# Patient Record
Sex: FEMALE | Race: BLACK OR AFRICAN AMERICAN | NOT HISPANIC OR LATINO | URBAN - METROPOLITAN AREA
[De-identification: names, ages, dates, MRNs, and addresses within clinical notes are randomized per-mention and may not be internally consistent; named-entity substitution may affect disease eponyms.]

---

## 2021-04-15 ENCOUNTER — INPATIENT (INPATIENT)
Facility: HOSPITAL | Age: 76
LOS: 17 days | Discharge: NURSING, REHAB OR LONG-TERM CARE | End: 2021-05-03
Attending: INTERNAL MEDICINE | Admitting: INTERNAL MEDICINE
Payer: MEDICARE

## 2021-04-15 VITALS
DIASTOLIC BLOOD PRESSURE: 79 MMHG | SYSTOLIC BLOOD PRESSURE: 145 MMHG | RESPIRATION RATE: 16 BRPM | TEMPERATURE: 98 F | WEIGHT: 188.94 LBS | OXYGEN SATURATION: 100 % | HEART RATE: 68 BPM | HEIGHT: 64 IN

## 2021-04-15 DIAGNOSIS — K92.2 GASTROINTESTINAL HEMORRHAGE, UNSPECIFIED: Chronic | ICD-10-CM

## 2021-04-15 LAB
ALBUMIN SERPL ELPH-MCNC: 3.6 G/DL — SIGNIFICANT CHANGE UP (ref 3.3–5)
ALP SERPL-CCNC: 87 U/L — SIGNIFICANT CHANGE UP (ref 40–120)
ALT FLD-CCNC: 18 U/L — SIGNIFICANT CHANGE UP (ref 12–78)
ANION GAP SERPL CALC-SCNC: 7 MMOL/L — SIGNIFICANT CHANGE UP (ref 5–17)
APPEARANCE UR: CLEAR — SIGNIFICANT CHANGE UP
AST SERPL-CCNC: 24 U/L — SIGNIFICANT CHANGE UP (ref 15–37)
BACTERIA # UR AUTO: ABNORMAL
BASOPHILS # BLD AUTO: 0.03 K/UL — SIGNIFICANT CHANGE UP (ref 0–0.2)
BASOPHILS NFR BLD AUTO: 0.5 % — SIGNIFICANT CHANGE UP (ref 0–2)
BILIRUB SERPL-MCNC: 0.4 MG/DL — SIGNIFICANT CHANGE UP (ref 0.2–1.2)
BILIRUB UR-MCNC: NEGATIVE — SIGNIFICANT CHANGE UP
BUN SERPL-MCNC: 67 MG/DL — HIGH (ref 7–23)
CALCIUM SERPL-MCNC: 8.4 MG/DL — LOW (ref 8.5–10.1)
CHLORIDE SERPL-SCNC: 118 MMOL/L — HIGH (ref 96–108)
CO2 SERPL-SCNC: 17 MMOL/L — LOW (ref 22–31)
COLOR SPEC: YELLOW — SIGNIFICANT CHANGE UP
CREAT SERPL-MCNC: 3.43 MG/DL — HIGH (ref 0.5–1.3)
DIFF PNL FLD: NEGATIVE — SIGNIFICANT CHANGE UP
EOSINOPHIL # BLD AUTO: 0.19 K/UL — SIGNIFICANT CHANGE UP (ref 0–0.5)
EOSINOPHIL NFR BLD AUTO: 3.1 % — SIGNIFICANT CHANGE UP (ref 0–6)
EPI CELLS # UR: SIGNIFICANT CHANGE UP
GLUCOSE BLDC GLUCOMTR-MCNC: 244 MG/DL — HIGH (ref 70–99)
GLUCOSE BLDC GLUCOMTR-MCNC: 248 MG/DL — HIGH (ref 70–99)
GLUCOSE SERPL-MCNC: 147 MG/DL — HIGH (ref 70–99)
GLUCOSE UR QL: NEGATIVE MG/DL — SIGNIFICANT CHANGE UP
HCT VFR BLD CALC: 27.5 % — LOW (ref 34.5–45)
HGB BLD-MCNC: 8.9 G/DL — LOW (ref 11.5–15.5)
IMM GRANULOCYTES NFR BLD AUTO: 1.1 % — SIGNIFICANT CHANGE UP (ref 0–1.5)
KETONES UR-MCNC: NEGATIVE — SIGNIFICANT CHANGE UP
LEUKOCYTE ESTERASE UR-ACNC: ABNORMAL
LYMPHOCYTES # BLD AUTO: 1.51 K/UL — SIGNIFICANT CHANGE UP (ref 1–3.3)
LYMPHOCYTES # BLD AUTO: 24.8 % — SIGNIFICANT CHANGE UP (ref 13–44)
MCHC RBC-ENTMCNC: 27.6 PG — SIGNIFICANT CHANGE UP (ref 27–34)
MCHC RBC-ENTMCNC: 32.4 GM/DL — SIGNIFICANT CHANGE UP (ref 32–36)
MCV RBC AUTO: 85.4 FL — SIGNIFICANT CHANGE UP (ref 80–100)
MONOCYTES # BLD AUTO: 0.32 K/UL — SIGNIFICANT CHANGE UP (ref 0–0.9)
MONOCYTES NFR BLD AUTO: 5.3 % — SIGNIFICANT CHANGE UP (ref 2–14)
NEUTROPHILS # BLD AUTO: 3.97 K/UL — SIGNIFICANT CHANGE UP (ref 1.8–7.4)
NEUTROPHILS NFR BLD AUTO: 65.2 % — SIGNIFICANT CHANGE UP (ref 43–77)
NITRITE UR-MCNC: NEGATIVE — SIGNIFICANT CHANGE UP
NRBC # BLD: 0 /100 WBCS — SIGNIFICANT CHANGE UP (ref 0–0)
PH UR: 5 — SIGNIFICANT CHANGE UP (ref 5–8)
PLATELET # BLD AUTO: 189 K/UL — SIGNIFICANT CHANGE UP (ref 150–400)
POTASSIUM SERPL-MCNC: 5.7 MMOL/L — HIGH (ref 3.5–5.3)
POTASSIUM SERPL-SCNC: 5.7 MMOL/L — HIGH (ref 3.5–5.3)
PROT SERPL-MCNC: 7.2 GM/DL — SIGNIFICANT CHANGE UP (ref 6–8.3)
PROT UR-MCNC: 100 MG/DL
RAPID RVP RESULT: SIGNIFICANT CHANGE UP
RBC # BLD: 3.22 M/UL — LOW (ref 3.8–5.2)
RBC # FLD: 14.5 % — SIGNIFICANT CHANGE UP (ref 10.3–14.5)
RBC CASTS # UR COMP ASSIST: SIGNIFICANT CHANGE UP /HPF (ref 0–4)
SARS-COV-2 RNA SPEC QL NAA+PROBE: SIGNIFICANT CHANGE UP
SODIUM SERPL-SCNC: 142 MMOL/L — SIGNIFICANT CHANGE UP (ref 135–145)
SP GR SPEC: 1.01 — SIGNIFICANT CHANGE UP (ref 1.01–1.02)
UROBILINOGEN FLD QL: NEGATIVE MG/DL — SIGNIFICANT CHANGE UP
WBC # BLD: 6.09 K/UL — SIGNIFICANT CHANGE UP (ref 3.8–10.5)
WBC # FLD AUTO: 6.09 K/UL — SIGNIFICANT CHANGE UP (ref 3.8–10.5)
WBC UR QL: SIGNIFICANT CHANGE UP

## 2021-04-15 PROCEDURE — 99285 EMERGENCY DEPT VISIT HI MDM: CPT | Mod: CS

## 2021-04-15 PROCEDURE — 72100 X-RAY EXAM L-S SPINE 2/3 VWS: CPT | Mod: 26

## 2021-04-15 PROCEDURE — 72131 CT LUMBAR SPINE W/O DYE: CPT | Mod: 26,MA

## 2021-04-15 PROCEDURE — 93970 EXTREMITY STUDY: CPT | Mod: 26

## 2021-04-15 PROCEDURE — 99223 1ST HOSP IP/OBS HIGH 75: CPT

## 2021-04-15 RX ORDER — SIMVASTATIN 20 MG/1
40 TABLET, FILM COATED ORAL AT BEDTIME
Refills: 0 | Status: DISCONTINUED | OUTPATIENT
Start: 2021-04-15 | End: 2021-05-03

## 2021-04-15 RX ORDER — SODIUM POLYSTYRENE SULFONATE 4.1 MEQ/G
30 POWDER, FOR SUSPENSION ORAL ONCE
Refills: 0 | Status: COMPLETED | OUTPATIENT
Start: 2021-04-15 | End: 2021-04-15

## 2021-04-15 RX ORDER — LABETALOL HCL 100 MG
200 TABLET ORAL THREE TIMES A DAY
Refills: 0 | Status: DISCONTINUED | OUTPATIENT
Start: 2021-04-15 | End: 2021-04-22

## 2021-04-15 RX ORDER — DEXTROSE 50 % IN WATER 50 %
15 SYRINGE (ML) INTRAVENOUS ONCE
Refills: 0 | Status: DISCONTINUED | OUTPATIENT
Start: 2021-04-15 | End: 2021-05-03

## 2021-04-15 RX ORDER — SODIUM CHLORIDE 9 MG/ML
1000 INJECTION, SOLUTION INTRAVENOUS
Refills: 0 | Status: DISCONTINUED | OUTPATIENT
Start: 2021-04-15 | End: 2021-05-03

## 2021-04-15 RX ORDER — DEXTROSE 50 % IN WATER 50 %
25 SYRINGE (ML) INTRAVENOUS ONCE
Refills: 0 | Status: DISCONTINUED | OUTPATIENT
Start: 2021-04-15 | End: 2021-05-03

## 2021-04-15 RX ORDER — GABAPENTIN 400 MG/1
300 CAPSULE ORAL AT BEDTIME
Refills: 0 | Status: DISCONTINUED | OUTPATIENT
Start: 2021-04-15 | End: 2021-05-03

## 2021-04-15 RX ORDER — DEXTROSE 50 % IN WATER 50 %
12.5 SYRINGE (ML) INTRAVENOUS ONCE
Refills: 0 | Status: DISCONTINUED | OUTPATIENT
Start: 2021-04-15 | End: 2021-05-03

## 2021-04-15 RX ORDER — AMLODIPINE BESYLATE 2.5 MG/1
10 TABLET ORAL DAILY
Refills: 0 | Status: DISCONTINUED | OUTPATIENT
Start: 2021-04-15 | End: 2021-05-03

## 2021-04-15 RX ORDER — METHOCARBAMOL 500 MG/1
1000 TABLET, FILM COATED ORAL ONCE
Refills: 0 | Status: COMPLETED | OUTPATIENT
Start: 2021-04-15 | End: 2021-04-15

## 2021-04-15 RX ORDER — LIDOCAINE 4 G/100G
1 CREAM TOPICAL DAILY
Refills: 0 | Status: DISCONTINUED | OUTPATIENT
Start: 2021-04-15 | End: 2021-05-03

## 2021-04-15 RX ORDER — HEPARIN SODIUM 5000 [USP'U]/ML
5000 INJECTION INTRAVENOUS; SUBCUTANEOUS EVERY 12 HOURS
Refills: 0 | Status: COMPLETED | OUTPATIENT
Start: 2021-04-15 | End: 2021-04-29

## 2021-04-15 RX ORDER — ONDANSETRON 8 MG/1
4 TABLET, FILM COATED ORAL ONCE
Refills: 0 | Status: COMPLETED | OUTPATIENT
Start: 2021-04-15 | End: 2021-04-15

## 2021-04-15 RX ORDER — POLYETHYLENE GLYCOL 3350 17 G/17G
17 POWDER, FOR SOLUTION ORAL DAILY
Refills: 0 | Status: DISCONTINUED | OUTPATIENT
Start: 2021-04-15 | End: 2021-05-03

## 2021-04-15 RX ORDER — INSULIN LISPRO 100/ML
VIAL (ML) SUBCUTANEOUS
Refills: 0 | Status: DISCONTINUED | OUTPATIENT
Start: 2021-04-15 | End: 2021-05-03

## 2021-04-15 RX ORDER — FUROSEMIDE 40 MG
40 TABLET ORAL DAILY
Refills: 0 | Status: DISCONTINUED | OUTPATIENT
Start: 2021-04-15 | End: 2021-04-20

## 2021-04-15 RX ORDER — TRAMADOL HYDROCHLORIDE 50 MG/1
50 TABLET ORAL ONCE
Refills: 0 | Status: DISCONTINUED | OUTPATIENT
Start: 2021-04-15 | End: 2021-04-15

## 2021-04-15 RX ORDER — GLUCAGON INJECTION, SOLUTION 0.5 MG/.1ML
1 INJECTION, SOLUTION SUBCUTANEOUS ONCE
Refills: 0 | Status: DISCONTINUED | OUTPATIENT
Start: 2021-04-15 | End: 2021-05-03

## 2021-04-15 RX ORDER — INSULIN GLARGINE 100 [IU]/ML
12 INJECTION, SOLUTION SUBCUTANEOUS AT BEDTIME
Refills: 0 | Status: DISCONTINUED | OUTPATIENT
Start: 2021-04-15 | End: 2021-04-25

## 2021-04-15 RX ORDER — SENNA PLUS 8.6 MG/1
2 TABLET ORAL AT BEDTIME
Refills: 0 | Status: DISCONTINUED | OUTPATIENT
Start: 2021-04-15 | End: 2021-05-03

## 2021-04-15 RX ORDER — OXYCODONE AND ACETAMINOPHEN 5; 325 MG/1; MG/1
1 TABLET ORAL EVERY 4 HOURS
Refills: 0 | Status: DISCONTINUED | OUTPATIENT
Start: 2021-04-15 | End: 2021-04-19

## 2021-04-15 RX ORDER — MORPHINE SULFATE 50 MG/1
2 CAPSULE, EXTENDED RELEASE ORAL ONCE
Refills: 0 | Status: DISCONTINUED | OUTPATIENT
Start: 2021-04-15 | End: 2021-04-15

## 2021-04-15 RX ADMIN — LIDOCAINE 1 PATCH: 4 CREAM TOPICAL at 19:34

## 2021-04-15 RX ADMIN — GABAPENTIN 300 MILLIGRAM(S): 400 CAPSULE ORAL at 21:03

## 2021-04-15 RX ADMIN — Medication 40 MILLIGRAM(S): at 20:14

## 2021-04-15 RX ADMIN — Medication 125 MILLIGRAM(S): at 18:07

## 2021-04-15 RX ADMIN — Medication 200 MILLIGRAM(S): at 20:14

## 2021-04-15 RX ADMIN — TRAMADOL HYDROCHLORIDE 50 MILLIGRAM(S): 50 TABLET ORAL at 15:21

## 2021-04-15 RX ADMIN — METHOCARBAMOL 1000 MILLIGRAM(S): 500 TABLET, FILM COATED ORAL at 15:21

## 2021-04-15 RX ADMIN — SIMVASTATIN 40 MILLIGRAM(S): 20 TABLET, FILM COATED ORAL at 21:03

## 2021-04-15 RX ADMIN — SODIUM POLYSTYRENE SULFONATE 30 GRAM(S): 4.1 POWDER, FOR SUSPENSION ORAL at 18:31

## 2021-04-15 RX ADMIN — INSULIN GLARGINE 12 UNIT(S): 100 INJECTION, SOLUTION SUBCUTANEOUS at 21:04

## 2021-04-15 NOTE — ED PROVIDER NOTE - CONSTITUTIONAL, MLM
aAwake, alert, oriented to person, place, time/situation, appear in pain especially with movement normal...

## 2021-04-15 NOTE — ED ADULT NURSE NOTE - OBJECTIVE STATEMENT
A&Ox4, brought in by EMS on stretcher. c/o left groin pain radiating down to left toes, sharp pain 10/10, started today around 1am, pain woke pt up from sleep. constant, worse with ambulating. A&Ox4, brought in by EMS on stretcher. c/o left groin pain radiating down to left toes, sharp pain 10/10, started today around 1am, pain woke pt up from sleep. constant, worse with ambulating. states that both legs were swollen all week. PMH b/l DVT 2008. Alix [place] and SOB

## 2021-04-15 NOTE — H&P ADULT - ASSESSMENT
75 year old female presents today c/o left sided back pain radiating into the left side since waking up this morning. Patient states that she was attempting to get out of bed to use the bathroom when she experienced sever pain described as an ache felt inside associated with numbness of the left leg. Patient  reports difficulty ambulating due to the pain worsening when she bears weight. Patient denies recent trauma but does report having physical therapy in 2007 due to problems in her lower back (-) urinary or bowel incontinence, fevers or chills, flu like symptoms, chest pain or sob. Called her PMD this morning and told to go to ER for an evaluation.     Ortho:  75 year old female presents today c/o left sided back pain radiating into the left side since waking up this morning. Patient states that she was attempting to get out of bed to use the bathroom when she experienced sever pain described as an ache felt inside associated with numbness of the left leg. Patient  reports difficulty ambulating due to the pain worsening when she bears weight. Patient denies recent trauma but does report having physical therapy in 2007 due to problems in her lower back (-) urinary or bowel incontinence, fevers or chills, flu like symptoms, chest pain or sob. Called her PMD this morning and told to go to ER for an evaluation.     Ortho:  Acute left leg siatica, moderate to severe spinal stenosis. PT eval, lidocaine patch, currently refusing medrol dose pack, may   reconsider in AM. Asked Ortho to eval. PRN percocet, DVT prophylaxis.     CV: Continue BP meds from home, Clonidine patch every Monday, is on in ER, Norvasc 10 mg/day, Labatelol 200 mg tid, Lasix   40 mg/day and ZXocur 40 mg/day.    DM: Uses NPH 70/30 bid about 15 units/day. Will change to Lantus 15 units at night.     75 year old female presents today c/o left sided back pain radiating into the left side since waking up this morning. Patient states that she was attempting to get out of bed to use the bathroom when she experienced sever pain described as an ache felt inside associated with numbness of the left leg. Patient  reports difficulty ambulating due to the pain worsening when she bears weight. Patient denies recent trauma but does report having physical therapy in 2007 due to problems in her lower back (-) urinary or bowel incontinence, fevers or chills, flu like symptoms, chest pain or sob. Called her PMD this morning and told to go to ER for an evaluation.     Ortho:  Acute left leg siatica, moderate to severe spinal stenosis. PT eval, lidocaine patch, currently refusing medrol dose pack, may   reconsider in AM. Asked Ortho to eval. PRN percocet, DVT prophylaxis.     CV: Continue BP meds from home, Clonidine patch every Monday, is on in ER, Norvasc 10 mg/day, Labatelol 200 mg tid, Lasix   40 mg/day and Zocur 40 mg/day.    DM: Uses NPH 70/30 bid about 15 units total per day. Will change to Lantus 15 units at night.        Renal: CKD IV stable, at baseline with creatinine 3.43. High K in ER, was given k-exolate by ER, repeat in AM. Renal diabetic diet.    75 year old female presents today c/o left sided back pain radiating into the left side since waking up this morning. Patient states that she was attempting to get out of bed to use the bathroom when she experienced sever pain described as an ache felt inside associated with numbness of the left leg. Patient  reports difficulty ambulating due to the pain worsening when she bears weight. Patient denies recent trauma but does report having physical therapy in 2007 due to problems in her lower back (-) urinary or bowel incontinence, fevers or chills, flu like symptoms, chest pain or sob. Called her PMD this morning and told to go to ER for an evaluation.     Ortho:  Acute left leg siatica, moderate to severe spinal stenosis. PT eval, lidocaine patch, currently refusing medrol dose pack, may   reconsider in AM. Asked Ortho to eval. PRN percocet, DVT prophylaxis. Start Neurontin 300 mg at night. Max dose per day. Confirmed dose with pharmacy  for CKD IV.     CV: Continue BP meds from home, Clonidine patch weekly every Monday .3 mg, is on in ER from home, Norvasc 10 mg/day, Labatelol 200 mg tid, Lasix   40 mg/day and Zocur 40 mg/day.    DM: Uses NPH 70/30 bid about 15 units total per day. Will change to Lantus 15 units at night.        Renal: CKD IV stable, at baseline with creatinine 3.43. High K in ER, was given k-exolate by ER, repeat in AM. Renal diabetic diet.    HGB likely anemia of chronic disease with HGB 8.9.   75 year old female presents today c/o left sided back pain radiating into the left side since waking up this morning. Patient states that she was attempting to get out of bed to use the bathroom when she experienced sever pain described as an ache felt inside associated with numbness of the left leg. Patient  reports difficulty ambulating due to the pain worsening when she bears weight. Patient denies recent trauma but does report having physical therapy in 2007 due to problems in her lower back (-) urinary or bowel incontinence, fevers or chills, flu like symptoms, chest pain or sob. Called her PMD this morning and told to go to ER for an evaluation.     Ortho:  Acute left leg siatica, moderate to severe spinal stenosis. PT eval, lidocaine patch, currently refusing medrol dose pack, may   reconsider in AM. Asked Ortho to eval. PRN percocet, DVT prophylaxis. Start Neurontin 300 mg at night. Max dose per day. Confirmed dose with pharmacy  for CKD IV.     CV: Continue BP meds from home, Clonidine patch weekly every Monday .3 mg, is on in ER from home, Norvasc 10 mg/day, Labatelol 200 mg tid, Lasix   40 mg/day and Zocur 40 mg/day.    DM: Uses NPH 70/30 bid about 15 units total per day. Will change to Lantus 12 units at night.        Renal: CKD IV stable, at baseline with creatinine 3.43. High K in ER, was given k-exolate by ER, repeat in AM. Renal diabetic diet.    HGB likely anemia of chronic disease with HGB 8.9.

## 2021-04-15 NOTE — CONSULT NOTE ADULT - SUBJECTIVE AND OBJECTIVE BOX
Patient is a 75y Female who presents c/o back pain that radiates down the left leg that began 1 day ago without any inciting factor. Patient states that she hasn't experienced pain this severe before. Denies trauma or fall. Denies pain/injury elsewhere. Admits to radiation of pain down the left extremity. Denies bowel/bladder incontinence. Denies fevers/chills. No other complaints at this time. Patient is a community ambulator with a walker. Patient has a history of a pacemaker and stage 4 chronic kidney disease.       Allergies    Eliquis (Other)          PAST MEDICAL & SURGICAL HISTORY:  HTN (hypertension)    Pacemaker    DVT (deep venous thrombosis)    Diverticulitis    Chronic renal disease    Intestinal bleeding                              8.9    6.09  )-----------( 189      ( 15 Apr 2021 15:04 )             27.5       15 Apr 2021 15:04    142    |  118    |  67     ----------------------------<  147    5.7     |  17     |  3.43     Ca    8.4        15 Apr 2021 15:04    TPro  7.2    /  Alb  3.6    /  TBili  0.4    /  DBili  x      /  AST  24     /  ALT  18     /  AlkPhos  87     15 Apr 2021 15:04          Urinalysis Basic - ( 15 Apr 2021 20:01 )    Color: Yellow / Appearance: Clear / S.015 / pH: x  Gluc: x / Ketone: Negative  / Bili: Negative / Urobili: Negative mg/dL   Blood: x / Protein: 100 mg/dL / Nitrite: Negative   Leuk Esterase: Trace / RBC: 0-2 /HPF / WBC 3-5   Sq Epi: x / Non Sq Epi: Few / Bacteria: Few        Vital Signs Last 24 Hrs  T(C): 36.4 (04-15-21 @ 18:41), Max: 36.6 (04-15-21 @ 13:33)  T(F): 97.5 (04-15-21 @ 18:41), Max: 97.9 (04-15-21 @ 13:33)  HR: 60 (04-15-21 @ 20:10) (60 - 68)  BP: 170/70 (04-15-21 @ 20:10) (145/79 - 171/75)  BP(mean): --  RR: 17 (04-15-21 @ 20:10) (16 - 21)  SpO2: 98% (04-15-21 @ 20:10) (98% - 100%)    Physical Exam:  Gen: NAD  Spine:  Skin intact  Lumbar scoliosis on exam   No midline TTP C/T/L/S spine  No bony step offs  Paraspinal muscle ttp/hypertonicity in the lumbar area  Pain with Straight leg raise of the left side  Negative clonus  Negative babinski  Negative cobian    Motor:                   C5                C6              C7               C8           T1   R            5/5                5/5            5/5             5/5          5/5  L             5/5               5/5             5/5             5/5          5/5                L2             L3             L4               L5            S1  R         5/5           5/5          5/5             5/5           5/5  L          3/5          4/5           5/5             5/5           5/5    Sensory:            C5         C6         C7      C8       T1        (0=absent, 1=impaired, 2=normal, NT=not testable)  R         2            2           2        2         2  L          2            2           2        2         2               L2          L3         L4      L5       S1         (0=absent, 1=impaired, 2=normal, NT=not testable)  R         2            2            2        2        2  L          2            2           2        2         2    Imaging: CT of the lumbar spine demonstrates a scoliotic curve present in the thoracolumbar junction. The patient also has severe degenerative spine disease throughout with severe facet arthritis. There is an L4-5 anterograde spondylolisthesis.     A/P: 75y Female with left sided sciatic like pain likely 2/2 severe degenerative disc disease    Would recommend MRI of lumbar spine if the patients PPM was compatible, however not urgent  Pain control, recommend tramadol/gabapentin   If cleared by medical team would recommend low dose steroids/medrol dose pack   WBAT with assistive devices as needed  PT/OT  Orthopaedically stable for discharge  No acute surgical orthopedic indication at this time  Follow up w/ Dr. Siegel 1-2 weeks after discharge. Call office to schedule appointment.  All patient's questions answered. Patient understands and agrees w/ above plan.  Will discuss w/ attending and advise if plan changes.

## 2021-04-15 NOTE — H&P ADULT - NSICDXPASTMEDICALHX_GEN_ALL_CORE_FT
PAST MEDICAL HISTORY:  Chronic renal disease     Diverticulitis     DVT (deep venous thrombosis)     HTN (hypertension)     Pacemaker

## 2021-04-15 NOTE — ED PROVIDER NOTE - CLINICAL SUMMARY MEDICAL DECISION MAKING FREE TEXT BOX
pt presented today with acute onstead of back pain radiating into her left lower leg w paresthesia and difficulty ambulating, labs/ct/pain control, dispo pending results

## 2021-04-15 NOTE — ED PROVIDER NOTE - PMH
Diverticulitis    DVT (deep venous thrombosis)    HTN (hypertension)    Pacemaker     Chronic renal disease    Diverticulitis    DVT (deep venous thrombosis)    HTN (hypertension)    Pacemaker

## 2021-04-15 NOTE — H&P ADULT - NSHPPHYSICALEXAM_GEN_ALL_CORE
PHYSICAL EXAMINATION:  Vital Signs Last 24 Hrs  T(C): 36.3 (15 Apr 2021 15:34), Max: 36.6 (15 Apr 2021 13:33)  T(F): 97.4 (15 Apr 2021 15:34), Max: 97.9 (15 Apr 2021 13:33)  HR: 60 (15 Apr 2021 15:34) (60 - 68)  BP: 156/66 (15 Apr 2021 15:34) (145/79 - 156/66)  BP(mean): --  RR: 21 (15 Apr 2021 15:34) (16 - 21)  SpO2: 98% (15 Apr 2021 15:34) (98% - 100%)  CAPILLARY BLOOD GLUCOSE          GENERAL: NAD, well-groomed, well-developed  HEAD:  atraumatic, normocephalic  EYES: sclera anicteric  ENMT: mucous membranes moist  NECK: supple, No JVD  CHEST/LUNG: clear to auscultation bilaterally; no rales, rhonchi, or wheezing b/l  HEART: normal S1, S2  ABDOMEN: BS+, soft, ND, NT   EXTREMITIES:  pulses palpable; no clubbing, cyanosis, or edema b/l LEs  NEURO: awake, alert, interactive; moves all extremities  SKIN: no rashes or lesions PHYSICAL EXAMINATION:  Vital Signs Last 24 Hrs  T(C): 36.3 (15 Apr 2021 15:34), Max: 36.6 (15 Apr 2021 13:33)  T(F): 97.4 (15 Apr 2021 15:34), Max: 97.9 (15 Apr 2021 13:33)  HR: 60 (15 Apr 2021 15:34) (60 - 68)  BP: 156/66 (15 Apr 2021 15:34) (145/79 - 156/66)  BP(mean): --  RR: 21 (15 Apr 2021 15:34) (16 - 21)  SpO2: 98% (15 Apr 2021 15:34) (98% - 100%)  CAPILLARY BLOOD GLUCOSE          GENERAL: NAD, well-groomed, seen in ER, left leg pain  HEAD:  atraumatic, normocephalic  EYES: sclera anicteric  ENMT: mucous membranes moist  NECK: supple, No JVD  CHEST/LUNG: clear to auscultation bilaterally; no rales, rhonchi, or wheezing b/l  HEART: normal S1, S2  ABDOMEN: BS+, soft, ND, NT   EXTREMITIES:  pulses palpable; no clubbing, cyanosis, or edema b/l LEs  NEURO: awake, alert, interactive; moves all extremities  LT intact both LE, left leg 3/5 MP, right leg 4/5 MP limited by pain  SKIN: no rashes or lesions

## 2021-04-15 NOTE — H&P ADULT - NSHPLABSRESULTS_GEN_ALL_CORE
LABS:                        8.9    6.09  )-----------( 189      ( 15 Apr 2021 15:04 )             27.5     04-15    142  |  118<H>  |  67<H>  ----------------------------<  147<H>  5.7<H>   |  17<L>  |  3.43<H>    Ca    8.4<L>      15 Apr 2021 15:04    TPro  7.2  /  Alb  3.6  /  TBili  0.4  /  DBili  x   /  AST  24  /  ALT  18  /  AlkPhos  87  04-15            RADIOLOGY & ADDITIONAL TESTS:

## 2021-04-15 NOTE — H&P ADULT - HISTORY OF PRESENT ILLNESS
75 year old female presents today c/o left sided back pain radiating into the left side since waking up this morning. Patient states that she was attempting to get out of bed to use the bathroom when she experienced sever pain described as an ache felt inside associated with numbness of the left leg. Patient  reports difficulty ambulating due to the pain worsening when she bears weight. Patient denies recent trauma but does report having physical therapy in 2007 due to problems in her lower back (-) urinary or bowel incontinence, fevers or chills, flu like symptoms, chest pain or sob. Called her PMD this morning and told to go to ER for an evaluation.

## 2021-04-15 NOTE — ED ADULT NURSE NOTE - ED STAT RN HANDOFF DETAILS
report given to Paula FOX. pt resting in stretcher offers no c/o at this time. pt drinking kayexalate

## 2021-04-15 NOTE — ED PROVIDER NOTE - CARE PLAN
Principal Discharge DX:	Lumbar radiculopathy, acute   Principal Discharge DX:	Lumbar radiculopathy, acute  Secondary Diagnosis:	Unable to ambulate

## 2021-04-15 NOTE — GOALS OF CARE CONVERSATION - ADVANCED CARE PLANNING - CONVERSATION DETAILS
Notified by RN of pt stating she has DNR/MOLST but doesn't have paperwork with her.  d/w pt at length; she tells me "when god tells me it's time, let me go."  She reports her son, who is also POA, has MOLST and will be bringing from [place] tomorrow.    I d/w her in lay terms what DNR and DNI encompass and she confirms this to be her wishes.  She declines to complete a new form, requests we honor her wishes and wait for original copy tomorrow.  pt is aaox4. will place order, day team to follow.

## 2021-04-15 NOTE — ED PROVIDER NOTE - PROGRESS NOTE DETAILS
pt continues to have pain, pt tried to ambulate but has pain, refused more medication except for solumedrol, pt's son did call and updated, he does confirm pt's chronic kidney problems

## 2021-04-15 NOTE — PATIENT PROFILE ADULT - BILL PAYMENT
Daily Note     Today's date: 3/10/2021  Patient name: Quoc Cisneros  : 1940  MRN: [id number]  Referring provider: Avi Andres DO  Dx:   Encounter Diagnosis     ICD-10-CM    1  Decreased motor strength  R53 1    2  Ambulatory dysfunction  R26 2                   Subjective: Pt reports some mild discomfort after IE  Today states he feels "pretty good "      Objective: See treatment diary below      Assessment: Reviewed/performed HR  Performed new exercises w/o complaint  Added PROM to R hip, motions improved with repetitions  Tolerated treatment well  Will monitor  Patient would benefit from continued PT      Plan: Continue per plan of care        Precautions: h/o prostate CA, PVCs      Manuals  3/10           R hip PROM  10'                                                  Neuro Re-Ed             NBOS foam  3x30"  No UE           Tandem walking  x2laps           Walking with head turns             Wobble board                                                    Ther Ex             Roll outs w/ pball- fwd  5"x10           Seated march  x15           HR  HEP x20           Step up/down  6"x10  ea                                                               Ther Activity                                       Gait Training                                       Modalities
no

## 2021-04-16 LAB
A1C WITH ESTIMATED AVERAGE GLUCOSE RESULT: 5.8 % — HIGH (ref 4–5.6)
ANION GAP SERPL CALC-SCNC: 9 MMOL/L — SIGNIFICANT CHANGE UP (ref 5–17)
BUN SERPL-MCNC: 73 MG/DL — HIGH (ref 7–23)
CALCIUM SERPL-MCNC: 8.7 MG/DL — SIGNIFICANT CHANGE UP (ref 8.5–10.1)
CHLORIDE SERPL-SCNC: 115 MMOL/L — HIGH (ref 96–108)
CO2 SERPL-SCNC: 16 MMOL/L — LOW (ref 22–31)
COVID-19 SPIKE DOMAIN AB INTERP: NEGATIVE — SIGNIFICANT CHANGE UP
COVID-19 SPIKE DOMAIN ANTIBODY RESULT: 0.4 U/ML — SIGNIFICANT CHANGE UP
CREAT SERPL-MCNC: 3.36 MG/DL — HIGH (ref 0.5–1.3)
ESTIMATED AVERAGE GLUCOSE: 120 MG/DL — HIGH (ref 68–114)
FERRITIN SERPL-MCNC: 230 NG/ML — HIGH (ref 15–150)
GLUCOSE BLDC GLUCOMTR-MCNC: 118 MG/DL — HIGH (ref 70–99)
GLUCOSE BLDC GLUCOMTR-MCNC: 196 MG/DL — HIGH (ref 70–99)
GLUCOSE BLDC GLUCOMTR-MCNC: 216 MG/DL — HIGH (ref 70–99)
GLUCOSE BLDC GLUCOMTR-MCNC: 267 MG/DL — HIGH (ref 70–99)
GLUCOSE SERPL-MCNC: 214 MG/DL — HIGH (ref 70–99)
HCT VFR BLD CALC: 29.8 % — LOW (ref 34.5–45)
HGB BLD-MCNC: 9.4 G/DL — LOW (ref 11.5–15.5)
IRON SATN MFR SERPL: 14 % — SIGNIFICANT CHANGE UP (ref 14–50)
IRON SATN MFR SERPL: 41 UG/DL — SIGNIFICANT CHANGE UP (ref 30–160)
MCHC RBC-ENTMCNC: 27.6 PG — SIGNIFICANT CHANGE UP (ref 27–34)
MCHC RBC-ENTMCNC: 31.5 GM/DL — LOW (ref 32–36)
MCV RBC AUTO: 87.6 FL — SIGNIFICANT CHANGE UP (ref 80–100)
NRBC # BLD: 0 /100 WBCS — SIGNIFICANT CHANGE UP (ref 0–0)
OB PNL STL: NEGATIVE — SIGNIFICANT CHANGE UP
PLATELET # BLD AUTO: 214 K/UL — SIGNIFICANT CHANGE UP (ref 150–400)
POTASSIUM SERPL-MCNC: 5.6 MMOL/L — HIGH (ref 3.5–5.3)
POTASSIUM SERPL-SCNC: 5.6 MMOL/L — HIGH (ref 3.5–5.3)
RBC # BLD: 3.4 M/UL — LOW (ref 3.8–5.2)
RBC # FLD: 14.6 % — HIGH (ref 10.3–14.5)
SARS-COV-2 IGG+IGM SERPL QL IA: 0.4 U/ML — SIGNIFICANT CHANGE UP
SARS-COV-2 IGG+IGM SERPL QL IA: NEGATIVE — SIGNIFICANT CHANGE UP
SODIUM SERPL-SCNC: 140 MMOL/L — SIGNIFICANT CHANGE UP (ref 135–145)
TIBC SERPL-MCNC: 284 UG/DL — SIGNIFICANT CHANGE UP (ref 220–430)
UIBC SERPL-MCNC: 244 UG/DL — SIGNIFICANT CHANGE UP (ref 110–370)
WBC # BLD: 7.72 K/UL — SIGNIFICANT CHANGE UP (ref 3.8–10.5)
WBC # FLD AUTO: 7.72 K/UL — SIGNIFICANT CHANGE UP (ref 3.8–10.5)

## 2021-04-16 PROCEDURE — 72170 X-RAY EXAM OF PELVIS: CPT | Mod: 26

## 2021-04-16 PROCEDURE — 99233 SBSQ HOSP IP/OBS HIGH 50: CPT

## 2021-04-16 RX ORDER — SODIUM POLYSTYRENE SULFONATE 4.1 MEQ/G
30 POWDER, FOR SUSPENSION ORAL ONCE
Refills: 0 | Status: COMPLETED | OUTPATIENT
Start: 2021-04-16 | End: 2021-04-16

## 2021-04-16 RX ADMIN — HEPARIN SODIUM 5000 UNIT(S): 5000 INJECTION INTRAVENOUS; SUBCUTANEOUS at 06:00

## 2021-04-16 RX ADMIN — SODIUM POLYSTYRENE SULFONATE 30 GRAM(S): 4.1 POWDER, FOR SUSPENSION ORAL at 17:47

## 2021-04-16 RX ADMIN — Medication 6: at 11:16

## 2021-04-16 RX ADMIN — SENNA PLUS 2 TABLET(S): 8.6 TABLET ORAL at 21:58

## 2021-04-16 RX ADMIN — Medication 4: at 08:05

## 2021-04-16 RX ADMIN — LIDOCAINE 1 PATCH: 4 CREAM TOPICAL at 07:08

## 2021-04-16 RX ADMIN — LIDOCAINE 1 PATCH: 4 CREAM TOPICAL at 11:18

## 2021-04-16 RX ADMIN — INSULIN GLARGINE 12 UNIT(S): 100 INJECTION, SOLUTION SUBCUTANEOUS at 21:58

## 2021-04-16 RX ADMIN — Medication 200 MILLIGRAM(S): at 15:39

## 2021-04-16 RX ADMIN — LIDOCAINE 1 PATCH: 4 CREAM TOPICAL at 23:00

## 2021-04-16 RX ADMIN — POLYETHYLENE GLYCOL 3350 17 GRAM(S): 17 POWDER, FOR SOLUTION ORAL at 11:16

## 2021-04-16 RX ADMIN — GABAPENTIN 300 MILLIGRAM(S): 400 CAPSULE ORAL at 21:59

## 2021-04-16 RX ADMIN — Medication 40 MILLIGRAM(S): at 06:00

## 2021-04-16 RX ADMIN — LIDOCAINE 1 PATCH: 4 CREAM TOPICAL at 19:36

## 2021-04-16 RX ADMIN — SIMVASTATIN 40 MILLIGRAM(S): 20 TABLET, FILM COATED ORAL at 21:58

## 2021-04-16 RX ADMIN — Medication 200 MILLIGRAM(S): at 05:59

## 2021-04-16 RX ADMIN — Medication 50 MILLIGRAM(S): at 17:47

## 2021-04-16 RX ADMIN — AMLODIPINE BESYLATE 10 MILLIGRAM(S): 2.5 TABLET ORAL at 05:59

## 2021-04-16 RX ADMIN — Medication 200 MILLIGRAM(S): at 21:58

## 2021-04-16 NOTE — PHYSICAL THERAPY INITIAL EVALUATION ADULT - PERTINENT HX OF CURRENT PROBLEM, REHAB EVAL
Pt is a 75 year old female with a PMH of stage 4 chronic renal disease, diverticulitis, DVT, HTN, and pacemaker. Pt arrived to [facility] ED 4/15 with c/o left sided back pain and radiculopathy into LLE. Pt denies trauma/fall, or injury elsewhere. CT of the lumbar spine shows scoliosis at thoracolumbar curve, severe degenerative spine disease throughout with severe facet arthritis & L4-5 anterograde spondylolisthesis. Ortho consult deemed no acute surgical orthopedic indication at this time.

## 2021-04-16 NOTE — PROGRESS NOTE ADULT - SUBJECTIVE AND OBJECTIVE BOX
Patient is a 75y old  Female who presents with a chief complaint of Acute left leg siatica. (15 Apr 2021 20:30)    INTERVAL HPI/OVERNIGHT EVENTS:  Pt was seen and examined, no acute events.    MEDICATIONS  (STANDING):  amLODIPine   Tablet 10 milliGRAM(s) Oral daily  dextrose 40% Gel 15 Gram(s) Oral once  dextrose 5%. 1000 milliLiter(s) (50 mL/Hr) IV Continuous <Continuous>  dextrose 5%. 1000 milliLiter(s) (100 mL/Hr) IV Continuous <Continuous>  dextrose 50% Injectable 25 Gram(s) IV Push once  dextrose 50% Injectable 12.5 Gram(s) IV Push once  dextrose 50% Injectable 25 Gram(s) IV Push once  furosemide    Tablet 40 milliGRAM(s) Oral daily  gabapentin 300 milliGRAM(s) Oral at bedtime  glucagon  Injectable 1 milliGRAM(s) IntraMuscular once  heparin   Injectable 5000 Unit(s) SubCutaneous every 12 hours  insulin glargine Injectable (LANTUS) 12 Unit(s) SubCutaneous at bedtime  insulin lispro (ADMELOG) corrective regimen sliding scale   SubCutaneous three times a day before meals  labetalol 200 milliGRAM(s) Oral three times a day  lidocaine   Patch 1 Patch Transdermal daily  polyethylene glycol 3350 17 Gram(s) Oral daily  predniSONE   Tablet 50 milliGRAM(s) Oral daily  senna 2 Tablet(s) Oral at bedtime  simvastatin 40 milliGRAM(s) Oral at bedtime    MEDICATIONS  (PRN):  oxycodone    5 mG/acetaminophen 325 mG 1 Tablet(s) Oral every 4 hours PRN Mild Pain (1 - 3)      Allergies  Eliquis (Other)        Vital Signs Last 24 Hrs  T(C): 36.6 (2021 11:56), Max: 36.6 (2021 11:56)  T(F): 97.9 (2021 11:56), Max: 97.9 (2021 11:56)  HR: 63 (2021 14:55) (60 - 72)  BP: 169/73 (2021 14:55) (154/78 - 173/78)  BP(mean): --  RR: 18 (2021 11:56) (17 - 19)  SpO2: 98% (2021 14:55) (95% - 100%)      PHYSICAL EXAM:  GENERAL: NAD  HEAD:  Atraumatic  EYES: PERRLA  NERVOUS SYSTEM:  Awake, alert  CHEST/LUNG: Clear  HEART: RRR  ABDOMEN: Soft, non tender  EXTREMITIES:  no edema      LABS:                        9.4    7.72  )-----------( 214      ( 2021 08:51 )             29.8     04-16    140  |  115<H>  |  73<H>  ----------------------------<  214<H>  5.6<H>   |  16<L>  |  3.36<H>    Ca    8.7      2021 08:50    TPro  7.2  /  Alb  3.6  /  TBili  0.4  /  DBili  x   /  AST  24  /  ALT  18  /  AlkPhos  87  04-15      Urinalysis Basic - ( 15 Apr 2021 20:01 )    Color: Yellow / Appearance: Clear / S.015 / pH: x  Gluc: x / Ketone: Negative  / Bili: Negative / Urobili: Negative mg/dL   Blood: x / Protein: 100 mg/dL / Nitrite: Negative   Leuk Esterase: Trace / RBC: 0-2 /HPF / WBC 3-5   Sq Epi: x / Non Sq Epi: Few / Bacteria: Few      CAPILLARY BLOOD GLUCOSE      POCT Blood Glucose.: 118 mg/dL (2021 15:26)  POCT Blood Glucose.: 267 mg/dL (2021 11:11)  POCT Blood Glucose.: 216 mg/dL (2021 07:42)  POCT Blood Glucose.: 244 mg/dL (15 Apr 2021 22:26)  POCT Blood Glucose.: 248 mg/dL (15 Apr 2021 20:50)      RADIOLOGY & ADDITIONAL TESTS:    Imaging Personally Reviewed:  [ ] YES  [ ] NO    Consultant(s) Notes Reviewed:  [ ] YES  [ ] NO    Care Discussed with Consultants/Other Providers [ ] YES  [ ] NO

## 2021-04-16 NOTE — PHYSICAL THERAPY INITIAL EVALUATION ADULT - BED MOBILITY TRAINING, PT EVAL
Pt will perform bed mobility independently with safe and efficient technique without pain in 3 weeks.

## 2021-04-16 NOTE — PROGRESS NOTE ADULT - ASSESSMENT
75 year old female with PMH of CKD, HTN, diverticulitis, DVT? presents today c/o left sided back pain radiating into the left side since waking up this morning. Patient states that she was attempting to get out of bed to use the bathroom when she experienced sever pain described as an ache felt inside associated with numbness of the left leg. Patient  reports difficulty ambulating due to the pain worsening when she bears weight. Patient denies recent trauma but does report having physical therapy in 2007 due to problems in her lower back (-) urinary or bowel incontinence, fevers or chills, flu like symptoms, chest pain or sob. Called her PMD this morning and told to go to ER for an evaluation.     Left sided sciatica:  - Acute onset  - Pain management  - Start prednisone 50 mg daily X 5 days.  - Seen by ortho, no intervention   - PT mayteal: SERGEI    HTN:  - Continue BP meds from home, Clonidine patch weekly every Monday .3 mg, is on in ER from home, Norvasc 10 mg/day, Labatelol 200 mg tid, Lasix   40 mg/day     HLD:  - Continue Statin    DM:   - Uses NPH 70/30 bid about 15 units total per day at home, prescribed by her endocrine doctor  - Now on Lantus 12 units at night with pre meal SS  - Hba 1c 5.8  - Expect hyperglycemia with steroid       CKD IV stable:  - Cr at baseline with creatinine 3.43.   - No NSAID  - On lasix    Hyperkalemia:  - S/P Kayexalate in ER  - Will dose again today- Low K diet    Anemia of chronic disease:  - Hb stable  - VIOLET outpt per renal    DVT ppx  -

## 2021-04-16 NOTE — PHYSICAL THERAPY INITIAL EVALUATION ADULT - DISCHARGE DISPOSITION, PT EVAL
Sub-Acute Rehab, pending further functional assessment, to further improve strength, balance and falls prevention. Patient demonstrates higher level of functional assistance on this encounter, compared to status pre-admission.

## 2021-04-16 NOTE — PHYSICAL THERAPY INITIAL EVALUATION ADULT - RANGE OF MOTION EXAMINATION, REHAB EVAL
LLE ROM limited secondary to onset of pain with movement/bilateral upper extremity ROM was WFL (within functional limits)/Right LE ROM was WFL (within functional limits)

## 2021-04-16 NOTE — PHYSICAL THERAPY INITIAL EVALUATION ADULT - GENERAL OBSERVATIONS, REHAB EVAL
Pt encountered semi-fowlers in bed, A&Ox4 in no acute distress, vitals stable with complaints of 5/10 pain at the L groin and down the left leg. +IV lock intact, +primafit,

## 2021-04-16 NOTE — PHYSICAL THERAPY INITIAL EVALUATION ADULT - ADDITIONAL COMMENTS
Pt lives alone in a co-op apartment that has 3 stair steps to enter, but patient uses ramp to enter building. Once inside, patient is on the main level. Prior to onset of pain upon waking up on 4/15 patient was independent with functional mobility in the household with a straight cane and in the community with a rolling walker. Pt reports having physical therapy in 2007 due to problems in her lower back.

## 2021-04-16 NOTE — PHYSICAL THERAPY INITIAL EVALUATION ADULT - BALANCE TRAINING, PT EVAL
Pt will improve static/dynamic sitting balance to normal in order for improved performance with transfers and to prevent falls in 3 weeks.

## 2021-04-17 LAB
ALBUMIN SERPL ELPH-MCNC: 3.7 G/DL — SIGNIFICANT CHANGE UP (ref 3.3–5)
ALP SERPL-CCNC: 97 U/L — SIGNIFICANT CHANGE UP (ref 40–120)
ALT FLD-CCNC: 19 U/L — SIGNIFICANT CHANGE UP (ref 12–78)
ANION GAP SERPL CALC-SCNC: 8 MMOL/L — SIGNIFICANT CHANGE UP (ref 5–17)
ANION GAP SERPL CALC-SCNC: 9 MMOL/L — SIGNIFICANT CHANGE UP (ref 5–17)
AST SERPL-CCNC: 27 U/L — SIGNIFICANT CHANGE UP (ref 15–37)
BILIRUB SERPL-MCNC: 0.3 MG/DL — SIGNIFICANT CHANGE UP (ref 0.2–1.2)
BUN SERPL-MCNC: 90 MG/DL — HIGH (ref 7–23)
BUN SERPL-MCNC: 94 MG/DL — HIGH (ref 7–23)
CALCIUM SERPL-MCNC: 8.2 MG/DL — LOW (ref 8.5–10.1)
CALCIUM SERPL-MCNC: 8.4 MG/DL — LOW (ref 8.5–10.1)
CHLORIDE SERPL-SCNC: 115 MMOL/L — HIGH (ref 96–108)
CHLORIDE SERPL-SCNC: 116 MMOL/L — HIGH (ref 96–108)
CO2 SERPL-SCNC: 17 MMOL/L — LOW (ref 22–31)
CO2 SERPL-SCNC: 18 MMOL/L — LOW (ref 22–31)
CREAT SERPL-MCNC: 3.57 MG/DL — HIGH (ref 0.5–1.3)
CREAT SERPL-MCNC: 3.7 MG/DL — HIGH (ref 0.5–1.3)
GLUCOSE BLDC GLUCOMTR-MCNC: 209 MG/DL — HIGH (ref 70–99)
GLUCOSE BLDC GLUCOMTR-MCNC: 215 MG/DL — HIGH (ref 70–99)
GLUCOSE BLDC GLUCOMTR-MCNC: 225 MG/DL — HIGH (ref 70–99)
GLUCOSE BLDC GLUCOMTR-MCNC: 259 MG/DL — HIGH (ref 70–99)
GLUCOSE SERPL-MCNC: 197 MG/DL — HIGH (ref 70–99)
GLUCOSE SERPL-MCNC: 215 MG/DL — HIGH (ref 70–99)
HCT VFR BLD CALC: 29.1 % — LOW (ref 34.5–45)
HGB BLD-MCNC: 9.2 G/DL — LOW (ref 11.5–15.5)
MCHC RBC-ENTMCNC: 27.5 PG — SIGNIFICANT CHANGE UP (ref 27–34)
MCHC RBC-ENTMCNC: 31.6 GM/DL — LOW (ref 32–36)
MCV RBC AUTO: 87.1 FL — SIGNIFICANT CHANGE UP (ref 80–100)
NRBC # BLD: 0 /100 WBCS — SIGNIFICANT CHANGE UP (ref 0–0)
PLATELET # BLD AUTO: 205 K/UL — SIGNIFICANT CHANGE UP (ref 150–400)
POTASSIUM SERPL-MCNC: 5.4 MMOL/L — HIGH (ref 3.5–5.3)
POTASSIUM SERPL-MCNC: 5.6 MMOL/L — HIGH (ref 3.5–5.3)
POTASSIUM SERPL-SCNC: 5.4 MMOL/L — HIGH (ref 3.5–5.3)
POTASSIUM SERPL-SCNC: 5.6 MMOL/L — HIGH (ref 3.5–5.3)
PROT SERPL-MCNC: 7.7 GM/DL — SIGNIFICANT CHANGE UP (ref 6–8.3)
RBC # BLD: 3.34 M/UL — LOW (ref 3.8–5.2)
RBC # FLD: 14.6 % — HIGH (ref 10.3–14.5)
SODIUM SERPL-SCNC: 141 MMOL/L — SIGNIFICANT CHANGE UP (ref 135–145)
SODIUM SERPL-SCNC: 142 MMOL/L — SIGNIFICANT CHANGE UP (ref 135–145)
WBC # BLD: 10.29 K/UL — SIGNIFICANT CHANGE UP (ref 3.8–10.5)
WBC # FLD AUTO: 10.29 K/UL — SIGNIFICANT CHANGE UP (ref 3.8–10.5)

## 2021-04-17 PROCEDURE — 99233 SBSQ HOSP IP/OBS HIGH 50: CPT

## 2021-04-17 RX ORDER — SODIUM POLYSTYRENE SULFONATE 4.1 MEQ/G
30 POWDER, FOR SUSPENSION ORAL ONCE
Refills: 0 | Status: COMPLETED | OUTPATIENT
Start: 2021-04-17 | End: 2021-04-17

## 2021-04-17 RX ORDER — SODIUM ZIRCONIUM CYCLOSILICATE 10 G/10G
10 POWDER, FOR SUSPENSION ORAL DAILY
Refills: 0 | Status: DISCONTINUED | OUTPATIENT
Start: 2021-04-17 | End: 2021-04-22

## 2021-04-17 RX ORDER — HYDRALAZINE HCL 50 MG
25 TABLET ORAL THREE TIMES A DAY
Refills: 0 | Status: DISCONTINUED | OUTPATIENT
Start: 2021-04-17 | End: 2021-04-19

## 2021-04-17 RX ADMIN — HEPARIN SODIUM 5000 UNIT(S): 5000 INJECTION INTRAVENOUS; SUBCUTANEOUS at 06:46

## 2021-04-17 RX ADMIN — LIDOCAINE 1 PATCH: 4 CREAM TOPICAL at 19:23

## 2021-04-17 RX ADMIN — Medication 4: at 16:24

## 2021-04-17 RX ADMIN — HEPARIN SODIUM 5000 UNIT(S): 5000 INJECTION INTRAVENOUS; SUBCUTANEOUS at 17:45

## 2021-04-17 RX ADMIN — Medication 4: at 08:06

## 2021-04-17 RX ADMIN — LIDOCAINE 1 PATCH: 4 CREAM TOPICAL at 11:32

## 2021-04-17 RX ADMIN — INSULIN GLARGINE 12 UNIT(S): 100 INJECTION, SOLUTION SUBCUTANEOUS at 21:21

## 2021-04-17 RX ADMIN — AMLODIPINE BESYLATE 10 MILLIGRAM(S): 2.5 TABLET ORAL at 06:46

## 2021-04-17 RX ADMIN — Medication 25 MILLIGRAM(S): at 16:24

## 2021-04-17 RX ADMIN — Medication 25 MILLIGRAM(S): at 21:20

## 2021-04-17 RX ADMIN — Medication 200 MILLIGRAM(S): at 13:25

## 2021-04-17 RX ADMIN — Medication 4: at 11:32

## 2021-04-17 RX ADMIN — SIMVASTATIN 40 MILLIGRAM(S): 20 TABLET, FILM COATED ORAL at 21:20

## 2021-04-17 RX ADMIN — LIDOCAINE 1 PATCH: 4 CREAM TOPICAL at 23:01

## 2021-04-17 RX ADMIN — Medication 40 MILLIGRAM(S): at 06:46

## 2021-04-17 RX ADMIN — SODIUM POLYSTYRENE SULFONATE 30 GRAM(S): 4.1 POWDER, FOR SUSPENSION ORAL at 17:44

## 2021-04-17 RX ADMIN — Medication 50 MILLIGRAM(S): at 08:05

## 2021-04-17 RX ADMIN — Medication 200 MILLIGRAM(S): at 21:20

## 2021-04-17 RX ADMIN — GABAPENTIN 300 MILLIGRAM(S): 400 CAPSULE ORAL at 21:21

## 2021-04-17 RX ADMIN — SODIUM POLYSTYRENE SULFONATE 30 GRAM(S): 4.1 POWDER, FOR SUSPENSION ORAL at 09:32

## 2021-04-17 NOTE — PROGRESS NOTE ADULT - SUBJECTIVE AND OBJECTIVE BOX
Patient is a 75y old  Female who presents with a chief complaint of Acute left leg sciatica. (2021 16:59)    INTERVAL HPI/OVERNIGHT EVENTS:  Pt was seen and examined, no acute events.    MEDICATIONS  (STANDING):  amLODIPine   Tablet 10 milliGRAM(s) Oral daily  dextrose 40% Gel 15 Gram(s) Oral once  dextrose 5%. 1000 milliLiter(s) (50 mL/Hr) IV Continuous <Continuous>  dextrose 5%. 1000 milliLiter(s) (100 mL/Hr) IV Continuous <Continuous>  dextrose 50% Injectable 25 Gram(s) IV Push once  dextrose 50% Injectable 12.5 Gram(s) IV Push once  dextrose 50% Injectable 25 Gram(s) IV Push once  furosemide    Tablet 40 milliGRAM(s) Oral daily  gabapentin 300 milliGRAM(s) Oral at bedtime  glucagon  Injectable 1 milliGRAM(s) IntraMuscular once  heparin   Injectable 5000 Unit(s) SubCutaneous every 12 hours  hydrALAZINE 25 milliGRAM(s) Oral three times a day  insulin glargine Injectable (LANTUS) 12 Unit(s) SubCutaneous at bedtime  insulin lispro (ADMELOG) corrective regimen sliding scale   SubCutaneous three times a day before meals  labetalol 200 milliGRAM(s) Oral three times a day  lidocaine   Patch 1 Patch Transdermal daily  polyethylene glycol 3350 17 Gram(s) Oral daily  predniSONE   Tablet 50 milliGRAM(s) Oral daily  senna 2 Tablet(s) Oral at bedtime  simvastatin 40 milliGRAM(s) Oral at bedtime    MEDICATIONS  (PRN):  oxycodone    5 mG/acetaminophen 325 mG 1 Tablet(s) Oral every 4 hours PRN Mild Pain (1 - 3)      Allergies  Eliquis (Other)      Vital Signs Last 24 Hrs  T(C): 37.1 (2021 11:44), Max: 37.1 (2021 11:44)  T(F): 98.7 (2021 11:44), Max: 98.7 (2021 11:44)  HR: 63 (2021 11:44) (58 - 63)  BP: 184/76 (2021 11:44) (160/75 - 188/80)  BP(mean): --  RR: 18 (2021 11:44) (18 - 18)  SpO2: 97% (2021 11:44) (95% - 97%)      PHYSICAL EXAM:  GENERAL: NAD  HEAD:  Atraumatic  EYES: PERRLA  NERVOUS SYSTEM:  Awake, alert  CHEST/LUNG: Clear  HEART: RRR  ABDOMEN: Soft, non tender  EXTREMITIES:  no edema      LABS:                        9.2    10.29 )-----------( 205      ( 2021 07:36 )             29.1     04-17    142  |  116<H>  |  90<H>  ----------------------------<  215<H>  5.6<H>   |  18<L>  |  3.70<H>    Ca    8.2<L>      2021 07:36    TPro  7.7  /  Alb  3.7  /  TBili  0.3  /  DBili  x   /  AST  27  /  ALT  19  /  AlkPhos  97  -      Urinalysis Basic - ( 15 Apr 2021 20:01 )    Color: Yellow / Appearance: Clear / S.015 / pH: x  Gluc: x / Ketone: Negative  / Bili: Negative / Urobili: Negative mg/dL   Blood: x / Protein: 100 mg/dL / Nitrite: Negative   Leuk Esterase: Trace / RBC: 0-2 /HPF / WBC 3-5   Sq Epi: x / Non Sq Epi: Few / Bacteria: Few      CAPILLARY BLOOD GLUCOSE      POCT Blood Glucose.: 215 mg/dL (2021 11:06)  POCT Blood Glucose.: 225 mg/dL (2021 07:45)  POCT Blood Glucose.: 196 mg/dL (2021 20:53)      RADIOLOGY & ADDITIONAL TESTS:    Imaging Personally Reviewed:  [ ] YES  [ ] NO    Consultant(s) Notes Reviewed:  [ ] YES  [ ] NO    Care Discussed with Consultants/Other Providers [ ] YES  [ ] NO

## 2021-04-17 NOTE — PROGRESS NOTE ADULT - ASSESSMENT
75 year old female with PMH of CKD, HTN, diverticulitis, DVT? presents today c/o left sided back pain radiating into the left side since waking up this morning. Patient states that she was attempting to get out of bed to use the bathroom when she experienced sever pain described as an ache felt inside associated with numbness of the left leg. Patient  reports difficulty ambulating due to the pain worsening when she bears weight. Patient denies recent trauma but does report having physical therapy in 2007 due to problems in her lower back (-) urinary or bowel incontinence, fevers or chills, flu like symptoms, chest pain or sob. Called her PMD this morning and told to go to ER for an evaluation.     Left sided sciatica:  - Acute onset  - Pain management  - Started prednisone 50 mg daily X 5 days.  - Seen by ortho, no intervention   - PT eval: SERGEI pt reluctant but willing to discuss with son    HTN:  - Uncontrolled  - Not on ACEi/ARB for CKD 4  - On clonidine patch, Labetalol, amlodipine, HR borderline  - Will add hydralzine    HLD:  - Continue Statin    DM:   - Uses NPH 70/30 bid about 15 units total per day at home, prescribed by her endocrine doctor  - Now on Lantus 12 units at night with pre meal SS  - Hba 1c 5.8  - Expect hyperglycemia with steroid       CKD IV stable:  - Cr around baseline  - No NSAID  - On lasix    Hyperkalemia:  - S/P Kayexalate X 3  - Will add lokelma  - Low K diet    Anemia of chronic disease:  - Hb stable  - VIOLET outpt per renal    DVT ppx

## 2021-04-17 NOTE — CHART NOTE - NSCHARTNOTEFT_GEN_A_CORE
Hospitalist Medicine NP STKITTS, CLAUDETTE    Notified by RN patient with critical lab result Potasium 5.6    Kayaxelate 30 gm stat  BMP @ 16:00    Interventions taken:     DENISSE Wang

## 2021-04-18 LAB
ANION GAP SERPL CALC-SCNC: 11 MMOL/L — SIGNIFICANT CHANGE UP (ref 5–17)
BUN SERPL-MCNC: 98 MG/DL — HIGH (ref 7–23)
CALCIUM SERPL-MCNC: 7.8 MG/DL — LOW (ref 8.5–10.1)
CHLORIDE SERPL-SCNC: 116 MMOL/L — HIGH (ref 96–108)
CO2 SERPL-SCNC: 17 MMOL/L — LOW (ref 22–31)
CREAT SERPL-MCNC: 3.51 MG/DL — HIGH (ref 0.5–1.3)
GLUCOSE BLDC GLUCOMTR-MCNC: 162 MG/DL — HIGH (ref 70–99)
GLUCOSE BLDC GLUCOMTR-MCNC: 242 MG/DL — HIGH (ref 70–99)
GLUCOSE BLDC GLUCOMTR-MCNC: 327 MG/DL — HIGH (ref 70–99)
GLUCOSE BLDC GLUCOMTR-MCNC: 332 MG/DL — HIGH (ref 70–99)
GLUCOSE SERPL-MCNC: 151 MG/DL — HIGH (ref 70–99)
HCT VFR BLD CALC: 27.8 % — LOW (ref 34.5–45)
HGB BLD-MCNC: 9.1 G/DL — LOW (ref 11.5–15.5)
MCHC RBC-ENTMCNC: 28 PG — SIGNIFICANT CHANGE UP (ref 27–34)
MCHC RBC-ENTMCNC: 32.7 GM/DL — SIGNIFICANT CHANGE UP (ref 32–36)
MCV RBC AUTO: 85.5 FL — SIGNIFICANT CHANGE UP (ref 80–100)
NRBC # BLD: 0 /100 WBCS — SIGNIFICANT CHANGE UP (ref 0–0)
PLATELET # BLD AUTO: 205 K/UL — SIGNIFICANT CHANGE UP (ref 150–400)
POTASSIUM SERPL-MCNC: 4.7 MMOL/L — SIGNIFICANT CHANGE UP (ref 3.5–5.3)
POTASSIUM SERPL-SCNC: 4.7 MMOL/L — SIGNIFICANT CHANGE UP (ref 3.5–5.3)
RBC # BLD: 3.25 M/UL — LOW (ref 3.8–5.2)
RBC # FLD: 14.5 % — SIGNIFICANT CHANGE UP (ref 10.3–14.5)
SODIUM SERPL-SCNC: 144 MMOL/L — SIGNIFICANT CHANGE UP (ref 135–145)
WBC # BLD: 12.11 K/UL — HIGH (ref 3.8–10.5)
WBC # FLD AUTO: 12.11 K/UL — HIGH (ref 3.8–10.5)

## 2021-04-18 PROCEDURE — 76775 US EXAM ABDO BACK WALL LIM: CPT | Mod: 26

## 2021-04-18 PROCEDURE — 99233 SBSQ HOSP IP/OBS HIGH 50: CPT

## 2021-04-18 RX ADMIN — LIDOCAINE 1 PATCH: 4 CREAM TOPICAL at 11:10

## 2021-04-18 RX ADMIN — Medication 8: at 16:14

## 2021-04-18 RX ADMIN — Medication 40 MILLIGRAM(S): at 05:38

## 2021-04-18 RX ADMIN — SODIUM ZIRCONIUM CYCLOSILICATE 10 GRAM(S): 10 POWDER, FOR SUSPENSION ORAL at 11:10

## 2021-04-18 RX ADMIN — SENNA PLUS 2 TABLET(S): 8.6 TABLET ORAL at 21:18

## 2021-04-18 RX ADMIN — Medication 25 MILLIGRAM(S): at 21:17

## 2021-04-18 RX ADMIN — HEPARIN SODIUM 5000 UNIT(S): 5000 INJECTION INTRAVENOUS; SUBCUTANEOUS at 17:00

## 2021-04-18 RX ADMIN — Medication 200 MILLIGRAM(S): at 13:28

## 2021-04-18 RX ADMIN — LIDOCAINE 1 PATCH: 4 CREAM TOPICAL at 23:21

## 2021-04-18 RX ADMIN — INSULIN GLARGINE 12 UNIT(S): 100 INJECTION, SOLUTION SUBCUTANEOUS at 21:17

## 2021-04-18 RX ADMIN — HEPARIN SODIUM 5000 UNIT(S): 5000 INJECTION INTRAVENOUS; SUBCUTANEOUS at 05:39

## 2021-04-18 RX ADMIN — Medication 200 MILLIGRAM(S): at 05:39

## 2021-04-18 RX ADMIN — SIMVASTATIN 40 MILLIGRAM(S): 20 TABLET, FILM COATED ORAL at 21:17

## 2021-04-18 RX ADMIN — Medication 50 MILLIGRAM(S): at 05:39

## 2021-04-18 RX ADMIN — Medication 2: at 07:39

## 2021-04-18 RX ADMIN — GABAPENTIN 300 MILLIGRAM(S): 400 CAPSULE ORAL at 21:17

## 2021-04-18 RX ADMIN — Medication 200 MILLIGRAM(S): at 21:17

## 2021-04-18 RX ADMIN — Medication 8: at 11:11

## 2021-04-18 RX ADMIN — Medication 25 MILLIGRAM(S): at 05:39

## 2021-04-18 RX ADMIN — Medication 25 MILLIGRAM(S): at 13:28

## 2021-04-18 RX ADMIN — LIDOCAINE 1 PATCH: 4 CREAM TOPICAL at 19:36

## 2021-04-18 RX ADMIN — AMLODIPINE BESYLATE 10 MILLIGRAM(S): 2.5 TABLET ORAL at 05:38

## 2021-04-18 NOTE — PROGRESS NOTE ADULT - SUBJECTIVE AND OBJECTIVE BOX
Patient is a 75y old  Female who presents with a chief complaint of Acute left leg sciatica. (17 Apr 2021 15:47)    INTERVAL HPI/OVERNIGHT EVENTS:  Pt was seen and examined, no acute events.    MEDICATIONS  (STANDING):  amLODIPine   Tablet 10 milliGRAM(s) Oral daily  dextrose 40% Gel 15 Gram(s) Oral once  dextrose 5%. 1000 milliLiter(s) (50 mL/Hr) IV Continuous <Continuous>  dextrose 5%. 1000 milliLiter(s) (100 mL/Hr) IV Continuous <Continuous>  dextrose 50% Injectable 25 Gram(s) IV Push once  dextrose 50% Injectable 12.5 Gram(s) IV Push once  dextrose 50% Injectable 25 Gram(s) IV Push once  furosemide    Tablet 40 milliGRAM(s) Oral daily  gabapentin 300 milliGRAM(s) Oral at bedtime  glucagon  Injectable 1 milliGRAM(s) IntraMuscular once  heparin   Injectable 5000 Unit(s) SubCutaneous every 12 hours  hydrALAZINE 25 milliGRAM(s) Oral three times a day  insulin glargine Injectable (LANTUS) 12 Unit(s) SubCutaneous at bedtime  insulin lispro (ADMELOG) corrective regimen sliding scale   SubCutaneous three times a day before meals  labetalol 200 milliGRAM(s) Oral three times a day  lidocaine   Patch 1 Patch Transdermal daily  polyethylene glycol 3350 17 Gram(s) Oral daily  predniSONE   Tablet 50 milliGRAM(s) Oral daily  senna 2 Tablet(s) Oral at bedtime  simvastatin 40 milliGRAM(s) Oral at bedtime  sodium zirconium cyclosilicate 10 Gram(s) Oral daily    MEDICATIONS  (PRN):  oxycodone    5 mG/acetaminophen 325 mG 1 Tablet(s) Oral every 4 hours PRN Mild Pain (1 - 3)      Allergchidi Robles (Other)    Vital Signs Last 24 Hrs  T(C): 36.6 (18 Apr 2021 11:55), Max: 36.9 (17 Apr 2021 17:17)  T(F): 97.9 (18 Apr 2021 11:55), Max: 98.4 (17 Apr 2021 17:17)  HR: 91 (18 Apr 2021 11:55) (60 - 91)  BP: 162/71 (18 Apr 2021 11:55) (159/75 - 179/73)  BP(mean): --  RR: 18 (18 Apr 2021 11:55) (18 - 18)  SpO2: 97% (18 Apr 2021 11:55) (97% - 98%)      PHYSICAL EXAM:  GENERAL: NAD  HEAD:  Atraumatic  EYES: PERRLA  NERVOUS SYSTEM:  Awake, alert  CHEST/LUNG: Clear  HEART: RRR  ABDOMEN: Soft, non tender  EXTREMITIES:  no edema      LABS:                        9.1    12.11 )-----------( 205      ( 18 Apr 2021 07:08 )             27.8     04-18    144  |  116<H>  |  98<H>  ----------------------------<  151<H>  4.7   |  17<L>  |  3.51<H>    Ca    7.8<L>      18 Apr 2021 12:32    TPro  7.7  /  Alb  3.7  /  TBili  0.3  /  DBili  x   /  AST  27  /  ALT  19  /  AlkPhos  97  04-17        CAPILLARY BLOOD GLUCOSE      POCT Blood Glucose.: 327 mg/dL (18 Apr 2021 15:42)  POCT Blood Glucose.: 332 mg/dL (18 Apr 2021 11:01)  POCT Blood Glucose.: 162 mg/dL (18 Apr 2021 07:34)  POCT Blood Glucose.: 259 mg/dL (17 Apr 2021 20:50)      RADIOLOGY & ADDITIONAL TESTS:    Imaging Personally Reviewed:  [ ] YES  [ ] NO    Consultant(s) Notes Reviewed:  [ ] YES  [ ] NO    Care Discussed with Consultants/Other Providers [ ] YES  [ ] NO

## 2021-04-18 NOTE — PROGRESS NOTE ADULT - ASSESSMENT
75 year old female with PMH of CKD, HTN, diverticulitis, DVT? presents today c/o left sided back pain radiating into the left side since waking up this morning. Patient states that she was attempting to get out of bed to use the bathroom when she experienced sever pain described as an ache felt inside associated with numbness of the left leg. Patient  reports difficulty ambulating due to the pain worsening when she bears weight. Patient denies recent trauma but does report having physical therapy in 2007 due to problems in her lower back (-) urinary or bowel incontinence, fevers or chills, flu like symptoms, chest pain or sob. Called her PMD this morning and told to go to ER for an evaluation.     Left sided sciatica:  - Acute onset  - Pain management  - Started prednisone 50 mg daily X 5 days.  - Seen by ortho, no intervention   - PT eval: SERGEI pt reluctant but willing to discuss with son    HTN:  - Not on ACEi/ARB for CKD 4  - On clonidine patch, Labetalol, amlodipine, HR borderline  - Added hydralzine, BP improved    HLD:  - Continue Statin    DM:   - Uses NPH 70/30 bid about 15 units total per day at home, prescribed by her endocrine doctor  - Now on Lantus 12 units at night with pre meal SS  - Hba 1c 5.8  - Expect hyperglycemia with steroid       CKD IV stable:  - Cr around baseline  - No NSAID  - On lasix    Hyperkalemia:  - S/P Kayexalate X 3  - Added lokelma, on veltassa at home  - Low K diet    Anemia of chronic disease:  - Hb stable  - VIOLET outpt per renal    Renal lesion in CT:  - Check renal US    DVT ppx    Discussed with son.

## 2021-04-19 LAB
ALBUMIN SERPL ELPH-MCNC: 3.2 G/DL — LOW (ref 3.3–5)
ALP SERPL-CCNC: 71 U/L — SIGNIFICANT CHANGE UP (ref 40–120)
ALT FLD-CCNC: 21 U/L — SIGNIFICANT CHANGE UP (ref 12–78)
ANION GAP SERPL CALC-SCNC: 11 MMOL/L — SIGNIFICANT CHANGE UP (ref 5–17)
AST SERPL-CCNC: 20 U/L — SIGNIFICANT CHANGE UP (ref 15–37)
BILIRUB SERPL-MCNC: 0.4 MG/DL — SIGNIFICANT CHANGE UP (ref 0.2–1.2)
BUN SERPL-MCNC: 108 MG/DL — HIGH (ref 7–23)
CALCIUM SERPL-MCNC: 8 MG/DL — LOW (ref 8.5–10.1)
CHLORIDE SERPL-SCNC: 115 MMOL/L — HIGH (ref 96–108)
CO2 SERPL-SCNC: 16 MMOL/L — LOW (ref 22–31)
CREAT SERPL-MCNC: 3.69 MG/DL — HIGH (ref 0.5–1.3)
FLUAV AG NPH QL: SIGNIFICANT CHANGE UP
FLUBV AG NPH QL: SIGNIFICANT CHANGE UP
GLUCOSE BLDC GLUCOMTR-MCNC: 172 MG/DL — HIGH (ref 70–99)
GLUCOSE BLDC GLUCOMTR-MCNC: 226 MG/DL — HIGH (ref 70–99)
GLUCOSE BLDC GLUCOMTR-MCNC: 344 MG/DL — HIGH (ref 70–99)
GLUCOSE BLDC GLUCOMTR-MCNC: 371 MG/DL — HIGH (ref 70–99)
GLUCOSE SERPL-MCNC: 143 MG/DL — HIGH (ref 70–99)
HCT VFR BLD CALC: 25.4 % — LOW (ref 34.5–45)
HGB BLD-MCNC: 8.4 G/DL — LOW (ref 11.5–15.5)
MCHC RBC-ENTMCNC: 27.7 PG — SIGNIFICANT CHANGE UP (ref 27–34)
MCHC RBC-ENTMCNC: 33.1 GM/DL — SIGNIFICANT CHANGE UP (ref 32–36)
MCV RBC AUTO: 83.8 FL — SIGNIFICANT CHANGE UP (ref 80–100)
NRBC # BLD: 0 /100 WBCS — SIGNIFICANT CHANGE UP (ref 0–0)
PLATELET # BLD AUTO: 191 K/UL — SIGNIFICANT CHANGE UP (ref 150–400)
POTASSIUM SERPL-MCNC: 4.5 MMOL/L — SIGNIFICANT CHANGE UP (ref 3.5–5.3)
POTASSIUM SERPL-SCNC: 4.5 MMOL/L — SIGNIFICANT CHANGE UP (ref 3.5–5.3)
PROT SERPL-MCNC: 6.4 GM/DL — SIGNIFICANT CHANGE UP (ref 6–8.3)
RBC # BLD: 3.03 M/UL — LOW (ref 3.8–5.2)
RBC # FLD: 14.7 % — HIGH (ref 10.3–14.5)
SARS-COV-2 RNA SPEC QL NAA+PROBE: SIGNIFICANT CHANGE UP
SODIUM SERPL-SCNC: 142 MMOL/L — SIGNIFICANT CHANGE UP (ref 135–145)
WBC # BLD: 11.3 K/UL — HIGH (ref 3.8–10.5)
WBC # FLD AUTO: 11.3 K/UL — HIGH (ref 3.8–10.5)

## 2021-04-19 PROCEDURE — 99233 SBSQ HOSP IP/OBS HIGH 50: CPT

## 2021-04-19 PROCEDURE — 73700 CT LOWER EXTREMITY W/O DYE: CPT | Mod: 26,LT

## 2021-04-19 RX ORDER — HYDRALAZINE HCL 50 MG
50 TABLET ORAL THREE TIMES A DAY
Refills: 0 | Status: DISCONTINUED | OUTPATIENT
Start: 2021-04-19 | End: 2021-04-22

## 2021-04-19 RX ORDER — FERROUS SULFATE 325(65) MG
325 TABLET ORAL DAILY
Refills: 0 | Status: DISCONTINUED | OUTPATIENT
Start: 2021-04-19 | End: 2021-04-22

## 2021-04-19 RX ADMIN — Medication 1 PATCH: at 08:13

## 2021-04-19 RX ADMIN — GABAPENTIN 300 MILLIGRAM(S): 400 CAPSULE ORAL at 21:27

## 2021-04-19 RX ADMIN — POLYETHYLENE GLYCOL 3350 17 GRAM(S): 17 POWDER, FOR SOLUTION ORAL at 11:57

## 2021-04-19 RX ADMIN — Medication 325 MILLIGRAM(S): at 11:56

## 2021-04-19 RX ADMIN — Medication 200 MILLIGRAM(S): at 21:28

## 2021-04-19 RX ADMIN — AMLODIPINE BESYLATE 10 MILLIGRAM(S): 2.5 TABLET ORAL at 05:26

## 2021-04-19 RX ADMIN — Medication 50 MILLIGRAM(S): at 21:27

## 2021-04-19 RX ADMIN — Medication 200 MILLIGRAM(S): at 14:01

## 2021-04-19 RX ADMIN — Medication 200 MILLIGRAM(S): at 05:26

## 2021-04-19 RX ADMIN — Medication 10: at 16:20

## 2021-04-19 RX ADMIN — INSULIN GLARGINE 12 UNIT(S): 100 INJECTION, SOLUTION SUBCUTANEOUS at 21:27

## 2021-04-19 RX ADMIN — SODIUM ZIRCONIUM CYCLOSILICATE 10 GRAM(S): 10 POWDER, FOR SUSPENSION ORAL at 11:56

## 2021-04-19 RX ADMIN — Medication 8: at 11:54

## 2021-04-19 RX ADMIN — Medication 50 MILLIGRAM(S): at 05:26

## 2021-04-19 RX ADMIN — Medication 40 MILLIGRAM(S): at 05:26

## 2021-04-19 RX ADMIN — SIMVASTATIN 40 MILLIGRAM(S): 20 TABLET, FILM COATED ORAL at 21:27

## 2021-04-19 RX ADMIN — LIDOCAINE 1 PATCH: 4 CREAM TOPICAL at 11:56

## 2021-04-19 RX ADMIN — Medication 25 MILLIGRAM(S): at 05:26

## 2021-04-19 RX ADMIN — LIDOCAINE 1 PATCH: 4 CREAM TOPICAL at 19:30

## 2021-04-19 RX ADMIN — HEPARIN SODIUM 5000 UNIT(S): 5000 INJECTION INTRAVENOUS; SUBCUTANEOUS at 18:24

## 2021-04-19 RX ADMIN — Medication 50 MILLIGRAM(S): at 14:01

## 2021-04-19 RX ADMIN — HEPARIN SODIUM 5000 UNIT(S): 5000 INJECTION INTRAVENOUS; SUBCUTANEOUS at 05:26

## 2021-04-19 RX ADMIN — OXYCODONE AND ACETAMINOPHEN 1 TABLET(S): 5; 325 TABLET ORAL at 10:09

## 2021-04-19 RX ADMIN — Medication 2: at 07:59

## 2021-04-19 RX ADMIN — Medication 1 PATCH: at 19:30

## 2021-04-19 NOTE — CONSULT NOTE ADULT - SUBJECTIVE AND OBJECTIVE BOX
HPI:  75 year old female presents today c/o left sided back pain radiating into the left side since waking up this morning. Patient states that she was attempting to get out of bed to use the bathroom when she experienced sever pain described as an ache felt inside associated with numbness of the left leg. Patient  reports difficulty ambulating due to the pain worsening when she bears weight. Patient denies recent trauma but does report having physical therapy in 2007 due to problems in her lower back (-) urinary or bowel incontinence, fevers or chills, flu like symptoms, chest pain or sob. Called her PMD this morning and told to go to ER for an evaluation.  (15 Apr 2021 18:39)  Miss her podiatrist appt and concerned about feet. FAmily lost leg from diabetes    PAST MEDICAL & SURGICAL HISTORY:  HTN (hypertension)    Pacemaker    DVT (deep venous thrombosis)    Diverticulitis    Chronic renal disease    Intestinal bleeding        ALLERY AND IMMUNOLOGIC: No hives or eczema      MEDICATIONS  (STANDING):  amLODIPine   Tablet 10 milliGRAM(s) Oral daily  cloNIDine Patch 0.3 mG/24Hr(s) 1 patch Transdermal <User Schedule>  dextrose 40% Gel 15 Gram(s) Oral once  dextrose 5%. 1000 milliLiter(s) (50 mL/Hr) IV Continuous <Continuous>  dextrose 5%. 1000 milliLiter(s) (100 mL/Hr) IV Continuous <Continuous>  dextrose 50% Injectable 25 Gram(s) IV Push once  dextrose 50% Injectable 12.5 Gram(s) IV Push once  dextrose 50% Injectable 25 Gram(s) IV Push once  ferrous    sulfate 325 milliGRAM(s) Oral daily  furosemide    Tablet 40 milliGRAM(s) Oral daily  gabapentin 300 milliGRAM(s) Oral at bedtime  glucagon  Injectable 1 milliGRAM(s) IntraMuscular once  heparin   Injectable 5000 Unit(s) SubCutaneous every 12 hours  hydrALAZINE 50 milliGRAM(s) Oral three times a day  insulin glargine Injectable (LANTUS) 12 Unit(s) SubCutaneous at bedtime  insulin lispro (ADMELOG) corrective regimen sliding scale   SubCutaneous three times a day before meals  labetalol 200 milliGRAM(s) Oral three times a day  lidocaine   Patch 1 Patch Transdermal daily  polyethylene glycol 3350 17 Gram(s) Oral daily  predniSONE   Tablet 50 milliGRAM(s) Oral daily  senna 2 Tablet(s) Oral at bedtime  simvastatin 40 milliGRAM(s) Oral at bedtime  sodium zirconium cyclosilicate 10 Gram(s) Oral daily    MEDICATIONS  (PRN):  oxycodone    5 mG/acetaminophen 325 mG 1 Tablet(s) Oral every 4 hours PRN Mild Pain (1 - 3)      Allergies    Eliquis (Other)    Intolerances        SOCIAL HISTORY:Smoking history  Alcohol history  Drug history    FAMILY HISTORY:      Vital Signs Last 24 Hrs  T(C): 36.6 (19 Apr 2021 11:39), Max: 36.6 (19 Apr 2021 11:39)  T(F): 97.9 (19 Apr 2021 11:39), Max: 97.9 (19 Apr 2021 11:39)  HR: 64 (19 Apr 2021 11:39) (60 - 65)  BP: 161/76 (19 Apr 2021 11:39) (143/78 - 174/72)  BP(mean): --  RR: 18 (19 Apr 2021 11:39) (18 - 18)  SpO2: 95% (19 Apr 2021 11:39) (95% - 99%)    PHYSICAL EXAM:    GENERAL: NAD, well-groomed, well-developed  NERVOUS SYSTEM:  Alert & Oriented    Good concentration;   Motor Strength 3/5 B/L lower extremities;   DTRs 1+ intact and symmetric   Sensorium  absent  EXTREMITIES:  1+ Peripheral Pulses,   No clubbing, cyanosis, or edema   Vascularity Skin dry  appearance hair absent     LYMPH: No lymphadenopathy noted  Derm Nails incurvated thick with subungual debris   3mm thickness    SKIN: No rashes or lesions  ORTHOPEDIC: foot deformities: rectus  ULCER: none  LABS:                        8.4    11.30 )-----------( 191      ( 19 Apr 2021 08:12 )             25.4     04-19    142  |  115<H>  |  108<H>  ----------------------------<  143<H>  4.5   |  16<L>  |  3.69<H>    Ca    8.0<L>      19 Apr 2021 08:12    TPro  6.4  /  Alb  3.2<L>  /  TBili  0.4  /  DBili  x   /  AST  20  /  ALT  21  /  AlkPhos  71  04-19              RADIOLOGY & ADDITIONAL STUDIES:

## 2021-04-19 NOTE — PROGRESS NOTE ADULT - SUBJECTIVE AND OBJECTIVE BOX
Patient is a 75y old  Female who presents with a chief complaint of Acute left leg sciatica. (18 Apr 2021 16:03)    INTERVAL HPI/OVERNIGHT EVENTS:  Pt was seen and examined, no acute events.    MEDICATIONS  (STANDING):  amLODIPine   Tablet 10 milliGRAM(s) Oral daily  cloNIDine Patch 0.3 mG/24Hr(s) 1 patch Transdermal <User Schedule>  dextrose 40% Gel 15 Gram(s) Oral once  dextrose 5%. 1000 milliLiter(s) (50 mL/Hr) IV Continuous <Continuous>  dextrose 5%. 1000 milliLiter(s) (100 mL/Hr) IV Continuous <Continuous>  dextrose 50% Injectable 25 Gram(s) IV Push once  dextrose 50% Injectable 12.5 Gram(s) IV Push once  dextrose 50% Injectable 25 Gram(s) IV Push once  ferrous    sulfate 325 milliGRAM(s) Oral daily  furosemide    Tablet 40 milliGRAM(s) Oral daily  gabapentin 300 milliGRAM(s) Oral at bedtime  glucagon  Injectable 1 milliGRAM(s) IntraMuscular once  heparin   Injectable 5000 Unit(s) SubCutaneous every 12 hours  hydrALAZINE 50 milliGRAM(s) Oral three times a day  insulin glargine Injectable (LANTUS) 12 Unit(s) SubCutaneous at bedtime  insulin lispro (ADMELOG) corrective regimen sliding scale   SubCutaneous three times a day before meals  labetalol 200 milliGRAM(s) Oral three times a day  lidocaine   Patch 1 Patch Transdermal daily  polyethylene glycol 3350 17 Gram(s) Oral daily  predniSONE   Tablet 50 milliGRAM(s) Oral daily  senna 2 Tablet(s) Oral at bedtime  simvastatin 40 milliGRAM(s) Oral at bedtime  sodium zirconium cyclosilicate 10 Gram(s) Oral daily    MEDICATIONS  (PRN):  oxycodone    5 mG/acetaminophen 325 mG 1 Tablet(s) Oral every 4 hours PRN Mild Pain (1 - 3)      Allergies  Eliquis (Other)      Vital Signs Last 24 Hrs  T(C): 36.6 (19 Apr 2021 11:39), Max: 36.8 (18 Apr 2021 17:12)  T(F): 97.9 (19 Apr 2021 11:39), Max: 98.3 (18 Apr 2021 17:12)  HR: 64 (19 Apr 2021 11:39) (60 - 75)  BP: 161/76 (19 Apr 2021 11:39) (143/78 - 174/72)  BP(mean): --  RR: 18 (19 Apr 2021 11:39) (18 - 19)  SpO2: 95% (19 Apr 2021 11:39) (95% - 99%)      PHYSICAL EXAM:  GENERAL: NAD  HEAD:  Atraumatic  EYES: PERRLA  NERVOUS SYSTEM:  Awake, alert  CHEST/LUNG: Clear  HEART: RRR  ABDOMEN: Soft, non tender  EXTREMITIES: no edema       LABS:                        8.4    11.30 )-----------( 191      ( 19 Apr 2021 08:12 )             25.4     04-19    142  |  115<H>  |  108<H>  ----------------------------<  143<H>  4.5   |  16<L>  |  3.69<H>    Ca    8.0<L>      19 Apr 2021 08:12    TPro  6.4  /  Alb  3.2<L>  /  TBili  0.4  /  DBili  x   /  AST  20  /  ALT  21  /  AlkPhos  71  04-19        CAPILLARY BLOOD GLUCOSE      POCT Blood Glucose.: 344 mg/dL (19 Apr 2021 10:56)  POCT Blood Glucose.: 172 mg/dL (19 Apr 2021 07:38)  POCT Blood Glucose.: 242 mg/dL (18 Apr 2021 20:31)  POCT Blood Glucose.: 327 mg/dL (18 Apr 2021 15:42)      RADIOLOGY & ADDITIONAL TESTS:    Imaging Personally Reviewed:  [ ] YES  [ ] NO    Consultant(s) Notes Reviewed:  [ ] YES  [ ] NO    Care Discussed with Consultants/Other Providers [ ] YES  [ ] NO

## 2021-04-19 NOTE — PROGRESS NOTE ADULT - ASSESSMENT
75 year old female with PMH of CKD, HTN, diverticulitis, DVT? presents today c/o left sided back pain radiating into the left side since waking up this morning. Patient states that she was attempting to get out of bed to use the bathroom when she experienced sever pain described as an ache felt inside associated with numbness of the left leg. Patient  reports difficulty ambulating due to the pain worsening when she bears weight. Patient denies recent trauma but does report having physical therapy in 2007 due to problems in her lower back (-) urinary or bowel incontinence, fevers or chills, flu like symptoms, chest pain or sob. Called her PMD this morning and told to go to ER for an evaluation.     Left sided sciatica:  - Acute onset  - Pain management  - Started prednisone 50 mg daily X 5 days.  - Seen by ortho, no intervention   - Check CT hip   - DC to rehab    HTN:  - Not on ACEi/ARB for CKD 4  - On clonidine patch, Labetalol, amlodipine, HR borderline  - Added hydralzine, BP improved    HLD:  - Continue Statin    DM:   - Uses NPH 70/30 bid about 15 units total per day at home, prescribed by her endocrine doctor  - Now on Lantus 12 units at night with pre meal SS  - Hb A1c 5.8  - Expect hyperglycemia with steroid       CKD IV stable:  - Cr around baseline  - No NSAID  - On lasix    Hyperkalemia:  - S/P Kayexalate X 3  - Added lokelma, on veltassa at home  - Low K diet    Anemia of chronic disease:  - Hb stable  - VIOLET outpt per renal    Renal cysts:  - seen in sono  - Follow up with PCP    DVT ppx    Discussed with son.

## 2021-04-20 LAB
ALBUMIN SERPL ELPH-MCNC: 3.4 G/DL — SIGNIFICANT CHANGE UP (ref 3.3–5)
ALP SERPL-CCNC: 72 U/L — SIGNIFICANT CHANGE UP (ref 40–120)
ALT FLD-CCNC: 22 U/L — SIGNIFICANT CHANGE UP (ref 12–78)
ANION GAP SERPL CALC-SCNC: 13 MMOL/L — SIGNIFICANT CHANGE UP (ref 5–17)
ANION GAP SERPL CALC-SCNC: 14 MMOL/L — SIGNIFICANT CHANGE UP (ref 5–17)
AST SERPL-CCNC: 19 U/L — SIGNIFICANT CHANGE UP (ref 15–37)
BASE EXCESS BLDA CALC-SCNC: -13.7 MMOL/L — LOW (ref -2–2)
BILIRUB SERPL-MCNC: 0.3 MG/DL — SIGNIFICANT CHANGE UP (ref 0.2–1.2)
BLOOD GAS COMMENTS: SIGNIFICANT CHANGE UP
BLOOD GAS SOURCE: SIGNIFICANT CHANGE UP
BUN SERPL-MCNC: 131 MG/DL — HIGH (ref 7–23)
BUN SERPL-MCNC: 131 MG/DL — HIGH (ref 7–23)
CALCIUM SERPL-MCNC: 7.6 MG/DL — LOW (ref 8.5–10.1)
CALCIUM SERPL-MCNC: 7.6 MG/DL — LOW (ref 8.5–10.1)
CHLORIDE SERPL-SCNC: 105 MMOL/L — SIGNIFICANT CHANGE UP (ref 96–108)
CHLORIDE SERPL-SCNC: 113 MMOL/L — HIGH (ref 96–108)
CO2 SERPL-SCNC: 13 MMOL/L — LOW (ref 22–31)
CO2 SERPL-SCNC: 15 MMOL/L — LOW (ref 22–31)
CREAT SERPL-MCNC: 4.77 MG/DL — HIGH (ref 0.5–1.3)
CREAT SERPL-MCNC: 4.96 MG/DL — HIGH (ref 0.5–1.3)
GLUCOSE BLDC GLUCOMTR-MCNC: 149 MG/DL — HIGH (ref 70–99)
GLUCOSE BLDC GLUCOMTR-MCNC: 294 MG/DL — HIGH (ref 70–99)
GLUCOSE BLDC GLUCOMTR-MCNC: 306 MG/DL — HIGH (ref 70–99)
GLUCOSE BLDC GLUCOMTR-MCNC: 313 MG/DL — HIGH (ref 70–99)
GLUCOSE BLDC GLUCOMTR-MCNC: 343 MG/DL — HIGH (ref 70–99)
GLUCOSE BLDC GLUCOMTR-MCNC: 459 MG/DL — CRITICAL HIGH (ref 70–99)
GLUCOSE BLDC GLUCOMTR-MCNC: 465 MG/DL — CRITICAL HIGH (ref 70–99)
GLUCOSE BLDC GLUCOMTR-MCNC: 492 MG/DL — CRITICAL HIGH (ref 70–99)
GLUCOSE BLDC GLUCOMTR-MCNC: 563 MG/DL — CRITICAL HIGH (ref 70–99)
GLUCOSE BLDC GLUCOMTR-MCNC: 572 MG/DL — CRITICAL HIGH (ref 70–99)
GLUCOSE BLDC GLUCOMTR-MCNC: 578 MG/DL — CRITICAL HIGH (ref 70–99)
GLUCOSE BLDC GLUCOMTR-MCNC: 588 MG/DL — CRITICAL HIGH (ref 70–99)
GLUCOSE BLDC GLUCOMTR-MCNC: >600 MG/DL — CRITICAL HIGH (ref 70–99)
GLUCOSE SERPL-MCNC: 131 MG/DL — HIGH (ref 70–99)
GLUCOSE SERPL-MCNC: 567 MG/DL — CRITICAL HIGH (ref 70–99)
HCO3 BLDA-SCNC: 12 MMOL/L — LOW (ref 21–29)
HCT VFR BLD CALC: 26 % — LOW (ref 34.5–45)
HCT VFR BLD CALC: 26.2 % — LOW (ref 34.5–45)
HGB BLD-MCNC: 8.6 G/DL — LOW (ref 11.5–15.5)
HGB BLD-MCNC: 8.6 G/DL — LOW (ref 11.5–15.5)
HOROWITZ INDEX BLDA+IHG-RTO: 28 — SIGNIFICANT CHANGE UP
MCHC RBC-ENTMCNC: 27.6 PG — SIGNIFICANT CHANGE UP (ref 27–34)
MCHC RBC-ENTMCNC: 28.1 PG — SIGNIFICANT CHANGE UP (ref 27–34)
MCHC RBC-ENTMCNC: 32.8 GM/DL — SIGNIFICANT CHANGE UP (ref 32–36)
MCHC RBC-ENTMCNC: 33.1 GM/DL — SIGNIFICANT CHANGE UP (ref 32–36)
MCV RBC AUTO: 84 FL — SIGNIFICANT CHANGE UP (ref 80–100)
MCV RBC AUTO: 85 FL — SIGNIFICANT CHANGE UP (ref 80–100)
NRBC # BLD: 0 /100 WBCS — SIGNIFICANT CHANGE UP (ref 0–0)
NRBC # BLD: 0 /100 WBCS — SIGNIFICANT CHANGE UP (ref 0–0)
PCO2 BLDA: 31 MMHG — LOW (ref 32–46)
PH BLD: 7.23 — LOW (ref 7.35–7.45)
PLATELET # BLD AUTO: 168 K/UL — SIGNIFICANT CHANGE UP (ref 150–400)
PLATELET # BLD AUTO: 186 K/UL — SIGNIFICANT CHANGE UP (ref 150–400)
PO2 BLDA: 110 MMHG — HIGH (ref 74–108)
POTASSIUM SERPL-MCNC: 4.7 MMOL/L — SIGNIFICANT CHANGE UP (ref 3.5–5.3)
POTASSIUM SERPL-MCNC: 5.2 MMOL/L — SIGNIFICANT CHANGE UP (ref 3.5–5.3)
POTASSIUM SERPL-SCNC: 4.7 MMOL/L — SIGNIFICANT CHANGE UP (ref 3.5–5.3)
POTASSIUM SERPL-SCNC: 5.2 MMOL/L — SIGNIFICANT CHANGE UP (ref 3.5–5.3)
PROT SERPL-MCNC: 6.8 GM/DL — SIGNIFICANT CHANGE UP (ref 6–8.3)
RBC # BLD: 3.06 M/UL — LOW (ref 3.8–5.2)
RBC # BLD: 3.12 M/UL — LOW (ref 3.8–5.2)
RBC # FLD: 14.9 % — HIGH (ref 10.3–14.5)
RBC # FLD: 14.9 % — HIGH (ref 10.3–14.5)
SAO2 % BLDA: 97 % — HIGH (ref 92–96)
SODIUM SERPL-SCNC: 132 MMOL/L — LOW (ref 135–145)
SODIUM SERPL-SCNC: 141 MMOL/L — SIGNIFICANT CHANGE UP (ref 135–145)
TROPONIN I SERPL-MCNC: 0.02 NG/ML — SIGNIFICANT CHANGE UP (ref 0.01–0.04)
TROPONIN I SERPL-MCNC: 0.03 NG/ML — SIGNIFICANT CHANGE UP (ref 0.01–0.04)
WBC # BLD: 11.17 K/UL — HIGH (ref 3.8–10.5)
WBC # BLD: 9.12 K/UL — SIGNIFICANT CHANGE UP (ref 3.8–10.5)
WBC # FLD AUTO: 11.17 K/UL — HIGH (ref 3.8–10.5)
WBC # FLD AUTO: 9.12 K/UL — SIGNIFICANT CHANGE UP (ref 3.8–10.5)

## 2021-04-20 PROCEDURE — 99233 SBSQ HOSP IP/OBS HIGH 50: CPT

## 2021-04-20 PROCEDURE — 71045 X-RAY EXAM CHEST 1 VIEW: CPT | Mod: 26

## 2021-04-20 PROCEDURE — 93010 ELECTROCARDIOGRAM REPORT: CPT

## 2021-04-20 RX ORDER — INSULIN LISPRO 100/ML
8 VIAL (ML) SUBCUTANEOUS ONCE
Refills: 0 | Status: COMPLETED | OUTPATIENT
Start: 2021-04-20 | End: 2021-04-20

## 2021-04-20 RX ORDER — SODIUM CHLORIDE 9 MG/ML
1000 INJECTION, SOLUTION INTRAVENOUS
Refills: 0 | Status: DISCONTINUED | OUTPATIENT
Start: 2021-04-20 | End: 2021-04-22

## 2021-04-20 RX ORDER — INSULIN HUMAN 100 [IU]/ML
5 INJECTION, SOLUTION SUBCUTANEOUS ONCE
Refills: 0 | Status: COMPLETED | OUTPATIENT
Start: 2021-04-20 | End: 2021-04-20

## 2021-04-20 RX ORDER — INSULIN LISPRO 100/ML
5 VIAL (ML) SUBCUTANEOUS
Refills: 0 | Status: DISCONTINUED | OUTPATIENT
Start: 2021-04-20 | End: 2021-05-03

## 2021-04-20 RX ORDER — PANTOPRAZOLE SODIUM 20 MG/1
40 TABLET, DELAYED RELEASE ORAL
Refills: 0 | Status: DISCONTINUED | OUTPATIENT
Start: 2021-04-20 | End: 2021-05-03

## 2021-04-20 RX ADMIN — INSULIN HUMAN 5 UNIT(S): 100 INJECTION, SOLUTION SUBCUTANEOUS at 19:10

## 2021-04-20 RX ADMIN — SIMVASTATIN 40 MILLIGRAM(S): 20 TABLET, FILM COATED ORAL at 22:30

## 2021-04-20 RX ADMIN — SODIUM CHLORIDE 75 MILLILITER(S): 9 INJECTION, SOLUTION INTRAVENOUS at 17:39

## 2021-04-20 RX ADMIN — Medication 50 MILLIGRAM(S): at 05:32

## 2021-04-20 RX ADMIN — Medication 1 PATCH: at 20:53

## 2021-04-20 RX ADMIN — Medication 50 MILLIGRAM(S): at 22:31

## 2021-04-20 RX ADMIN — INSULIN GLARGINE 12 UNIT(S): 100 INJECTION, SOLUTION SUBCUTANEOUS at 22:39

## 2021-04-20 RX ADMIN — SODIUM ZIRCONIUM CYCLOSILICATE 10 GRAM(S): 10 POWDER, FOR SUSPENSION ORAL at 11:04

## 2021-04-20 RX ADMIN — SENNA PLUS 2 TABLET(S): 8.6 TABLET ORAL at 22:36

## 2021-04-20 RX ADMIN — HEPARIN SODIUM 5000 UNIT(S): 5000 INJECTION INTRAVENOUS; SUBCUTANEOUS at 17:40

## 2021-04-20 RX ADMIN — LIDOCAINE 1 PATCH: 4 CREAM TOPICAL at 22:43

## 2021-04-20 RX ADMIN — Medication 12: at 15:50

## 2021-04-20 RX ADMIN — Medication 200 MILLIGRAM(S): at 13:38

## 2021-04-20 RX ADMIN — LIDOCAINE 1 PATCH: 4 CREAM TOPICAL at 20:52

## 2021-04-20 RX ADMIN — LIDOCAINE 1 PATCH: 4 CREAM TOPICAL at 00:54

## 2021-04-20 RX ADMIN — Medication 40 MILLIGRAM(S): at 05:32

## 2021-04-20 RX ADMIN — Medication 325 MILLIGRAM(S): at 11:03

## 2021-04-20 RX ADMIN — Medication 1 PATCH: at 07:55

## 2021-04-20 RX ADMIN — INSULIN HUMAN 5 UNIT(S): 100 INJECTION, SOLUTION SUBCUTANEOUS at 16:32

## 2021-04-20 RX ADMIN — Medication 50 MILLIGRAM(S): at 13:38

## 2021-04-20 RX ADMIN — POLYETHYLENE GLYCOL 3350 17 GRAM(S): 17 POWDER, FOR SOLUTION ORAL at 11:04

## 2021-04-20 RX ADMIN — LIDOCAINE 1 PATCH: 4 CREAM TOPICAL at 11:03

## 2021-04-20 RX ADMIN — HEPARIN SODIUM 5000 UNIT(S): 5000 INJECTION INTRAVENOUS; SUBCUTANEOUS at 05:32

## 2021-04-20 RX ADMIN — Medication 30 MILLILITER(S): at 15:39

## 2021-04-20 RX ADMIN — Medication 8: at 11:04

## 2021-04-20 RX ADMIN — AMLODIPINE BESYLATE 10 MILLIGRAM(S): 2.5 TABLET ORAL at 05:32

## 2021-04-20 RX ADMIN — GABAPENTIN 300 MILLIGRAM(S): 400 CAPSULE ORAL at 22:31

## 2021-04-20 RX ADMIN — Medication 200 MILLIGRAM(S): at 22:30

## 2021-04-20 RX ADMIN — Medication 8 UNIT(S): at 22:02

## 2021-04-20 NOTE — CHART NOTE - NSCHARTNOTEFT_GEN_A_CORE
Called by the medicine team to discuss Left hip arthritis with patient.    Discussed the cause of her groin pain is likely dur to her severe arthritis of the left hip.     Advised patient of possible treatment options, including nonoperative and operative management at this time.    Patient states that she is not interested in operative management at this time. Recommend pain control and PT.     No orthopedic surgical intervention at this time. Patient can FU with orthopedic surgeon as an outpatient if they are interested in pursuing operative management or need further nonoperative recommendations.

## 2021-04-20 NOTE — PROGRESS NOTE ADULT - ASSESSMENT
75 year old female with PMH of CKD, HTN, diverticulitis, DVT? presents today c/o left sided back pain radiating into the left side since waking up this morning. Patient states that she was attempting to get out of bed to use the bathroom when she experienced sever pain described as an ache felt inside associated with numbness of the left leg. Patient  reports difficulty ambulating due to the pain worsening when she bears weight. Patient denies recent trauma but does report having physical therapy in 2007 due to problems in her lower back (-) urinary or bowel incontinence, fevers or chills, flu like symptoms, chest pain or sob. Called her PMD who told her to go to ER for an evaluation.     Left sided sciatica:  - Acute onset  - Pain management  - Started prednisone 50 mg daily X 5 days.  - Seen by ortho, no intervention   - CT L spine: Multilevel degenerative changes, most prominent at L4/L5 with moderate to severe bilateral foraminal stenosis and high-grade spinal canal stenosis, CT hip: no fracture, unable to do MR.  - Her left groin pain is likely secondary to severe OA  - DC to rehab when stable.    HTN:  - Not on ACEi/ARB for CKD 4  - On clonidine patch, Labetalol, amlodipine, HR borderline  - Added hydralazine,  BP improved    HLD:  - Continue Statin    DM:   - Uses NPH 70/30 bid about 15 units total per day at home, prescribed by her endocrine doctor  - Now on Lantus 12 units at night with pre meal SS  - Hb A1c 5.8  - Expect hyperglycemia with steroid       ROSALES on CKD IV:  - Cr worse today, cr 4.77  - No NSAID  - Hold Lasix  - Bladder scan with ~ 50 mL.  - Renal follow up    Hyperkalemia:  - S/P Kayexalate X 3  - Added lokelma, on veltassa at home  - Low K diet    Anemia of chronic disease:  - Hb stable  - Low % saturation , although high ferritin that could be from chronic inflammatory state   - Started PO iron   - VIOLET outpt per renal    Renal cysts:  - seen in sono  - Follow up with PCP    DVT ppx    Discussed with son. 75 year old female with PMH of CKD, HTN, diverticulitis, DVT? presents today c/o left sided back pain radiating into the left side since waking up this morning. Patient states that she was attempting to get out of bed to use the bathroom when she experienced sever pain described as an ache felt inside associated with numbness of the left leg. Patient  reports difficulty ambulating due to the pain worsening when she bears weight. Patient denies recent trauma but does report having physical therapy in 2007 due to problems in her lower back (-) urinary or bowel incontinence, fevers or chills, flu like symptoms, chest pain or sob. Called her PMD who told her to go to ER for an evaluation.     Left sided sciatica:  - Acute onset  - Pain management  - Started prednisone 50 mg daily X 5 days.  - Seen by ortho, no intervention   - CT L spine: Multilevel degenerative changes, most prominent at L4/L5 with moderate to severe bilateral foraminal stenosis and high-grade spinal canal stenosis, CT hip: no fracture, unable to do MR.  - Her left groin pain is likely secondary to severe OA  - DC to rehab when stable.    HTN:  - Not on ACEi/ARB for CKD 4  - On clonidine patch, Labetalol, amlodipine, HR borderline  - Added hydralazine,  BP improved    HLD:  - Continue Statin    DM:   - Uses NPH 70/30 bid about 15 units total per day at home, prescribed by her endocrine doctor  - Now on Lantus 12 units at night with pre meal SS  - Hb A1c 5.8  - Expect hyperglycemia with steroid       ROSALES on CKD IV:  - Cr worse today, cr 4.77  - No NSAID  - Hold Lasix  - Bladder scan with ~ 50 mL.  - Renal follow up    Hyperkalemia:  - S/P Kayexalate X 3  - Added lokelma, on veltassa at home  - Low K diet    Anemia of chronic disease:  - Hb stable  - Low % saturation , although high ferritin that could be from chronic inflammatory state   - Started PO iron   - VIOLET outpt per renal    Renal cysts:  - seen in sono  - Follow up with PCP    DVT ppx    As per pt she is DNR and had documents at home. son to bring paperwork

## 2021-04-20 NOTE — PROGRESS NOTE ADULT - SUBJECTIVE AND OBJECTIVE BOX
Patient is a 75y old  Female who presents with a chief complaint of Acute left leg siatica. (20 Apr 2021 10:58)    INTERVAL HPI/OVERNIGHT EVENTS:  Pt was seen and examined, no acute events.    MEDICATIONS  (STANDING):  amLODIPine   Tablet 10 milliGRAM(s) Oral daily  cloNIDine Patch 0.3 mG/24Hr(s) 1 patch Transdermal <User Schedule>  dextrose 40% Gel 15 Gram(s) Oral once  dextrose 5%. 1000 milliLiter(s) (50 mL/Hr) IV Continuous <Continuous>  dextrose 5%. 1000 milliLiter(s) (100 mL/Hr) IV Continuous <Continuous>  dextrose 50% Injectable 25 Gram(s) IV Push once  dextrose 50% Injectable 12.5 Gram(s) IV Push once  dextrose 50% Injectable 25 Gram(s) IV Push once  ferrous    sulfate 325 milliGRAM(s) Oral daily  gabapentin 300 milliGRAM(s) Oral at bedtime  glucagon  Injectable 1 milliGRAM(s) IntraMuscular once  heparin   Injectable 5000 Unit(s) SubCutaneous every 12 hours  hydrALAZINE 50 milliGRAM(s) Oral three times a day  insulin glargine Injectable (LANTUS) 12 Unit(s) SubCutaneous at bedtime  insulin lispro (ADMELOG) corrective regimen sliding scale   SubCutaneous three times a day before meals  labetalol 200 milliGRAM(s) Oral three times a day  lidocaine   Patch 1 Patch Transdermal daily  polyethylene glycol 3350 17 Gram(s) Oral daily  predniSONE   Tablet 50 milliGRAM(s) Oral daily  senna 2 Tablet(s) Oral at bedtime  simvastatin 40 milliGRAM(s) Oral at bedtime  sodium zirconium cyclosilicate 10 Gram(s) Oral daily    MEDICATIONS  (PRN):  oxycodone    5 mG/acetaminophen 325 mG 1 Tablet(s) Oral every 4 hours PRN Mild Pain (1 - 3)      Allergies  Eliquis (Other)      Vital Signs Last 24 Hrs  T(C): 36.7 (20 Apr 2021 13:24), Max: 36.7 (19 Apr 2021 17:34)  T(F): 98 (20 Apr 2021 13:24), Max: 98.1 (19 Apr 2021 17:34)  HR: 71 (20 Apr 2021 13:24) (60 - 71)  BP: 156/61 (20 Apr 2021 13:24) (145/73 - 156/61)  BP(mean): --  RR: 18 (20 Apr 2021 13:24) (17 - 18)  SpO2: 94% (20 Apr 2021 13:24) (94% - 97%)      PHYSICAL EXAM:  GENERAL: NAD  HEAD:  Atraumatic  EYES: PERRLA  NERVOUS SYSTEM:  Awake, alert  CHEST/LUNG: Clear  HEART: RRR  ABDOMEN: Soft, non tender  EXTREMITIES:  no edema      LABS:                        8.6    11.17 )-----------( 186      ( 20 Apr 2021 08:20 )             26.2     04-20    141  |  113<H>  |  131<H>  ----------------------------<  131<H>  4.7   |  15<L>  |  4.77<H>    Ca    7.6<L>      20 Apr 2021 08:20    TPro  6.8  /  Alb  3.4  /  TBili  0.3  /  DBili  x   /  AST  19  /  ALT  22  /  AlkPhos  72  04-20        CAPILLARY BLOOD GLUCOSE      POCT Blood Glucose.: 343 mg/dL (20 Apr 2021 10:42)  POCT Blood Glucose.: 149 mg/dL (20 Apr 2021 08:02)  POCT Blood Glucose.: 226 mg/dL (19 Apr 2021 21:23)  POCT Blood Glucose.: 371 mg/dL (19 Apr 2021 16:14)      RADIOLOGY & ADDITIONAL TESTS:    Imaging Personally Reviewed:  [ ] YES  [ ] NO    Consultant(s) Notes Reviewed:  [ ] YES  [ ] NO    Care Discussed with Consultants/Other Providers [ ] YES  [ ] NO

## 2021-04-20 NOTE — CHART NOTE - NSCHARTNOTEFT_GEN_A_CORE
Reviewed her EKG, I was concerned about Wellens syndrome, however discussed with on call cath team at [facility], EKG and chart reviewed and didn't think any acte EKG finding present. Advised serial trop and EKG. Echo, CXR  and cardio follow up inpt.  ABG with metabolic acidosis, will start bicarb drip ( will do half strength as pt is very hyperglycemic and will avoid D5)  Pt anuric now, refusing HD start.  no emergent indication for HD yet, discussed with renal. Reviewed her EKG, I was concerned about Wellens syndrome, however discussed with on call cath team at [facility], EKG and chart reviewed and didn't think any acte EKG finding present. Advised serial trop and EKG. Echo, CXR  and cardio follow up inpt.  ABG with metabolic acidosis, will start bicarb drip ( will do half strength as pt is very hyperglycemic and will avoid D5)  Pt anuric now, refusing HD start.  no emergent indication for HD yet, discussed with renal.  Transfer to telemetry.

## 2021-04-20 NOTE — CONSULT NOTE ADULT - SUBJECTIVE AND OBJECTIVE BOX
Patient chart reviewed, full consult to follow.     Thank you for the courtesy of this consultation. [facility] NEPHROLOGY [facility], [facility]  NEPHROLOGY AND HYPERTENSION  [address]  [address]  [place] [address]  [phone]    MD PRAVEEN LYNNE MD ANDREY GONCHARUK, MD MADHU KORRAPATI, MD YELENA ROSENBERG, MD BINNY KOSHY, MD CHRISTOPHER CAPUTO, MD EDWARD BOVER, MD      Information from chart:  "Patient is a 75y old  Female who presents with a chief complaint of Acute left leg sciatica. (2021 15:09)    HPI:  75 year old female presents today c/o left sided back pain radiating into the left side since waking up this morning. Patient states that she was attempting to get out of bed to use the bathroom when she experienced sever pain described as an ache felt inside associated with numbness of the left leg. Patient  reports difficulty ambulating due to the pain worsening when she bears weight. Patient denies recent trauma but does report having physical therapy in  due to problems in her lower back (-) urinary or bowel incontinence, fevers or chills, flu like symptoms, chest pain or sob. Called her PMD this morning and told to go to ER for an evaluation.  (15 Apr 2021 18:39)   "    Noted increasing Cr trend since admission  Increasing SOB with chest pain today  Hyperglycemia and metabolic acidosis  On lasix 40 mg daily; prednisone for back pain ;   Cr 3.5 ; CKD 4 followed by Dr. Castillo in [place] [phone].  Patient resistant to starting hemodialysis    Trend Bun/Cr  21 @ 16:13  BUN/CR -  131<H> / 4.96<H>  21 @ 08:20  BUN/CR -  131<H> / 4.77<H>  21 @ 08:12  BUN/CR -  108<H> / 3.69<H>  21 @ 12:32  BUN/CR -  98<H> / 3.51<H>  21 @ 16:25  BUN/CR -  94<H> / 3.57<H>  21 @ 07:36  BUN/CR -  90<H> / 3.70<H>  21 @ 08:50  BUN/CR -  73<H> / 3.36<H>  04-15-21 @ 15:04  BUN/CR -  67<H> / 3.43<H>    PAST MEDICAL & SURGICAL HISTORY:  HTN (hypertension)    Pacemaker    DVT (deep venous thrombosis)    Diverticulitis    Chronic renal disease    Intestinal bleeding      FAMILY HISTORY:    Allergies    Eliquis (Other)    Intolerances      Home Medications:  aspirin:  (15 Apr 2021 14:14)    MEDICATIONS  (STANDING):  amLODIPine   Tablet 10 milliGRAM(s) Oral daily  cloNIDine Patch 0.3 mG/24Hr(s) 1 patch Transdermal <User Schedule>  dextrose 40% Gel 15 Gram(s) Oral once  dextrose 5%. 1000 milliLiter(s) (50 mL/Hr) IV Continuous <Continuous>  dextrose 5%. 1000 milliLiter(s) (100 mL/Hr) IV Continuous <Continuous>  dextrose 50% Injectable 25 Gram(s) IV Push once  dextrose 50% Injectable 12.5 Gram(s) IV Push once  dextrose 50% Injectable 25 Gram(s) IV Push once  ferrous    sulfate 325 milliGRAM(s) Oral daily  gabapentin 300 milliGRAM(s) Oral at bedtime  glucagon  Injectable 1 milliGRAM(s) IntraMuscular once  heparin   Injectable 5000 Unit(s) SubCutaneous every 12 hours  hydrALAZINE 50 milliGRAM(s) Oral three times a day  insulin glargine Injectable (LANTUS) 12 Unit(s) SubCutaneous at bedtime  insulin lispro (ADMELOG) corrective regimen sliding scale   SubCutaneous three times a day before meals  insulin lispro Injectable (ADMELOG) 5 Unit(s) SubCutaneous three times a day before meals  labetalol 200 milliGRAM(s) Oral three times a day  lidocaine   Patch 1 Patch Transdermal daily  pantoprazole    Tablet 40 milliGRAM(s) Oral before breakfast  polyethylene glycol 3350 17 Gram(s) Oral daily  predniSONE   Tablet 50 milliGRAM(s) Oral daily  senna 2 Tablet(s) Oral at bedtime  simvastatin 40 milliGRAM(s) Oral at bedtime  sodium chloride 0.45% 1000 milliLiter(s) (75 mL/Hr) IV Continuous <Continuous>  sodium zirconium cyclosilicate 10 Gram(s) Oral daily    MEDICATIONS  (PRN):  aluminum hydroxide/magnesium hydroxide/simethicone Suspension 30 milliLiter(s) Oral every 6 hours PRN Dyspepsia  oxycodone    5 mG/acetaminophen 325 mG 1 Tablet(s) Oral every 4 hours PRN Mild Pain (1 - 3)    Vital Signs Last 24 Hrs  T(C): 36.7 (2021 13:24), Max: 36.7 (2021 17:34)  T(F): 98 (2021 13:24), Max: 98.1 (2021 17:34)  HR: 67 (2021 15:53) (60 - 71)  BP: 170/76 (2021 15:53) (145/73 - 170/76)  BP(mean): --  RR: 18 (2021 15:53) (17 - 18)  SpO2: 96% (2021 15:53) (94% - 97%)    Daily     Daily Weight in k.4 (2021 05:23)    21 @ 07:01  -  21 @ 16:55  --------------------------------------------------------  IN: 720 mL / OUT: 0 mL / NET: 720 mL      CAPILLARY BLOOD GLUCOSE      POCT Blood Glucose.: 563 mg/dL (2021 16:35)  POCT Blood Glucose.: 588 mg/dL (2021 16:34)  POCT Blood Glucose.: 572 mg/dL (2021 15:59)  POCT Blood Glucose.: >600 mg/dL (2021 15:44)  POCT Blood Glucose.: 578 mg/dL (2021 15:41)  POCT Blood Glucose.: 343 mg/dL (2021 10:42)  POCT Blood Glucose.: 149 mg/dL (2021 08:02)  POCT Blood Glucose.: 226 mg/dL (2021 21:23)    PHYSICAL EXAM:      T(C): 36.7 (21 @ 13:24), Max: 36.7 (21 @ 17:34)  HR: 67 (21 @ 15:53) (60 - 71)  BP: 170/76 (21 @ 15:53) (145/73 - 170/76)  RR: 18 (21 @ 15:53) (17 - 18)  SpO2: 96% (21 @ 15:53) (94% - 97%)  Wt(kg): --  Lungs clear  Heart S1S2  Abd soft NT ND  Extremities:   tr edema                  132<L>  |  105  |  131<H>  ----------------------------<  567<HH>  5.2   |  13<L>  |  4.96<H>    Ca    7.6<L>      2021 16:13    TPro  6.8  /  Alb  3.4  /  TBili  0.3  /  DBili  x   /  AST  19  /  ALT  22  /  AlkPhos  72                            8.6    9.12  )-----------( 168      ( 2021 16:14 )             26.0     Creatinine Trend: 4.96<--, 4.77<--, 3.69<--, 3.51<--, 3.57<--, 3.70<--    ABG - ( 2021 16:17 )  pH, Arterial: x     pH, Blood: 7.23  /  pCO2: 31    /  pO2: 110   / HCO3: 12    / Base Excess: -13.7 /  SaO2: 97          Urinalysis (04.15.21 @ 20:01)    pH Urine: 5.0    Glucose Qualitative, Urine: Negative mg/dL    Blood, Urine: Negative    Color: Yellow    Urine Appearance: Clear    Bilirubin: Negative    Ketone - Urine: Negative    Specific Gravity: 1.015    Protein, Urine: 100 mg/dL    Urobilinogen: Negative mg/dL    Nitrite: Negative    Leukocyte Esterase Concentration: Trace              Assessment   ROSALES CKD3-4; pre renal azotemia; catabolism with steroids;   Metabolic acidosis; hyperglycemia   R/o occult paraprotein related disease    Plan  D/C Lasix  Serial EKG; CE; transfer center evaluated;   IVF with bicarbonate support  Telemetry monitoring, CE   Paraprotein screen, acetone   Will follow closely for HD indications. Patient has expressed resistance to this option, will discuss with outpatient nephrologist       Erickson Ray MD        Patient chart reviewed, full consult to follow.     Thank you for the courtesy of this consultation.

## 2021-04-21 LAB
ACETONE SERPL-MCNC: NEGATIVE — SIGNIFICANT CHANGE UP
ALBUMIN SERPL ELPH-MCNC: 3.1 G/DL — LOW (ref 3.3–5)
ALP SERPL-CCNC: 63 U/L — SIGNIFICANT CHANGE UP (ref 40–120)
ALT FLD-CCNC: 19 U/L — SIGNIFICANT CHANGE UP (ref 12–78)
ANION GAP SERPL CALC-SCNC: 12 MMOL/L — SIGNIFICANT CHANGE UP (ref 5–17)
APPEARANCE UR: CLEAR — SIGNIFICANT CHANGE UP
AST SERPL-CCNC: 15 U/L — SIGNIFICANT CHANGE UP (ref 15–37)
BACTERIA # UR AUTO: ABNORMAL
BILIRUB SERPL-MCNC: 0.4 MG/DL — SIGNIFICANT CHANGE UP (ref 0.2–1.2)
BILIRUB UR-MCNC: NEGATIVE — SIGNIFICANT CHANGE UP
BUN SERPL-MCNC: 134 MG/DL — HIGH (ref 7–23)
CALCIUM SERPL-MCNC: 7.5 MG/DL — LOW (ref 8.5–10.1)
CHLORIDE SERPL-SCNC: 110 MMOL/L — HIGH (ref 96–108)
CO2 SERPL-SCNC: 17 MMOL/L — LOW (ref 22–31)
COLOR SPEC: YELLOW — SIGNIFICANT CHANGE UP
CREAT SERPL-MCNC: 5.26 MG/DL — HIGH (ref 0.5–1.3)
DIFF PNL FLD: NEGATIVE — SIGNIFICANT CHANGE UP
EPI CELLS # UR: SIGNIFICANT CHANGE UP
GLUCOSE BLDC GLUCOMTR-MCNC: 120 MG/DL — HIGH (ref 70–99)
GLUCOSE BLDC GLUCOMTR-MCNC: 279 MG/DL — HIGH (ref 70–99)
GLUCOSE BLDC GLUCOMTR-MCNC: 285 MG/DL — HIGH (ref 70–99)
GLUCOSE BLDC GLUCOMTR-MCNC: 372 MG/DL — HIGH (ref 70–99)
GLUCOSE SERPL-MCNC: 116 MG/DL — HIGH (ref 70–99)
GLUCOSE UR QL: NEGATIVE MG/DL — SIGNIFICANT CHANGE UP
HCT VFR BLD CALC: 25.1 % — LOW (ref 34.5–45)
HGB BLD-MCNC: 8.3 G/DL — LOW (ref 11.5–15.5)
KETONES UR-MCNC: NEGATIVE — SIGNIFICANT CHANGE UP
LEUKOCYTE ESTERASE UR-ACNC: ABNORMAL
MCHC RBC-ENTMCNC: 27.1 PG — SIGNIFICANT CHANGE UP (ref 27–34)
MCHC RBC-ENTMCNC: 33.1 GM/DL — SIGNIFICANT CHANGE UP (ref 32–36)
MCV RBC AUTO: 82 FL — SIGNIFICANT CHANGE UP (ref 80–100)
NITRITE UR-MCNC: NEGATIVE — SIGNIFICANT CHANGE UP
NRBC # BLD: 0 /100 WBCS — SIGNIFICANT CHANGE UP (ref 0–0)
NT-PROBNP SERPL-SCNC: 2132 PG/ML — HIGH (ref 0–450)
PH UR: 5 — SIGNIFICANT CHANGE UP (ref 5–8)
PLATELET # BLD AUTO: 182 K/UL — SIGNIFICANT CHANGE UP (ref 150–400)
POTASSIUM SERPL-MCNC: 4.6 MMOL/L — SIGNIFICANT CHANGE UP (ref 3.5–5.3)
POTASSIUM SERPL-SCNC: 4.6 MMOL/L — SIGNIFICANT CHANGE UP (ref 3.5–5.3)
PROT SERPL-MCNC: 6.3 GM/DL — SIGNIFICANT CHANGE UP (ref 6–8.3)
PROT UR-MCNC: 15 MG/DL
RBC # BLD: 3.06 M/UL — LOW (ref 3.8–5.2)
RBC # FLD: 14.7 % — HIGH (ref 10.3–14.5)
SODIUM SERPL-SCNC: 139 MMOL/L — SIGNIFICANT CHANGE UP (ref 135–145)
SP GR SPEC: 1.02 — SIGNIFICANT CHANGE UP (ref 1.01–1.02)
TROPONIN I SERPL-MCNC: 0.03 NG/ML — SIGNIFICANT CHANGE UP (ref 0.01–0.04)
UROBILINOGEN FLD QL: NEGATIVE MG/DL — SIGNIFICANT CHANGE UP
WBC # BLD: 10.76 K/UL — HIGH (ref 3.8–10.5)
WBC # FLD AUTO: 10.76 K/UL — HIGH (ref 3.8–10.5)
WBC UR QL: ABNORMAL

## 2021-04-21 PROCEDURE — 99233 SBSQ HOSP IP/OBS HIGH 50: CPT

## 2021-04-21 RX ADMIN — LIDOCAINE 1 PATCH: 4 CREAM TOPICAL at 18:32

## 2021-04-21 RX ADMIN — Medication 200 MILLIGRAM(S): at 06:30

## 2021-04-21 RX ADMIN — HEPARIN SODIUM 5000 UNIT(S): 5000 INJECTION INTRAVENOUS; SUBCUTANEOUS at 06:31

## 2021-04-21 RX ADMIN — POLYETHYLENE GLYCOL 3350 17 GRAM(S): 17 POWDER, FOR SOLUTION ORAL at 11:51

## 2021-04-21 RX ADMIN — INSULIN GLARGINE 12 UNIT(S): 100 INJECTION, SOLUTION SUBCUTANEOUS at 21:59

## 2021-04-21 RX ADMIN — Medication 200 MILLIGRAM(S): at 22:13

## 2021-04-21 RX ADMIN — SODIUM CHLORIDE 75 MILLILITER(S): 9 INJECTION, SOLUTION INTRAVENOUS at 08:38

## 2021-04-21 RX ADMIN — SENNA PLUS 2 TABLET(S): 8.6 TABLET ORAL at 22:13

## 2021-04-21 RX ADMIN — PANTOPRAZOLE SODIUM 40 MILLIGRAM(S): 20 TABLET, DELAYED RELEASE ORAL at 06:30

## 2021-04-21 RX ADMIN — Medication 50 MILLIGRAM(S): at 15:15

## 2021-04-21 RX ADMIN — Medication 325 MILLIGRAM(S): at 11:51

## 2021-04-21 RX ADMIN — SODIUM ZIRCONIUM CYCLOSILICATE 10 GRAM(S): 10 POWDER, FOR SUSPENSION ORAL at 12:23

## 2021-04-21 RX ADMIN — GABAPENTIN 300 MILLIGRAM(S): 400 CAPSULE ORAL at 22:13

## 2021-04-21 RX ADMIN — AMLODIPINE BESYLATE 10 MILLIGRAM(S): 2.5 TABLET ORAL at 11:51

## 2021-04-21 RX ADMIN — LIDOCAINE 1 PATCH: 4 CREAM TOPICAL at 12:23

## 2021-04-21 RX ADMIN — Medication 5 UNIT(S): at 16:56

## 2021-04-21 RX ADMIN — Medication 50 MILLIGRAM(S): at 06:29

## 2021-04-21 RX ADMIN — Medication 1 PATCH: at 08:19

## 2021-04-21 RX ADMIN — Medication 50 MILLIGRAM(S): at 06:28

## 2021-04-21 RX ADMIN — Medication 5 UNIT(S): at 08:39

## 2021-04-21 RX ADMIN — Medication 200 MILLIGRAM(S): at 15:15

## 2021-04-21 RX ADMIN — Medication 10: at 16:56

## 2021-04-21 RX ADMIN — Medication 5 UNIT(S): at 11:53

## 2021-04-21 RX ADMIN — SIMVASTATIN 40 MILLIGRAM(S): 20 TABLET, FILM COATED ORAL at 22:13

## 2021-04-21 RX ADMIN — Medication 1 PATCH: at 18:31

## 2021-04-21 RX ADMIN — Medication 50 MILLIGRAM(S): at 22:12

## 2021-04-21 RX ADMIN — HEPARIN SODIUM 5000 UNIT(S): 5000 INJECTION INTRAVENOUS; SUBCUTANEOUS at 17:36

## 2021-04-21 RX ADMIN — Medication 6: at 11:54

## 2021-04-21 NOTE — PROGRESS NOTE ADULT - SUBJECTIVE AND OBJECTIVE BOX
Patient is a 75y old  Female who presents with a chief complaint of Acute left leg siatica. (20 Apr 2021 10:58)    INTERVAL HPI/OVERNIGHT EVENTS:  Pt was seen and examined, no acute events.    MEDICATIONS  (STANDING):  amLODIPine   Tablet 10 milliGRAM(s) Oral daily  cloNIDine Patch 0.3 mG/24Hr(s) 1 patch Transdermal <User Schedule>  dextrose 40% Gel 15 Gram(s) Oral once  dextrose 5%. 1000 milliLiter(s) (50 mL/Hr) IV Continuous <Continuous>  dextrose 5%. 1000 milliLiter(s) (100 mL/Hr) IV Continuous <Continuous>  dextrose 50% Injectable 25 Gram(s) IV Push once  dextrose 50% Injectable 12.5 Gram(s) IV Push once  dextrose 50% Injectable 25 Gram(s) IV Push once  ferrous    sulfate 325 milliGRAM(s) Oral daily  gabapentin 300 milliGRAM(s) Oral at bedtime  glucagon  Injectable 1 milliGRAM(s) IntraMuscular once  heparin   Injectable 5000 Unit(s) SubCutaneous every 12 hours  hydrALAZINE 50 milliGRAM(s) Oral three times a day  insulin glargine Injectable (LANTUS) 12 Unit(s) SubCutaneous at bedtime  insulin lispro (ADMELOG) corrective regimen sliding scale   SubCutaneous three times a day before meals  labetalol 200 milliGRAM(s) Oral three times a day  lidocaine   Patch 1 Patch Transdermal daily  polyethylene glycol 3350 17 Gram(s) Oral daily  predniSONE   Tablet 50 milliGRAM(s) Oral daily  senna 2 Tablet(s) Oral at bedtime  simvastatin 40 milliGRAM(s) Oral at bedtime  sodium zirconium cyclosilicate 10 Gram(s) Oral daily    MEDICATIONS  (PRN):  oxycodone    5 mG/acetaminophen 325 mG 1 Tablet(s) Oral every 4 hours PRN Mild Pain (1 - 3)      Allergies  Eliquis (Other)      Vital Signs Last 24 Hrs  T(C): 36.7 (21 Apr 2021 17:11), Max: 36.7 (21 Apr 2021 17:11)  T(F): 98 (21 Apr 2021 17:11), Max: 98 (21 Apr 2021 17:11)  HR: 60 (21 Apr 2021 17:11) (60 - 64)  BP: 144/73 (21 Apr 2021 17:11) (135/65 - 144/73)  BP(mean): --  RR: 18 (21 Apr 2021 17:11) (18 - 20)  SpO2: 100% (21 Apr 2021 17:11) (98% - 100%)      PHYSICAL EXAM:  GENERAL: NAD  HEAD:  Atraumatic  EYES: PERRLA  NERVOUS SYSTEM:  Awake, alert  CHEST/LUNG: Clear  HEART: RRR  ABDOMEN: Soft, non tender  EXTREMITIES:  no edema      LABS:                                   8.3    10.76 )-----------( 182      ( 21 Apr 2021 07:30 )             25.1     04-21    139  |  110<H>  |  134<H>  ----------------------------<  116<H>  4.6   |  17<L>  |  5.26<H>    Ca    7.5<L>      21 Apr 2021 07:30    TPro  6.3  /  Alb  3.1<L>  /  TBili  0.4  /  DBili  x   /  AST  15  /  ALT  19  /  AlkPhos  63  04-21      RADIOLOGY & ADDITIONAL TESTS:    Imaging Personally Reviewed:  [ ] YES  [ ] NO    Consultant(s) Notes Reviewed:  [ ] YES  [ ] NO    Care Discussed with Consultants/Other Providers [ ] YES  [ ] NO Patient is a 75y old  Female who presents with a chief complaint of Acute left leg siatica. (20 Apr 2021 10:58)    INTERVAL HPI/OVERNIGHT EVENTS:  Pt was seen and examined, no acute events.  no complaints.     Denies fever, chills, N/V, dizziness, HA, cough, CP, palpitations, SOB, abdominal pain, dysuria, diarrhea, constipation.     MEDICATIONS  (STANDING):  amLODIPine   Tablet 10 milliGRAM(s) Oral daily  cloNIDine Patch 0.3 mG/24Hr(s) 1 patch Transdermal <User Schedule>  dextrose 40% Gel 15 Gram(s) Oral once  dextrose 5%. 1000 milliLiter(s) (50 mL/Hr) IV Continuous <Continuous>  dextrose 5%. 1000 milliLiter(s) (100 mL/Hr) IV Continuous <Continuous>  dextrose 50% Injectable 25 Gram(s) IV Push once  dextrose 50% Injectable 12.5 Gram(s) IV Push once  dextrose 50% Injectable 25 Gram(s) IV Push once  ferrous    sulfate 325 milliGRAM(s) Oral daily  gabapentin 300 milliGRAM(s) Oral at bedtime  glucagon  Injectable 1 milliGRAM(s) IntraMuscular once  heparin   Injectable 5000 Unit(s) SubCutaneous every 12 hours  hydrALAZINE 50 milliGRAM(s) Oral three times a day  insulin glargine Injectable (LANTUS) 12 Unit(s) SubCutaneous at bedtime  insulin lispro (ADMELOG) corrective regimen sliding scale   SubCutaneous three times a day before meals  labetalol 200 milliGRAM(s) Oral three times a day  lidocaine   Patch 1 Patch Transdermal daily  polyethylene glycol 3350 17 Gram(s) Oral daily  predniSONE   Tablet 50 milliGRAM(s) Oral daily  senna 2 Tablet(s) Oral at bedtime  simvastatin 40 milliGRAM(s) Oral at bedtime  sodium zirconium cyclosilicate 10 Gram(s) Oral daily    MEDICATIONS  (PRN):  oxycodone    5 mG/acetaminophen 325 mG 1 Tablet(s) Oral every 4 hours PRN Mild Pain (1 - 3)      Allergies  Eliquis (Other)      Vital Signs Last 24 Hrs  T(C): 36.7 (21 Apr 2021 17:11), Max: 36.7 (21 Apr 2021 17:11)  T(F): 98 (21 Apr 2021 17:11), Max: 98 (21 Apr 2021 17:11)  HR: 60 (21 Apr 2021 17:11) (60 - 64)  BP: 144/73 (21 Apr 2021 17:11) (135/65 - 144/73)  BP(mean): --  RR: 18 (21 Apr 2021 17:11) (18 - 20)  SpO2: 100% (21 Apr 2021 17:11) (98% - 100%)      PHYSICAL EXAM:  GENERAL: NAD, no increased WOB  HEAD:  Atraumatic  EYES: PERRLA  NERVOUS SYSTEM:  AOx3, no focal neuro deficits   CHEST/LUNG: CTAB  HEART: RRR, +S1/S2  ABDOMEN: Soft, non tender, ND, +BS   EXTREMITIES:  no edema, calf tenderness b/l       LABS:                                   8.3    10.76 )-----------( 182      ( 21 Apr 2021 07:30 )             25.1     04-21    139  |  110<H>  |  134<H>  ----------------------------<  116<H>  4.6   |  17<L>  |  5.26<H>    Ca    7.5<L>      21 Apr 2021 07:30    TPro  6.3  /  Alb  3.1<L>  /  TBili  0.4  /  DBili  x   /  AST  15  /  ALT  19  /  AlkPhos  63  04-21      RADIOLOGY & ADDITIONAL TESTS:    Imaging Personally Reviewed:  [ ] YES  [ ] NO    Consultant(s) Notes Reviewed:  [x ] YES  [ ] NO    Care Discussed with Consultants/Other Providers [x ] YES  [ ] NO  Care discussed in detail with patient and son  All questions and concerns addressed

## 2021-04-21 NOTE — PROGRESS NOTE ADULT - ASSESSMENT
75 year old female with PMH of CKD, HTN, diverticulitis, DVT? presents today c/o left sided back pain radiating into the left side since waking up this morning. Patient states that she was attempting to get out of bed to use the bathroom when she experienced sever pain described as an ache felt inside associated with numbness of the left leg. Patient  reports difficulty ambulating due to the pain worsening when she bears weight. Patient denies recent trauma but does report having physical therapy in 2007 due to problems in her lower back (-) urinary or bowel incontinence, fevers or chills, flu like symptoms, chest pain or sob. Called her PMD who told her to go to ER for an evaluation.     Left sided sciatica:  - Acute onset  - Pain management  - Started prednisone 50 mg daily X 5 days.  - Seen by ortho, no intervention   - CT L spine: Multilevel degenerative changes, most prominent at L4/L5 with moderate to severe bilateral foraminal stenosis and high-grade spinal canal stenosis, CT hip: no fracture, unable to do MR.  - Her left groin pain is likely secondary to severe OA  - DC to rehab when stable.    HTN:  - Not on ACEi/ARB for CKD 4  - On clonidine patch, Labetalol, amlodipine, HR borderline  - Added hydralazine,  BP improved    HLD:  - Continue Statin    DM:   - Uses NPH 70/30 bid about 15 units total per day at home, prescribed by her endocrine doctor  - Now on Lantus 12 units at night with pre meal SS  - Hb A1c 5.8  - Expect hyperglycemia with steroid       ROSALES on CKD IV:  - Cr worse today, cr 4.77  - No NSAID  - Hold Lasix  - Bladder scan with ~ 50 mL.  - Renal follow up    Hyperkalemia:  - S/P Kayexalate X 3  - Added lokelma, on veltassa at home  - Low K diet    Anemia of chronic disease:  - Hb stable  - Low % saturation , although high ferritin that could be from chronic inflammatory state   - Started PO iron   - VIOLET outpt per renal    Renal cysts:  - seen in sono  - Follow up with PCP    DVT ppx    As per pt she is DNR and had documents at home. son to bring paperwork 75 year old female with PMH of CKD, HTN, diverticulitis, DVT? presents today c/o left sided back pain radiating into the left side since waking up this morning. Patient states that she was attempting to get out of bed to use the bathroom when she experienced sever pain described as an ache felt inside associated with numbness of the left leg. Patient  reports difficulty ambulating due to the pain worsening when she bears weight. Patient denies recent trauma but does report having physical therapy in 2007 due to problems in her lower back (-) urinary or bowel incontinence, fevers or chills, flu like symptoms, chest pain or sob. Called her PMD who told her to go to ER for an evaluation.     Left sided sciatica:  - Acute onset  - Pain management  - completed prednisone 50 mg daily X 5 days.  - Seen by ortho, no intervention   - CT L spine: Multilevel degenerative changes, most prominent at L4/L5 with moderate to severe bilateral foraminal stenosis and high-grade spinal canal stenosis, CT hip: no fracture, unable to do MR.  - Her left groin pain is likely secondary to severe OA    HTN:  - Not on ACEi/ARB for CKD 4  - On clonidine patch, Labetalol, amlodipine, hydralazine     HLD:  - Continue Statin    DM:   - Uses NPH 70/30 bid about 15 units total per day at home, prescribed by her endocrine doctor  - Now on Lantus 12 units at night with pre meal SS  - Hb A1c 5.8       ROSALES on CKD IV:  - Cr worse today, cr 4.77  - No NSAID  - Hold Lasix  - Bladder scan with ~ 50 mL.  - Renal follow up  patient now agreeable to initiating HD on this admission if warranted,   discussed with renal     Hyperkalemia:  - S/P Kayexalate X 3  - Added lokelma, on veltassa at home  - Low K diet    Anemia of chronic disease:  - Hb stable  - Low % saturation , although high ferritin that could be from chronic inflammatory state   - Started PO iron   - VIOLET outpt per renal    Renal cysts:  - seen in sono  - Follow up with PCP    DVT ppx--heparin SQ  fall precautions     As per pt she is DNR and had documents at home. son to bring paperwork

## 2021-04-21 NOTE — DIETITIAN INITIAL EVALUATION ADULT. - OTHER INFO
Pt lives alone; hs a son in [place] & another in [place]; has been independent c ADLs. Pt takes NPH 70/30 insulin x 2/day (15 units total daily). Pt hospitalized for left leg sciatica but per discussion c Nephrologist (Dr. Ray) was just advised that HD to be initiated tomorrow due to increasing SOB & chest pain; increasing Cr trend since admission, lack of urination, hyperglycemia c metabolic acidosis (per MD note 4/20).  Pt very upset during visit; discussed not thinking too far into future but dealing c one day at a time.  Unsure at this time whether HD to be permanent or temporary.  Advised pt will come back again once HD initiated & she is feeling better so we can discuss more in depth Renal diet recommendations.  Denies any N/V/C/D or chew/swallowing difficulty; wt has been stable & appetite is good.

## 2021-04-21 NOTE — DIETITIAN INITIAL EVALUATION ADULT. - PERTINENT MEDS FT
Heparin, 1/2 NS @ 75 ml/hr, Admelog, Lantus, Maalox, Protonix, Chinyere press patch, Iron Sulfate, Hydralazine, Lokelma, Neurontin, Normodyne, Lidoderm, percocet, Senna, Zocor, Miralax

## 2021-04-21 NOTE — DIETITIAN INITIAL EVALUATION ADULT. - OTHER CALCULATIONS
Ht (cm):    162.6   Wt (kg):    93.7 (4/21)   BMI:   35.5      IBW: 54.5 kg   %IBW:  172%   UBW:  stable   %UBW: 100%

## 2021-04-21 NOTE — PROGRESS NOTE ADULT - SUBJECTIVE AND OBJECTIVE BOX
[facility] NEPHROLOGY SERVICES, [facility]  NEPHROLOGY AND HYPERTENSION  [address]  [address]  [place] [address]  [phone]    MD PRAVEEN LYNNE MD ANDREY GONCHARUK, MD MADHU KORRAPATI, MD YELENA ROSENBERG, MD BINNY KOSHY, MD CHRISTOPHER CAPUTO, MD EDWARD BOVER, MD          Patient events noted  Anxious about initiating HD  Tachypnea with exertion and anxiety   When resting, comfortable     MEDICATIONS  (STANDING):  amLODIPine   Tablet 10 milliGRAM(s) Oral daily  cloNIDine Patch 0.3 mG/24Hr(s) 1 patch Transdermal <User Schedule>  dextrose 40% Gel 15 Gram(s) Oral once  dextrose 5%. 1000 milliLiter(s) (100 mL/Hr) IV Continuous <Continuous>  dextrose 5%. 1000 milliLiter(s) (50 mL/Hr) IV Continuous <Continuous>  dextrose 50% Injectable 25 Gram(s) IV Push once  dextrose 50% Injectable 12.5 Gram(s) IV Push once  dextrose 50% Injectable 25 Gram(s) IV Push once  ferrous    sulfate 325 milliGRAM(s) Oral daily  gabapentin 300 milliGRAM(s) Oral at bedtime  glucagon  Injectable 1 milliGRAM(s) IntraMuscular once  heparin   Injectable 5000 Unit(s) SubCutaneous every 12 hours  hydrALAZINE 50 milliGRAM(s) Oral three times a day  insulin glargine Injectable (LANTUS) 12 Unit(s) SubCutaneous at bedtime  insulin lispro (ADMELOG) corrective regimen sliding scale   SubCutaneous three times a day before meals  insulin lispro Injectable (ADMELOG) 5 Unit(s) SubCutaneous three times a day before meals  labetalol 200 milliGRAM(s) Oral three times a day  lidocaine   Patch 1 Patch Transdermal daily  pantoprazole    Tablet 40 milliGRAM(s) Oral before breakfast  polyethylene glycol 3350 17 Gram(s) Oral daily  senna 2 Tablet(s) Oral at bedtime  simvastatin 40 milliGRAM(s) Oral at bedtime  sodium chloride 0.45% 1000 milliLiter(s) (75 mL/Hr) IV Continuous <Continuous>  sodium zirconium cyclosilicate 10 Gram(s) Oral daily    MEDICATIONS  (PRN):  aluminum hydroxide/magnesium hydroxide/simethicone Suspension 30 milliLiter(s) Oral every 6 hours PRN Dyspepsia  oxycodone    5 mG/acetaminophen 325 mG 1 Tablet(s) Oral every 4 hours PRN Mild Pain (1 - 3)      04-20-21 @ 07:01  -  04-21-21 @ 07:00  --------------------------------------------------------  IN: 720 mL / OUT: 0 mL / NET: 720 mL    04-21-21 @ 07:01  -  04-21-21 @ 15:12  --------------------------------------------------------  IN: 1000 mL / OUT: 0 mL / NET: 1000 mL      PHYSICAL EXAM:      T(C): 36.4 (04-21-21 @ 11:00), Max: 36.7 (04-20-21 @ 17:20)  HR: 60 (04-21-21 @ 11:00) (60 - 68)  BP: 135/65 (04-21-21 @ 11:00) (135/65 - 170/76)  RR: 20 (04-21-21 @ 11:00) (18 - 20)  SpO2: 99% (04-21-21 @ 11:00) (96% - 100%)  Wt(kg): --  Lungs clear  Heart S1S2  Abd soft NT ND  Extremities:   tr edema                                    8.3    10.76 )-----------( 182      ( 21 Apr 2021 07:30 )             25.1     04-21    139  |  110<H>  |  134<H>  ----------------------------<  116<H>  4.6   |  17<L>  |  5.26<H>    Ca    7.5<L>      21 Apr 2021 07:30    TPro  6.3  /  Alb  3.1<L>  /  TBili  0.4  /  DBili  x   /  AST  15  /  ALT  19  /  AlkPhos  63  04-21    ABG - ( 20 Apr 2021 16:17 )  pH, Arterial: x     pH, Blood: 7.23  /  pCO2: 31    /  pO2: 110   / HCO3: 12    / Base Excess: -13.7 /  SaO2: 97          Trend Bun/Cr  04-21-21 @ 07:30  BUN/CR -  134<H> / 5.26<H>  04-20-21 @ 16:13  BUN/CR -  131<H> / 4.96<H>  04-20-21 @ 08:20  BUN/CR -  131<H> / 4.77<H>  04-19-21 @ 08:12  BUN/CR -  108<H> / 3.69<H>  04-18-21 @ 12:32  BUN/CR -  98<H> / 3.51<H>  04-17-21 @ 16:25  BUN/CR -  94<H> / 3.57<H>  04-17-21 @ 07:36  BUN/CR -  90<H> / 3.70<H>  04-16-21 @ 08:50  BUN/CR -  73<H> / 3.36<H>  04-15-21 @ 15:04  BUN/CR -  67<H> / 3.43<H>        LIVER FUNCTIONS - ( 21 Apr 2021 07:30 )  Alb: 3.1 g/dL / Pro: 6.3 gm/dL / ALK PHOS: 63 U/L / ALT: 19 U/L / AST: 15 U/L / GGT: x           Creatinine Trend: 5.26<--, 4.96<--, 4.77<--, 3.69<--, 3.51<--, 3.57<--      Assessment   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  Discussed with patient and son in detail, goals, indications, benefits and risks of the hemodialysis procedure.   They expressed agreement for tomorrow.         Erickson Ray MD

## 2021-04-21 NOTE — DIETITIAN INITIAL EVALUATION ADULT. - PERTINENT LABORATORY DATA
04-21 Na139 mmol/L Glu 116 mg/dL<H> K+ 4.6 mmol/L Cr  5.26 mg/dL<H>  mg/dL<H> 04-21 Alb 3.1 g/dL<L>  04-16-21  A1C 5.8%; average 120; 04-20 POCT: 149, 343, 578, >600, 572, 588, 563, 459, 465, 306, 313

## 2021-04-22 LAB
ALBUMIN SERPL ELPH-MCNC: 3 G/DL — LOW (ref 3.3–5)
ALP SERPL-CCNC: 59 U/L — SIGNIFICANT CHANGE UP (ref 40–120)
ALT FLD-CCNC: 18 U/L — SIGNIFICANT CHANGE UP (ref 12–78)
ANION GAP SERPL CALC-SCNC: 13 MMOL/L — SIGNIFICANT CHANGE UP (ref 5–17)
APTT BLD: 33.6 SEC — SIGNIFICANT CHANGE UP (ref 27.5–35.5)
AST SERPL-CCNC: 14 U/L — LOW (ref 15–37)
BASOPHILS # BLD AUTO: 0 K/UL — SIGNIFICANT CHANGE UP (ref 0–0.2)
BASOPHILS NFR BLD AUTO: 0 % — SIGNIFICANT CHANGE UP (ref 0–2)
BILIRUB SERPL-MCNC: 0.5 MG/DL — SIGNIFICANT CHANGE UP (ref 0.2–1.2)
BUN SERPL-MCNC: 142 MG/DL — HIGH (ref 7–23)
CALCIUM SERPL-MCNC: 7.3 MG/DL — LOW (ref 8.5–10.1)
CHLORIDE SERPL-SCNC: 107 MMOL/L — SIGNIFICANT CHANGE UP (ref 96–108)
CO2 SERPL-SCNC: 17 MMOL/L — LOW (ref 22–31)
CREAT SERPL-MCNC: 6.21 MG/DL — HIGH (ref 0.5–1.3)
EOSINOPHIL # BLD AUTO: 0.1 K/UL — SIGNIFICANT CHANGE UP (ref 0–0.5)
EOSINOPHIL NFR BLD AUTO: 1 % — SIGNIFICANT CHANGE UP (ref 0–6)
ERYTHROCYTE [SEDIMENTATION RATE] IN BLOOD: 11 MM/HR — SIGNIFICANT CHANGE UP (ref 0–20)
GLUCOSE BLDC GLUCOMTR-MCNC: 192 MG/DL — HIGH (ref 70–99)
GLUCOSE BLDC GLUCOMTR-MCNC: 233 MG/DL — HIGH (ref 70–99)
GLUCOSE BLDC GLUCOMTR-MCNC: 248 MG/DL — HIGH (ref 70–99)
GLUCOSE BLDC GLUCOMTR-MCNC: 301 MG/DL — HIGH (ref 70–99)
GLUCOSE SERPL-MCNC: 210 MG/DL — HIGH (ref 70–99)
HCT VFR BLD CALC: 23.2 % — LOW (ref 34.5–45)
HCT VFR BLD CALC: 24.1 % — LOW (ref 34.5–45)
HGB BLD-MCNC: 7.7 G/DL — LOW (ref 11.5–15.5)
HGB BLD-MCNC: 7.8 G/DL — LOW (ref 11.5–15.5)
INR BLD: 1.03 RATIO — SIGNIFICANT CHANGE UP (ref 0.88–1.16)
LYMPHOCYTES # BLD AUTO: 1.39 K/UL — SIGNIFICANT CHANGE UP (ref 1–3.3)
LYMPHOCYTES # BLD AUTO: 14 % — SIGNIFICANT CHANGE UP (ref 13–44)
MAGNESIUM SERPL-MCNC: 2 MG/DL — SIGNIFICANT CHANGE UP (ref 1.6–2.6)
MANUAL SMEAR VERIFICATION: YES — SIGNIFICANT CHANGE UP
MCHC RBC-ENTMCNC: 27.4 PG — SIGNIFICANT CHANGE UP (ref 27–34)
MCHC RBC-ENTMCNC: 27.8 PG — SIGNIFICANT CHANGE UP (ref 27–34)
MCHC RBC-ENTMCNC: 32.4 GM/DL — SIGNIFICANT CHANGE UP (ref 32–36)
MCHC RBC-ENTMCNC: 33.2 GM/DL — SIGNIFICANT CHANGE UP (ref 32–36)
MCV RBC AUTO: 83.8 FL — SIGNIFICANT CHANGE UP (ref 80–100)
MCV RBC AUTO: 84.6 FL — SIGNIFICANT CHANGE UP (ref 80–100)
MONOCYTES # BLD AUTO: 0.7 K/UL — SIGNIFICANT CHANGE UP (ref 0–0.9)
MONOCYTES NFR BLD AUTO: 7 % — SIGNIFICANT CHANGE UP (ref 2–14)
NEUTROPHILS # BLD AUTO: 7.65 K/UL — HIGH (ref 1.8–7.4)
NEUTROPHILS NFR BLD AUTO: 74 % — SIGNIFICANT CHANGE UP (ref 43–77)
NEUTS BAND # BLD: 3 % — SIGNIFICANT CHANGE UP (ref 0–8)
NRBC # BLD: 1 /100 WBCS — HIGH (ref 0–0)
NRBC # BLD: 1 /100 — HIGH (ref 0–0)
NRBC # BLD: SIGNIFICANT CHANGE UP /100 WBCS (ref 0–0)
PHOSPHATE SERPL-MCNC: 7.4 MG/DL — HIGH (ref 2.5–4.5)
PLAT MORPH BLD: NORMAL — SIGNIFICANT CHANGE UP
PLATELET # BLD AUTO: 165 K/UL — SIGNIFICANT CHANGE UP (ref 150–400)
PLATELET # BLD AUTO: 177 K/UL — SIGNIFICANT CHANGE UP (ref 150–400)
POTASSIUM SERPL-MCNC: 4.9 MMOL/L — SIGNIFICANT CHANGE UP (ref 3.5–5.3)
POTASSIUM SERPL-SCNC: 4.9 MMOL/L — SIGNIFICANT CHANGE UP (ref 3.5–5.3)
PROT ?TM UR-MCNC: 13 MG/DL — HIGH (ref 0–12)
PROT SERPL-MCNC: 5.9 G/DL — LOW (ref 6–8.3)
PROT SERPL-MCNC: 5.9 G/DL — LOW (ref 6–8.3)
PROT SERPL-MCNC: 6 GM/DL — SIGNIFICANT CHANGE UP (ref 6–8.3)
PROTHROM AB SERPL-ACNC: 11.9 SEC — SIGNIFICANT CHANGE UP (ref 10.6–13.6)
RBC # BLD: 2.77 M/UL — LOW (ref 3.8–5.2)
RBC # BLD: 2.85 M/UL — LOW (ref 3.8–5.2)
RBC # FLD: 14.6 % — HIGH (ref 10.3–14.5)
RBC # FLD: 15 % — HIGH (ref 10.3–14.5)
RBC BLD AUTO: SIGNIFICANT CHANGE UP
SODIUM SERPL-SCNC: 137 MMOL/L — SIGNIFICANT CHANGE UP (ref 135–145)
VARIANT LYMPHS # BLD: 1 % — SIGNIFICANT CHANGE UP (ref 0–6)
WBC # BLD: 5.79 K/UL — SIGNIFICANT CHANGE UP (ref 3.8–10.5)
WBC # BLD: 9.94 K/UL — SIGNIFICANT CHANGE UP (ref 3.8–10.5)
WBC # FLD AUTO: 5.79 K/UL — SIGNIFICANT CHANGE UP (ref 3.8–10.5)
WBC # FLD AUTO: 9.94 K/UL — SIGNIFICANT CHANGE UP (ref 3.8–10.5)

## 2021-04-22 PROCEDURE — 77001 FLUOROGUIDE FOR VEIN DEVICE: CPT | Mod: 26

## 2021-04-22 PROCEDURE — 71045 X-RAY EXAM CHEST 1 VIEW: CPT | Mod: 26

## 2021-04-22 PROCEDURE — 70450 CT HEAD/BRAIN W/O DYE: CPT | Mod: 26

## 2021-04-22 PROCEDURE — 99221 1ST HOSP IP/OBS SF/LOW 40: CPT

## 2021-04-22 PROCEDURE — 99233 SBSQ HOSP IP/OBS HIGH 50: CPT

## 2021-04-22 PROCEDURE — 36556 INSERT NON-TUNNEL CV CATH: CPT | Mod: RT

## 2021-04-22 PROCEDURE — 76937 US GUIDE VASCULAR ACCESS: CPT | Mod: 26

## 2021-04-22 RX ORDER — ONDANSETRON 8 MG/1
4 TABLET, FILM COATED ORAL ONCE
Refills: 0 | Status: COMPLETED | OUTPATIENT
Start: 2021-04-22 | End: 2021-04-22

## 2021-04-22 RX ORDER — HYDRALAZINE HCL 50 MG
25 TABLET ORAL THREE TIMES A DAY
Refills: 0 | Status: DISCONTINUED | OUTPATIENT
Start: 2021-04-22 | End: 2021-05-03

## 2021-04-22 RX ORDER — LABETALOL HCL 100 MG
100 TABLET ORAL THREE TIMES A DAY
Refills: 0 | Status: DISCONTINUED | OUTPATIENT
Start: 2021-04-22 | End: 2021-05-03

## 2021-04-22 RX ORDER — ALPRAZOLAM 0.25 MG
0.5 TABLET ORAL EVERY 12 HOURS
Refills: 0 | Status: DISCONTINUED | OUTPATIENT
Start: 2021-04-22 | End: 2021-04-22

## 2021-04-22 RX ADMIN — SENNA PLUS 2 TABLET(S): 8.6 TABLET ORAL at 22:14

## 2021-04-22 RX ADMIN — Medication 1 PATCH: at 07:04

## 2021-04-22 RX ADMIN — PANTOPRAZOLE SODIUM 40 MILLIGRAM(S): 20 TABLET, DELAYED RELEASE ORAL at 05:50

## 2021-04-22 RX ADMIN — LIDOCAINE 1 PATCH: 4 CREAM TOPICAL at 01:28

## 2021-04-22 RX ADMIN — INSULIN GLARGINE 12 UNIT(S): 100 INJECTION, SOLUTION SUBCUTANEOUS at 22:21

## 2021-04-22 RX ADMIN — Medication 8: at 11:23

## 2021-04-22 RX ADMIN — GABAPENTIN 300 MILLIGRAM(S): 400 CAPSULE ORAL at 22:16

## 2021-04-22 RX ADMIN — Medication 5 UNIT(S): at 17:18

## 2021-04-22 RX ADMIN — Medication 325 MILLIGRAM(S): at 13:58

## 2021-04-22 RX ADMIN — Medication 25 MILLIGRAM(S): at 22:20

## 2021-04-22 RX ADMIN — SODIUM CHLORIDE 75 MILLILITER(S): 9 INJECTION, SOLUTION INTRAVENOUS at 02:52

## 2021-04-22 RX ADMIN — HEPARIN SODIUM 5000 UNIT(S): 5000 INJECTION INTRAVENOUS; SUBCUTANEOUS at 17:17

## 2021-04-22 RX ADMIN — SODIUM ZIRCONIUM CYCLOSILICATE 10 GRAM(S): 10 POWDER, FOR SUSPENSION ORAL at 13:58

## 2021-04-22 RX ADMIN — Medication 4: at 08:18

## 2021-04-22 RX ADMIN — Medication 4: at 17:18

## 2021-04-22 RX ADMIN — Medication 50 MILLIGRAM(S): at 05:48

## 2021-04-22 RX ADMIN — Medication 5 UNIT(S): at 08:18

## 2021-04-22 RX ADMIN — Medication 200 MILLIGRAM(S): at 05:48

## 2021-04-22 RX ADMIN — LIDOCAINE 1 PATCH: 4 CREAM TOPICAL at 23:00

## 2021-04-22 RX ADMIN — Medication 100 MILLIGRAM(S): at 22:20

## 2021-04-22 RX ADMIN — Medication 1 PATCH: at 23:00

## 2021-04-22 RX ADMIN — HEPARIN SODIUM 5000 UNIT(S): 5000 INJECTION INTRAVENOUS; SUBCUTANEOUS at 05:48

## 2021-04-22 RX ADMIN — AMLODIPINE BESYLATE 10 MILLIGRAM(S): 2.5 TABLET ORAL at 05:49

## 2021-04-22 RX ADMIN — SIMVASTATIN 40 MILLIGRAM(S): 20 TABLET, FILM COATED ORAL at 22:14

## 2021-04-22 RX ADMIN — Medication 5 UNIT(S): at 11:22

## 2021-04-22 RX ADMIN — LIDOCAINE 1 PATCH: 4 CREAM TOPICAL at 13:58

## 2021-04-22 RX ADMIN — Medication 0.5 MILLIGRAM(S): at 10:54

## 2021-04-22 NOTE — PROGRESS NOTE ADULT - SUBJECTIVE AND OBJECTIVE BOX
Patient is a 75y old  Female who presents with a chief complaint of Acute left leg siatica. (20 Apr 2021 10:58)    INTERVAL HPI/OVERNIGHT EVENTS:  Pt was seen and examined, no acute events.  no complaints.     Denies fever, chills, N/V, dizziness, HA, cough, CP, palpitations, SOB, abdominal pain, dysuria, diarrhea, constipation.     MEDICATIONS  (STANDING):  amLODIPine   Tablet 10 milliGRAM(s) Oral daily  cloNIDine Patch 0.3 mG/24Hr(s) 1 patch Transdermal <User Schedule>  dextrose 40% Gel 15 Gram(s) Oral once  dextrose 5%. 1000 milliLiter(s) (50 mL/Hr) IV Continuous <Continuous>  dextrose 5%. 1000 milliLiter(s) (100 mL/Hr) IV Continuous <Continuous>  dextrose 50% Injectable 25 Gram(s) IV Push once  dextrose 50% Injectable 12.5 Gram(s) IV Push once  dextrose 50% Injectable 25 Gram(s) IV Push once  ferrous    sulfate 325 milliGRAM(s) Oral daily  gabapentin 300 milliGRAM(s) Oral at bedtime  glucagon  Injectable 1 milliGRAM(s) IntraMuscular once  heparin   Injectable 5000 Unit(s) SubCutaneous every 12 hours  hydrALAZINE 50 milliGRAM(s) Oral three times a day  insulin glargine Injectable (LANTUS) 12 Unit(s) SubCutaneous at bedtime  insulin lispro (ADMELOG) corrective regimen sliding scale   SubCutaneous three times a day before meals  labetalol 200 milliGRAM(s) Oral three times a day  lidocaine   Patch 1 Patch Transdermal daily  polyethylene glycol 3350 17 Gram(s) Oral daily  predniSONE   Tablet 50 milliGRAM(s) Oral daily  senna 2 Tablet(s) Oral at bedtime  simvastatin 40 milliGRAM(s) Oral at bedtime  sodium zirconium cyclosilicate 10 Gram(s) Oral daily    MEDICATIONS  (PRN):  oxycodone    5 mG/acetaminophen 325 mG 1 Tablet(s) Oral every 4 hours PRN Mild Pain (1 - 3)      Allergies  Eliquis (Other)      Vital Signs Last 24 Hrs  T(C): 36.5 (22 Apr 2021 11:00), Max: 36.9 (22 Apr 2021 00:27)  T(F): 97.7 (22 Apr 2021 11:00), Max: 98.5 (22 Apr 2021 00:27)  HR: 60 (22 Apr 2021 11:00) (60 - 64)  BP: 115/70 (22 Apr 2021 11:00) (115/70 - 152/63)  BP(mean): --  RR: 18 (22 Apr 2021 11:00) (18 - 18)  SpO2: 99% (22 Apr 2021 11:00) (98% - 99%)    PHYSICAL EXAM:  GENERAL: NAD, no increased WOB  HEAD:  Atraumatic  EYES: PERRLA  NERVOUS SYSTEM:  AOx3, no focal neuro deficits   CHEST/LUNG: CTAB  HEART: RRR, +S1/S2  ABDOMEN: Soft, non tender, ND, +BS   EXTREMITIES:  no edema, calf tenderness b/l       LABS:                                   7.8    9.94  )-----------( 177      ( 22 Apr 2021 07:39 )             24.1   04-22    137  |  107  |  142<H>  ----------------------------<  210<H>  4.9   |  17<L>  |  6.21<H>    Ca    7.3<L>      22 Apr 2021 07:39  Phos  7.4     04-22  Mg     2.0     04-22    TPro  5.9<L>  /  Alb  x   /  TBili  x   /  DBili  x   /  AST  x   /  ALT  x   /  AlkPhos  x   04-22      RADIOLOGY & ADDITIONAL TESTS:    Imaging Personally Reviewed:  [ ] YES  [ ] NO    Consultant(s) Notes Reviewed:  [x ] YES  [ ] NO    Care Discussed with Consultants/Other Providers [x ] YES  [ ] NO  Care discussed in detail with patient and son  All questions and concerns addressed

## 2021-04-22 NOTE — CONSULT NOTE ADULT - CONSULT REASON
Sciatica
temporary dialysis catheter placement
ROSALES CKD
Dm foot evaluation   Patient experiencing painful toes

## 2021-04-22 NOTE — CONSULT NOTE ADULT - SUBJECTIVE AND OBJECTIVE BOX
IR consulted for temporary dialysis catheter placement.        HPI:  75 year old female presents today c/o left sided back pain radiating into the left side since waking up this morning. Patient states that she was attempting to get out of bed to use the bathroom when she experienced sever pain described as an ache felt inside associated with numbness of the left leg. Patient  reports difficulty ambulating due to the pain worsening when she bears weight. Patient denies recent trauma but does report having physical therapy in  due to problems in her lower back (-) urinary or bowel incontinence, fevers or chills, flu like symptoms, chest pain or sob. Called her PMD this morning and told to go to ER for an evaluation.  (15 Apr 2021 18:39)          PAST MEDICAL & SURGICAL HISTORY:  HTN (hypertension)    Pacemaker    DVT (deep venous thrombosis)    Diverticulitis    Chronic renal disease    Intestinal bleeding        Allergies    Eliquis (Other)    Intolerances        MEDICATIONS  (STANDING):  amLODIPine   Tablet 10 milliGRAM(s) Oral daily  cloNIDine Patch 0.3 mG/24Hr(s) 1 patch Transdermal <User Schedule>  dextrose 40% Gel 15 Gram(s) Oral once  dextrose 5%. 1000 milliLiter(s) (50 mL/Hr) IV Continuous <Continuous>  dextrose 5%. 1000 milliLiter(s) (100 mL/Hr) IV Continuous <Continuous>  dextrose 50% Injectable 25 Gram(s) IV Push once  dextrose 50% Injectable 12.5 Gram(s) IV Push once  dextrose 50% Injectable 25 Gram(s) IV Push once  ferrous    sulfate 325 milliGRAM(s) Oral daily  gabapentin 300 milliGRAM(s) Oral at bedtime  glucagon  Injectable 1 milliGRAM(s) IntraMuscular once  heparin   Injectable 5000 Unit(s) SubCutaneous every 12 hours  hydrALAZINE 50 milliGRAM(s) Oral three times a day  insulin glargine Injectable (LANTUS) 12 Unit(s) SubCutaneous at bedtime  insulin lispro (ADMELOG) corrective regimen sliding scale   SubCutaneous three times a day before meals  insulin lispro Injectable (ADMELOG) 5 Unit(s) SubCutaneous three times a day before meals  labetalol 200 milliGRAM(s) Oral three times a day  lidocaine   Patch 1 Patch Transdermal daily  pantoprazole    Tablet 40 milliGRAM(s) Oral before breakfast  polyethylene glycol 3350 17 Gram(s) Oral daily  senna 2 Tablet(s) Oral at bedtime  simvastatin 40 milliGRAM(s) Oral at bedtime  sodium chloride 0.45% 1000 milliLiter(s) (75 mL/Hr) IV Continuous <Continuous>  sodium zirconium cyclosilicate 10 Gram(s) Oral daily    MEDICATIONS  (PRN):  ALPRAZolam 0.5 milliGRAM(s) Oral every 12 hours PRN anxiety  aluminum hydroxide/magnesium hydroxide/simethicone Suspension 30 milliLiter(s) Oral every 6 hours PRN Dyspepsia  oxycodone    5 mG/acetaminophen 325 mG 1 Tablet(s) Oral every 4 hours PRN Mild Pain (1 - 3)        SOCIAL HISTORY:    FAMILY HISTORY:        PHYSICAL EXAM:    Vital Signs Last 24 Hrs  T(C): 36.9 (2021 04:53), Max: 36.9 (2021 00:27)  T(F): 98.5 (2021 04:53), Max: 98.5 (2021 00:27)  HR: 64 (2021 04:53) (60 - 64)  BP: 152/63 (2021 04:53) (135/65 - 152/63)  BP(mean): --  RR: 18 (2021 04:53) (18 - 20)  SpO2: 98% (2021 04:53) (98% - 100%)    General:  Appears stated age, anxious  Lungs:  CTAB  Cardiovascular:  good S1, S2,   Abdomen:  Soft, non-tender, non-distended,   Extremities:  mild pitting edema b/l LE  Neuro/Psych:  A &O x 3    LABS:                        7.8    9.94  )-----------( 177      ( 2021 07:39 )             24.1         137  |  107  |  142<H>  ----------------------------<  210<H>  4.9   |  17<L>  |  6.21<H>    Ca    7.3<L>      2021 07:39  Phos  7.4       Mg     2.0         TPro  6.0  /  Alb  3.0<L>  /  TBili  0.5  /  DBili  x   /  AST  14<L>  /  ALT  18  /  AlkPhos  59      PT/INR - ( 2021 07:40 )   PT: 11.9 sec;   INR: 1.03 ratio         PTT - ( 2021 07:40 )  PTT:33.6 sec  Urinalysis Basic - ( 2021 19:55 )    Color: Yellow / Appearance: Clear / S.020 / pH: x  Gluc: x / Ketone: Negative  / Bili: Negative / Urobili: Negative mg/dL   Blood: x / Protein: 15 mg/dL / Nitrite: Negative   Leuk Esterase: Small / RBC: x / WBC 6-10   Sq Epi: x / Non Sq Epi: Occasional / Bacteria: Few        RADIOLOGY & ADDITIONAL STUDIES:  < from: CT Hip No Cont, Left (21 @ 16:07) >  IMPRESSION: No acute fracture or dislocation is demonstrated.  Moderate to advanced left hip osteoarthritis. Other degenerative changes, as above.    < end of copied text >  < from: CT Lumbar Spine No Cont (04.15.21 @ 16:38) >  No acute fractures identified.    Multilevel degenerative changes, most prominent at L4/L5 with moderate to severe bilateral foraminal stenosis and high-grade spinal canal stenosis. MRI of the lumbar spine is a more sensitive evaluation for degenerative disc disease and may be obtained on a nonemergent basis.    Multiple hypoattenuating and hyperattenuating lesions in the bilateral kidneys, incompletely evaluated on this study. Further imaging may be obtained as clinically indicated.    < end of copied text >

## 2021-04-22 NOTE — CONSULT NOTE ADULT - ASSESSMENT
Onychomychosis 1-10 application of betadine solution   Feet stable
IR consulted for temporary dialysis catheter placement.      76 yo female with HTN, DM, ROSALES and CKD.  Pt  and her private nephrologist agree with initiation of HD  Pt c/o Sciatic nerve pain with spinal stenosis,    Renal US - Sonographic evaluation demonstrates presence of bilateral renal cysts without suspicious mass lesion noted. Dedicated renal mass protocol CT or MRI evaluation can be performed for further assessment of the renal parenchyma as clinically desired.    CXR shows mild congestive changes    Plan  -will place catheter today  -meds, labs and vitals reviewed  -Consent to be obtained from patient

## 2021-04-22 NOTE — CHART NOTE - NSCHARTNOTEFT_GEN_A_CORE
75 y female with ROSALES/CKD.  IR consulted for a temporary dialysis catheter placement by nephrology    Pt refusing temporary dialysis catheter at this time.  She wants her private nephrologist (Dr. Mike Castillo [phone]) to be made aware of her hospital plan.  This discussed with nephrology    -full consult to follow        -

## 2021-04-22 NOTE — PROGRESS NOTE ADULT - ASSESSMENT
75 year old female with PMH of CKD, HTN, diverticulitis, DVT? presents today c/o left sided back pain radiating into the left side since waking up this morning. Patient states that she was attempting to get out of bed to use the bathroom when she experienced sever pain described as an ache felt inside associated with numbness of the left leg. Patient  reports difficulty ambulating due to the pain worsening when she bears weight. Patient denies recent trauma but does report having physical therapy in 2007 due to problems in her lower back (-) urinary or bowel incontinence, fevers or chills, flu like symptoms, chest pain or sob. Called her PMD who told her to go to ER for an evaluation.     Left sided sciatica:  - Acute onset  - Pain management  - completed prednisone 50 mg daily X 5 days.  - Seen by ortho, no intervention   - CT L spine: Multilevel degenerative changes, most prominent at L4/L5 with moderate to severe bilateral foraminal stenosis and high-grade spinal canal stenosis, CT hip: no fracture, unable to do MR.  - Her left groin pain is likely secondary to severe OA    HTN:  - Not on ACEi/ARB for CKD 4  - On clonidine patch, Labetalol, amlodipine, hydralazine     HLD:  - Continue Statin    DM:   - Uses NPH 70/30 bid about 15 units total per day at home, prescribed by her endocrine doctor  - Now on Lantus 12 units at night with pre meal SS  - Hb A1c 5.8       ROSALES on CKD IV:  - Cr worse today, cr 4.77  - No NSAID  - Hold Lasix  - Bladder scan with ~ 50 mL.  - Renal follow up  patient now agreeable to initiating HD on this admission if warranted,   discussed with renal     Hyperkalemia:  - S/P Kayexalate X 3  - Added lokelma, on veltassa at home  - Low K diet    Anemia of chronic disease:  - Hb stable  - Low % saturation , although high ferritin that could be from chronic inflammatory state   - Started PO iron   - VIOLET outpt per renal    Renal cysts:  - seen in sono  - Follow up with PCP    DVT ppx--heparin SQ  fall precautions     As per pt she is DNR and had documents at home. son to bring paperwork 75 year old female with PMH of CKD, HTN, diverticulitis, DVT? presents today c/o left sided back pain radiating into the left side since waking up this morning. Patient states that she was attempting to get out of bed to use the bathroom when she experienced sever pain described as an ache felt inside associated with numbness of the left leg. Patient  reports difficulty ambulating due to the pain worsening when she bears weight. Patient denies recent trauma but does report having physical therapy in 2007 due to problems in her lower back (-) urinary or bowel incontinence, fevers or chills, flu like symptoms, chest pain or sob. Called her PMD who told her to go to ER for an evaluation.     Left sided sciatica:  - Acute onset  - Pain management  - completed prednisone 50 mg daily X 5 days.  - Seen by ortho, no intervention   - CT L spine: Multilevel degenerative changes, most prominent at L4/L5 with moderate to severe bilateral foraminal stenosis and high-grade spinal canal stenosis, CT hip: no fracture, unable to do MR.  - Her left groin pain is likely secondary to severe OA    HTN:  - Not on ACEi/ARB for CKD 4  - On clonidine patch, Labetalol, amlodipine, hydralazine     HLD:  - Continue Statin    DM:   - Uses NPH 70/30 bid about 15 units total per day at home, prescribed by her endocrine doctor  - Now on Lantus 12 units at night with pre meal SS  - Hb A1c 5.8       ROSALES on CKD IV:  - Cr worse today, cr 4.77  - No NSAID  - Hold Lasix  - Bladder scan with ~ 50 mL.  - Renal follow up  4/22 temp dialysis cath placed     Hyperkalemia:  - S/P Kayexalate X 3  - Added lokelma, on veltassa at home  - Low K diet    Anemia of chronic disease:  - Hb stable  - Low % saturation , although high ferritin that could be from chronic inflammatory state   - Started PO iron   - VIOLET outpt per renal    Renal cysts:  - seen in sono  - Follow up with PCP    DVT ppx--heparin SQ  fall precautions     As per pt she is DNR and had documents at home. son to bring paperwork

## 2021-04-22 NOTE — PROGRESS NOTE ADULT - SUBJECTIVE AND OBJECTIVE BOX
NEPHROLOGY PROGRESS NOTE    CHIEF COMPLAINT:  ROSALES/CKD    HPI:  Renal function continues to acutely worsen.  Little urine output.  RIJ sharona catheter placed.    ROS:  she feels lightheaded    EXAM:  T(F): 97.7 (21 @ 11:00)  HR: 60 (21 @ 11:00)  BP: 115/70 (21 @ 11:00)  RR: 18 (21 @ 11:00)  SpO2: 99% (21 @ 11:00)    Conversant, in no apparent distress  Normal respiratory effort, lungs clear bilaterally  Heart RRR with no murmur, 1+ peripheral edema  RIJ catheter placed with some oozing         LABS                             7.8    9.94  )-----------( 177      ( 2021 07:39 )             24.1              137  |  107  |  142<H>  ----------------------------<  210<H>  4.9   |  17<L>  |  6.21<H>    Ca    7.3<L>      2021 07:39  Phos  7.4       Mg     2.0         TPro  5.9<L>  /  Alb  x   /  TBili  x   /  DBili  x   /  AST  x   /  ALT  x   /  AlkPhos  x       Urinalysis Basic - ( 2021 19:55 )  Color: Yellow / Appearance: Clear / S.020 / pH: x  Gluc: x / Ketone: Negative  / Bili: Negative / Urobili: Negative mg/dL   Blood: x / Protein: 15 mg/dL / Nitrite: Negative   Leuk Esterase: Small / RBC: x / WBC 6-10   Sq Epi: x / Non Sq Epi: Occasional / Bacteria: Few        Assessment   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

## 2021-04-23 LAB
ANA TITR SER: NEGATIVE — SIGNIFICANT CHANGE UP
ANION GAP SERPL CALC-SCNC: 10 MMOL/L — SIGNIFICANT CHANGE UP (ref 5–17)
BUN SERPL-MCNC: 104 MG/DL — HIGH (ref 7–23)
CALCIUM SERPL-MCNC: 7.2 MG/DL — LOW (ref 8.5–10.1)
CHLORIDE SERPL-SCNC: 105 MMOL/L — SIGNIFICANT CHANGE UP (ref 96–108)
CO2 SERPL-SCNC: 26 MMOL/L — SIGNIFICANT CHANGE UP (ref 22–31)
CREAT SERPL-MCNC: 5.21 MG/DL — HIGH (ref 0.5–1.3)
GLUCOSE BLDC GLUCOMTR-MCNC: 116 MG/DL — HIGH (ref 70–99)
GLUCOSE BLDC GLUCOMTR-MCNC: 129 MG/DL — HIGH (ref 70–99)
GLUCOSE BLDC GLUCOMTR-MCNC: 140 MG/DL — HIGH (ref 70–99)
GLUCOSE BLDC GLUCOMTR-MCNC: 160 MG/DL — HIGH (ref 70–99)
GLUCOSE SERPL-MCNC: 165 MG/DL — HIGH (ref 70–99)
HAV IGM SER-ACNC: SIGNIFICANT CHANGE UP
HBV CORE IGM SER-ACNC: SIGNIFICANT CHANGE UP
HBV SURFACE AB SER-ACNC: SIGNIFICANT CHANGE UP
HBV SURFACE AG SER-ACNC: SIGNIFICANT CHANGE UP
HCV AB S/CO SERPL IA: 0.08 S/CO — SIGNIFICANT CHANGE UP (ref 0–0.99)
HCV AB SERPL-IMP: SIGNIFICANT CHANGE UP
IGA FLD-MCNC: 152 MG/DL — SIGNIFICANT CHANGE UP (ref 84–499)
IGG FLD-MCNC: 885 MG/DL — SIGNIFICANT CHANGE UP (ref 610–1660)
IGM SERPL-MCNC: 77 MG/DL — SIGNIFICANT CHANGE UP (ref 35–242)
KAPPA LC SER QL IFE: 6.54 MG/DL — HIGH (ref 0.33–1.94)
KAPPA/LAMBDA FREE LIGHT CHAIN RATIO, SERUM: 2.1 RATIO — HIGH (ref 0.26–1.65)
LAMBDA LC SER QL IFE: 3.12 MG/DL — HIGH (ref 0.57–2.63)
PHOSPHATE SERPL-MCNC: 6.2 MG/DL — HIGH (ref 2.5–4.5)
POTASSIUM SERPL-MCNC: 4.2 MMOL/L — SIGNIFICANT CHANGE UP (ref 3.5–5.3)
POTASSIUM SERPL-SCNC: 4.2 MMOL/L — SIGNIFICANT CHANGE UP (ref 3.5–5.3)
SODIUM SERPL-SCNC: 141 MMOL/L — SIGNIFICANT CHANGE UP (ref 135–145)

## 2021-04-23 PROCEDURE — 99232 SBSQ HOSP IP/OBS MODERATE 35: CPT

## 2021-04-23 PROCEDURE — 99233 SBSQ HOSP IP/OBS HIGH 50: CPT

## 2021-04-23 RX ORDER — CHLORHEXIDINE GLUCONATE 213 G/1000ML
1 SOLUTION TOPICAL
Refills: 0 | Status: DISCONTINUED | OUTPATIENT
Start: 2021-04-23 | End: 2021-05-03

## 2021-04-23 RX ADMIN — LIDOCAINE 1 PATCH: 4 CREAM TOPICAL at 23:15

## 2021-04-23 RX ADMIN — Medication 1 PATCH: at 07:56

## 2021-04-23 RX ADMIN — Medication 5 UNIT(S): at 11:59

## 2021-04-23 RX ADMIN — Medication 25 MILLIGRAM(S): at 21:37

## 2021-04-23 RX ADMIN — LIDOCAINE 1 PATCH: 4 CREAM TOPICAL at 01:00

## 2021-04-23 RX ADMIN — AMLODIPINE BESYLATE 10 MILLIGRAM(S): 2.5 TABLET ORAL at 11:26

## 2021-04-23 RX ADMIN — SIMVASTATIN 40 MILLIGRAM(S): 20 TABLET, FILM COATED ORAL at 21:37

## 2021-04-23 RX ADMIN — INSULIN GLARGINE 12 UNIT(S): 100 INJECTION, SOLUTION SUBCUTANEOUS at 21:38

## 2021-04-23 RX ADMIN — Medication 1 PATCH: at 18:53

## 2021-04-23 RX ADMIN — PANTOPRAZOLE SODIUM 40 MILLIGRAM(S): 20 TABLET, DELAYED RELEASE ORAL at 05:32

## 2021-04-23 RX ADMIN — Medication 100 MILLIGRAM(S): at 21:37

## 2021-04-23 RX ADMIN — Medication 5 UNIT(S): at 08:19

## 2021-04-23 RX ADMIN — HEPARIN SODIUM 5000 UNIT(S): 5000 INJECTION INTRAVENOUS; SUBCUTANEOUS at 05:32

## 2021-04-23 RX ADMIN — Medication 100 MILLIGRAM(S): at 13:05

## 2021-04-23 RX ADMIN — LIDOCAINE 1 PATCH: 4 CREAM TOPICAL at 18:53

## 2021-04-23 RX ADMIN — LIDOCAINE 1 PATCH: 4 CREAM TOPICAL at 11:26

## 2021-04-23 RX ADMIN — Medication 5 UNIT(S): at 17:37

## 2021-04-23 RX ADMIN — GABAPENTIN 300 MILLIGRAM(S): 400 CAPSULE ORAL at 21:37

## 2021-04-23 RX ADMIN — HEPARIN SODIUM 5000 UNIT(S): 5000 INJECTION INTRAVENOUS; SUBCUTANEOUS at 17:37

## 2021-04-23 RX ADMIN — POLYETHYLENE GLYCOL 3350 17 GRAM(S): 17 POWDER, FOR SOLUTION ORAL at 11:26

## 2021-04-23 NOTE — PROGRESS NOTE ADULT - SUBJECTIVE AND OBJECTIVE BOX
Patient s/p temporary dialysis catheter placement.       PHYSICAL EXAM:    Vital Signs Last 24 Hrs  T(C): 36.8 (2021 05:06), Max: 36.8 (2021 05:06)  T(F): 98.3 (2021 05:06), Max: 98.3 (2021 05:06)  HR: 60 (2021 05:06) (60 - 61)  BP: 149/67 (2021 05:06) (131/61 - 164/70)  BP(mean): --  RR: 18 (2021 05:06) (18 - 18)  SpO2: 99% (2021 05:06) (97% - 100%)    General:  A & O x3, NAD  Neck/Chest:  see A/P   Lungs:  CTAB  Cardiovascular:  good S1, S2,   Abdomen:  Soft, non-tender, non-distended, normoactive bowel sounds, no HSM  Extremities:  no calf swelling/tenderness b/l        LABS:                        7.7    5.79  )-----------( 165      ( 2021 20:21 )             23.2     04-22    137  |  107  |  142<H>  ----------------------------<  210<H>  4.9   |  17<L>  |  6.21<H>    Ca    7.3<L>      2021 07:39  Phos  7.4     04-22  Mg     2.0     04-22    TPro  5.9<L>  /  Alb  x   /  TBili  x   /  DBili  x   /  AST  x   /  ALT  x   /  AlkPhos  x   04-22    PT/INR - ( 2021 07:40 )   PT: 11.9 sec;   INR: 1.03 ratio         PTT - ( 2021 07:40 )  PTT:33.6 sec  Urinalysis Basic - ( 2021 19:55 )    Color: Yellow / Appearance: Clear / S.020 / pH: x  Gluc: x / Ketone: Negative  / Bili: Negative / Urobili: Negative mg/dL   Blood: x / Protein: 15 mg/dL / Nitrite: Negative   Leuk Esterase: Small / RBC: x / WBC 6-10   Sq Epi: x / Non Sq Epi: Occasional / Bacteria: Few        RADIOLOGY & ADDITIONAL STUDIES:

## 2021-04-23 NOTE — PROGRESS NOTE ADULT - ASSESSMENT
75 year old female with PMH of CKD, HTN, diverticulitis, DVT? presents today c/o left sided back pain radiating into the left side since waking up this morning. Patient states that she was attempting to get out of bed to use the bathroom when she experienced sever pain described as an ache felt inside associated with numbness of the left leg. Patient  reports difficulty ambulating due to the pain worsening when she bears weight. Patient denies recent trauma but does report having physical therapy in 2007 due to problems in her lower back (-) urinary or bowel incontinence, fevers or chills, flu like symptoms, chest pain or sob. Called her PMD who told her to go to ER for an evaluation.     Left sided sciatica:  - Acute onset  - Pain management  - completed prednisone 50 mg daily X 5 days.  - Seen by ortho, no intervention   - CT L spine: Multilevel degenerative changes, most prominent at L4/L5 with moderate to severe bilateral foraminal stenosis and high-grade spinal canal stenosis, CT hip: no fracture, unable to do MR.  - Her left groin pain is likely secondary to severe OA    HTN:  - Not on ACEi/ARB for CKD 4  - On clonidine patch, Labetalol, amlodipine, hydralazine     HLD:  - Continue Statin    DM:   - Uses NPH 70/30 bid about 15 units total per day at home, prescribed by her endocrine doctor  - Now on Lantus 12 units at night with pre meal SS  - Hb A1c 5.8       ROSALES on CKD IV:  - Cr worse today, cr 4.77  - No NSAID  - Hold Lasix  - Bladder scan with ~ 50 mL.  - Renal follow up  4/22 temp dialysis cath placed     Hyperkalemia:  - S/P Kayexalate X 3  - Added lokelma, on veltassa at home  - Low K diet    Anemia of chronic disease:  - Hb stable  - Low % saturation , although high ferritin that could be from chronic inflammatory state   - Started PO iron   - VIOLET outpt per renal    Renal cysts:  - seen in sono  - Follow up with PCP    DVT ppx--heparin SQ  fall precautions     As per pt she is DNR and had documents at home. son to bring paperwork 75 year old female with PMH of CKD, HTN, diverticulitis, DVT? presents today c/o left sided back pain radiating into the left side since waking up this morning. Patient states that she was attempting to get out of bed to use the bathroom when she experienced sever pain described as an ache felt inside associated with numbness of the left leg. Patient  reports difficulty ambulating due to the pain worsening when she bears weight. Patient denies recent trauma but does report having physical therapy in 2007 due to problems in her lower back (-) urinary or bowel incontinence, fevers or chills, flu like symptoms, chest pain or sob. Called her PMD who told her to go to ER for an evaluation.     Assessment and Plan:     Left sided sciatica:  - Acute onset  - Pain management  - completed prednisone 50 mg daily X 5 days.  - Seen by ortho, no intervention   - CT L spine: Multilevel degenerative changes, most prominent at L4/L5 with moderate to severe bilateral foraminal stenosis and high-grade spinal canal stenosis, CT hip: no fracture, unable to do MR.  - Her left groin pain is likely secondary to severe OA    HTN:  - Not on ACEi/ARB for CKD 4  - On clonidine patch, Labetalol, amlodipine, hydralazine     HLD:  - Continue Statin    DM:   - Uses NPH 70/30 bid about 15 units total per day at home, prescribed by her endocrine doctor  - Now on Lantus 12 units at night with pre meal SS  - Hb A1c 5.8       ROSALES on CKD IV; starting dialysis on this admission   - Cr worse today, cr 4.77  - No NSAID  - Hold Lasix  - Bladder scan with ~ 50 mL.  - Renal following   4/22 temp dialysis cath placed   per renal, s/p HD yesterday and again today     Hyperkalemia:  - S/P Kayexalate X 3  - Added lokelma, on veltassa at home  - Low K diet    Anemia of chronic disease:  - Hb stable  - Low % saturation , although high ferritin that could be from chronic inflammatory state   - Started PO iron   - VIOLET outpt per renal    Renal cysts:  - seen in sono  - Follow up with PCP    DVT ppx--heparin SQ  fall precautions     As per pt she is DNR and had documents at home. son to bring paperwork    Dispo: [facility]

## 2021-04-23 NOTE — PROGRESS NOTE ADULT - ASSESSMENT
Patient s/p temporary dialysis catheter placement.    No acute events overnight  Pt c/o some site soreness.  Pt had dialysis yesterday, per notes, catheter worked well.  Pt has mild swelling around insertion site.    VSS,  no new bmp today.      Plan   - Insertion site with bloody dressing.  IR will change today IF pt does not go to dialysis today  -Will convert to tunneled when cleared by nephrology

## 2021-04-23 NOTE — PROGRESS NOTE ADULT - SUBJECTIVE AND OBJECTIVE BOX
[facility] NEPHROLOGY SERVICES, [facility]  NEPHROLOGY AND HYPERTENSION  [address]  [address]  [place] [address]  [phone]    MD PRAVEEN LYNNE MD ANDREY GONCHARUK, MD MADHU KORRAPATI, MD YELENA ROSENBERG, MD BINNY KOSHY, MD CHRISTOPHER CAPUTO, MD EDWARD BOVER, MD          Patient events noted  No distress     MEDICATIONS  (STANDING):  amLODIPine   Tablet 10 milliGRAM(s) Oral daily  chlorhexidine 2% Cloths 1 Application(s) Topical <User Schedule>  cloNIDine Patch 0.3 mG/24Hr(s) 1 patch Transdermal <User Schedule>  dextrose 40% Gel 15 Gram(s) Oral once  dextrose 5%. 1000 milliLiter(s) (50 mL/Hr) IV Continuous <Continuous>  dextrose 5%. 1000 milliLiter(s) (100 mL/Hr) IV Continuous <Continuous>  dextrose 50% Injectable 25 Gram(s) IV Push once  dextrose 50% Injectable 12.5 Gram(s) IV Push once  dextrose 50% Injectable 25 Gram(s) IV Push once  gabapentin 300 milliGRAM(s) Oral at bedtime  glucagon  Injectable 1 milliGRAM(s) IntraMuscular once  heparin   Injectable 5000 Unit(s) SubCutaneous every 12 hours  hydrALAZINE 25 milliGRAM(s) Oral three times a day  insulin glargine Injectable (LANTUS) 12 Unit(s) SubCutaneous at bedtime  insulin lispro (ADMELOG) corrective regimen sliding scale   SubCutaneous three times a day before meals  insulin lispro Injectable (ADMELOG) 5 Unit(s) SubCutaneous three times a day before meals  labetalol 100 milliGRAM(s) Oral three times a day  lidocaine   Patch 1 Patch Transdermal daily  pantoprazole    Tablet 40 milliGRAM(s) Oral before breakfast  polyethylene glycol 3350 17 Gram(s) Oral daily  senna 2 Tablet(s) Oral at bedtime  simvastatin 40 milliGRAM(s) Oral at bedtime    MEDICATIONS  (PRN):  ALPRAZolam 0.5 milliGRAM(s) Oral every 12 hours PRN anxiety  aluminum hydroxide/magnesium hydroxide/simethicone Suspension 30 milliLiter(s) Oral every 6 hours PRN Dyspepsia      04-22-21 @ 07:01  -  04-23-21 @ 07:00  --------------------------------------------------------  IN: 300 mL / OUT: 0 mL / NET: 300 mL    04-23-21 @ 07:01  -  04-23-21 @ 22:35  --------------------------------------------------------  IN: 0 mL / OUT: 350 mL / NET: -350 mL      PHYSICAL EXAM:      T(C): 36.6 (04-23-21 @ 17:15), Max: 37 (04-23-21 @ 10:50)  HR: 65 (04-23-21 @ 21:35) (60 - 65)  BP: 169/76 (04-23-21 @ 21:35) (145/68 - 169/76)  RR: 20 (04-23-21 @ 21:35) (18 - 20)  SpO2: 94% (04-23-21 @ 21:35) (94% - 99%)  Wt(kg): --  Lungs clear  Heart S1S2  Abd soft NT ND  Extremities:   tr edema                                    7.7    5.79  )-----------( 165      ( 22 Apr 2021 20:21 )             23.2     04-23    141  |  105  |  104<H>  ----------------------------<  165<H>  4.2   |  26  |  5.21<H>    Ca    7.2<L>      23 Apr 2021 14:36  Phos  6.2     04-23  Mg     2.0     04-22    TPro  5.9<L>  /  Alb  x   /  TBili  x   /  DBili  x   /  AST  x   /  ALT  x   /  AlkPhos  x   04-22      LIVER FUNCTIONS - ( 22 Apr 2021 10:41 )  Alb: x     / Pro: 5.9 g/dL / ALK PHOS: x     / ALT: x     / AST: x     / GGT: x           Creatinine Trend: 5.21<--, 6.21<--, 5.26<--, 4.96<--, 4.77<--, 3.69<--    Assessment   ROSALES CKD3-4; pre renal azotemia; catabolism with steroids;   Probable underlying renovascular disease; HTN, diabetic nephrosclerosis   Metabolic acidosis; hyperglycemia   R/o occult paraprotein related disease  Sciatic nerve pain with spinal stenosis, will r/o occult vasculitic process related to ROSALES      Plan  HD for today and tomorrow  Will follow course;           Erickson Ray MD

## 2021-04-23 NOTE — PROGRESS NOTE ADULT - SUBJECTIVE AND OBJECTIVE BOX
Patient is a 75y old  Female who presents with a chief complaint of Acute left leg siatica. (20 Apr 2021 10:58)    INTERVAL HPI/OVERNIGHT EVENTS:  Pt was seen and examined, no acute events.  no complaints.     Denies fever, chills, N/V, dizziness, HA, cough, CP, palpitations, SOB, abdominal pain, dysuria, diarrhea, constipation.     MEDICATIONS  (STANDING):  amLODIPine   Tablet 10 milliGRAM(s) Oral daily  cloNIDine Patch 0.3 mG/24Hr(s) 1 patch Transdermal <User Schedule>  dextrose 40% Gel 15 Gram(s) Oral once  dextrose 5%. 1000 milliLiter(s) (50 mL/Hr) IV Continuous <Continuous>  dextrose 5%. 1000 milliLiter(s) (100 mL/Hr) IV Continuous <Continuous>  dextrose 50% Injectable 25 Gram(s) IV Push once  dextrose 50% Injectable 12.5 Gram(s) IV Push once  dextrose 50% Injectable 25 Gram(s) IV Push once  ferrous    sulfate 325 milliGRAM(s) Oral daily  gabapentin 300 milliGRAM(s) Oral at bedtime  glucagon  Injectable 1 milliGRAM(s) IntraMuscular once  heparin   Injectable 5000 Unit(s) SubCutaneous every 12 hours  hydrALAZINE 50 milliGRAM(s) Oral three times a day  insulin glargine Injectable (LANTUS) 12 Unit(s) SubCutaneous at bedtime  insulin lispro (ADMELOG) corrective regimen sliding scale   SubCutaneous three times a day before meals  labetalol 200 milliGRAM(s) Oral three times a day  lidocaine   Patch 1 Patch Transdermal daily  polyethylene glycol 3350 17 Gram(s) Oral daily  predniSONE   Tablet 50 milliGRAM(s) Oral daily  senna 2 Tablet(s) Oral at bedtime  simvastatin 40 milliGRAM(s) Oral at bedtime  sodium zirconium cyclosilicate 10 Gram(s) Oral daily    MEDICATIONS  (PRN):  oxycodone    5 mG/acetaminophen 325 mG 1 Tablet(s) Oral every 4 hours PRN Mild Pain (1 - 3)      Allergies  Eliquis (Other)      Vital Signs Last 24 Hrs  T(C): 36.5 (23 Apr 2021 17:12), Max: 37 (23 Apr 2021 10:50)  T(F): 97.7 (23 Apr 2021 17:12), Max: 98.6 (23 Apr 2021 10:50)  HR: 60 (23 Apr 2021 17:12) (60 - 62)  BP: 159/59 (23 Apr 2021 17:12) (131/61 - 164/70)  BP(mean): --  RR: 18 (23 Apr 2021 17:12) (18 - 20)  SpO2: 97% (23 Apr 2021 17:12) (97% - 100%)    PHYSICAL EXAM:  GENERAL: NAD, no increased WOB  HEAD:  Atraumatic  EYES: PERRLA  NERVOUS SYSTEM:  AOx3, no focal neuro deficits   CHEST/LUNG: CTAB  HEART: RRR, +S1/S2  ABDOMEN: Soft, non tender, ND, +BS   EXTREMITIES:  no edema, calf tenderness b/l       LABS:                        7.7    5.79  )-----------( 165      ( 22 Apr 2021 20:21 )             23.2   04-23    141  |  105  |  104<H>  ----------------------------<  165<H>  4.2   |  26  |  5.21<H>    Ca    7.2<L>      23 Apr 2021 14:36  Phos  6.2     04-23  Mg     2.0     04-22    TPro  5.9<L>  /  Alb  x   /  TBili  x   /  DBili  x   /  AST  x   /  ALT  x   /  AlkPhos  x   04-22      RADIOLOGY & ADDITIONAL TESTS:    Imaging Personally Reviewed:  [ ] YES  [ ] NO    Consultant(s) Notes Reviewed:  [x ] YES  [ ] NO    Care Discussed with Consultants/Other Providers [x ] YES  [ ] NO  Care discussed in detail with patient and son  All questions and concerns addressed Patient is a 75y old  Female who presents with a chief complaint of Acute left leg siatica. (20 Apr 2021 10:58)    INTERVAL HPI/OVERNIGHT EVENTS:  Pt was seen and examined, no acute events.  no complaints.     Denies fever, chills, N/V, dizziness, HA, cough, CP, palpitations, SOB, abdominal pain, dysuria, diarrhea, constipation.     MEDICATIONS  (STANDING):  amLODIPine   Tablet 10 milliGRAM(s) Oral daily  cloNIDine Patch 0.3 mG/24Hr(s) 1 patch Transdermal <User Schedule>  dextrose 40% Gel 15 Gram(s) Oral once  dextrose 5%. 1000 milliLiter(s) (50 mL/Hr) IV Continuous <Continuous>  dextrose 5%. 1000 milliLiter(s) (100 mL/Hr) IV Continuous <Continuous>  dextrose 50% Injectable 25 Gram(s) IV Push once  dextrose 50% Injectable 12.5 Gram(s) IV Push once  dextrose 50% Injectable 25 Gram(s) IV Push once  ferrous    sulfate 325 milliGRAM(s) Oral daily  gabapentin 300 milliGRAM(s) Oral at bedtime  glucagon  Injectable 1 milliGRAM(s) IntraMuscular once  heparin   Injectable 5000 Unit(s) SubCutaneous every 12 hours  hydrALAZINE 50 milliGRAM(s) Oral three times a day  insulin glargine Injectable (LANTUS) 12 Unit(s) SubCutaneous at bedtime  insulin lispro (ADMELOG) corrective regimen sliding scale   SubCutaneous three times a day before meals  labetalol 200 milliGRAM(s) Oral three times a day  lidocaine   Patch 1 Patch Transdermal daily  polyethylene glycol 3350 17 Gram(s) Oral daily  predniSONE   Tablet 50 milliGRAM(s) Oral daily  senna 2 Tablet(s) Oral at bedtime  simvastatin 40 milliGRAM(s) Oral at bedtime  sodium zirconium cyclosilicate 10 Gram(s) Oral daily    MEDICATIONS  (PRN):  oxycodone    5 mG/acetaminophen 325 mG 1 Tablet(s) Oral every 4 hours PRN Mild Pain (1 - 3)      Allergies  Eliquis (Other)      Vital Signs Last 24 Hrs  T(C): 36.5 (23 Apr 2021 17:12), Max: 37 (23 Apr 2021 10:50)  T(F): 97.7 (23 Apr 2021 17:12), Max: 98.6 (23 Apr 2021 10:50)  HR: 60 (23 Apr 2021 17:12) (60 - 62)  BP: 159/59 (23 Apr 2021 17:12) (131/61 - 164/70)  BP(mean): --  RR: 18 (23 Apr 2021 17:12) (18 - 20)  SpO2: 97% (23 Apr 2021 17:12) (97% - 100%)    PHYSICAL EXAM:  GENERAL: NAD, no increased WOB  HEAD:  Atraumatic  EYES: PERRLA  NERVOUS SYSTEM:  AOx3, no focal neuro deficits   CHEST/LUNG: CTAB  HEART: RRR, +S1/S2  ABDOMEN: Soft, non tender, ND, +BS   EXTREMITIES:  no edema, calf tenderness b/l         LABS:                        7.7    5.79  )-----------( 165      ( 22 Apr 2021 20:21 )             23.2   04-23    141  |  105  |  104<H>  ----------------------------<  165<H>  4.2   |  26  |  5.21<H>    Ca    7.2<L>      23 Apr 2021 14:36  Phos  6.2     04-23  Mg     2.0     04-22    TPro  5.9<L>  /  Alb  x   /  TBili  x   /  DBili  x   /  AST  x   /  ALT  x   /  AlkPhos  x   04-22      RADIOLOGY & ADDITIONAL TESTS:  < from: CT Head No Cont (04.22.21 @ 16:44) >  IMPRESSION:  Moderate to severe chronic microvascular changes without evidence of an acute transcortical infarction or hemorrhage.    < end of copied text >      Imaging Personally Reviewed:  [ ] YES  [ ] NO    Consultant(s) Notes Reviewed:  [x ] YES  [ ] NO    Care Discussed with Consultants/Other Providers [x ] YES  [ ] NO  Care discussed in detail with patient and son  All questions and concerns addressed

## 2021-04-24 LAB
AUTO DIFF PNL BLD: ABNORMAL
C-ANCA SER-ACNC: NEGATIVE — SIGNIFICANT CHANGE UP
GLUCOSE BLDC GLUCOMTR-MCNC: 135 MG/DL — HIGH (ref 70–99)
GLUCOSE BLDC GLUCOMTR-MCNC: 151 MG/DL — HIGH (ref 70–99)
GLUCOSE BLDC GLUCOMTR-MCNC: 206 MG/DL — HIGH (ref 70–99)
GLUCOSE BLDC GLUCOMTR-MCNC: 88 MG/DL — SIGNIFICANT CHANGE UP (ref 70–99)
P-ANCA SER-ACNC: NEGATIVE — SIGNIFICANT CHANGE UP

## 2021-04-24 PROCEDURE — 99233 SBSQ HOSP IP/OBS HIGH 50: CPT

## 2021-04-24 RX ORDER — ERYTHROPOIETIN 10000 [IU]/ML
10000 INJECTION, SOLUTION INTRAVENOUS; SUBCUTANEOUS
Refills: 0 | Status: DISCONTINUED | OUTPATIENT
Start: 2021-04-24 | End: 2021-05-03

## 2021-04-24 RX ORDER — ACETAMINOPHEN 500 MG
1000 TABLET ORAL ONCE
Refills: 0 | Status: COMPLETED | OUTPATIENT
Start: 2021-04-24 | End: 2021-04-24

## 2021-04-24 RX ADMIN — POLYETHYLENE GLYCOL 3350 17 GRAM(S): 17 POWDER, FOR SOLUTION ORAL at 15:46

## 2021-04-24 RX ADMIN — Medication 5 UNIT(S): at 16:35

## 2021-04-24 RX ADMIN — Medication 1 PATCH: at 07:50

## 2021-04-24 RX ADMIN — SENNA PLUS 2 TABLET(S): 8.6 TABLET ORAL at 22:00

## 2021-04-24 RX ADMIN — HEPARIN SODIUM 5000 UNIT(S): 5000 INJECTION INTRAVENOUS; SUBCUTANEOUS at 05:02

## 2021-04-24 RX ADMIN — LIDOCAINE 1 PATCH: 4 CREAM TOPICAL at 15:41

## 2021-04-24 RX ADMIN — Medication 4: at 16:34

## 2021-04-24 RX ADMIN — Medication 25 MILLIGRAM(S): at 05:03

## 2021-04-24 RX ADMIN — Medication 100 MILLIGRAM(S): at 15:46

## 2021-04-24 RX ADMIN — Medication 100 MILLIGRAM(S): at 22:00

## 2021-04-24 RX ADMIN — Medication 400 MILLIGRAM(S): at 22:01

## 2021-04-24 RX ADMIN — Medication 25 MILLIGRAM(S): at 22:00

## 2021-04-24 RX ADMIN — AMLODIPINE BESYLATE 10 MILLIGRAM(S): 2.5 TABLET ORAL at 05:03

## 2021-04-24 RX ADMIN — INSULIN GLARGINE 12 UNIT(S): 100 INJECTION, SOLUTION SUBCUTANEOUS at 22:02

## 2021-04-24 RX ADMIN — GABAPENTIN 300 MILLIGRAM(S): 400 CAPSULE ORAL at 22:00

## 2021-04-24 RX ADMIN — Medication 25 MILLIGRAM(S): at 15:44

## 2021-04-24 RX ADMIN — PANTOPRAZOLE SODIUM 40 MILLIGRAM(S): 20 TABLET, DELAYED RELEASE ORAL at 06:09

## 2021-04-24 RX ADMIN — HEPARIN SODIUM 5000 UNIT(S): 5000 INJECTION INTRAVENOUS; SUBCUTANEOUS at 17:35

## 2021-04-24 RX ADMIN — Medication 1 PATCH: at 22:09

## 2021-04-24 RX ADMIN — CHLORHEXIDINE GLUCONATE 1 APPLICATION(S): 213 SOLUTION TOPICAL at 05:04

## 2021-04-24 RX ADMIN — Medication 1000 MILLIGRAM(S): at 22:31

## 2021-04-24 RX ADMIN — Medication 100 MILLIGRAM(S): at 05:04

## 2021-04-24 RX ADMIN — LIDOCAINE 1 PATCH: 4 CREAM TOPICAL at 19:30

## 2021-04-24 RX ADMIN — SIMVASTATIN 40 MILLIGRAM(S): 20 TABLET, FILM COATED ORAL at 22:00

## 2021-04-24 RX ADMIN — ERYTHROPOIETIN 10000 UNIT(S): 10000 INJECTION, SOLUTION INTRAVENOUS; SUBCUTANEOUS at 22:00

## 2021-04-24 NOTE — PROGRESS NOTE ADULT - ASSESSMENT
75 year old female with PMH of CKD, HTN, diverticulitis, DVT? presents today c/o left sided back pain radiating into the left side since waking up this morning. Patient states that she was attempting to get out of bed to use the bathroom when she experienced sever pain described as an ache felt inside associated with numbness of the left leg. Patient  reports difficulty ambulating due to the pain worsening when she bears weight. Patient denies recent trauma but does report having physical therapy in 2007 due to problems in her lower back (-) urinary or bowel incontinence, fevers or chills, flu like symptoms, chest pain or sob. Called her PMD who told her to go to ER for an evaluation.     Assessment and Plan:     Left sided sciatica:  - Acute onset  - CT L spine: Multilevel degenerative changes, most prominent at L4/L5 with moderate to severe bilateral foraminal stenosis and high-grade spinal canal stenosis, CT hip: no fracture, unable to do MR.  - Seen by ortho (Christal), no intervention planned  - Her left groin pain is likely secondary to severe OA  - Pain management - Gabapentin, Lidoderm  - completed prednisone 50 mg daily X 5 days.    HTN:  - Not on ACEi/ARB due to CKD 4  - On clonidine patch, Labetalol, amlodipine, hydralazine     HLD:  - Continue Statin    DM:   - Uses NPH 70/30 bid about 15 units total per day at home, prescribed by her endocrine doctor  - Now on Lantus 12 units at night with pre meal SS  - Hb A1c 5.8       ROSALES on CKD IV; started dialysis on this admission   - No NSAID  - Hold Lasix  - Bladder scan with ~ 50 mL.  - Renal following   4/22 temp dialysis cath placed   per renal, s/p HD yesterday and again today     Hyperkalemia:  - S/P Kayexalate X 3  - Added lokelma, on veltassa at home  - Low K diet    Anemia of chronic disease:  - Hb stable  - Low % saturation , although high ferritin that could be from chronic inflammatory state   - Started PO iron   - VIOLET outpt per renal    Renal cysts:  - seen in sono  - Follow up with PCP    DVT ppx--heparin SQ  fall precautions     As per pt she is DNR and had documents at home. son to bring paperwork    Dispo: [facility]  75 year old female with PMH of CKD, HTN, diverticulitis, DVT? presents today c/o left sided back pain radiating into the left side since waking up this morning. Patient states that she was attempting to get out of bed to use the bathroom when she experienced sever pain described as an ache felt inside associated with numbness of the left leg. Patient  reports difficulty ambulating due to the pain worsening when she bears weight. Patient denies recent trauma but does report having physical therapy in 2007 due to problems in her lower back (-) urinary or bowel incontinence, fevers or chills, flu like symptoms, chest pain or sob. Called her PMD who told her to go to ER for an evaluation.     Assessment and Plan:     Left sided sciatica:  - Acute onset  - CT L spine: Multilevel degenerative changes, most prominent at L4/L5 with moderate to severe bilateral foraminal stenosis and high-grade spinal canal stenosis, CT hip: no fracture, unable to do MR.  - Seen by ortho (Christal), no intervention planned  - Her left groin pain is likely secondary to severe OA  - Pain management - Gabapentin, Lidoderm  - completed prednisone 50 mg daily X 5 days.  - Needs rehab    HTN:  - Not on ACEi/ARB due to CKD 4  - On clonidine patch, Labetalol, amlodipine, hydralazine     HLD:  - Continue Statin    DM:   - At home, uses NPH 70/30 bid about 15 units total per day at home, prescribed by her endocrine doctor  - Now on Lantus 12 units at night with pre meal SS  - Hb A1c 5.8       ROSALES on CKD IV; started dialysis on this admission   - No NSAID  - Hold Lasix  - Renal following (Cory/Dipak)  - Temp dialysis cath placed 4/22; IR will convert to tunneled cath when OK per Nephro  Had HD 4/22, 4/23, 4/24; none planned for 4/25; restart Monday 4/26    Hyperkalemia:  - S/P Kayexalate X 3  - Added lokelma, on veltassa at home  - Low K diet    Anemia of chronic disease:  - Hb stable  - Low % saturation , although high ferritin that could be from chronic inflammatory state   - Started PO iron   - Epogen started by nephro    Renal cysts:  - seen in sono  - Follow up with PCP    DVT ppx--heparin SQ  fall precautions     As per pt she is DNR and had documents at home. son to bring paperwork    Dispo: [facility] when medically stable.

## 2021-04-24 NOTE — PROGRESS NOTE ADULT - SUBJECTIVE AND OBJECTIVE BOX
Patient is a 75y old  Female who presents with a chief complaint of Acute left leg siatica. (24 Apr 2021 14:36)      INTERVAL HPI/OVERNIGHT EVENTS:    Seen during HD; only complaint is that she's very hungry. Lunch being held over concern for the positionality of her HD cath today.    REVIEW OF SYSTEMS:   Remaining ROS negative    MEDICATIONS  (STANDING):  amLODIPine   Tablet 10 milliGRAM(s) Oral daily  chlorhexidine 2% Cloths 1 Application(s) Topical <User Schedule>  cloNIDine Patch 0.3 mG/24Hr(s) 1 patch Transdermal <User Schedule>  dextrose 40% Gel 15 Gram(s) Oral once  dextrose 5%. 1000 milliLiter(s) (50 mL/Hr) IV Continuous <Continuous>  dextrose 5%. 1000 milliLiter(s) (100 mL/Hr) IV Continuous <Continuous>  dextrose 50% Injectable 25 Gram(s) IV Push once  dextrose 50% Injectable 12.5 Gram(s) IV Push once  dextrose 50% Injectable 25 Gram(s) IV Push once  epoetin torrie-epbx (RETACRIT) Injectable 44185 Unit(s) SubCutaneous every 7 days  gabapentin 300 milliGRAM(s) Oral at bedtime  glucagon  Injectable 1 milliGRAM(s) IntraMuscular once  heparin   Injectable 5000 Unit(s) SubCutaneous every 12 hours  hydrALAZINE 25 milliGRAM(s) Oral three times a day  insulin glargine Injectable (LANTUS) 12 Unit(s) SubCutaneous at bedtime  insulin lispro (ADMELOG) corrective regimen sliding scale   SubCutaneous three times a day before meals  insulin lispro Injectable (ADMELOG) 5 Unit(s) SubCutaneous three times a day before meals  labetalol 100 milliGRAM(s) Oral three times a day  lidocaine   Patch 1 Patch Transdermal daily  pantoprazole    Tablet 40 milliGRAM(s) Oral before breakfast  polyethylene glycol 3350 17 Gram(s) Oral daily  senna 2 Tablet(s) Oral at bedtime  simvastatin 40 milliGRAM(s) Oral at bedtime    MEDICATIONS  (PRN):  ALPRAZolam 0.5 milliGRAM(s) Oral every 12 hours PRN anxiety  aluminum hydroxide/magnesium hydroxide/simethicone Suspension 30 milliLiter(s) Oral every 6 hours PRN Dyspepsia      Allergies    Eliquis (Other)    Intolerances        Vital Signs Last 24 Hrs  T(C): 36.7 (24 Apr 2021 17:08), Max: 36.8 (24 Apr 2021 00:07)  T(F): 98.1 (24 Apr 2021 17:08), Max: 98.3 (24 Apr 2021 10:04)  HR: 60 (24 Apr 2021 17:08) (60 - 65)  BP: 158/68 (24 Apr 2021 17:08) (136/63 - 169/76)  BP(mean): --  RR: 18 (24 Apr 2021 17:08) (18 - 20)  SpO2: 96% (24 Apr 2021 17:08) (94% - 100%)    PHYSICAL EXAM:  GENERAL: NAD  HEAD:  Atraumatic, Normocephalic  EYES: EOMI, PERRLA, conjunctiva and sclera clear  ENT: O/P Clear  NECK: Supple, No JVD  NERVOUS SYSTEM:  No focal deficits  CHEST/LUNG: Clear to percussion bilaterally; No rales, rhonchi, wheezing  HEART: Regular rate and rhythm; No murmurs, rubs, or gallops  ABDOMEN: Soft, Nontender, Nondistended; Bowel sounds present  EXTREMITIES:  2+ Peripheral Pulses, No clubbing, cyanosis, or edema  SKIN: No rashes or lesions    LABS:                        7.7    5.79  )-----------( 165      ( 22 Apr 2021 20:21 )             23.2     04-23    141  |  105  |  104<H>  ----------------------------<  165<H>  4.2   |  26  |  5.21<H>    Ca    7.2<L>      23 Apr 2021 14:36  Phos  6.2     04-23          CAPILLARY BLOOD GLUCOSE      POCT Blood Glucose.: 206 mg/dL (24 Apr 2021 16:31)  POCT Blood Glucose.: 135 mg/dL (24 Apr 2021 13:50)  POCT Blood Glucose.: 88 mg/dL (24 Apr 2021 07:59)  POCT Blood Glucose.: 160 mg/dL (23 Apr 2021 21:23)      RADIOLOGY & ADDITIONAL TESTS:    Imaging Personally Reviewed:  [ ] YES  [ ] NO    Consultant(s) Notes Reviewed:  [ ] YES  [ ] NO    Care Discussed with Consultants/Other Providers [ ] YES  [ ] NO

## 2021-04-24 NOTE — PROGRESS NOTE ADULT - SUBJECTIVE AND OBJECTIVE BOX
NEPHROLOGY PROGRESS NOTE    CHIEF COMPLAINT:  ROSALES/CKD    HPI:  Seen on dialysis.  Access worked well.  Tolerated 2 liters fluid removal.    ROS:  denies SOB    EXAM:  T(F): 98.1 (04-24-21 @ 14:12)  HR: 60 (04-24-21 @ 14:12)  BP: 161/78 (04-24-21 @ 14:12)  RR: 18 (04-24-21 @ 14:12)  SpO2: 100% (04-24-21 @ 14:12)    Conversant, in no apparent distress  Normal respiratory effort, lungs clear bilaterally  Heart RRR with no murmur, 1+ peripheral edema         LABS                             7.7    5.79  )-----------( 165      ( 22 Apr 2021 20:21 )             23.2          04-23    141  |  105  |  104<H>  ----------------------------<  165<H>  4.2   |  26  |  5.21<H>    Ca    7.2<L>      23 Apr 2021 14:36  Phos  6.2     04-23      ASSESSMENT  ROSALES CKD3-4; pre renal azotemia; catabolism with steroids  Probable underlying renovascular disease; HTN, diabetic nephrosclerosis   Metabolic acidosis, resolved  Anemia of chronic disease  Sciatic nerve pain with spinal stenosis    RECOMMEND  She has had 3 dialysis sessions in a row;  rest tomorrow and re evaluate for continuation of HD on Monday  Start epogen 10,000 units SC once weekly

## 2021-04-25 LAB
GLUCOSE BLDC GLUCOMTR-MCNC: 199 MG/DL — HIGH (ref 70–99)
GLUCOSE BLDC GLUCOMTR-MCNC: 216 MG/DL — HIGH (ref 70–99)
GLUCOSE BLDC GLUCOMTR-MCNC: 227 MG/DL — HIGH (ref 70–99)
GLUCOSE BLDC GLUCOMTR-MCNC: 95 MG/DL — SIGNIFICANT CHANGE UP (ref 70–99)

## 2021-04-25 PROCEDURE — 99233 SBSQ HOSP IP/OBS HIGH 50: CPT

## 2021-04-25 RX ORDER — COLCHICINE 0.6 MG
1.2 TABLET ORAL ONCE
Refills: 0 | Status: COMPLETED | OUTPATIENT
Start: 2021-04-25 | End: 2021-04-25

## 2021-04-25 RX ORDER — COLCHICINE 0.6 MG
0.6 TABLET ORAL ONCE
Refills: 0 | Status: COMPLETED | OUTPATIENT
Start: 2021-04-25 | End: 2021-04-25

## 2021-04-25 RX ORDER — INSULIN GLARGINE 100 [IU]/ML
10 INJECTION, SOLUTION SUBCUTANEOUS AT BEDTIME
Refills: 0 | Status: DISCONTINUED | OUTPATIENT
Start: 2021-04-25 | End: 2021-04-29

## 2021-04-25 RX ADMIN — Medication 100 MILLIGRAM(S): at 21:43

## 2021-04-25 RX ADMIN — Medication 100 MILLIGRAM(S): at 05:39

## 2021-04-25 RX ADMIN — PANTOPRAZOLE SODIUM 40 MILLIGRAM(S): 20 TABLET, DELAYED RELEASE ORAL at 05:39

## 2021-04-25 RX ADMIN — Medication 25 MILLIGRAM(S): at 21:43

## 2021-04-25 RX ADMIN — LIDOCAINE 1 PATCH: 4 CREAM TOPICAL at 19:30

## 2021-04-25 RX ADMIN — Medication 1.2 MILLIGRAM(S): at 17:47

## 2021-04-25 RX ADMIN — CHLORHEXIDINE GLUCONATE 1 APPLICATION(S): 213 SOLUTION TOPICAL at 05:39

## 2021-04-25 RX ADMIN — Medication 4: at 11:08

## 2021-04-25 RX ADMIN — POLYETHYLENE GLYCOL 3350 17 GRAM(S): 17 POWDER, FOR SOLUTION ORAL at 11:09

## 2021-04-25 RX ADMIN — SIMVASTATIN 40 MILLIGRAM(S): 20 TABLET, FILM COATED ORAL at 21:43

## 2021-04-25 RX ADMIN — HEPARIN SODIUM 5000 UNIT(S): 5000 INJECTION INTRAVENOUS; SUBCUTANEOUS at 05:39

## 2021-04-25 RX ADMIN — Medication 1 PATCH: at 07:39

## 2021-04-25 RX ADMIN — Medication 100 MILLIGRAM(S): at 14:15

## 2021-04-25 RX ADMIN — Medication 25 MILLIGRAM(S): at 05:39

## 2021-04-25 RX ADMIN — GABAPENTIN 300 MILLIGRAM(S): 400 CAPSULE ORAL at 21:43

## 2021-04-25 RX ADMIN — Medication 5 UNIT(S): at 11:08

## 2021-04-25 RX ADMIN — HEPARIN SODIUM 5000 UNIT(S): 5000 INJECTION INTRAVENOUS; SUBCUTANEOUS at 17:48

## 2021-04-25 RX ADMIN — Medication 4: at 16:56

## 2021-04-25 RX ADMIN — Medication 5 UNIT(S): at 16:56

## 2021-04-25 RX ADMIN — INSULIN GLARGINE 10 UNIT(S): 100 INJECTION, SOLUTION SUBCUTANEOUS at 21:45

## 2021-04-25 RX ADMIN — Medication 0.6 MILLIGRAM(S): at 18:48

## 2021-04-25 RX ADMIN — LIDOCAINE 1 PATCH: 4 CREAM TOPICAL at 03:58

## 2021-04-25 RX ADMIN — ERYTHROPOIETIN 10000 UNIT(S): 10000 INJECTION, SOLUTION INTRAVENOUS; SUBCUTANEOUS at 11:10

## 2021-04-25 RX ADMIN — SENNA PLUS 2 TABLET(S): 8.6 TABLET ORAL at 21:47

## 2021-04-25 RX ADMIN — Medication 25 MILLIGRAM(S): at 14:15

## 2021-04-25 RX ADMIN — Medication 1 PATCH: at 19:30

## 2021-04-25 RX ADMIN — AMLODIPINE BESYLATE 10 MILLIGRAM(S): 2.5 TABLET ORAL at 05:39

## 2021-04-25 RX ADMIN — LIDOCAINE 1 PATCH: 4 CREAM TOPICAL at 11:09

## 2021-04-25 NOTE — PROGRESS NOTE ADULT - SUBJECTIVE AND OBJECTIVE BOX
Patient is a 75y old  Female who presents with a chief complaint of Acute left leg siatica. (24 Apr 2021 19:04)      INTERVAL HPI/OVERNIGHT EVENTS:    c/o considerable pain in R big toe since this morning. No Hx of gout.    REVIEW OF SYSTEMS:   Remaining ROS negative    MEDICATIONS  (STANDING):  amLODIPine   Tablet 10 milliGRAM(s) Oral daily  chlorhexidine 2% Cloths 1 Application(s) Topical <User Schedule>  cloNIDine Patch 0.3 mG/24Hr(s) 1 patch Transdermal <User Schedule>  colchicine 1.2 milliGRAM(s) Oral once  colchicine 0.6 milliGRAM(s) Oral once  dextrose 40% Gel 15 Gram(s) Oral once  dextrose 5%. 1000 milliLiter(s) (50 mL/Hr) IV Continuous <Continuous>  dextrose 5%. 1000 milliLiter(s) (100 mL/Hr) IV Continuous <Continuous>  dextrose 50% Injectable 25 Gram(s) IV Push once  dextrose 50% Injectable 12.5 Gram(s) IV Push once  dextrose 50% Injectable 25 Gram(s) IV Push once  epoetin torrie-epbx (RETACRIT) Injectable 67056 Unit(s) SubCutaneous every 7 days  gabapentin 300 milliGRAM(s) Oral at bedtime  glucagon  Injectable 1 milliGRAM(s) IntraMuscular once  heparin   Injectable 5000 Unit(s) SubCutaneous every 12 hours  hydrALAZINE 25 milliGRAM(s) Oral three times a day  insulin glargine Injectable (LANTUS) 12 Unit(s) SubCutaneous at bedtime  insulin lispro (ADMELOG) corrective regimen sliding scale   SubCutaneous three times a day before meals  insulin lispro Injectable (ADMELOG) 5 Unit(s) SubCutaneous three times a day before meals  labetalol 100 milliGRAM(s) Oral three times a day  lidocaine   Patch 1 Patch Transdermal daily  pantoprazole    Tablet 40 milliGRAM(s) Oral before breakfast  polyethylene glycol 3350 17 Gram(s) Oral daily  senna 2 Tablet(s) Oral at bedtime  simvastatin 40 milliGRAM(s) Oral at bedtime    MEDICATIONS  (PRN):  ALPRAZolam 0.5 milliGRAM(s) Oral every 12 hours PRN anxiety  aluminum hydroxide/magnesium hydroxide/simethicone Suspension 30 milliLiter(s) Oral every 6 hours PRN Dyspepsia      Allergies    Eliquis (Other)    Intolerances        Vital Signs Last 24 Hrs  T(C): 36.5 (25 Apr 2021 11:04), Max: 36.8 (25 Apr 2021 00:30)  T(F): 97.7 (25 Apr 2021 11:04), Max: 98.3 (25 Apr 2021 00:30)  HR: 65 (25 Apr 2021 14:00) (60 - 71)  BP: 145/77 (25 Apr 2021 14:00) (124/69 - 162/66)  BP(mean): --  RR: 18 (25 Apr 2021 11:04) (18 - 18)  SpO2: 99% (25 Apr 2021 11:04) (94% - 99%)    PHYSICAL EXAM:  GENERAL: NAD  HEAD:  Atraumatic, Normocephalic  EYES: EOMI, PERRLA, conjunctiva and sclera clear  ENT: O/P Clear  NECK: Supple, No JVD  NERVOUS SYSTEM:  No focal deficits  CHEST/LUNG: Clear to percussion bilaterally; No rales, rhonchi, wheezing  HEART: Regular rate and rhythm; No murmurs, rubs, or gallops  ABDOMEN: Soft, Nontender, Nondistended; Bowel sounds present  EXTREMITIES:  2+ Peripheral Pulses, No clubbing, cyanosis, or edema R 1st toe mildly swollen; very tender, especially w/ movement  SKIN: No rashes or lesions    LABS:              CAPILLARY BLOOD GLUCOSE      POCT Blood Glucose.: 227 mg/dL (25 Apr 2021 11:04)  POCT Blood Glucose.: 95 mg/dL (25 Apr 2021 07:34)  POCT Blood Glucose.: 151 mg/dL (24 Apr 2021 21:59)  POCT Blood Glucose.: 206 mg/dL (24 Apr 2021 16:31)      RADIOLOGY & ADDITIONAL TESTS:    Imaging Personally Reviewed:  [ ] YES  [ ] NO    Consultant(s) Notes Reviewed:  [ ] YES  [ ] NO    Care Discussed with Consultants/Other Providers [ ] YES  [ ] NO

## 2021-04-25 NOTE — PROGRESS NOTE ADULT - ASSESSMENT
75 year old female with PMH of CKD, HTN, diverticulitis, DVT? presents today c/o left sided back pain radiating into the left side since waking up this morning. Patient states that she was attempting to get out of bed to use the bathroom when she experienced sever pain described as an ache felt inside associated with numbness of the left leg. Patient  reports difficulty ambulating due to the pain worsening when she bears weight. Patient denies recent trauma but does report having physical therapy in 2007 due to problems in her lower back (-) urinary or bowel incontinence, fevers or chills, flu like symptoms, chest pain or sob. Called her PMD who told her to go to ER for an evaluation.     Assessment and Plan:     Left sided sciatica:  - Acute onset  - CT L spine: Multilevel degenerative changes, most prominent at L4/L5 with moderate to severe bilateral foraminal stenosis and high-grade spinal canal stenosis, CT hip: no fracture, unable to do MR.  - Seen by ortho (Christal), no intervention planned  - Her left groin pain is likely secondary to severe OA  - Pain management - Gabapentin, Lidoderm  - completed prednisone 50 mg daily X 5 days.  - Needs rehab    HTN:  - Not on ACEi/ARB due to CKD 4  - On clonidine patch, Labetalol, amlodipine, hydralazine     HLD:  - Continue Statin    DM:   - At home, uses NPH 70/30 bid about 15 units total per day at home, prescribed by her endocrine doctor  - Now on Lantus 10 units at night with pre meal Admelog 5 units and SS  - Hb A1c 5.8       ROSALES on CKD IV; started dialysis on this admission   - No NSAID  - Hold Lasix  - Renal following (Cory/Dipak)  - Temp dialysis cath placed 4/22; IR will convert to tunneled cath when OK per Nephro  Had HD 4/22, 4/23, 4/24; none planned for 4/25; restart Monday 4/26    Hyperkalemia:  - was, on Veltassa at home  - S/P Kayexalate X 3  - Added Lokelma initially; now stopped after HD started  - Low K diet    Anemia of chronic disease:  - Hb stable  - high ferritin, but could be from chronic inflammatory state   - Epogen started by nephro    Renal cysts:  - seen in sono  - Follow up with PCP    Probable gout (or pseudogout)   - check uric acid   - Colchicine 1.2mg x 1, then 0.6mg x 1    DVT ppx--heparin SQ  fall precautions     As per pt she is DNR and had documents at home. son to bring paperwork    Dispo: [facility] when medically stable.

## 2021-04-26 LAB
GBM IGG SER-ACNC: 11 — SIGNIFICANT CHANGE UP (ref 0–20)
GLUCOSE BLDC GLUCOMTR-MCNC: 115 MG/DL — HIGH (ref 70–99)
GLUCOSE BLDC GLUCOMTR-MCNC: 153 MG/DL — HIGH (ref 70–99)
GLUCOSE BLDC GLUCOMTR-MCNC: 222 MG/DL — HIGH (ref 70–99)
GLUCOSE BLDC GLUCOMTR-MCNC: 87 MG/DL — SIGNIFICANT CHANGE UP (ref 70–99)

## 2021-04-26 PROCEDURE — 99233 SBSQ HOSP IP/OBS HIGH 50: CPT

## 2021-04-26 RX ORDER — FUROSEMIDE 40 MG
80 TABLET ORAL ONCE
Refills: 0 | Status: COMPLETED | OUTPATIENT
Start: 2021-04-26 | End: 2021-04-26

## 2021-04-26 RX ADMIN — Medication 25 MILLIGRAM(S): at 21:27

## 2021-04-26 RX ADMIN — Medication 2: at 17:20

## 2021-04-26 RX ADMIN — Medication 1 PATCH: at 09:14

## 2021-04-26 RX ADMIN — Medication 5 UNIT(S): at 08:36

## 2021-04-26 RX ADMIN — Medication 1 PATCH: at 08:38

## 2021-04-26 RX ADMIN — Medication 80 MILLIGRAM(S): at 17:20

## 2021-04-26 RX ADMIN — POLYETHYLENE GLYCOL 3350 17 GRAM(S): 17 POWDER, FOR SOLUTION ORAL at 11:17

## 2021-04-26 RX ADMIN — HEPARIN SODIUM 5000 UNIT(S): 5000 INJECTION INTRAVENOUS; SUBCUTANEOUS at 17:26

## 2021-04-26 RX ADMIN — LIDOCAINE 1 PATCH: 4 CREAM TOPICAL at 00:00

## 2021-04-26 RX ADMIN — PANTOPRAZOLE SODIUM 40 MILLIGRAM(S): 20 TABLET, DELAYED RELEASE ORAL at 05:47

## 2021-04-26 RX ADMIN — Medication 100 MILLIGRAM(S): at 21:26

## 2021-04-26 RX ADMIN — SENNA PLUS 2 TABLET(S): 8.6 TABLET ORAL at 21:26

## 2021-04-26 RX ADMIN — HEPARIN SODIUM 5000 UNIT(S): 5000 INJECTION INTRAVENOUS; SUBCUTANEOUS at 05:43

## 2021-04-26 RX ADMIN — INSULIN GLARGINE 10 UNIT(S): 100 INJECTION, SOLUTION SUBCUTANEOUS at 21:27

## 2021-04-26 RX ADMIN — LIDOCAINE 1 PATCH: 4 CREAM TOPICAL at 18:24

## 2021-04-26 RX ADMIN — LIDOCAINE 1 PATCH: 4 CREAM TOPICAL at 23:32

## 2021-04-26 RX ADMIN — Medication 4: at 11:18

## 2021-04-26 RX ADMIN — Medication 25 MILLIGRAM(S): at 14:18

## 2021-04-26 RX ADMIN — Medication 5 UNIT(S): at 11:17

## 2021-04-26 RX ADMIN — SIMVASTATIN 40 MILLIGRAM(S): 20 TABLET, FILM COATED ORAL at 21:26

## 2021-04-26 RX ADMIN — CHLORHEXIDINE GLUCONATE 1 APPLICATION(S): 213 SOLUTION TOPICAL at 05:43

## 2021-04-26 RX ADMIN — Medication 1 PATCH: at 18:24

## 2021-04-26 RX ADMIN — Medication 100 MILLIGRAM(S): at 05:43

## 2021-04-26 RX ADMIN — LIDOCAINE 1 PATCH: 4 CREAM TOPICAL at 11:20

## 2021-04-26 RX ADMIN — Medication 5 UNIT(S): at 17:20

## 2021-04-26 RX ADMIN — Medication 1 PATCH: at 08:02

## 2021-04-26 RX ADMIN — GABAPENTIN 300 MILLIGRAM(S): 400 CAPSULE ORAL at 21:26

## 2021-04-26 RX ADMIN — Medication 100 MILLIGRAM(S): at 14:18

## 2021-04-26 RX ADMIN — AMLODIPINE BESYLATE 10 MILLIGRAM(S): 2.5 TABLET ORAL at 11:16

## 2021-04-26 NOTE — PROGRESS NOTE ADULT - SUBJECTIVE AND OBJECTIVE BOX
Patient is a 75y old  Female who presents with a chief complaint of Acute left leg siatica. (26 Apr 2021 16:01)      INTERVAL HPI/OVERNIGHT EVENTS:   No specific complaints; the R toe feels much better.    REVIEW OF SYSTEMS:   Remaining ROS negative    MEDICATIONS  (STANDING):  amLODIPine   Tablet 10 milliGRAM(s) Oral daily  chlorhexidine 2% Cloths 1 Application(s) Topical <User Schedule>  cloNIDine Patch 0.3 mG/24Hr(s) 1 patch Transdermal <User Schedule>  dextrose 40% Gel 15 Gram(s) Oral once  dextrose 5%. 1000 milliLiter(s) (50 mL/Hr) IV Continuous <Continuous>  dextrose 5%. 1000 milliLiter(s) (100 mL/Hr) IV Continuous <Continuous>  dextrose 50% Injectable 25 Gram(s) IV Push once  dextrose 50% Injectable 12.5 Gram(s) IV Push once  dextrose 50% Injectable 25 Gram(s) IV Push once  epoetin torrie-epbx (RETACRIT) Injectable 06682 Unit(s) SubCutaneous every 7 days  gabapentin 300 milliGRAM(s) Oral at bedtime  glucagon  Injectable 1 milliGRAM(s) IntraMuscular once  heparin   Injectable 5000 Unit(s) SubCutaneous every 12 hours  hydrALAZINE 25 milliGRAM(s) Oral three times a day  insulin glargine Injectable (LANTUS) 10 Unit(s) SubCutaneous at bedtime  insulin lispro (ADMELOG) corrective regimen sliding scale   SubCutaneous three times a day before meals  insulin lispro Injectable (ADMELOG) 5 Unit(s) SubCutaneous three times a day before meals  labetalol 100 milliGRAM(s) Oral three times a day  lidocaine   Patch 1 Patch Transdermal daily  pantoprazole    Tablet 40 milliGRAM(s) Oral before breakfast  polyethylene glycol 3350 17 Gram(s) Oral daily  senna 2 Tablet(s) Oral at bedtime  simvastatin 40 milliGRAM(s) Oral at bedtime    MEDICATIONS  (PRN):  ALPRAZolam 0.5 milliGRAM(s) Oral every 12 hours PRN anxiety  aluminum hydroxide/magnesium hydroxide/simethicone Suspension 30 milliLiter(s) Oral every 6 hours PRN Dyspepsia      Allergies    Eliquis (Other)    Intolerances        Vital Signs Last 24 Hrs  T(C): 36.7 (26 Apr 2021 11:10), Max: 36.9 (26 Apr 2021 00:42)  T(F): 98 (26 Apr 2021 11:10), Max: 98.4 (26 Apr 2021 00:42)  HR: 60 (26 Apr 2021 11:10) (60 - 81)  BP: 158/69 (26 Apr 2021 11:10) (121/64 - 158/69)  BP(mean): --  RR: 18 (26 Apr 2021 11:10) (18 - 18)  SpO2: 100% (26 Apr 2021 11:10) (97% - 100%)    PHYSICAL EXAM:  GENERAL: NAD  HEAD:  Atraumatic, Normocephalic  EYES: EOMI, PERRLA, conjunctiva and sclera clear  ENT: O/P Clear  NECK: Supple, No JVD  NERVOUS SYSTEM:  No focal deficits  CHEST/LUNG: Clear to percussion bilaterally; No rales, rhonchi, wheezing  HEART: Regular rate and rhythm; No murmurs, rubs, or gallops  ABDOMEN: Soft, Nontender, Nondistended; Bowel sounds present  EXTREMITIES:  2+ Peripheral Pulses, No clubbing, cyanosis, or edema  SKIN: No rashes or lesions    LABS:              CAPILLARY BLOOD GLUCOSE      POCT Blood Glucose.: 153 mg/dL (26 Apr 2021 17:16)  POCT Blood Glucose.: 222 mg/dL (26 Apr 2021 11:07)  POCT Blood Glucose.: 87 mg/dL (26 Apr 2021 07:55)  POCT Blood Glucose.: 199 mg/dL (25 Apr 2021 21:44)      RADIOLOGY & ADDITIONAL TESTS:    Imaging Personally Reviewed:  [ ] YES  [ ] NO    Consultant(s) Notes Reviewed:  [ ] YES  [ ] NO    Care Discussed with Consultants/Other Providers [ ] YES  [ ] NO

## 2021-04-26 NOTE — PROGRESS NOTE ADULT - SUBJECTIVE AND OBJECTIVE BOX
NE  PHROLOGY PROGRESS NOTE    CHIEF COMPLAINT:  CKD    HPI:  Patient gets dyspneic at times and is still c/o left leg pain/numbness.  She says she hasn't made much urine.    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  She says she hasn't made much urine.    ROS:  denies cough    EXAM:  T(F): 98 (04-26-21 @ 11:10)  HR: 60 (04-26-21 @ 11:10)  BP: 158/69 (04-26-21 @ 11:10)  RR: 18 (04-26-21 @ 11:10)  SpO2: 100% (04-26-21 @ 11:10)    Conversant, in no apparent distress  Normal respiratory effort, lungs basilar crackles  Heart RRR with no murmur, mild peripheral edema      ASSESSMENT  ROSALES CKD3-4; pre renal azotemia; catabolism with steroids  Probable underlying renovascular disease; HTN, diabetic nephrosclerosis   Anemia of chronic disease  Sciatic nerve pain with spinal stenosis    RECOMMEND  Hemodialysis tomorrow  Lasix 80 mg IVP now then torsemide 100 mg PO daily  Blood work needs updating

## 2021-04-26 NOTE — PROGRESS NOTE ADULT - ASSESSMENT
75 year old female with PMH of CKD, HTN, diverticulitis, DVT? presents today c/o left sided back pain radiating into the left side since waking up this morning. Patient states that she was attempting to get out of bed to use the bathroom when she experienced sever pain described as an ache felt inside associated with numbness of the left leg. Patient  reports difficulty ambulating due to the pain worsening when she bears weight. Patient denies recent trauma but does report having physical therapy in 2007 due to problems in her lower back (-) urinary or bowel incontinence, fevers or chills, flu like symptoms, chest pain or sob. Called her PMD who told her to go to ER for an evaluation.     Assessment and Plan:     Left sided sciatica:  - Acute onset  - CT L spine: Multilevel degenerative changes, most prominent at L4/L5 with moderate to severe bilateral foraminal stenosis and high-grade spinal canal stenosis, CT hip: no fracture, unable to do MR.  - Seen by ortho (Christal), no intervention planned  - Her left groin pain is likely secondary to severe OA  - Pain management - Gabapentin, Lidoderm  - completed prednisone 50 mg daily X 5 days.  - Needs rehab    HTN:  - Not on ACEi/ARB due to CKD 4  - On clonidine patch, Labetalol, amlodipine, hydralazine     HLD:  - Continue Statin    DM:   - At home, uses NPH 70/30 bid about 15 units total per day at home, prescribed by her endocrine doctor  - Now on Lantus 10 units at night with pre meal Admelog 5 units and SS  - Hb A1c 5.8       ROSALES on CKD IV; started dialysis on this admission   - No NSAID  - Unable to get HD today (4/26) so one dose of Lasix given and standing Bumex added  - Renal following (Cory/Dipak)  - Temp dialysis cath placed 4/22; IR will convert to tunneled cath when OK per Nephro  Had HD 4/22, 4/23, 4/24; none planned for 4/25; had to be deferred Monday 4/26; restarting Tue 4/27  - Labs not drawn today due to missed HD session; re-ordered for AM    Hyperkalemia:  - was, on Veltassa at home  - S/P Kayexalate X 3  - Added Lokelma initially; now stopped after HD started  - Low K diet    Anemia of chronic disease:  - Hb stable  - high ferritin, but could be from chronic inflammatory state   - Epogen started by nephro    Renal cysts:  - seen in sono  - Follow up with PCP    Probable gout (or pseudogout)   - check uric acid   - Colchicine 1.2mg x 1, then 0.6mg x 1 (seems to have helped)    DVT ppx--heparin SQ  fall precautions     As per pt she is DNR and had documents at home. son to bring paperwork    Dispo: [facility] when medically stable.

## 2021-04-27 LAB
ABO RH CONFIRMATION: SIGNIFICANT CHANGE UP
ANION GAP SERPL CALC-SCNC: 11 MMOL/L — SIGNIFICANT CHANGE UP (ref 5–17)
BLD GP AB SCN SERPL QL: SIGNIFICANT CHANGE UP
BUN SERPL-MCNC: 86 MG/DL — HIGH (ref 7–23)
CALCIUM SERPL-MCNC: 7.7 MG/DL — LOW (ref 8.5–10.1)
CHLORIDE SERPL-SCNC: 102 MMOL/L — SIGNIFICANT CHANGE UP (ref 96–108)
CO2 SERPL-SCNC: 25 MMOL/L — SIGNIFICANT CHANGE UP (ref 22–31)
CREAT SERPL-MCNC: 6.03 MG/DL — HIGH (ref 0.5–1.3)
FLUAV AG NPH QL: SIGNIFICANT CHANGE UP
FLUBV AG NPH QL: SIGNIFICANT CHANGE UP
GLUCOSE BLDC GLUCOMTR-MCNC: 205 MG/DL — HIGH (ref 70–99)
GLUCOSE BLDC GLUCOMTR-MCNC: 84 MG/DL — SIGNIFICANT CHANGE UP (ref 70–99)
GLUCOSE SERPL-MCNC: 140 MG/DL — HIGH (ref 70–99)
HCT VFR BLD CALC: 21.4 % — LOW (ref 34.5–45)
HGB BLD-MCNC: 6.9 G/DL — CRITICAL LOW (ref 11.5–15.5)
MAGNESIUM SERPL-MCNC: 2.1 MG/DL — SIGNIFICANT CHANGE UP (ref 1.6–2.6)
MCHC RBC-ENTMCNC: 27.8 PG — SIGNIFICANT CHANGE UP (ref 27–34)
MCHC RBC-ENTMCNC: 32.2 GM/DL — SIGNIFICANT CHANGE UP (ref 32–36)
MCV RBC AUTO: 86.3 FL — SIGNIFICANT CHANGE UP (ref 80–100)
NRBC # BLD: 0 /100 WBCS — SIGNIFICANT CHANGE UP (ref 0–0)
PHOSPHATE SERPL-MCNC: 7.2 MG/DL — HIGH (ref 2.5–4.5)
PLATELET # BLD AUTO: 115 K/UL — LOW (ref 150–400)
POTASSIUM SERPL-MCNC: 4.6 MMOL/L — SIGNIFICANT CHANGE UP (ref 3.5–5.3)
POTASSIUM SERPL-SCNC: 4.6 MMOL/L — SIGNIFICANT CHANGE UP (ref 3.5–5.3)
RBC # BLD: 2.48 M/UL — LOW (ref 3.8–5.2)
RBC # FLD: 14.9 % — HIGH (ref 10.3–14.5)
SARS-COV-2 RNA SPEC QL NAA+PROBE: SIGNIFICANT CHANGE UP
SODIUM SERPL-SCNC: 138 MMOL/L — SIGNIFICANT CHANGE UP (ref 135–145)
URATE SERPL-MCNC: 8.1 MG/DL — HIGH (ref 2.5–7)
WBC # BLD: 7.67 K/UL — SIGNIFICANT CHANGE UP (ref 3.8–10.5)
WBC # FLD AUTO: 7.67 K/UL — SIGNIFICANT CHANGE UP (ref 3.8–10.5)

## 2021-04-27 PROCEDURE — 99233 SBSQ HOSP IP/OBS HIGH 50: CPT

## 2021-04-27 RX ADMIN — Medication 25 MILLIGRAM(S): at 06:25

## 2021-04-27 RX ADMIN — SENNA PLUS 2 TABLET(S): 8.6 TABLET ORAL at 22:26

## 2021-04-27 RX ADMIN — SIMVASTATIN 40 MILLIGRAM(S): 20 TABLET, FILM COATED ORAL at 22:26

## 2021-04-27 RX ADMIN — AMLODIPINE BESYLATE 10 MILLIGRAM(S): 2.5 TABLET ORAL at 06:25

## 2021-04-27 RX ADMIN — Medication 4: at 11:53

## 2021-04-27 RX ADMIN — Medication 5 UNIT(S): at 08:31

## 2021-04-27 RX ADMIN — Medication 100 MILLIGRAM(S): at 06:24

## 2021-04-27 RX ADMIN — CHLORHEXIDINE GLUCONATE 1 APPLICATION(S): 213 SOLUTION TOPICAL at 06:59

## 2021-04-27 RX ADMIN — LIDOCAINE 1 PATCH: 4 CREAM TOPICAL at 12:26

## 2021-04-27 RX ADMIN — Medication 100 MILLIGRAM(S): at 22:26

## 2021-04-27 RX ADMIN — Medication 1 PATCH: at 08:29

## 2021-04-27 RX ADMIN — POLYETHYLENE GLYCOL 3350 17 GRAM(S): 17 POWDER, FOR SOLUTION ORAL at 12:26

## 2021-04-27 RX ADMIN — Medication 100 MILLIGRAM(S): at 06:25

## 2021-04-27 RX ADMIN — HEPARIN SODIUM 5000 UNIT(S): 5000 INJECTION INTRAVENOUS; SUBCUTANEOUS at 06:26

## 2021-04-27 RX ADMIN — GABAPENTIN 300 MILLIGRAM(S): 400 CAPSULE ORAL at 22:26

## 2021-04-27 RX ADMIN — Medication 1 PATCH: at 18:39

## 2021-04-27 RX ADMIN — Medication 5 UNIT(S): at 11:52

## 2021-04-27 RX ADMIN — Medication 25 MILLIGRAM(S): at 22:26

## 2021-04-27 RX ADMIN — PANTOPRAZOLE SODIUM 40 MILLIGRAM(S): 20 TABLET, DELAYED RELEASE ORAL at 06:25

## 2021-04-27 RX ADMIN — INSULIN GLARGINE 10 UNIT(S): 100 INJECTION, SOLUTION SUBCUTANEOUS at 22:27

## 2021-04-27 RX ADMIN — HEPARIN SODIUM 5000 UNIT(S): 5000 INJECTION INTRAVENOUS; SUBCUTANEOUS at 18:55

## 2021-04-27 RX ADMIN — LIDOCAINE 1 PATCH: 4 CREAM TOPICAL at 18:40

## 2021-04-27 NOTE — PROGRESS NOTE ADULT - SUBJECTIVE AND OBJECTIVE BOX
[facility] NEPHROLOGY SERVICES, [facility]  NEPHROLOGY AND HYPERTENSION  [address]  [address]  [place] [address]  [phone]    MD PRAVEEN LYNNE MD ANDREY GONCHARUK, MD MADHU KORRAPATI, MD YELENA ROSENBERG, MD BINNY KOSHY, MD CHRISTOPHER CAPUTO, MD EDWARD BOVER, MD          Patient events noted    MEDICATIONS  (STANDING):  amLODIPine   Tablet 10 milliGRAM(s) Oral daily  chlorhexidine 2% Cloths 1 Application(s) Topical <User Schedule>  cloNIDine Patch 0.3 mG/24Hr(s) 1 patch Transdermal <User Schedule>  dextrose 40% Gel 15 Gram(s) Oral once  dextrose 5%. 1000 milliLiter(s) (50 mL/Hr) IV Continuous <Continuous>  dextrose 5%. 1000 milliLiter(s) (100 mL/Hr) IV Continuous <Continuous>  dextrose 50% Injectable 25 Gram(s) IV Push once  dextrose 50% Injectable 12.5 Gram(s) IV Push once  dextrose 50% Injectable 25 Gram(s) IV Push once  epoetin torrie-epbx (RETACRIT) Injectable 13851 Unit(s) SubCutaneous every 7 days  gabapentin 300 milliGRAM(s) Oral at bedtime  glucagon  Injectable 1 milliGRAM(s) IntraMuscular once  heparin   Injectable 5000 Unit(s) SubCutaneous every 12 hours  hydrALAZINE 25 milliGRAM(s) Oral three times a day  insulin glargine Injectable (LANTUS) 10 Unit(s) SubCutaneous at bedtime  insulin lispro (ADMELOG) corrective regimen sliding scale   SubCutaneous three times a day before meals  insulin lispro Injectable (ADMELOG) 5 Unit(s) SubCutaneous three times a day before meals  labetalol 100 milliGRAM(s) Oral three times a day  lidocaine   Patch 1 Patch Transdermal daily  pantoprazole    Tablet 40 milliGRAM(s) Oral before breakfast  polyethylene glycol 3350 17 Gram(s) Oral daily  senna 2 Tablet(s) Oral at bedtime  simvastatin 40 milliGRAM(s) Oral at bedtime  torsemide 100 milliGRAM(s) Oral daily    MEDICATIONS  (PRN):  ALPRAZolam 0.5 milliGRAM(s) Oral every 12 hours PRN anxiety  aluminum hydroxide/magnesium hydroxide/simethicone Suspension 30 milliLiter(s) Oral every 6 hours PRN Dyspepsia      04-27-21 @ 07:01  -  04-28-21 @ 07:00  --------------------------------------------------------  IN: 480 mL / OUT: 2500 mL / NET: -2020 mL    04-28-21 @ 07:01  -  04-28-21 @ 22:34  --------------------------------------------------------  IN: 240 mL / OUT: 2500 mL / NET: -2260 mL      PHYSICAL EXAM:      T(C): 36.2 (04-28-21 @ 17:17), Max: 37.2 (04-28-21 @ 00:06)  HR: 72 (04-28-21 @ 22:23) (60 - 72)  BP: 160/71 (04-28-21 @ 22:23) (127/71 - 170/74)  RR: 18 (04-28-21 @ 22:23) (16 - 20)  SpO2: 98% (04-28-21 @ 17:17) (97% - 100%)  Wt(kg): --  Lungs clear  Heart S1S2  Abd soft NT ND  Extremities:   tr edema                                    6.9    6.09  )-----------( 93       ( 28 Apr 2021 14:04 )             21.1     04-28    137  |  96  |  5<L>  ----------------------------<  82  2.0<LL>   |  36<H>  |  0.76    Ca    7.5<L>      28 Apr 2021 14:04  Phos  1.0     04-28  Mg     1.9     04-28          Creatinine Trend: 0.76<--, 6.03<--, 5.21<--, 6.21<--, 5.26<--, 4.96<--      Assessment   ESRD, acute on chronic anemia     Plan:  HD for today  UF as tolerated  PRBC tx  Perm cath Friday; discharge planning      Erickson Ray MD

## 2021-04-27 NOTE — PROGRESS NOTE ADULT - SUBJECTIVE AND OBJECTIVE BOX
Patient is a 75y old  Female who presents with a chief complaint of Acute left leg siatica. (26 Apr 2021 17:29)      INTERVAL HPI/OVERNIGHT EVENTS:    R toe feels OK; Missed HD yesterday. Permacath planned for tomorrow. Had a little SOB yesterday afternoon, but improved post Lasix    REVIEW OF SYSTEMS:   Remaining ROS negative    MEDICATIONS  (STANDING):  amLODIPine   Tablet 10 milliGRAM(s) Oral daily  chlorhexidine 2% Cloths 1 Application(s) Topical <User Schedule>  cloNIDine Patch 0.3 mG/24Hr(s) 1 patch Transdermal <User Schedule>  dextrose 40% Gel 15 Gram(s) Oral once  dextrose 5%. 1000 milliLiter(s) (50 mL/Hr) IV Continuous <Continuous>  dextrose 5%. 1000 milliLiter(s) (100 mL/Hr) IV Continuous <Continuous>  dextrose 50% Injectable 25 Gram(s) IV Push once  dextrose 50% Injectable 12.5 Gram(s) IV Push once  dextrose 50% Injectable 25 Gram(s) IV Push once  epoetin torrie-epbx (RETACRIT) Injectable 90624 Unit(s) SubCutaneous every 7 days  gabapentin 300 milliGRAM(s) Oral at bedtime  glucagon  Injectable 1 milliGRAM(s) IntraMuscular once  heparin   Injectable 5000 Unit(s) SubCutaneous every 12 hours  hydrALAZINE 25 milliGRAM(s) Oral three times a day  insulin glargine Injectable (LANTUS) 10 Unit(s) SubCutaneous at bedtime  insulin lispro (ADMELOG) corrective regimen sliding scale   SubCutaneous three times a day before meals  insulin lispro Injectable (ADMELOG) 5 Unit(s) SubCutaneous three times a day before meals  labetalol 100 milliGRAM(s) Oral three times a day  lidocaine   Patch 1 Patch Transdermal daily  pantoprazole    Tablet 40 milliGRAM(s) Oral before breakfast  polyethylene glycol 3350 17 Gram(s) Oral daily  senna 2 Tablet(s) Oral at bedtime  simvastatin 40 milliGRAM(s) Oral at bedtime  torsemide 100 milliGRAM(s) Oral daily    MEDICATIONS  (PRN):  ALPRAZolam 0.5 milliGRAM(s) Oral every 12 hours PRN anxiety  aluminum hydroxide/magnesium hydroxide/simethicone Suspension 30 milliLiter(s) Oral every 6 hours PRN Dyspepsia      Allergies    Eliquis (Other)    Intolerances        Vital Signs Last 24 Hrs  T(C): 36.8 (27 Apr 2021 13:20), Max: 36.9 (26 Apr 2021 17:48)  T(F): 98.3 (27 Apr 2021 13:20), Max: 98.5 (27 Apr 2021 00:28)  HR: 62 (27 Apr 2021 13:20) (58 - 62)  BP: 169/70 (27 Apr 2021 13:20) (116/53 - 169/70)  BP(mean): --  RR: 18 (27 Apr 2021 13:20) (18 - 18)  SpO2: 99% (27 Apr 2021 13:20) (95% - 100%)    PHYSICAL EXAM:  GENERAL: NAD  HEAD:  Atraumatic, Normocephalic  EYES: EOMI, PERRLA, conjunctiva and sclera clear  ENT: O/P Clear  NECK: Supple, No JVD  NERVOUS SYSTEM:  No focal deficits  CHEST/LUNG: Clear to percussion bilaterally; No rales, rhonchi, wheezing  HEART: Regular rate and rhythm; No murmurs, rubs, or gallops  ABDOMEN: Soft, Nontender, Nondistended; Bowel sounds present  EXTREMITIES:  2+ Peripheral Pulses, No clubbing, cyanosis, or edema  SKIN: No rashes or lesions    LABS:                        6.9    7.67  )-----------( 115      ( 27 Apr 2021 14:26 )             21.4     04-27    138  |  102  |  86<H>  ----------------------------<  140<H>  4.6   |  25  |  6.03<H>    Ca    7.7<L>      27 Apr 2021 14:26  Phos  7.2     04-27  Mg     2.1     04-27          CAPILLARY BLOOD GLUCOSE      POCT Blood Glucose.: 205 mg/dL (27 Apr 2021 11:31)  POCT Blood Glucose.: 84 mg/dL (27 Apr 2021 08:27)  POCT Blood Glucose.: 115 mg/dL (26 Apr 2021 21:17)      RADIOLOGY & ADDITIONAL TESTS:    Imaging Personally Reviewed:  [ ] YES  [ ] NO    Consultant(s) Notes Reviewed:  [ ] YES  [ ] NO    Care Discussed with Consultants/Other Providers [ ] YES  [ ] NO

## 2021-04-27 NOTE — PROGRESS NOTE ADULT - TIME BILLING
min spent reviewing chart, examining patient, discussing plan with patient and family

## 2021-04-27 NOTE — PROGRESS NOTE ADULT - ASSESSMENT
75 year old female with PMH of CKD, HTN, diverticulitis, DVT? presents today c/o left sided back pain radiating into the left side since waking up this morning. Patient states that she was attempting to get out of bed to use the bathroom when she experienced sever pain described as an ache felt inside associated with numbness of the left leg. Patient  reports difficulty ambulating due to the pain worsening when she bears weight. Patient denies recent trauma but does report having physical therapy in 2007 due to problems in her lower back (-) urinary or bowel incontinence, fevers or chills, flu like symptoms, chest pain or sob. Called her PMD who told her to go to ER for an evaluation.     Assessment and Plan:     Left sided sciatica:  - Acute onset  - CT L spine: Multilevel degenerative changes, most prominent at L4/L5 with moderate to severe bilateral foraminal stenosis and high-grade spinal canal stenosis, CT hip: no fracture, unable to do MR.  - Seen by ortho (Christal), no intervention planned  - Her left groin pain is likely secondary to severe OA  - Pain management - Gabapentin, Lidoderm  - completed prednisone 50 mg daily X 5 days.  - Needs rehab (although improving w/ PT and home w/ home PT becoming a possibility)    HTN:  - Not on ACEi/ARB due to CKD 4  - On clonidine patch, Labetalol, amlodipine, hydralazine, torsemide    HLD:  - Continue Statin    DM:   - At home, uses NPH 70/30 bid about 15 units total per day at home, prescribed by her endocrine doctor  - Now on Lantus 10 units at night with pre meal Admelog 5 units and SS  - Hb A1c 5.8       ROSALES on CKD IV; started dialysis on this admission   - No NSAID  - Unable to get HD today (4/26) so one dose of Lasix given and standing Bumex added  - Renal following (Cory/Dipak)  - Temp dialysis cath placed 4/22; IR will convert to tunneled cath when OK per Nephro  Had HD 4/22, 4/23, 4/24; none planned for 4/25; had to be deferred Monday 4/26; restarting Tue 4/27  - Labs not drawn today due to missed HD session; re-ordered for AM    Hyperkalemia:  - was, on Veltassa at home  - S/P Kayexalate X 3  - Added Lokelma initially; now stopped after HD started  - Low K diet    Anemia of chronic disease:  - Hb mostly stable  - high ferritin, but could be from chronic inflammatory state   - Epogen started by nephro  - 1 unit PRBC ordered/pending    Renal cysts:  - seen on sono  - Follow up with PCP for recheck    Probable gout (or pseudogout)   - acid pending   - Colchicine given 1.2mg x 1, then 0.6mg x 1 (seems to have helped)    DVT ppx--heparin SQ  fall precautions     As per pt she is DNR and had documents at home. son to bring paperwork    Dispo: [facility] (or possibly home w/ home PT) when medically stable and final HD arrangements in place.

## 2021-04-28 LAB
% ALBUMIN: 57.9 % — SIGNIFICANT CHANGE UP
% ALPHA 1: 4.4 % — SIGNIFICANT CHANGE UP
% ALPHA 2: 12.8 % — SIGNIFICANT CHANGE UP
% BETA: 10.6 % — SIGNIFICANT CHANGE UP
% GAMMA: 14.3 % — SIGNIFICANT CHANGE UP
% M SPIKE: 4 % — SIGNIFICANT CHANGE UP
ALBUMIN SERPL ELPH-MCNC: 3.4 G/DL — LOW (ref 3.6–5.5)
ALBUMIN/GLOB SERPL ELPH: 1.4 RATIO — SIGNIFICANT CHANGE UP
ALPHA1 GLOB SERPL ELPH-MCNC: 0.3 G/DL — SIGNIFICANT CHANGE UP (ref 0.1–0.4)
ALPHA2 GLOB SERPL ELPH-MCNC: 0.8 G/DL — SIGNIFICANT CHANGE UP (ref 0.5–1)
ANION GAP SERPL CALC-SCNC: 5 MMOL/L — SIGNIFICANT CHANGE UP (ref 5–17)
B-GLOBULIN SERPL ELPH-MCNC: 0.6 G/DL — SIGNIFICANT CHANGE UP (ref 0.5–1)
BUN SERPL-MCNC: 5 MG/DL — LOW (ref 7–23)
CALCIUM SERPL-MCNC: 7.5 MG/DL — LOW (ref 8.5–10.1)
CHLORIDE SERPL-SCNC: 96 MMOL/L — SIGNIFICANT CHANGE UP (ref 96–108)
CO2 SERPL-SCNC: 36 MMOL/L — HIGH (ref 22–31)
CREAT SERPL-MCNC: 0.76 MG/DL — SIGNIFICANT CHANGE UP (ref 0.5–1.3)
GAMMA GLOBULIN: 0.8 G/DL — SIGNIFICANT CHANGE UP (ref 0.6–1.6)
GLUCOSE BLDC GLUCOMTR-MCNC: 199 MG/DL — HIGH (ref 70–99)
GLUCOSE BLDC GLUCOMTR-MCNC: 201 MG/DL — HIGH (ref 70–99)
GLUCOSE BLDC GLUCOMTR-MCNC: 77 MG/DL — SIGNIFICANT CHANGE UP (ref 70–99)
GLUCOSE BLDC GLUCOMTR-MCNC: 83 MG/DL — SIGNIFICANT CHANGE UP (ref 70–99)
GLUCOSE SERPL-MCNC: 82 MG/DL — SIGNIFICANT CHANGE UP (ref 70–99)
HCT VFR BLD CALC: 21.1 % — LOW (ref 34.5–45)
HGB BLD-MCNC: 6.9 G/DL — CRITICAL LOW (ref 11.5–15.5)
INTERPRETATION SERPL IFE-IMP: SIGNIFICANT CHANGE UP
M-SPIKE: 0.2 G/DL — HIGH (ref 0–0)
MAGNESIUM SERPL-MCNC: 1.9 MG/DL — SIGNIFICANT CHANGE UP (ref 1.6–2.6)
MCHC RBC-ENTMCNC: 28.2 PG — SIGNIFICANT CHANGE UP (ref 27–34)
MCHC RBC-ENTMCNC: 32.7 GM/DL — SIGNIFICANT CHANGE UP (ref 32–36)
MCV RBC AUTO: 86.1 FL — SIGNIFICANT CHANGE UP (ref 80–100)
NRBC # BLD: 0 /100 WBCS — SIGNIFICANT CHANGE UP (ref 0–0)
PHOSPHATE SERPL-MCNC: 1 MG/DL — CRITICAL LOW (ref 2.5–4.5)
PLATELET # BLD AUTO: 93 K/UL — LOW (ref 150–400)
POTASSIUM SERPL-MCNC: 2 MMOL/L — CRITICAL LOW (ref 3.5–5.3)
POTASSIUM SERPL-SCNC: 2 MMOL/L — CRITICAL LOW (ref 3.5–5.3)
PROT PATTERN SERPL ELPH-IMP: SIGNIFICANT CHANGE UP
RBC # BLD: 2.45 M/UL — LOW (ref 3.8–5.2)
RBC # FLD: 14.9 % — HIGH (ref 10.3–14.5)
SODIUM SERPL-SCNC: 137 MMOL/L — SIGNIFICANT CHANGE UP (ref 135–145)
WBC # BLD: 6.09 K/UL — SIGNIFICANT CHANGE UP (ref 3.8–10.5)
WBC # FLD AUTO: 6.09 K/UL — SIGNIFICANT CHANGE UP (ref 3.8–10.5)

## 2021-04-28 PROCEDURE — 99233 SBSQ HOSP IP/OBS HIGH 50: CPT

## 2021-04-28 RX ORDER — ACETAMINOPHEN 500 MG
1000 TABLET ORAL ONCE
Refills: 0 | Status: COMPLETED | OUTPATIENT
Start: 2021-04-28 | End: 2021-04-28

## 2021-04-28 RX ORDER — IRON SUCROSE 20 MG/ML
100 INJECTION, SOLUTION INTRAVENOUS ONCE
Refills: 0 | Status: COMPLETED | OUTPATIENT
Start: 2021-04-28 | End: 2021-04-28

## 2021-04-28 RX ADMIN — Medication 100 MILLIGRAM(S): at 22:24

## 2021-04-28 RX ADMIN — Medication 1 PATCH: at 08:10

## 2021-04-28 RX ADMIN — Medication 25 MILLIGRAM(S): at 17:36

## 2021-04-28 RX ADMIN — SENNA PLUS 2 TABLET(S): 8.6 TABLET ORAL at 22:24

## 2021-04-28 RX ADMIN — Medication 2: at 08:05

## 2021-04-28 RX ADMIN — POLYETHYLENE GLYCOL 3350 17 GRAM(S): 17 POWDER, FOR SOLUTION ORAL at 11:24

## 2021-04-28 RX ADMIN — AMLODIPINE BESYLATE 10 MILLIGRAM(S): 2.5 TABLET ORAL at 05:36

## 2021-04-28 RX ADMIN — IRON SUCROSE 210 MILLIGRAM(S): 20 INJECTION, SOLUTION INTRAVENOUS at 17:37

## 2021-04-28 RX ADMIN — Medication 1000 MILLIGRAM(S): at 02:17

## 2021-04-28 RX ADMIN — Medication 100 MILLIGRAM(S): at 05:36

## 2021-04-28 RX ADMIN — HEPARIN SODIUM 5000 UNIT(S): 5000 INJECTION INTRAVENOUS; SUBCUTANEOUS at 05:36

## 2021-04-28 RX ADMIN — Medication 400 MILLIGRAM(S): at 01:19

## 2021-04-28 RX ADMIN — Medication 25 MILLIGRAM(S): at 22:24

## 2021-04-28 RX ADMIN — Medication 100 MILLIGRAM(S): at 17:37

## 2021-04-28 RX ADMIN — GABAPENTIN 300 MILLIGRAM(S): 400 CAPSULE ORAL at 22:24

## 2021-04-28 RX ADMIN — Medication 5 UNIT(S): at 08:05

## 2021-04-28 RX ADMIN — CHLORHEXIDINE GLUCONATE 1 APPLICATION(S): 213 SOLUTION TOPICAL at 05:36

## 2021-04-28 RX ADMIN — LIDOCAINE 1 PATCH: 4 CREAM TOPICAL at 00:46

## 2021-04-28 RX ADMIN — PANTOPRAZOLE SODIUM 40 MILLIGRAM(S): 20 TABLET, DELAYED RELEASE ORAL at 05:36

## 2021-04-28 RX ADMIN — SIMVASTATIN 40 MILLIGRAM(S): 20 TABLET, FILM COATED ORAL at 22:24

## 2021-04-28 RX ADMIN — Medication 25 MILLIGRAM(S): at 05:36

## 2021-04-28 RX ADMIN — HEPARIN SODIUM 5000 UNIT(S): 5000 INJECTION INTRAVENOUS; SUBCUTANEOUS at 17:36

## 2021-04-28 RX ADMIN — Medication 5 UNIT(S): at 11:24

## 2021-04-28 RX ADMIN — Medication 1 PATCH: at 19:40

## 2021-04-28 RX ADMIN — INSULIN GLARGINE 10 UNIT(S): 100 INJECTION, SOLUTION SUBCUTANEOUS at 22:25

## 2021-04-28 NOTE — PROVIDER CONTACT NOTE (CRITICAL VALUE NOTIFICATION) - ACTION/TREATMENT ORDERED:
type and cross match done and endorse dto nurse Arlyn to get consent for blood transfusion
4K+ bath  1 unit PRBC

## 2021-04-28 NOTE — PROGRESS NOTE ADULT - SUBJECTIVE AND OBJECTIVE BOX
Patient is a 75y old  Female who presents with a chief complaint of Acute left leg siatica. (27 Apr 2021 17:26)    INTERVAL HPI/OVERNIGHT EVENTS:  Pt was seen and examined, no acute events.    MEDICATIONS  (STANDING):  amLODIPine   Tablet 10 milliGRAM(s) Oral daily  chlorhexidine 2% Cloths 1 Application(s) Topical <User Schedule>  cloNIDine Patch 0.3 mG/24Hr(s) 1 patch Transdermal <User Schedule>  dextrose 40% Gel 15 Gram(s) Oral once  dextrose 5%. 1000 milliLiter(s) (50 mL/Hr) IV Continuous <Continuous>  dextrose 5%. 1000 milliLiter(s) (100 mL/Hr) IV Continuous <Continuous>  dextrose 50% Injectable 25 Gram(s) IV Push once  dextrose 50% Injectable 12.5 Gram(s) IV Push once  dextrose 50% Injectable 25 Gram(s) IV Push once  epoetin torrie-epbx (RETACRIT) Injectable 21697 Unit(s) SubCutaneous every 7 days  gabapentin 300 milliGRAM(s) Oral at bedtime  glucagon  Injectable 1 milliGRAM(s) IntraMuscular once  heparin   Injectable 5000 Unit(s) SubCutaneous every 12 hours  hydrALAZINE 25 milliGRAM(s) Oral three times a day  insulin glargine Injectable (LANTUS) 10 Unit(s) SubCutaneous at bedtime  insulin lispro (ADMELOG) corrective regimen sliding scale   SubCutaneous three times a day before meals  insulin lispro Injectable (ADMELOG) 5 Unit(s) SubCutaneous three times a day before meals  iron sucrose IVPB 100 milliGRAM(s) IV Intermittent once  labetalol 100 milliGRAM(s) Oral three times a day  lidocaine   Patch 1 Patch Transdermal daily  pantoprazole    Tablet 40 milliGRAM(s) Oral before breakfast  polyethylene glycol 3350 17 Gram(s) Oral daily  senna 2 Tablet(s) Oral at bedtime  simvastatin 40 milliGRAM(s) Oral at bedtime  torsemide 100 milliGRAM(s) Oral daily    MEDICATIONS  (PRN):  ALPRAZolam 0.5 milliGRAM(s) Oral every 12 hours PRN anxiety  aluminum hydroxide/magnesium hydroxide/simethicone Suspension 30 milliLiter(s) Oral every 6 hours PRN Dyspepsia      Allergies  Eliquis (Other)      Vital Signs Last 24 Hrs  T(C): 36.2 (28 Apr 2021 17:17), Max: 37.2 (28 Apr 2021 00:06)  T(F): 97.1 (28 Apr 2021 17:17), Max: 98.9 (28 Apr 2021 00:06)  HR: 68 (28 Apr 2021 17:17) (60 - 99)  BP: 170/74 (28 Apr 2021 17:17) (127/71 - 170/74)  BP(mean): --  RR: 18 (28 Apr 2021 17:17) (16 - 20)  SpO2: 98% (28 Apr 2021 17:17) (97% - 100%)      PHYSICAL EXAM:  GENERAL: NAD  HEAD:  Atraumatic  EYES: PERRLA  NERVOUS SYSTEM:  Awake, alert  CHEST/LUNG: Clear  HEART: RRR  ABDOMEN: Soft, non tender  EXTREMITIES:  no edema      LABS:                        6.9    6.09  )-----------( 93       ( 28 Apr 2021 14:04 )             21.1     04-28    137  |  96  |  5<L>  ----------------------------<  82  2.0<LL>   |  36<H>  |  0.76    Ca    7.5<L>      28 Apr 2021 14:04  Phos  1.0     04-28  Mg     1.9     04-28      CAPILLARY BLOOD GLUCOSE      POCT Blood Glucose.: 83 mg/dL (28 Apr 2021 17:14)  POCT Blood Glucose.: 77 mg/dL (28 Apr 2021 11:20)  POCT Blood Glucose.: 199 mg/dL (28 Apr 2021 07:55)      RADIOLOGY & ADDITIONAL TESTS:    Imaging Personally Reviewed:  [ ] YES  [ ] NO    Consultant(s) Notes Reviewed:  [ ] YES  [ ] NO    Care Discussed with Consultants/Other Providers [ ] YES  [ ] NO

## 2021-04-28 NOTE — PROVIDER CONTACT NOTE (CRITICAL VALUE NOTIFICATION) - RECOMMENDATIONS
type and cross match and to be transfuse 1 unit during dialysis if blood available if not pt to be transfused tomorrow in dialysis
Blood transfusion and higher K bath

## 2021-04-28 NOTE — PROGRESS NOTE ADULT - ASSESSMENT
75 year old female with PMH of CKD, HTN, diverticulitis, DVT? presents today c/o left sided back pain radiating into the left side since waking up this morning. Patient states that she was attempting to get out of bed to use the bathroom when she experienced sever pain described as an ache felt inside associated with numbness of the left leg. Patient  reports difficulty ambulating due to the pain worsening when she bears weight. Patient denies recent trauma but does report having physical therapy in 2007 due to problems in her lower back (-) urinary or bowel incontinence, fevers or chills, flu like symptoms, chest pain or sob. Called her PMD who told her to go to ER for an evaluation.     Left sided sciatica:  - Acute onset  - CT L spine: Multilevel degenerative changes, most prominent at L4/L5 with moderate to severe bilateral foraminal stenosis and high-grade spinal canal stenosis, CT hip: no fracture, unable to do MR.  - Seen by ortho (Christal), no intervention planned  - Her left groin pain is likely secondary to severe OA  - Pain management - Gabapentin, Lidoderm  - completed prednisone 50 mg daily X 5 days.  - Needs rehab (although improving w/ PT and home w/ home PT becoming a possibility)    HTN:  - Not on ACEi/ARB due to CKD 4  - On clonidine patch, Labetalol, amlodipine, hydralazine, torsemide    HLD:  - Continue Statin    DM:   - At home, uses NPH 70/30 bid about 15 units total per day at home, prescribed by her endocrine doctor  - Now on Lantus 10 units at night with pre meal Admelog 5 units and SS  - Hb A1c 5.8       ROSALES on CKD IV; started dialysis on this admission   - Unable to get HD today (4/26) so one dose of Lasix given and standing Bumex added  - Renal following (Cory/Dipak)  - Temp dialysis cath placed 4/22; IR will convert to tunneled cath possibly on friday  - HD today    Hyperkalemia:  - was, on Veltassa at home  - S/P Kayexalate X 3  - Added Lokelma initially; now stopped after HD started  - Low K diet    Anemia of chronic disease:  - Hb mostly stable  - high ferritin, but could be from chronic inflammatory state   - Epogen started by nephro  - 1 unit PRBC ordered/pending    Renal cysts:  - seen on sono  - Follow up with PCP for recheck    Probable gout (or pseudogout)   - acid pending   - Colchicine given 1.2mg x 1, then 0.6mg x 1 (seems to have helped)    DVT ppx--heparin SQ  fall precautions     As per pt she is DNR and had documents at home. son to bring paperwork    Dispo: [facility] (or possibly home w/ home PT) when final HD arrangements in place. 75 year old female with PMH of CKD, HTN, diverticulitis, DVT? presents today c/o left sided back pain radiating into the left side since waking up this morning. Patient states that she was attempting to get out of bed to use the bathroom when she experienced sever pain described as an ache felt inside associated with numbness of the left leg. Patient  reports difficulty ambulating due to the pain worsening when she bears weight. Patient denies recent trauma but does report having physical therapy in 2007 due to problems in her lower back (-) urinary or bowel incontinence, fevers or chills, flu like symptoms, chest pain or sob. Called her PMD who told her to go to ER for an evaluation.     Left sided sciatica:  - Acute onset  - CT L spine: Multilevel degenerative changes, most prominent at L4/L5 with moderate to severe bilateral foraminal stenosis and high-grade spinal canal stenosis, CT hip: no fracture, unable to do MR.  - Seen by ortho (Christal), no intervention planned  - Her left groin pain is likely secondary to severe OA  - Pain management - Gabapentin, Lidoderm  - completed prednisone 50 mg daily X 5 days.  - Needs rehab (although improving w/ PT and home w/ home PT becoming a possibility)    HTN:  - Not on ACEi/ARB due to CKD 4  - On clonidine patch, Labetalol, amlodipine, hydralazine, torsemide    HLD:  - Continue Statin    DM:   - At home, uses NPH 70/30 bid about 15 units total per day at home, prescribed by her endocrine doctor  - Now on Lantus 10 units at night with pre meal Admelog 5 units and SS  - Hb A1c 5.8       ROSALES on CKD IV; started dialysis on this admission   - Unable to get HD today (4/26) so one dose of Lasix given and standing Bumex added  - Renal following (Cory/Dipak)  - Temp dialysis cath placed 4/22; IR will convert to tunneled cath possibly on friday  - HD today    Hyperkalemia:  - was, on Veltassa at home  - S/P Kayexalate X 3  - Added Lokelma initially; now stopped after HD started  - Low K diet    Anemia of chronic disease:  - Hb mostly stable  - high ferritin, but could be from chronic inflammatory state   - Epogen started by nephro  - 1 unit PRBC ordered/pending    Renal cysts:  - seen on sono  - Follow up with PCP for recheck    Probable gout (or pseudogout)   - acid pending   - Colchicine given 1.2mg x 1, then 0.6mg x 1 (seems to have helped)    DVT ppx--heparin SQ  fall precautions     Pt is DNR, reviewed her living will. Pt initially was wiling to sign molst but now refusing to sign and stating its unnecessary.  Dispo: [facility] (or possibly home w/ home PT) when final HD arrangements in place.

## 2021-04-28 NOTE — CHART NOTE - NSCHARTNOTEFT_GEN_A_CORE
Pt admitted c back pain radiating to left side c left leg numbness, difficulty ambulating; dx c left sided sciatica, severe OA.  Pt c CKD stage IV c minimal urine production; HD initiated during this admission; permacath scheduled for placement today (4/28)    Factors impacting intake: [ x] none [ ] nausea  [ ] vomiting [ ] diarrhea [ ] constipation  [ ]chewing problems [ ] swallowing issues  [ ] other:     Diet Prescription: Diet, Consistent Carbohydrate Renal/No Snacks:   Supplement Feeding Modality:  Oral  Nepro Cans or Servings Per Day:  1       Frequency:  Daily (04-21-21 @ 15:32)    Intake:   pt eating well % most meals; drinking Nepro; recommendation consumption of Nepro before bed or in am before breakfast as BG in am has been low    Current Weight:   94 kg (4/28); admission wt 93/7 kg (4/21)  % Weight Change: stable x 1 week    2+ edema noted b/l LEs    Pertinent Medications: MEDICATIONS  (STANDING):  amLODIPine   Tablet 10 milliGRAM(s) Oral daily  chlorhexidine 2% Cloths 1 Application(s) Topical <User Schedule>  cloNIDine Patch 0.3 mG/24Hr(s) 1 patch Transdermal <User Schedule>  dextrose 40% Gel 15 Gram(s) Oral once  dextrose 5%. 1000 milliLiter(s) (50 mL/Hr) IV Continuous <Continuous>  dextrose 5%. 1000 milliLiter(s) (100 mL/Hr) IV Continuous <Continuous>  dextrose 50% Injectable 25 Gram(s) IV Push once  dextrose 50% Injectable 12.5 Gram(s) IV Push once  dextrose 50% Injectable 25 Gram(s) IV Push once  epoetin torrie-epbx (RETACRIT) Injectable 40575 Unit(s) SubCutaneous every 7 days  gabapentin 300 milliGRAM(s) Oral at bedtime  glucagon  Injectable 1 milliGRAM(s) IntraMuscular once  heparin   Injectable 5000 Unit(s) SubCutaneous every 12 hours  hydrALAZINE 25 milliGRAM(s) Oral three times a day  insulin glargine Injectable (LANTUS) 10 Unit(s) SubCutaneous at bedtime  insulin lispro (ADMELOG) corrective regimen sliding scale   SubCutaneous three times a day before meals  insulin lispro Injectable (ADMELOG) 5 Unit(s) SubCutaneous three times a day before meals  labetalol 100 milliGRAM(s) Oral three times a day  lidocaine   Patch 1 Patch Transdermal daily  pantoprazole    Tablet 40 milliGRAM(s) Oral before breakfast  polyethylene glycol 3350 17 Gram(s) Oral daily  senna 2 Tablet(s) Oral at bedtime  simvastatin 40 milliGRAM(s) Oral at bedtime  torsemide 100 milliGRAM(s) Oral daily    MEDICATIONS  (PRN):  ALPRAZolam 0.5 milliGRAM(s) Oral every 12 hours PRN anxiety  aluminum hydroxide/magnesium hydroxide/simethicone Suspension 30 milliLiter(s) Oral every 6 hours PRN Dyspepsia    Pertinent Labs: 04-27 Na138 mmol/L Glu 140 mg/dL<H> K+ 4.6 mmol/L Cr  6.03 mg/dL<H> BUN 86 mg/dL<H> 04-27 Phos 7.2 mg/dL<H> 04-22 Alb 3.0 g/dL<L>  04-16-21  A1C 5.8%; 04-27 POCT: 84, 205    Skin: WDL    Estimated Needs:   [X ] no change since previous assessment  (4/21)  [ ] recalculated:     Previous Nutrition Diagnosis:   [X ] Increased Nutrient Needs   Etiology:  Increased demand for protein  Signs & Symptoms:  Initiation of HD requires additional protein intake    GOAL:  Pt to meet >75% protein requirements via meals & supplements - met    Nutrition Diagnosis is [X ] ongoing  [ ] resolved [ ] not applicable     New Nutrition Diagnosis: [ X] not applicable    Interventions:   continue current diet rx as noted  Recommend  [ ] Change Diet To:  [ ] Nutrition Supplement  [ ] Nutrition Support  [x ] Other: provided in-depth renal diet counseling + educational material  (4/26)    Monitoring and Evaluation:   [X ] PO intake [ x ] Tolerance to diet prescription [ x ] weights [ x ] labs[ x ] follow up per protocol  [ ] other:

## 2021-04-29 LAB
ALBUMIN SERPL ELPH-MCNC: 2.9 G/DL — LOW (ref 3.3–5)
ALP SERPL-CCNC: 75 U/L — SIGNIFICANT CHANGE UP (ref 40–120)
ALT FLD-CCNC: 66 U/L — SIGNIFICANT CHANGE UP (ref 12–78)
ANION GAP SERPL CALC-SCNC: 6 MMOL/L — SIGNIFICANT CHANGE UP (ref 5–17)
AST SERPL-CCNC: 63 U/L — HIGH (ref 15–37)
BILIRUB SERPL-MCNC: 0.4 MG/DL — SIGNIFICANT CHANGE UP (ref 0.2–1.2)
BUN SERPL-MCNC: 31 MG/DL — HIGH (ref 7–23)
CALCIUM SERPL-MCNC: 8.3 MG/DL — LOW (ref 8.5–10.1)
CHLORIDE SERPL-SCNC: 101 MMOL/L — SIGNIFICANT CHANGE UP (ref 96–108)
CO2 SERPL-SCNC: 30 MMOL/L — SIGNIFICANT CHANGE UP (ref 22–31)
CREAT SERPL-MCNC: 3.43 MG/DL — HIGH (ref 0.5–1.3)
FLUAV AG NPH QL: SIGNIFICANT CHANGE UP
FLUBV AG NPH QL: SIGNIFICANT CHANGE UP
GLUCOSE BLDC GLUCOMTR-MCNC: 136 MG/DL — HIGH (ref 70–99)
GLUCOSE BLDC GLUCOMTR-MCNC: 158 MG/DL — HIGH (ref 70–99)
GLUCOSE BLDC GLUCOMTR-MCNC: 200 MG/DL — HIGH (ref 70–99)
GLUCOSE BLDC GLUCOMTR-MCNC: 62 MG/DL — LOW (ref 70–99)
GLUCOSE BLDC GLUCOMTR-MCNC: 70 MG/DL — SIGNIFICANT CHANGE UP (ref 70–99)
GLUCOSE BLDC GLUCOMTR-MCNC: 77 MG/DL — SIGNIFICANT CHANGE UP (ref 70–99)
GLUCOSE SERPL-MCNC: 57 MG/DL — LOW (ref 70–99)
HCT VFR BLD CALC: 25 % — LOW (ref 34.5–45)
HGB BLD-MCNC: 8.1 G/DL — LOW (ref 11.5–15.5)
MCHC RBC-ENTMCNC: 27.2 PG — SIGNIFICANT CHANGE UP (ref 27–34)
MCHC RBC-ENTMCNC: 32.4 GM/DL — SIGNIFICANT CHANGE UP (ref 32–36)
MCV RBC AUTO: 83.9 FL — SIGNIFICANT CHANGE UP (ref 80–100)
NRBC # BLD: 0 /100 WBCS — SIGNIFICANT CHANGE UP (ref 0–0)
PHOSPHATE SERPL-MCNC: 4.3 MG/DL — SIGNIFICANT CHANGE UP (ref 2.5–4.5)
PLATELET # BLD AUTO: 125 K/UL — LOW (ref 150–400)
POTASSIUM SERPL-MCNC: 4 MMOL/L — SIGNIFICANT CHANGE UP (ref 3.5–5.3)
POTASSIUM SERPL-SCNC: 4 MMOL/L — SIGNIFICANT CHANGE UP (ref 3.5–5.3)
PROT SERPL-MCNC: 6.2 GM/DL — SIGNIFICANT CHANGE UP (ref 6–8.3)
RBC # BLD: 2.98 M/UL — LOW (ref 3.8–5.2)
RBC # FLD: 17.5 % — HIGH (ref 10.3–14.5)
SARS-COV-2 RNA SPEC QL NAA+PROBE: SIGNIFICANT CHANGE UP
SODIUM SERPL-SCNC: 137 MMOL/L — SIGNIFICANT CHANGE UP (ref 135–145)
WBC # BLD: 9.68 K/UL — SIGNIFICANT CHANGE UP (ref 3.8–10.5)
WBC # FLD AUTO: 9.68 K/UL — SIGNIFICANT CHANGE UP (ref 3.8–10.5)

## 2021-04-29 PROCEDURE — 99232 SBSQ HOSP IP/OBS MODERATE 35: CPT

## 2021-04-29 RX ADMIN — CHLORHEXIDINE GLUCONATE 1 APPLICATION(S): 213 SOLUTION TOPICAL at 06:23

## 2021-04-29 RX ADMIN — Medication 5 UNIT(S): at 17:03

## 2021-04-29 RX ADMIN — Medication 25 MILLIGRAM(S): at 14:56

## 2021-04-29 RX ADMIN — Medication 100 MILLIGRAM(S): at 14:56

## 2021-04-29 RX ADMIN — Medication 25 MILLIGRAM(S): at 05:11

## 2021-04-29 RX ADMIN — LIDOCAINE 1 PATCH: 4 CREAM TOPICAL at 19:30

## 2021-04-29 RX ADMIN — Medication 100 MILLIGRAM(S): at 05:10

## 2021-04-29 RX ADMIN — SIMVASTATIN 40 MILLIGRAM(S): 20 TABLET, FILM COATED ORAL at 21:32

## 2021-04-29 RX ADMIN — Medication 1 PATCH: at 19:30

## 2021-04-29 RX ADMIN — HEPARIN SODIUM 5000 UNIT(S): 5000 INJECTION INTRAVENOUS; SUBCUTANEOUS at 17:03

## 2021-04-29 RX ADMIN — POLYETHYLENE GLYCOL 3350 17 GRAM(S): 17 POWDER, FOR SOLUTION ORAL at 11:44

## 2021-04-29 RX ADMIN — SENNA PLUS 2 TABLET(S): 8.6 TABLET ORAL at 21:32

## 2021-04-29 RX ADMIN — HEPARIN SODIUM 5000 UNIT(S): 5000 INJECTION INTRAVENOUS; SUBCUTANEOUS at 05:11

## 2021-04-29 RX ADMIN — LIDOCAINE 1 PATCH: 4 CREAM TOPICAL at 11:44

## 2021-04-29 RX ADMIN — Medication 1 PATCH: at 08:23

## 2021-04-29 RX ADMIN — Medication 100 MILLIGRAM(S): at 21:32

## 2021-04-29 RX ADMIN — Medication 2: at 11:43

## 2021-04-29 RX ADMIN — Medication 25 MILLIGRAM(S): at 21:32

## 2021-04-29 RX ADMIN — Medication 5 UNIT(S): at 11:43

## 2021-04-29 RX ADMIN — GABAPENTIN 300 MILLIGRAM(S): 400 CAPSULE ORAL at 21:32

## 2021-04-29 RX ADMIN — LIDOCAINE 1 PATCH: 4 CREAM TOPICAL at 23:00

## 2021-04-29 RX ADMIN — AMLODIPINE BESYLATE 10 MILLIGRAM(S): 2.5 TABLET ORAL at 05:11

## 2021-04-29 RX ADMIN — Medication 100 MILLIGRAM(S): at 05:11

## 2021-04-29 RX ADMIN — PANTOPRAZOLE SODIUM 40 MILLIGRAM(S): 20 TABLET, DELAYED RELEASE ORAL at 05:11

## 2021-04-29 NOTE — PROGRESS NOTE ADULT - SUBJECTIVE AND OBJECTIVE BOX
Subjective: c/o difficulty ambulating, gross LE edema      MEDICATIONS  (STANDING):  amLODIPine   Tablet 10 milliGRAM(s) Oral daily  chlorhexidine 2% Cloths 1 Application(s) Topical <User Schedule>  cloNIDine Patch 0.3 mG/24Hr(s) 1 patch Transdermal <User Schedule>  dextrose 40% Gel 15 Gram(s) Oral once  dextrose 5%. 1000 milliLiter(s) (50 mL/Hr) IV Continuous <Continuous>  dextrose 5%. 1000 milliLiter(s) (100 mL/Hr) IV Continuous <Continuous>  dextrose 50% Injectable 25 Gram(s) IV Push once  dextrose 50% Injectable 12.5 Gram(s) IV Push once  dextrose 50% Injectable 25 Gram(s) IV Push once  epoetin torrie-epbx (RETACRIT) Injectable 60016 Unit(s) SubCutaneous every 7 days  gabapentin 300 milliGRAM(s) Oral at bedtime  glucagon  Injectable 1 milliGRAM(s) IntraMuscular once  heparin   Injectable 5000 Unit(s) SubCutaneous every 12 hours  hydrALAZINE 25 milliGRAM(s) Oral three times a day  insulin glargine Injectable (LANTUS) 10 Unit(s) SubCutaneous at bedtime  insulin lispro (ADMELOG) corrective regimen sliding scale   SubCutaneous three times a day before meals  insulin lispro Injectable (ADMELOG) 5 Unit(s) SubCutaneous three times a day before meals  labetalol 100 milliGRAM(s) Oral three times a day  lidocaine   Patch 1 Patch Transdermal daily  pantoprazole    Tablet 40 milliGRAM(s) Oral before breakfast  polyethylene glycol 3350 17 Gram(s) Oral daily  senna 2 Tablet(s) Oral at bedtime  simvastatin 40 milliGRAM(s) Oral at bedtime  torsemide 100 milliGRAM(s) Oral daily    MEDICATIONS  (PRN):  ALPRAZolam 0.5 milliGRAM(s) Oral every 12 hours PRN anxiety  aluminum hydroxide/magnesium hydroxide/simethicone Suspension 30 milliLiter(s) Oral every 6 hours PRN Dyspepsia          T(C): 36.8 (04-29-21 @ 05:33), Max: 36.8 (04-28-21 @ 12:40)  HR: 60 (04-29-21 @ 05:33) (60 - 72)  BP: 150/64 (04-29-21 @ 05:33) (127/70 - 170/74)  RR: 18 (04-29-21 @ 05:33) (18 - 20)  SpO2: 98% (04-29-21 @ 05:33) (95% - 100%)  Wt(kg): --        I&O's Detail    28 Apr 2021 07:01  -  29 Apr 2021 07:00  --------------------------------------------------------  IN:    Oral Fluid: 240 mL  Total IN: 240 mL    OUT:    Other (mL): 2500 mL  Total OUT: 2500 mL    Total NET: -2260 mL               PHYSICAL EXAM:    GENERAL: anxious  NECK: Supple, no inc in JVP  CHEST/LUNG: dec BS  HEART: S1S2  ABDOMEN: Soft, Nontender, Nondistended; Bowel sounds present  EXTREMITIES:  3+ edema  NEURO: no asterixis  R IJ NTC      LABS:  CBC Full  -  ( 29 Apr 2021 08:44 )  WBC Count : 9.68 K/uL  RBC Count : 2.98 M/uL  Hemoglobin : 8.1 g/dL  Hematocrit : 25.0 %  Platelet Count - Automated : 125 K/uL  Mean Cell Volume : 83.9 fl  Mean Cell Hemoglobin : 27.2 pg  Mean Cell Hemoglobin Concentration : 32.4 gm/dL  Auto Neutrophil # : x  Auto Lymphocyte # : x  Auto Monocyte # : x  Auto Eosinophil # : x  Auto Basophil # : x  Auto Neutrophil % : x  Auto Lymphocyte % : x  Auto Monocyte % : x  Auto Eosinophil % : x  Auto Basophil % : x    04-29    137  |  101  |  31<H>  ----------------------------<  57<L>  4.0   |  30  |  3.43<H>    Ca    8.3<L>      29 Apr 2021 08:44  Phos  4.3     04-29  Mg     1.9     04-28    TPro  6.2  /  Alb  2.9<L>  /  TBili  0.4  /  DBili  x   /  AST  63<H>  /  ALT  66  /  AlkPhos  75  04-29          Assessment :  ESRD, gross anasarca. Initiated on dialysis during this admission  Acute on chronic anemia     Plan:  Next HD ordered for tomorrow. UF as BP permits  Estimated fluid excess 6-10L  Perm cath Friday  D/c planning to [facility] with on-site dialysis.   Consider discontinuation of Torsemide as pt does not appear to be responding.

## 2021-04-29 NOTE — PROGRESS NOTE ADULT - ASSESSMENT
75 year old female with PMH of CKD, HTN, diverticulitis, DVT? presents today c/o left sided back pain radiating into the left side since waking up this morning. Patient states that she was attempting to get out of bed to use the bathroom when she experienced sever pain described as an ache felt inside associated with numbness of the left leg. Patient  reports difficulty ambulating due to the pain worsening when she bears weight. Patient denies recent trauma but does report having physical therapy in 2007 due to problems in her lower back (-) urinary or bowel incontinence, fevers or chills, flu like symptoms, chest pain or sob. Called her PMD who told her to go to ER for an evaluation.     Left sided sciatica:  - Acute onset  - CT L spine: Multilevel degenerative changes, most prominent at L4/L5 with moderate to severe bilateral foraminal stenosis and high-grade spinal canal stenosis, CT hip: no fracture, unable to do MR.  - Seen by ortho (Christal), no intervention planned  - Her left groin pain is likely secondary to severe OA  - Pain management - Gabapentin, Lidoderm  - completed prednisone 50 mg daily X 5 days.  - Needs rehab (although improving w/ PT and home w/ home PT becoming a possibility)    HTN:  - Not on ACEi/ARB due to CKD 4  - On clonidine patch, Labetalol, amlodipine, hydralazine, torsemide    HLD:  - Continue Statin    DM:   - At home, uses NPH 70/30 bid about 15 units total per day at home, prescribed by her endocrine doctor  - Hb A1c 5.8  - Hypoglycemic in am, will dc lantus       ROSALES on CKD IV; Now ESRD, started dialysis on this admission   - Unable to get HD today (4/26) so one dose of Lasix given and standing Bumex added  - Renal following  - Temp dialysis cath placed 4/22; IR will convert to tunneled cath on friday  - HD as scheduled.     Hyperkalemia:  - was, on Veltassa at home  - Added Lokelma initially; now stopped after HD started  - Low K diet    Anemia of chronic disease:  - Hb mostly stable  - high ferritin, but could be from chronic inflammatory state   - Epogen started by nephro  - 1 unit PRBC ordered/pending    Renal cysts:  - seen on sono  - Follow up with PCP for recheck    Probable gout (or pseudogout)  - Colchicine given 1.2mg x 1, then 0.6mg x 1 (seems to have helped)    DVT ppx--heparin SQ  fall precautions     Pt is DNR, reviewed her living will. Pt initially was wiling to sign molst but now refusing to sign and stating its unnecessary.  Dispo: [facility] (or possibly home w/ home PT) when final HD arrangements in place.

## 2021-04-29 NOTE — PROGRESS NOTE ADULT - SUBJECTIVE AND OBJECTIVE BOX
Patient is a 76y old  Female who presents with a chief complaint of Acute left leg sciatica. (29 Apr 2021 09:31)    INTERVAL HPI/OVERNIGHT EVENTS:  Pt was seen and examined, no acute events.    MEDICATIONS  (STANDING):  amLODIPine   Tablet 10 milliGRAM(s) Oral daily  chlorhexidine 2% Cloths 1 Application(s) Topical <User Schedule>  cloNIDine Patch 0.3 mG/24Hr(s) 1 patch Transdermal <User Schedule>  dextrose 40% Gel 15 Gram(s) Oral once  dextrose 5%. 1000 milliLiter(s) (50 mL/Hr) IV Continuous <Continuous>  dextrose 5%. 1000 milliLiter(s) (100 mL/Hr) IV Continuous <Continuous>  dextrose 50% Injectable 25 Gram(s) IV Push once  dextrose 50% Injectable 12.5 Gram(s) IV Push once  dextrose 50% Injectable 25 Gram(s) IV Push once  epoetin torrie-epbx (RETACRIT) Injectable 78860 Unit(s) SubCutaneous every 7 days  gabapentin 300 milliGRAM(s) Oral at bedtime  glucagon  Injectable 1 milliGRAM(s) IntraMuscular once  heparin   Injectable 5000 Unit(s) SubCutaneous every 12 hours  hydrALAZINE 25 milliGRAM(s) Oral three times a day  insulin lispro (ADMELOG) corrective regimen sliding scale   SubCutaneous three times a day before meals  insulin lispro Injectable (ADMELOG) 5 Unit(s) SubCutaneous three times a day before meals  labetalol 100 milliGRAM(s) Oral three times a day  lidocaine   Patch 1 Patch Transdermal daily  pantoprazole    Tablet 40 milliGRAM(s) Oral before breakfast  polyethylene glycol 3350 17 Gram(s) Oral daily  senna 2 Tablet(s) Oral at bedtime  simvastatin 40 milliGRAM(s) Oral at bedtime  torsemide 100 milliGRAM(s) Oral daily    MEDICATIONS  (PRN):  ALPRAZolam 0.5 milliGRAM(s) Oral every 12 hours PRN anxiety  aluminum hydroxide/magnesium hydroxide/simethicone Suspension 30 milliLiter(s) Oral every 6 hours PRN Dyspepsia      Allergies  Eliquis (Other)      Vital Signs Last 24 Hrs  T(C): 36.7 (29 Apr 2021 10:30), Max: 36.8 (29 Apr 2021 00:03)  T(F): 98 (29 Apr 2021 10:30), Max: 98.3 (29 Apr 2021 05:33)  HR: 62 (29 Apr 2021 10:30) (60 - 72)  BP: 151/63 (29 Apr 2021 10:30) (127/70 - 170/74)  BP(mean): --  RR: 18 (29 Apr 2021 10:30) (18 - 20)  SpO2: 97% (29 Apr 2021 10:30) (95% - 100%)      PHYSICAL EXAM:  GENERAL: NAD  HEAD:  Atraumatic  EYES: PERRLA  NERVOUS SYSTEM:  Awake, alert  CHEST/LUNG: Clear  HEART: RRR  ABDOMEN: Soft, non tender  EXTREMITIES: no edema       LABS:                        8.1    9.68  )-----------( 125      ( 29 Apr 2021 08:44 )             25.0     04-29    137  |  101  |  31<H>  ----------------------------<  57<L>  4.0   |  30  |  3.43<H>    Ca    8.3<L>      29 Apr 2021 08:44  Phos  4.3     04-29  Mg     1.9     04-28    TPro  6.2  /  Alb  2.9<L>  /  TBili  0.4  /  DBili  x   /  AST  63<H>  /  ALT  66  /  AlkPhos  75  04-29      CAPILLARY BLOOD GLUCOSE      POCT Blood Glucose.: 200 mg/dL (29 Apr 2021 11:23)  POCT Blood Glucose.: 77 mg/dL (29 Apr 2021 08:40)  POCT Blood Glucose.: 70 mg/dL (29 Apr 2021 08:21)  POCT Blood Glucose.: 62 mg/dL (29 Apr 2021 08:04)  POCT Blood Glucose.: 201 mg/dL (28 Apr 2021 22:17)  POCT Blood Glucose.: 83 mg/dL (28 Apr 2021 17:14)      RADIOLOGY & ADDITIONAL TESTS:    Imaging Personally Reviewed:  [ ] YES  [ ] NO    Consultant(s) Notes Reviewed:  [ ] YES  [ ] NO    Care Discussed with Consultants/Other Providers [ ] YES  [ ] NO

## 2021-04-29 NOTE — PROVIDER CONTACT NOTE (HYPOGLYCEMIA EVENT) - NS PROVIDER CONTACT BACKGROUND-HYPO
Age: 76y    Gender: Female    POCT Blood Glucose:  62 mg/dL (04-29-21 @ 08:04)  201 mg/dL (04-28-21 @ 22:17)  83 mg/dL (04-28-21 @ 17:14)  77 mg/dL (04-28-21 @ 11:20)      eMAR:  insulin glargine Injectable (LANTUS)   10 Unit(s) SubCutaneous (04-28-21 @ 22:25)    insulin lispro Injectable (ADMELOG)   5 Unit(s) SubCutaneous (04-28-21 @ 11:24)    simvastatin   40 milliGRAM(s) Oral (04-28-21 @ 22:24)

## 2021-04-30 LAB
ALBUMIN SERPL ELPH-MCNC: 2.8 G/DL — LOW (ref 3.3–5)
ALP SERPL-CCNC: 77 U/L — SIGNIFICANT CHANGE UP (ref 40–120)
ALT FLD-CCNC: 56 U/L — SIGNIFICANT CHANGE UP (ref 12–78)
ANION GAP SERPL CALC-SCNC: 5 MMOL/L — SIGNIFICANT CHANGE UP (ref 5–17)
APTT BLD: 27.6 SEC — SIGNIFICANT CHANGE UP (ref 27.5–35.5)
AST SERPL-CCNC: 55 U/L — HIGH (ref 15–37)
BILIRUB DIRECT SERPL-MCNC: 0.12 MG/DL — SIGNIFICANT CHANGE UP (ref 0.05–0.2)
BILIRUB INDIRECT FLD-MCNC: 0.3 MG/DL — SIGNIFICANT CHANGE UP (ref 0.2–1)
BILIRUB SERPL-MCNC: 0.4 MG/DL — SIGNIFICANT CHANGE UP (ref 0.2–1.2)
BUN SERPL-MCNC: 36 MG/DL — HIGH (ref 7–23)
CALCIUM SERPL-MCNC: 8 MG/DL — LOW (ref 8.5–10.1)
CHLORIDE SERPL-SCNC: 101 MMOL/L — SIGNIFICANT CHANGE UP (ref 96–108)
CO2 SERPL-SCNC: 29 MMOL/L — SIGNIFICANT CHANGE UP (ref 22–31)
CREAT SERPL-MCNC: 4.32 MG/DL — HIGH (ref 0.5–1.3)
GLUCOSE BLDC GLUCOMTR-MCNC: 111 MG/DL — HIGH (ref 70–99)
GLUCOSE BLDC GLUCOMTR-MCNC: 129 MG/DL — HIGH (ref 70–99)
GLUCOSE BLDC GLUCOMTR-MCNC: 136 MG/DL — HIGH (ref 70–99)
GLUCOSE BLDC GLUCOMTR-MCNC: 164 MG/DL — HIGH (ref 70–99)
GLUCOSE BLDC GLUCOMTR-MCNC: 209 MG/DL — HIGH (ref 70–99)
GLUCOSE SERPL-MCNC: 111 MG/DL — HIGH (ref 70–99)
HCT VFR BLD CALC: 24.6 % — LOW (ref 34.5–45)
HGB BLD-MCNC: 7.6 G/DL — LOW (ref 11.5–15.5)
INR BLD: 0.95 RATIO — SIGNIFICANT CHANGE UP (ref 0.88–1.16)
MCHC RBC-ENTMCNC: 26.9 PG — LOW (ref 27–34)
MCHC RBC-ENTMCNC: 30.9 GM/DL — LOW (ref 32–36)
MCV RBC AUTO: 86.9 FL — SIGNIFICANT CHANGE UP (ref 80–100)
NRBC # BLD: 0 /100 WBCS — SIGNIFICANT CHANGE UP (ref 0–0)
PLATELET # BLD AUTO: 115 K/UL — LOW (ref 150–400)
POTASSIUM SERPL-MCNC: 4.5 MMOL/L — SIGNIFICANT CHANGE UP (ref 3.5–5.3)
POTASSIUM SERPL-SCNC: 4.5 MMOL/L — SIGNIFICANT CHANGE UP (ref 3.5–5.3)
PROT SERPL-MCNC: 6 GM/DL — SIGNIFICANT CHANGE UP (ref 6–8.3)
PROTHROM AB SERPL-ACNC: 11.1 SEC — SIGNIFICANT CHANGE UP (ref 10.6–13.6)
RBC # BLD: 2.83 M/UL — LOW (ref 3.8–5.2)
RBC # FLD: 17.2 % — HIGH (ref 10.3–14.5)
SODIUM SERPL-SCNC: 135 MMOL/L — SIGNIFICANT CHANGE UP (ref 135–145)
WBC # BLD: 9.68 K/UL — SIGNIFICANT CHANGE UP (ref 3.8–10.5)
WBC # FLD AUTO: 9.68 K/UL — SIGNIFICANT CHANGE UP (ref 3.8–10.5)

## 2021-04-30 PROCEDURE — 99231 SBSQ HOSP IP/OBS SF/LOW 25: CPT

## 2021-04-30 PROCEDURE — 99232 SBSQ HOSP IP/OBS MODERATE 35: CPT

## 2021-04-30 RX ORDER — IRON SUCROSE 20 MG/ML
100 INJECTION, SOLUTION INTRAVENOUS ONCE
Refills: 0 | Status: COMPLETED | OUTPATIENT
Start: 2021-04-30 | End: 2021-04-30

## 2021-04-30 RX ORDER — ERYTHROPOIETIN 10000 [IU]/ML
10000 INJECTION, SOLUTION INTRAVENOUS; SUBCUTANEOUS ONCE
Refills: 0 | Status: COMPLETED | OUTPATIENT
Start: 2021-04-30 | End: 2021-05-03

## 2021-04-30 RX ADMIN — LIDOCAINE 1 PATCH: 4 CREAM TOPICAL at 21:20

## 2021-04-30 RX ADMIN — SIMVASTATIN 40 MILLIGRAM(S): 20 TABLET, FILM COATED ORAL at 21:20

## 2021-04-30 RX ADMIN — Medication 100 MILLIGRAM(S): at 21:20

## 2021-04-30 RX ADMIN — IRON SUCROSE 210 MILLIGRAM(S): 20 INJECTION, SOLUTION INTRAVENOUS at 22:11

## 2021-04-30 RX ADMIN — Medication 100 MILLIGRAM(S): at 05:15

## 2021-04-30 RX ADMIN — Medication 4: at 16:36

## 2021-04-30 RX ADMIN — Medication 1 PATCH: at 21:13

## 2021-04-30 RX ADMIN — CHLORHEXIDINE GLUCONATE 1 APPLICATION(S): 213 SOLUTION TOPICAL at 05:17

## 2021-04-30 RX ADMIN — Medication 1 PATCH: at 07:30

## 2021-04-30 RX ADMIN — POLYETHYLENE GLYCOL 3350 17 GRAM(S): 17 POWDER, FOR SOLUTION ORAL at 16:37

## 2021-04-30 RX ADMIN — LIDOCAINE 1 PATCH: 4 CREAM TOPICAL at 16:36

## 2021-04-30 RX ADMIN — Medication 5 UNIT(S): at 16:36

## 2021-04-30 RX ADMIN — AMLODIPINE BESYLATE 10 MILLIGRAM(S): 2.5 TABLET ORAL at 05:15

## 2021-04-30 RX ADMIN — Medication 25 MILLIGRAM(S): at 05:15

## 2021-04-30 RX ADMIN — PANTOPRAZOLE SODIUM 40 MILLIGRAM(S): 20 TABLET, DELAYED RELEASE ORAL at 08:11

## 2021-04-30 RX ADMIN — SENNA PLUS 2 TABLET(S): 8.6 TABLET ORAL at 21:20

## 2021-04-30 RX ADMIN — Medication 25 MILLIGRAM(S): at 21:20

## 2021-04-30 RX ADMIN — GABAPENTIN 300 MILLIGRAM(S): 400 CAPSULE ORAL at 21:20

## 2021-04-30 NOTE — PROGRESS NOTE ADULT - SUBJECTIVE AND OBJECTIVE BOX
Patient is a 76y old  Female who presents with a chief complaint of Acute left leg sciatica. (29 Apr 2021 14:10)    INTERVAL HPI/OVERNIGHT EVENTS:  Pt was seen and examined, no acute events.    MEDICATIONS  (STANDING):  amLODIPine   Tablet 10 milliGRAM(s) Oral daily  chlorhexidine 2% Cloths 1 Application(s) Topical <User Schedule>  cloNIDine Patch 0.3 mG/24Hr(s) 1 patch Transdermal <User Schedule>  dextrose 40% Gel 15 Gram(s) Oral once  dextrose 5%. 1000 milliLiter(s) (50 mL/Hr) IV Continuous <Continuous>  dextrose 5%. 1000 milliLiter(s) (100 mL/Hr) IV Continuous <Continuous>  dextrose 50% Injectable 25 Gram(s) IV Push once  dextrose 50% Injectable 12.5 Gram(s) IV Push once  dextrose 50% Injectable 25 Gram(s) IV Push once  epoetin torrie-epbx (RETACRIT) Injectable 71866 Unit(s) SubCutaneous every 7 days  gabapentin 300 milliGRAM(s) Oral at bedtime  glucagon  Injectable 1 milliGRAM(s) IntraMuscular once  hydrALAZINE 25 milliGRAM(s) Oral three times a day  insulin lispro (ADMELOG) corrective regimen sliding scale   SubCutaneous three times a day before meals  insulin lispro Injectable (ADMELOG) 5 Unit(s) SubCutaneous three times a day before meals  labetalol 100 milliGRAM(s) Oral three times a day  lidocaine   Patch 1 Patch Transdermal daily  pantoprazole    Tablet 40 milliGRAM(s) Oral before breakfast  polyethylene glycol 3350 17 Gram(s) Oral daily  senna 2 Tablet(s) Oral at bedtime  simvastatin 40 milliGRAM(s) Oral at bedtime  torsemide 100 milliGRAM(s) Oral daily    MEDICATIONS  (PRN):  aluminum hydroxide/magnesium hydroxide/simethicone Suspension 30 milliLiter(s) Oral every 6 hours PRN Dyspepsia      Allergies  Eliquis (Other)      Vital Signs Last 24 Hrs  T(C): 36.6 (30 Apr 2021 14:25), Max: 36.8 (29 Apr 2021 17:40)  T(F): 97.8 (30 Apr 2021 14:25), Max: 98.2 (29 Apr 2021 17:40)  HR: 60 (30 Apr 2021 14:25) (60 - 64)  BP: 150/67 (30 Apr 2021 14:25) (129/57 - 169/67)  BP(mean): --  RR: 18 (30 Apr 2021 14:25) (15 - 18)  SpO2: 97% (30 Apr 2021 14:25) (96% - 99%)      PHYSICAL EXAM:  GENERAL: NAD  HEAD:  Atraumatic  EYES: PERRLA  NERVOUS SYSTEM:  Awake, alert  CHEST/LUNG: Clear  HEART: RRR  ABDOMEN: Soft, non tender  EXTREMITIES:  no edema      LABS:                        7.6    9.68  )-----------( 115      ( 30 Apr 2021 04:21 )             24.6     04-30    135  |  101  |  36<H>  ----------------------------<  111<H>  4.5   |  29  |  4.32<H>    Ca    8.0<L>      30 Apr 2021 04:21  Phos  4.3     04-29    TPro  6.0  /  Alb  2.8<L>  /  TBili  0.4  /  DBili  .12  /  AST  55<H>  /  ALT  56  /  AlkPhos  77  04-30    PT/INR - ( 30 Apr 2021 04:21 )   PT: 11.1 sec;   INR: 0.95 ratio         PTT - ( 30 Apr 2021 04:21 )  PTT:27.6 sec    CAPILLARY BLOOD GLUCOSE      POCT Blood Glucose.: 164 mg/dL (30 Apr 2021 14:20)  POCT Blood Glucose.: 111 mg/dL (30 Apr 2021 12:19)  POCT Blood Glucose.: 129 mg/dL (30 Apr 2021 07:39)  POCT Blood Glucose.: 158 mg/dL (29 Apr 2021 21:28)  POCT Blood Glucose.: 136 mg/dL (29 Apr 2021 16:33)      RADIOLOGY & ADDITIONAL TESTS:    Imaging Personally Reviewed:  [ ] YES  [ ] NO    Consultant(s) Notes Reviewed:  [ ] YES  [ ] NO    Care Discussed with Consultants/Other Providers [ ] YES  [ ] NO

## 2021-04-30 NOTE — PROGRESS NOTE ADULT - ASSESSMENT
75 year old female with PMH of CKD, HTN, diverticulitis, DVT? presents today c/o left sided back pain radiating into the left side since waking up this morning. Patient states that she was attempting to get out of bed to use the bathroom when she experienced sever pain described as an ache felt inside associated with numbness of the left leg. Patient  reports difficulty ambulating due to the pain worsening when she bears weight. Patient denies recent trauma but does report having physical therapy in 2007 due to problems in her lower back (-) urinary or bowel incontinence, fevers or chills, flu like symptoms, chest pain or sob. Called her PMD who told her to go to ER for an evaluation.     ROSALES on CKD IV; Now ESRD, started dialysis on this admission   - Unable to get HD today (4/26) so one dose of Lasix given and standing Bumex added  - Renal following  - Temp dialysis cath placed 4/22; IR will convert to tunneled cath on friday  - HD as scheduled.     Left sided sciatica:  - Acute onset  - CT L spine: Multilevel degenerative changes, most prominent at L4/L5 with moderate to severe bilateral foraminal stenosis and high-grade spinal canal stenosis, CT hip: no fracture, unable to do MR.  - Seen by ortho (Christal), no intervention planned  - Her left groin pain is likely secondary to severe OA  - Pain management - Gabapentin, Lidoderm  - completed prednisone 50 mg daily X 5 days.  - Needs rehab although py now refusing    HTN:  - Not on ACEi/ARB due to CKD 4  - On clonidine patch, Labetalol, amlodipine, hydralazine, torsemide    HLD:  - Continue Statin    DM:   - At home, uses NPH 70/30 bid about 15 units total per day at home, prescribed by her endocrine doctor  - Hb A1c 5.8  - Hypoglycemic yesterday am, Lantus stopped       Hyperkalemia:  - was, on Veltassa at home  - Added Lokelma initially; now stopped after HD started  - Low K diet    Anemia of chronic disease:  - Hb mostly stable  - high ferritin, but could be from chronic inflammatory state   - Epogen per nephro  - 1 unit PRBC on 4/27    Renal cysts:  - seen on sono  - Follow up with PCP for recheck    Probable gout (or pseudogout)  - Colchicine given 1.2mg x 1, then 0.6mg x 1 (seems to have helped)    DVT ppx--heparin SQ  fall precautions     Pt is DNR, reviewed her living will. Pt initially was wiling to sign molst but now refusing to sign and stating its unnecessary.  Dispo: [facility] but pt refusing

## 2021-04-30 NOTE — PRE PROCEDURE NOTE - PRE PROCEDURE EVALUATION
Vascular & Interventional Radiology Pre-Procedure Note    Procedure Name: Conversion of temporary to tunneled dialysis catheter    Allergies: Eliquis (Other)    Medications (Abx/Cardiac/Anticoagulation/Blood Products)  amLODIPine   Tablet: 10 milliGRAM(s) Oral (04-30 @ 05:15)  heparin   Injectable: 5000 Unit(s) SubCutaneous (04-29 @ 17:03)  hydrALAZINE: 25 milliGRAM(s) Oral (04-30 @ 05:15)  labetalol: 100 milliGRAM(s) Oral (04-30 @ 05:15)  torsemide: 100 milliGRAM(s) Oral (04-30 @ 05:15)    Data:    T(C): 36.6  HR: 60  BP: 169/67  RR: 16  SpO2: 98%    Exam  General: _______A&Ox3  Chest: _______CTAB  Abdomen: _______soft, NT, ND  Extremities: ____no calf pain b/l___    -WBC 9.68 / HgB 7.6 / Hct 24.6 / Plt 115  -Na 135 / Cl 101 / BUN 36 / Glucose 111  -K 4.5 / CO2 29 / Cr 4.32  -ALT 56 / Alk Phos 77 / T.Bili 0.4  -INR0.95      A/P  Procedure Name: Conversion of temporary to tunneled dialysis catheter.  Pt s/p temp cath with dialysis since 4/22 without incident.  kidney fxn improved with dialysis    74 yo female with HTN, DM, ROSALES and CKD.  Pt  and her private nephrologist agree with initiation of HD  Pt c/o Sciatic nerve pain with spinal stenosis,    Renal US - Sonographic evaluation demonstrates presence of bilateral renal cysts without suspicious mass lesion noted. Dedicated renal mass protocol CT or MRI evaluation can be performed for further assessment of the renal parenchyma as clinically desired.      Plan  -meds, labs and vitals reviewed  -consent obtained from patient

## 2021-04-30 NOTE — DISCHARGE NOTE NURSING/CASE MANAGEMENT/SOCIAL WORK - NSDCFUADDAPPT_GEN_ALL_CORE_FT
If pt does go home, LILI arranged for new HD slot at [facility] at [facility] on Tuesday, Thursday and Saturday at 1pm.  If first treatment is Tuesday, May4, pt's son must accompany her at 12:30.  Carol:  Grey, [phone].  [facility] is located at [address] [place] [address].

## 2021-04-30 NOTE — PROCEDURE NOTE - PROCEDURE FINDINGS AND DETAILS
s/p US/Fluoro guided temporary diayalsis catheter placement.  Inserted into the right IJ vein.  Catheter tip confirmed at the cavo-atrial junction.  Hemostasis achieved.  DSD applied.  Pt tolerated procedure well.  VSS  -
s/p Fluoro guided conversion of temp to tunneled dialysis catheter placement, exchanged over a wire.  The temp cath was removed in its entirety without incident.  The tunneled catheter tip confirmed at the cavo-atrial junction.  14.5 fr x 19cm tip to cuff DL.  Catheter sutured to skin.  DSD applied.  No complications

## 2021-04-30 NOTE — DISCHARGE NOTE NURSING/CASE MANAGEMENT/SOCIAL WORK - PATIENT PORTAL LINK FT
You can access the FollowMyHealth Patient Portal offered by [facility] by registering at the following website: [email or web address]. By joining [facility]’s FollowMyHealth portal, you will also be able to view your health information using other applications (apps) compatible with our system.

## 2021-04-30 NOTE — PROGRESS NOTE ADULT - SUBJECTIVE AND OBJECTIVE BOX
[facility] NEPHROLOGY SERVICES, [facility]  NEPHROLOGY AND HYPERTENSION  [address]  [address]  [place] [address]  [phone]    MD PRAVEEN LYNNE MD ANDREY GONCHARUK, MD MADHU KORRAPATI, MD YELENA ROSENBERG, MD BINNY KOSHY, MD CHRISTOPHER CAPUTO, MD EDWARD BOVER, MD          Patient events noted  feels better  post perm cath placement      MEDICATIONS  (STANDING):  amLODIPine   Tablet 10 milliGRAM(s) Oral daily  chlorhexidine 2% Cloths 1 Application(s) Topical <User Schedule>  cloNIDine Patch 0.3 mG/24Hr(s) 1 patch Transdermal <User Schedule>  dextrose 40% Gel 15 Gram(s) Oral once  dextrose 5%. 1000 milliLiter(s) (50 mL/Hr) IV Continuous <Continuous>  dextrose 5%. 1000 milliLiter(s) (100 mL/Hr) IV Continuous <Continuous>  dextrose 50% Injectable 25 Gram(s) IV Push once  dextrose 50% Injectable 12.5 Gram(s) IV Push once  dextrose 50% Injectable 25 Gram(s) IV Push once  epoetin torrie-epbx (RETACRIT) Injectable 10373 Unit(s) SubCutaneous every 7 days  gabapentin 300 milliGRAM(s) Oral at bedtime  glucagon  Injectable 1 milliGRAM(s) IntraMuscular once  hydrALAZINE 25 milliGRAM(s) Oral three times a day  insulin lispro (ADMELOG) corrective regimen sliding scale   SubCutaneous three times a day before meals  insulin lispro Injectable (ADMELOG) 5 Unit(s) SubCutaneous three times a day before meals  labetalol 100 milliGRAM(s) Oral three times a day  lidocaine   Patch 1 Patch Transdermal daily  pantoprazole    Tablet 40 milliGRAM(s) Oral before breakfast  polyethylene glycol 3350 17 Gram(s) Oral daily  senna 2 Tablet(s) Oral at bedtime  simvastatin 40 milliGRAM(s) Oral at bedtime  torsemide 100 milliGRAM(s) Oral daily    MEDICATIONS  (PRN):  aluminum hydroxide/magnesium hydroxide/simethicone Suspension 30 milliLiter(s) Oral every 6 hours PRN Dyspepsia      04-30-21 @ 07:01  -  04-30-21 @ 21:13  --------------------------------------------------------  IN: 0 mL / OUT: 2000 mL / NET: -2000 mL      PHYSICAL EXAM:      T(C): 36.8 (04-30-21 @ 18:47), Max: 36.8 (04-30-21 @ 18:47)  HR: 74 (04-30-21 @ 18:47) (60 - 74)  BP: 150/71 (04-30-21 @ 18:47) (129/57 - 169/67)  RR: 18 (04-30-21 @ 18:47) (15 - 18)  SpO2: 97% (04-30-21 @ 18:47) (96% - 99%)  Wt(kg): --  Lungs clear  Heart S1S2  Abd soft NT ND  Extremities:   1 edema                                    7.6    9.68  )-----------( 115      ( 30 Apr 2021 04:21 )             24.6     04-30    135  |  101  |  36<H>  ----------------------------<  111<H>  4.5   |  29  |  4.32<H>    Ca    8.0<L>      30 Apr 2021 04:21  Phos  4.3     04-29    TPro  6.0  /  Alb  2.8<L>  /  TBili  0.4  /  DBili  .12  /  AST  55<H>  /  ALT  56  /  AlkPhos  77  04-30      LIVER FUNCTIONS - ( 30 Apr 2021 04:21 )  Alb: 2.8 g/dL / Pro: 6.0 gm/dL / ALK PHOS: 77 U/L / ALT: 56 U/L / AST: 55 U/L / GGT: x           Creatinine Trend: 4.32<--, 3.43<--, 0.76<--, 6.03<--, 5.21<--, 6.21<--      Assessment   ROSALES CKD 4-5; now HD dependent     Plan:  Maintenance HD  UF as tolerated  VIOLET, IV venofer    Erickson Ray MD

## 2021-05-01 LAB
ALBUMIN SERPL ELPH-MCNC: 2.8 G/DL — LOW (ref 3.3–5)
ALP SERPL-CCNC: 78 U/L — SIGNIFICANT CHANGE UP (ref 40–120)
ALT FLD-CCNC: 69 U/L — SIGNIFICANT CHANGE UP (ref 12–78)
ANION GAP SERPL CALC-SCNC: 5 MMOL/L — SIGNIFICANT CHANGE UP (ref 5–17)
AST SERPL-CCNC: 82 U/L — HIGH (ref 15–37)
BILIRUB SERPL-MCNC: 0.5 MG/DL — SIGNIFICANT CHANGE UP (ref 0.2–1.2)
BUN SERPL-MCNC: 30 MG/DL — HIGH (ref 7–23)
CALCIUM SERPL-MCNC: 8.1 MG/DL — LOW (ref 8.5–10.1)
CHLORIDE SERPL-SCNC: 104 MMOL/L — SIGNIFICANT CHANGE UP (ref 96–108)
CO2 SERPL-SCNC: 30 MMOL/L — SIGNIFICANT CHANGE UP (ref 22–31)
CREAT SERPL-MCNC: 3.98 MG/DL — HIGH (ref 0.5–1.3)
GLUCOSE BLDC GLUCOMTR-MCNC: 121 MG/DL — HIGH (ref 70–99)
GLUCOSE BLDC GLUCOMTR-MCNC: 204 MG/DL — HIGH (ref 70–99)
GLUCOSE BLDC GLUCOMTR-MCNC: 211 MG/DL — HIGH (ref 70–99)
GLUCOSE BLDC GLUCOMTR-MCNC: 84 MG/DL — SIGNIFICANT CHANGE UP (ref 70–99)
GLUCOSE SERPL-MCNC: 109 MG/DL — HIGH (ref 70–99)
HCT VFR BLD CALC: 24.1 % — LOW (ref 34.5–45)
HGB BLD-MCNC: 7.7 G/DL — LOW (ref 11.5–15.5)
MCHC RBC-ENTMCNC: 27.3 PG — SIGNIFICANT CHANGE UP (ref 27–34)
MCHC RBC-ENTMCNC: 32 GM/DL — SIGNIFICANT CHANGE UP (ref 32–36)
MCV RBC AUTO: 85.5 FL — SIGNIFICANT CHANGE UP (ref 80–100)
NRBC # BLD: 0 /100 WBCS — SIGNIFICANT CHANGE UP (ref 0–0)
PLATELET # BLD AUTO: 115 K/UL — LOW (ref 150–400)
POTASSIUM SERPL-MCNC: 4.7 MMOL/L — SIGNIFICANT CHANGE UP (ref 3.5–5.3)
POTASSIUM SERPL-SCNC: 4.7 MMOL/L — SIGNIFICANT CHANGE UP (ref 3.5–5.3)
PROT SERPL-MCNC: 6.1 GM/DL — SIGNIFICANT CHANGE UP (ref 6–8.3)
RAPID RVP RESULT: SIGNIFICANT CHANGE UP
RBC # BLD: 2.82 M/UL — LOW (ref 3.8–5.2)
RBC # FLD: 16.7 % — HIGH (ref 10.3–14.5)
SARS-COV-2 RNA SPEC QL NAA+PROBE: SIGNIFICANT CHANGE UP
SODIUM SERPL-SCNC: 139 MMOL/L — SIGNIFICANT CHANGE UP (ref 135–145)
WBC # BLD: 10.18 K/UL — SIGNIFICANT CHANGE UP (ref 3.8–10.5)
WBC # FLD AUTO: 10.18 K/UL — SIGNIFICANT CHANGE UP (ref 3.8–10.5)

## 2021-05-01 PROCEDURE — 99233 SBSQ HOSP IP/OBS HIGH 50: CPT

## 2021-05-01 PROCEDURE — 70490 CT SOFT TISSUE NECK W/O DYE: CPT | Mod: 26

## 2021-05-01 RX ORDER — ACETAMINOPHEN 500 MG
650 TABLET ORAL EVERY 4 HOURS
Refills: 0 | Status: DISCONTINUED | OUTPATIENT
Start: 2021-05-01 | End: 2021-05-03

## 2021-05-01 RX ADMIN — GABAPENTIN 300 MILLIGRAM(S): 400 CAPSULE ORAL at 21:43

## 2021-05-01 RX ADMIN — Medication 25 MILLIGRAM(S): at 21:42

## 2021-05-01 RX ADMIN — Medication 100 MILLIGRAM(S): at 05:23

## 2021-05-01 RX ADMIN — SENNA PLUS 2 TABLET(S): 8.6 TABLET ORAL at 21:42

## 2021-05-01 RX ADMIN — Medication 25 MILLIGRAM(S): at 05:23

## 2021-05-01 RX ADMIN — Medication 100 MILLIGRAM(S): at 13:02

## 2021-05-01 RX ADMIN — Medication 650 MILLIGRAM(S): at 00:29

## 2021-05-01 RX ADMIN — Medication 5 UNIT(S): at 11:16

## 2021-05-01 RX ADMIN — Medication 1 PATCH: at 21:38

## 2021-05-01 RX ADMIN — Medication 100 MILLIGRAM(S): at 21:43

## 2021-05-01 RX ADMIN — Medication 1 PATCH: at 07:46

## 2021-05-01 RX ADMIN — Medication 4: at 11:15

## 2021-05-01 RX ADMIN — LIDOCAINE 1 PATCH: 4 CREAM TOPICAL at 21:43

## 2021-05-01 RX ADMIN — POLYETHYLENE GLYCOL 3350 17 GRAM(S): 17 POWDER, FOR SOLUTION ORAL at 11:16

## 2021-05-01 RX ADMIN — AMLODIPINE BESYLATE 10 MILLIGRAM(S): 2.5 TABLET ORAL at 05:23

## 2021-05-01 RX ADMIN — LIDOCAINE 1 PATCH: 4 CREAM TOPICAL at 11:16

## 2021-05-01 RX ADMIN — Medication 5 UNIT(S): at 07:49

## 2021-05-01 RX ADMIN — SIMVASTATIN 40 MILLIGRAM(S): 20 TABLET, FILM COATED ORAL at 21:42

## 2021-05-01 RX ADMIN — PANTOPRAZOLE SODIUM 40 MILLIGRAM(S): 20 TABLET, DELAYED RELEASE ORAL at 07:49

## 2021-05-01 RX ADMIN — CHLORHEXIDINE GLUCONATE 1 APPLICATION(S): 213 SOLUTION TOPICAL at 05:23

## 2021-05-01 RX ADMIN — Medication 25 MILLIGRAM(S): at 13:02

## 2021-05-01 RX ADMIN — Medication 650 MILLIGRAM(S): at 00:59

## 2021-05-01 RX ADMIN — LIDOCAINE 1 PATCH: 4 CREAM TOPICAL at 04:13

## 2021-05-01 NOTE — PROGRESS NOTE ADULT - ASSESSMENT
75 year old female with PMH of CKD, HTN, diverticulitis, DVT? presents today c/o left sided back pain radiating into the left side since waking up this morning. Patient states that she was attempting to get out of bed to use the bathroom when she experienced sever pain described as an ache felt inside associated with numbness of the left leg. Patient  reports difficulty ambulating due to the pain worsening when she bears weight. Patient denies recent trauma but does report having physical therapy in 2007 due to problems in her lower back (-) urinary or bowel incontinence, fevers or chills, flu like symptoms, chest pain or sob. Called her PMD who told her to go to ER for an evaluation.     ROSALES on CKD IV; Now ESRD, started dialysis on this admission   - Renal following  - HD as scheduled.   - S/P permacath , follow up CT neck to evaluate swelling and tenderness    Left sided sciatica:  - Acute onset  - CT L spine: Multilevel degenerative changes, most prominent at L4/L5 with moderate to severe bilateral foraminal stenosis and high-grade spinal canal stenosis, CT hip: no fracture, unable to do MR.  - Seen by ortho (Christal), no intervention planned  - Her left groin pain is likely secondary to severe OA  - Pain management - Gabapentin, Lidoderm  - completed prednisone 50 mg daily X 5 days.  - PT re- eval : home PT but recommended [facility] fro safety reason as pt lives alone    HTN:  - Not on ACEi/ARB due to CKD 4, will hold off for now, if becomes HD dependant can start  - Continue rest of meds.    HLD:  - Continue Statin    DM:   - At home, uses NPH 70/30 bid about 15 units total per day at home, prescribed by her endocrine doctor  - Hb A1c 5.8  - Hypoglycemic yesterday am, Lantus stopped       Hyperkalemia:  - was, on Veltassa at home  - Added Lokelma initially; now stopped after HD started  - Low K diet    Anemia of chronic disease:  - Hb mostly stable  - high ferritin, but could be from chronic inflammatory state   - Epogen per nephro  - 1 unit PRBC on 4/27    Renal cysts:  - Seen on sono  - Follow up with PCP for recheck    Probable gout (or pseudogout)  - Colchicine given 1.2mg x 1, then 0.6mg x 1 (seems to have helped)    DVT ppx--heparin SQ  fall precautions     Pt is DNR, reviewed her living will. Pt initially was willing to sign molst but now refusing to sign and stating its unnecessary.    Discussed with Son

## 2021-05-01 NOTE — PROGRESS NOTE ADULT - SUBJECTIVE AND OBJECTIVE BOX
[facility] NEPHROLOGY SERVICES, [facility]  NEPHROLOGY AND HYPERTENSION  [address]  [address]  [place] [address]  [phone]    MD PRAVEEN LYNNE MD ANDREY GONCHARUK, MD MADHU KORRAPATI, MD YELENA ROSENBERG, MD BINNY KOSHY, MD CHRISTOPHER CAPUTO, MD EDWARD BOVER, MD          Patient events noted  pain at perm cath site    MEDICATIONS  (STANDING):  amLODIPine   Tablet 10 milliGRAM(s) Oral daily  chlorhexidine 2% Cloths 1 Application(s) Topical <User Schedule>  cloNIDine Patch 0.3 mG/24Hr(s) 1 patch Transdermal <User Schedule>  dextrose 40% Gel 15 Gram(s) Oral once  dextrose 5%. 1000 milliLiter(s) (50 mL/Hr) IV Continuous <Continuous>  dextrose 5%. 1000 milliLiter(s) (100 mL/Hr) IV Continuous <Continuous>  dextrose 50% Injectable 12.5 Gram(s) IV Push once  dextrose 50% Injectable 25 Gram(s) IV Push once  dextrose 50% Injectable 25 Gram(s) IV Push once  epoetin torrie-epbx (RETACRIT) Injectable 03754 Unit(s) SubCutaneous every 7 days  epoetin torrie-epbx (RETACRIT) Injectable 80354 Unit(s) SubCutaneous once  gabapentin 300 milliGRAM(s) Oral at bedtime  glucagon  Injectable 1 milliGRAM(s) IntraMuscular once  hydrALAZINE 25 milliGRAM(s) Oral three times a day  insulin lispro (ADMELOG) corrective regimen sliding scale   SubCutaneous three times a day before meals  insulin lispro Injectable (ADMELOG) 5 Unit(s) SubCutaneous three times a day before meals  labetalol 100 milliGRAM(s) Oral three times a day  lidocaine   Patch 1 Patch Transdermal daily  pantoprazole    Tablet 40 milliGRAM(s) Oral before breakfast  polyethylene glycol 3350 17 Gram(s) Oral daily  senna 2 Tablet(s) Oral at bedtime  simvastatin 40 milliGRAM(s) Oral at bedtime    MEDICATIONS  (PRN):  acetaminophen   Tablet .. 650 milliGRAM(s) Oral every 4 hours PRN Temp greater or equal to 38C (100.4F)  aluminum hydroxide/magnesium hydroxide/simethicone Suspension 30 milliLiter(s) Oral every 6 hours PRN Dyspepsia      04-30-21 @ 07:01  -  05-01-21 @ 07:00  --------------------------------------------------------  IN: 0 mL / OUT: 2000 mL / NET: -2000 mL      PHYSICAL EXAM:      T(C): 36.6 (05-01-21 @ 17:30), Max: 37.1 (04-30-21 @ 23:58)  HR: 60 (05-01-21 @ 17:30) (58 - 67)  BP: 159/64 (05-01-21 @ 17:30) (144/66 - 167/77)  RR: 17 (05-01-21 @ 17:30) (16 - 18)  SpO2: 95% (05-01-21 @ 17:30) (95% - 98%)  Wt(kg): --  Lungs clear  Chest tenderness at tunnel site; ecchymosis   Heart S1S2  Abd soft NT ND  Extremities:   tr edema                                    7.7    10.18 )-----------( 115      ( 01 May 2021 08:32 )             24.1     05-01    139  |  104  |  30<H>  ----------------------------<  109<H>  4.7   |  30  |  3.98<H>    Ca    8.1<L>      01 May 2021 08:32    TPro  6.1  /  Alb  2.8<L>  /  TBili  0.5  /  DBili  x   /  AST  82<H>  /  ALT  69  /  AlkPhos  78  05-01      LIVER FUNCTIONS - ( 01 May 2021 08:32 )  Alb: 2.8 g/dL / Pro: 6.1 gm/dL / ALK PHOS: 78 U/L / ALT: 69 U/L / AST: 82 U/L / GGT: x           Creatinine Trend: 3.98<--, 4.32<--, 3.43<--, 0.76<--, 6.03<--, 5.21<--      Assessment   ESRD, new;     Plan:  CT neck chest noted  Will follow hgb    Erickson Ray MD

## 2021-05-01 NOTE — PROGRESS NOTE ADULT - SUBJECTIVE AND OBJECTIVE BOX
Patient is a 76y old  Female who presents with a chief complaint of Acute left leg siatica. (30 Apr 2021 21:13)    INTERVAL HPI/OVERNIGHT EVENTS:  Pt was seen and examined, no acute events.    MEDICATIONS  (STANDING):  amLODIPine   Tablet 10 milliGRAM(s) Oral daily  chlorhexidine 2% Cloths 1 Application(s) Topical <User Schedule>  cloNIDine Patch 0.3 mG/24Hr(s) 1 patch Transdermal <User Schedule>  dextrose 40% Gel 15 Gram(s) Oral once  dextrose 5%. 1000 milliLiter(s) (50 mL/Hr) IV Continuous <Continuous>  dextrose 5%. 1000 milliLiter(s) (100 mL/Hr) IV Continuous <Continuous>  dextrose 50% Injectable 12.5 Gram(s) IV Push once  dextrose 50% Injectable 25 Gram(s) IV Push once  dextrose 50% Injectable 25 Gram(s) IV Push once  epoetin torrie-epbx (RETACRIT) Injectable 24951 Unit(s) SubCutaneous every 7 days  epoetin torrie-epbx (RETACRIT) Injectable 99078 Unit(s) SubCutaneous once  gabapentin 300 milliGRAM(s) Oral at bedtime  glucagon  Injectable 1 milliGRAM(s) IntraMuscular once  hydrALAZINE 25 milliGRAM(s) Oral three times a day  insulin lispro (ADMELOG) corrective regimen sliding scale   SubCutaneous three times a day before meals  insulin lispro Injectable (ADMELOG) 5 Unit(s) SubCutaneous three times a day before meals  labetalol 100 milliGRAM(s) Oral three times a day  lidocaine   Patch 1 Patch Transdermal daily  pantoprazole    Tablet 40 milliGRAM(s) Oral before breakfast  polyethylene glycol 3350 17 Gram(s) Oral daily  senna 2 Tablet(s) Oral at bedtime  simvastatin 40 milliGRAM(s) Oral at bedtime    MEDICATIONS  (PRN):  acetaminophen   Tablet .. 650 milliGRAM(s) Oral every 4 hours PRN Temp greater or equal to 38C (100.4F)  aluminum hydroxide/magnesium hydroxide/simethicone Suspension 30 milliLiter(s) Oral every 6 hours PRN Dyspepsia      Allergies  Eliquis (Other)      Vital Signs Last 24 Hrs  T(C): 36.7 (01 May 2021 11:16), Max: 37.1 (30 Apr 2021 23:58)  T(F): 98.1 (01 May 2021 11:16), Max: 98.7 (30 Apr 2021 23:58)  HR: 67 (01 May 2021 11:16) (58 - 74)  BP: 144/66 (01 May 2021 11:16) (144/66 - 167/77)  BP(mean): --  RR: 18 (01 May 2021 11:16) (16 - 18)  SpO2: 97% (01 May 2021 11:16) (97% - 98%)      PHYSICAL EXAM:  GENERAL: NAD  HEAD:  Atraumatic  NECK: rt permacath, swelling and tenderness above the site  EYES: PERRLA  NERVOUS SYSTEM:  Awake, alert  CHEST/LUNG: Clear  HEART: RRR  ABDOMEN: Soft, non tender  EXTREMITIES: no edema       LABS:                        7.7    10.18 )-----------( 115      ( 01 May 2021 08:32 )             24.1     05-01    139  |  104  |  30<H>  ----------------------------<  109<H>  4.7   |  30  |  3.98<H>    Ca    8.1<L>      01 May 2021 08:32    TPro  6.1  /  Alb  2.8<L>  /  TBili  0.5  /  DBili  x   /  AST  82<H>  /  ALT  69  /  AlkPhos  78  05-01    PT/INR - ( 30 Apr 2021 04:21 )   PT: 11.1 sec;   INR: 0.95 ratio         PTT - ( 30 Apr 2021 04:21 )  PTT:27.6 sec    CAPILLARY BLOOD GLUCOSE      POCT Blood Glucose.: 211 mg/dL (01 May 2021 11:14)  POCT Blood Glucose.: 121 mg/dL (01 May 2021 07:41)  POCT Blood Glucose.: 136 mg/dL (30 Apr 2021 21:00)  POCT Blood Glucose.: 209 mg/dL (30 Apr 2021 16:07)      RADIOLOGY & ADDITIONAL TESTS:    Imaging Personally Reviewed:  [ ] YES  [ ] NO    Consultant(s) Notes Reviewed:  [ ] YES  [ ] NO    Care Discussed with Consultants/Other Providers [ ] YES  [ ] NO

## 2021-05-02 LAB
ALBUMIN SERPL ELPH-MCNC: 2.8 G/DL — LOW (ref 3.3–5)
ALP SERPL-CCNC: 80 U/L — SIGNIFICANT CHANGE UP (ref 40–120)
ALT FLD-CCNC: 62 U/L — SIGNIFICANT CHANGE UP (ref 12–78)
ANION GAP SERPL CALC-SCNC: 5 MMOL/L — SIGNIFICANT CHANGE UP (ref 5–17)
ANION GAP SERPL CALC-SCNC: 8 MMOL/L — SIGNIFICANT CHANGE UP (ref 5–17)
AST SERPL-CCNC: 68 U/L — HIGH (ref 15–37)
BILIRUB SERPL-MCNC: 0.5 MG/DL — SIGNIFICANT CHANGE UP (ref 0.2–1.2)
BLD GP AB SCN SERPL QL: SIGNIFICANT CHANGE UP
BUN SERPL-MCNC: 51 MG/DL — HIGH (ref 7–23)
BUN SERPL-MCNC: 60 MG/DL — HIGH (ref 7–23)
CALCIUM SERPL-MCNC: 8.2 MG/DL — LOW (ref 8.5–10.1)
CALCIUM SERPL-MCNC: 8.2 MG/DL — LOW (ref 8.5–10.1)
CHLORIDE SERPL-SCNC: 104 MMOL/L — SIGNIFICANT CHANGE UP (ref 96–108)
CHLORIDE SERPL-SCNC: 105 MMOL/L — SIGNIFICANT CHANGE UP (ref 96–108)
CO2 SERPL-SCNC: 26 MMOL/L — SIGNIFICANT CHANGE UP (ref 22–31)
CO2 SERPL-SCNC: 28 MMOL/L — SIGNIFICANT CHANGE UP (ref 22–31)
CREAT SERPL-MCNC: 5.17 MG/DL — HIGH (ref 0.5–1.3)
CREAT SERPL-MCNC: 5.69 MG/DL — HIGH (ref 0.5–1.3)
GLUCOSE BLDC GLUCOMTR-MCNC: 143 MG/DL — HIGH (ref 70–99)
GLUCOSE BLDC GLUCOMTR-MCNC: 221 MG/DL — HIGH (ref 70–99)
GLUCOSE BLDC GLUCOMTR-MCNC: 255 MG/DL — HIGH (ref 70–99)
GLUCOSE BLDC GLUCOMTR-MCNC: 60 MG/DL — LOW (ref 70–99)
GLUCOSE BLDC GLUCOMTR-MCNC: 62 MG/DL — LOW (ref 70–99)
GLUCOSE BLDC GLUCOMTR-MCNC: 90 MG/DL — SIGNIFICANT CHANGE UP (ref 70–99)
GLUCOSE SERPL-MCNC: 126 MG/DL — HIGH (ref 70–99)
GLUCOSE SERPL-MCNC: 151 MG/DL — HIGH (ref 70–99)
HCT VFR BLD CALC: 25.4 % — LOW (ref 34.5–45)
HCT VFR BLD CALC: 25.9 % — LOW (ref 34.5–45)
HGB BLD-MCNC: 7.7 G/DL — LOW (ref 11.5–15.5)
HGB BLD-MCNC: 8.2 G/DL — LOW (ref 11.5–15.5)
MCHC RBC-ENTMCNC: 26.6 PG — LOW (ref 27–34)
MCHC RBC-ENTMCNC: 27.2 PG — SIGNIFICANT CHANGE UP (ref 27–34)
MCHC RBC-ENTMCNC: 30.3 GM/DL — LOW (ref 32–36)
MCHC RBC-ENTMCNC: 31.7 GM/DL — LOW (ref 32–36)
MCV RBC AUTO: 85.8 FL — SIGNIFICANT CHANGE UP (ref 80–100)
MCV RBC AUTO: 87.9 FL — SIGNIFICANT CHANGE UP (ref 80–100)
NRBC # BLD: 0 /100 WBCS — SIGNIFICANT CHANGE UP (ref 0–0)
NRBC # BLD: 0 /100 WBCS — SIGNIFICANT CHANGE UP (ref 0–0)
PLATELET # BLD AUTO: 150 K/UL — SIGNIFICANT CHANGE UP (ref 150–400)
PLATELET # BLD AUTO: 162 K/UL — SIGNIFICANT CHANGE UP (ref 150–400)
POTASSIUM SERPL-MCNC: 4.9 MMOL/L — SIGNIFICANT CHANGE UP (ref 3.5–5.3)
POTASSIUM SERPL-MCNC: 5.3 MMOL/L — SIGNIFICANT CHANGE UP (ref 3.5–5.3)
POTASSIUM SERPL-SCNC: 4.9 MMOL/L — SIGNIFICANT CHANGE UP (ref 3.5–5.3)
POTASSIUM SERPL-SCNC: 5.3 MMOL/L — SIGNIFICANT CHANGE UP (ref 3.5–5.3)
PROT SERPL-MCNC: 6.2 GM/DL — SIGNIFICANT CHANGE UP (ref 6–8.3)
RBC # BLD: 2.89 M/UL — LOW (ref 3.8–5.2)
RBC # BLD: 3.02 M/UL — LOW (ref 3.8–5.2)
RBC # FLD: 16.5 % — HIGH (ref 10.3–14.5)
RBC # FLD: 17 % — HIGH (ref 10.3–14.5)
SODIUM SERPL-SCNC: 138 MMOL/L — SIGNIFICANT CHANGE UP (ref 135–145)
SODIUM SERPL-SCNC: 138 MMOL/L — SIGNIFICANT CHANGE UP (ref 135–145)
WBC # BLD: 10.05 K/UL — SIGNIFICANT CHANGE UP (ref 3.8–10.5)
WBC # BLD: 11.2 K/UL — HIGH (ref 3.8–10.5)
WBC # FLD AUTO: 10.05 K/UL — SIGNIFICANT CHANGE UP (ref 3.8–10.5)
WBC # FLD AUTO: 11.2 K/UL — HIGH (ref 3.8–10.5)

## 2021-05-02 PROCEDURE — 99233 SBSQ HOSP IP/OBS HIGH 50: CPT

## 2021-05-02 RX ADMIN — Medication 5 UNIT(S): at 07:51

## 2021-05-02 RX ADMIN — Medication 100 MILLIGRAM(S): at 13:01

## 2021-05-02 RX ADMIN — Medication 100 MILLIGRAM(S): at 05:38

## 2021-05-02 RX ADMIN — PANTOPRAZOLE SODIUM 40 MILLIGRAM(S): 20 TABLET, DELAYED RELEASE ORAL at 07:51

## 2021-05-02 RX ADMIN — Medication 650 MILLIGRAM(S): at 12:54

## 2021-05-02 RX ADMIN — Medication 25 MILLIGRAM(S): at 13:01

## 2021-05-02 RX ADMIN — Medication 1 PATCH: at 07:52

## 2021-05-02 RX ADMIN — Medication 5 UNIT(S): at 11:16

## 2021-05-02 RX ADMIN — Medication 1 PATCH: at 23:17

## 2021-05-02 RX ADMIN — SIMVASTATIN 40 MILLIGRAM(S): 20 TABLET, FILM COATED ORAL at 21:33

## 2021-05-02 RX ADMIN — Medication 650 MILLIGRAM(S): at 12:19

## 2021-05-02 RX ADMIN — Medication 100 MILLIGRAM(S): at 21:33

## 2021-05-02 RX ADMIN — AMLODIPINE BESYLATE 10 MILLIGRAM(S): 2.5 TABLET ORAL at 05:38

## 2021-05-02 RX ADMIN — LIDOCAINE 1 PATCH: 4 CREAM TOPICAL at 11:16

## 2021-05-02 RX ADMIN — Medication 6: at 11:15

## 2021-05-02 RX ADMIN — Medication 25 MILLIGRAM(S): at 21:33

## 2021-05-02 RX ADMIN — SENNA PLUS 2 TABLET(S): 8.6 TABLET ORAL at 21:33

## 2021-05-02 RX ADMIN — CHLORHEXIDINE GLUCONATE 1 APPLICATION(S): 213 SOLUTION TOPICAL at 05:38

## 2021-05-02 RX ADMIN — Medication 25 MILLIGRAM(S): at 05:38

## 2021-05-02 RX ADMIN — POLYETHYLENE GLYCOL 3350 17 GRAM(S): 17 POWDER, FOR SOLUTION ORAL at 11:17

## 2021-05-02 RX ADMIN — LIDOCAINE 1 PATCH: 4 CREAM TOPICAL at 04:11

## 2021-05-02 RX ADMIN — ERYTHROPOIETIN 10000 UNIT(S): 10000 INJECTION, SOLUTION INTRAVENOUS; SUBCUTANEOUS at 11:16

## 2021-05-02 RX ADMIN — GABAPENTIN 300 MILLIGRAM(S): 400 CAPSULE ORAL at 21:33

## 2021-05-02 NOTE — PROGRESS NOTE ADULT - REASON FOR ADMISSION
Acute left leg sciatica.
Acute left leg sciatica.
Acute left leg siatica.
Acute left leg sciatica.
Acute left leg siatica.
Acute left leg siatica.
Acute left leg sciatica.
Acute left leg siatica.
Acute left leg sciatica.
Acute left leg sciatica.
Acute left leg siatica.
Acute left leg siatica.

## 2021-05-02 NOTE — PROGRESS NOTE ADULT - SUBJECTIVE AND OBJECTIVE BOX
Patient is a 76y old  Female who presents with a chief complaint of Acute left leg siatica. (01 May 2021 23:45)    INTERVAL HPI/OVERNIGHT EVENTS:  Pt was seen and examined, no acute events.    MEDICATIONS  (STANDING):  amLODIPine   Tablet 10 milliGRAM(s) Oral daily  chlorhexidine 2% Cloths 1 Application(s) Topical <User Schedule>  cloNIDine Patch 0.3 mG/24Hr(s) 1 patch Transdermal <User Schedule>  dextrose 40% Gel 15 Gram(s) Oral once  dextrose 5%. 1000 milliLiter(s) (50 mL/Hr) IV Continuous <Continuous>  dextrose 5%. 1000 milliLiter(s) (100 mL/Hr) IV Continuous <Continuous>  dextrose 50% Injectable 25 Gram(s) IV Push once  dextrose 50% Injectable 12.5 Gram(s) IV Push once  dextrose 50% Injectable 25 Gram(s) IV Push once  epoetin torrie-epbx (RETACRIT) Injectable 05002 Unit(s) SubCutaneous once  epoetin torrie-epbx (RETACRIT) Injectable 51957 Unit(s) SubCutaneous every 7 days  gabapentin 300 milliGRAM(s) Oral at bedtime  glucagon  Injectable 1 milliGRAM(s) IntraMuscular once  hydrALAZINE 25 milliGRAM(s) Oral three times a day  insulin lispro (ADMELOG) corrective regimen sliding scale   SubCutaneous three times a day before meals  insulin lispro Injectable (ADMELOG) 5 Unit(s) SubCutaneous three times a day before meals  labetalol 100 milliGRAM(s) Oral three times a day  lidocaine   Patch 1 Patch Transdermal daily  pantoprazole    Tablet 40 milliGRAM(s) Oral before breakfast  polyethylene glycol 3350 17 Gram(s) Oral daily  senna 2 Tablet(s) Oral at bedtime  simvastatin 40 milliGRAM(s) Oral at bedtime    MEDICATIONS  (PRN):  acetaminophen   Tablet .. 650 milliGRAM(s) Oral every 4 hours PRN Temp greater or equal to 38C (100.4F)  aluminum hydroxide/magnesium hydroxide/simethicone Suspension 30 milliLiter(s) Oral every 6 hours PRN Dyspepsia      Allergies  Eliquis (Other)      Vital Signs Last 24 Hrs  T(C): 36.7 (02 May 2021 11:13), Max: 36.8 (02 May 2021 09:47)  T(F): 98.1 (02 May 2021 11:13), Max: 98.3 (02 May 2021 09:47)  HR: 60 (02 May 2021 11:13) (60 - 71)  BP: 138/71 (02 May 2021 11:13) (138/71 - 163/70)  BP(mean): --  RR: 18 (02 May 2021 11:13) (17 - 18)  SpO2: 95% (02 May 2021 11:13) (95% - 97%)      PHYSICAL EXAM:  GENERAL: NAD  HEAD:  Atraumatic  EYES: PERRLA  NERVOUS SYSTEM:  Awake, alert  CHEST/LUNG: Clear  HEART: RRR  ABDOMEN: Soft, non tender  EXTREMITIES:  no edema      LABS:                        7.7    10.05 )-----------( 150      ( 02 May 2021 07:10 )             25.4     05-02    138  |  105  |  51<H>  ----------------------------<  126<H>  4.9   |  28  |  5.17<H>    Ca    8.2<L>      02 May 2021 07:10    TPro  6.2  /  Alb  2.8<L>  /  TBili  0.5  /  DBili  x   /  AST  68<H>  /  ALT  62  /  AlkPhos  80  05-02        CAPILLARY BLOOD GLUCOSE      POCT Blood Glucose.: 255 mg/dL (02 May 2021 11:09)  POCT Blood Glucose.: 143 mg/dL (02 May 2021 07:43)  POCT Blood Glucose.: 204 mg/dL (01 May 2021 21:10)  POCT Blood Glucose.: 84 mg/dL (01 May 2021 15:33)      RADIOLOGY & ADDITIONAL TESTS:    Imaging Personally Reviewed:  [ ] YES  [ ] NO    Consultant(s) Notes Reviewed:  [ ] YES  [ ] NO    Care Discussed with Consultants/Other Providers [ ] YES  [ ] NO

## 2021-05-02 NOTE — PROGRESS NOTE ADULT - SUBJECTIVE AND OBJECTIVE BOX
[facility] NEPHROLOGY SERVICES, [facility]  NEPHROLOGY AND HYPERTENSION  [address]  [address]  [place] [address]  [phone]    MD PRAVEEN LYNNE MD ANDREY GONCHARUK, MD MADHU KORRAPATI, MD YELENA ROSENBERG, MD BINNY KOSHY, MD CHRISTOPHER CAPUTO, MD EDWARD BOVER, MD          Patient events noted    less pain  urine output noted today    MEDICATIONS  (STANDING):  amLODIPine   Tablet 10 milliGRAM(s) Oral daily  chlorhexidine 2% Cloths 1 Application(s) Topical <User Schedule>  cloNIDine Patch 0.3 mG/24Hr(s) 1 patch Transdermal <User Schedule>  dextrose 40% Gel 15 Gram(s) Oral once  dextrose 5%. 1000 milliLiter(s) (50 mL/Hr) IV Continuous <Continuous>  dextrose 5%. 1000 milliLiter(s) (100 mL/Hr) IV Continuous <Continuous>  dextrose 50% Injectable 25 Gram(s) IV Push once  dextrose 50% Injectable 12.5 Gram(s) IV Push once  dextrose 50% Injectable 25 Gram(s) IV Push once  epoetin torrie-epbx (RETACRIT) Injectable 37392 Unit(s) SubCutaneous every 7 days  epoetin torrie-epbx (RETACRIT) Injectable 28699 Unit(s) SubCutaneous once  gabapentin 300 milliGRAM(s) Oral at bedtime  glucagon  Injectable 1 milliGRAM(s) IntraMuscular once  hydrALAZINE 25 milliGRAM(s) Oral three times a day  insulin lispro (ADMELOG) corrective regimen sliding scale   SubCutaneous three times a day before meals  insulin lispro Injectable (ADMELOG) 5 Unit(s) SubCutaneous three times a day before meals  labetalol 100 milliGRAM(s) Oral three times a day  lidocaine   Patch 1 Patch Transdermal daily  pantoprazole    Tablet 40 milliGRAM(s) Oral before breakfast  polyethylene glycol 3350 17 Gram(s) Oral daily  senna 2 Tablet(s) Oral at bedtime  simvastatin 40 milliGRAM(s) Oral at bedtime    MEDICATIONS  (PRN):  acetaminophen   Tablet .. 650 milliGRAM(s) Oral every 4 hours PRN Temp greater or equal to 38C (100.4F)  aluminum hydroxide/magnesium hydroxide/simethicone Suspension 30 milliLiter(s) Oral every 6 hours PRN Dyspepsia      05-02-21 @ 07:01  -  05-03-21 @ 00:42  --------------------------------------------------------  IN: 455 mL / OUT: 0 mL / NET: 455 mL      PHYSICAL EXAM:      T(C): 36.4 (05-02-21 @ 23:30), Max: 36.8 (05-02-21 @ 09:47)  HR: 60 (05-02-21 @ 23:30) (60 - 71)  BP: 153/68 (05-02-21 @ 23:30) (123/62 - 165/70)  RR: 18 (05-02-21 @ 23:30) (17 - 18)  SpO2: 96% (05-02-21 @ 23:30) (95% - 97%)  Wt(kg): --  Lungs clear  Heart S1S2  Abd soft NT ND  Extremities:   tr edema                                    8.2    11.20 )-----------( 162      ( 02 May 2021 18:02 )             25.9     05-02    138  |  104  |  60<H>  ----------------------------<  151<H>  5.3   |  26  |  5.69<H>    Ca    8.2<L>      02 May 2021 18:02    TPro  6.2  /  Alb  2.8<L>  /  TBili  0.5  /  DBili  x   /  AST  68<H>  /  ALT  62  /  AlkPhos  80  05-02      LIVER FUNCTIONS - ( 02 May 2021 07:10 )  Alb: 2.8 g/dL / Pro: 6.2 gm/dL / ALK PHOS: 80 U/L / ALT: 62 U/L / AST: 68 U/L / GGT: x           Creatinine Trend: 5.69<--, 5.17<--, 3.98<--, 4.32<--, 3.43<--, 0.76<--      Assessment   ROSALES CKD 5; new to HD  Post perm cath, hematoma   Hgb stable     Plan:  HD for tomorrow  Discharge planning    Erickson Ray MD

## 2021-05-02 NOTE — PROGRESS NOTE ADULT - ASSESSMENT
75 year old female with PMH of CKD, HTN, diverticulitis, DVT? presents today c/o left sided back pain radiating into the left side since waking up this morning. Patient states that she was attempting to get out of bed to use the bathroom when she experienced sever pain described as an ache felt inside associated with numbness of the left leg. Patient  reports difficulty ambulating due to the pain worsening when she bears weight. Patient denies recent trauma but does report having physical therapy in 2007 due to problems in her lower back (-) urinary or bowel incontinence, fevers or chills, flu like symptoms, chest pain or sob. Called her PMD who told her to go to ER for an evaluation. During hospital stay developed ROSALES and anuria and started on HD.    ROSALES on CKD IV; Now ESRD, started dialysis on this admission   - Renal following  - HD as scheduled.   - S/P permacath , CT neck with small hematoma, hb stable, will monitor, if no improvement will ask vascular/IR tomorrow to evaluate.    Left sided sciatica:  - Acute onset  - CT L spine: Multilevel degenerative changes, most prominent at L4/L5 with moderate to severe bilateral foraminal stenosis and high-grade spinal canal stenosis, CT hip: no fracture, unable to do MR.  - Seen by ortho (Christal), no intervention planned  - Her left groin pain is likely secondary to severe OA  - Pain management - Gabapentin, Lidoderm  - completed prednisone 50 mg daily X 5 days.  - PT re- eval : home PT but recommended SERGEI fro safety reason as pt lives alone, pt now agreeable    HTN:  - Not on ACEi/ARB due to CKD 4, will hold off for now, if becomes HD dependant can start  - Continue rest of meds.    HLD:  - Continue Statin    DM:   - At home, uses NPH 70/30 bid about 15 units total per day at home, prescribed by her endocrine doctor  - Hb A1c 5.8  - Hypoglycemic yesterday am, Lantus stopped       Hyperkalemia:  - was, on Veltassa at home  - Added Lokelma initially; now stopped after HD started  - Low K diet    Anemia of chronic disease:  - Hb mostly stable  - high ferritin, but could be from chronic inflammatory state   - Epogen per nephro  - 1 unit PRBC on 4/27    Renal cysts:  - Seen on sono  - Follow up with PCP for recheck    Probable gout (or pseudogout)  - Colchicine given 1.2mg x 1, then 0.6mg x 1 (seems to have helped)    DVT ppx--heparin SQ  fall precautions     Pt is DNR, reviewed her living will. Pt initially was willing to sign molst but now refusing to sign and stating its unnecessary.    Discussed with Son

## 2021-05-02 NOTE — PROGRESS NOTE ADULT - PROVIDER SPECIALTY LIST ADULT
Intervent Radiology
Nephrology
Hospitalist
Nephrology
Hospitalist
Nephrology
Hospitalist
Nephrology
Nephrology
Hospitalist

## 2021-05-02 NOTE — PROGRESS NOTE ADULT - NSICDXPILOT_GEN_ALL_CORE
[place]

## 2021-05-03 VITALS — DIASTOLIC BLOOD PRESSURE: 70 MMHG | HEART RATE: 66 BPM | SYSTOLIC BLOOD PRESSURE: 161 MMHG

## 2021-05-03 LAB
ALBUMIN SERPL ELPH-MCNC: 2.8 G/DL — LOW (ref 3.3–5)
ALP SERPL-CCNC: 88 U/L — SIGNIFICANT CHANGE UP (ref 40–120)
ALT FLD-CCNC: 60 U/L — SIGNIFICANT CHANGE UP (ref 12–78)
ANION GAP SERPL CALC-SCNC: 7 MMOL/L — SIGNIFICANT CHANGE UP (ref 5–17)
AST SERPL-CCNC: 65 U/L — HIGH (ref 15–37)
BILIRUB SERPL-MCNC: 0.5 MG/DL — SIGNIFICANT CHANGE UP (ref 0.2–1.2)
BUN SERPL-MCNC: 65 MG/DL — HIGH (ref 7–23)
CALCIUM SERPL-MCNC: 8.1 MG/DL — LOW (ref 8.5–10.1)
CHLORIDE SERPL-SCNC: 104 MMOL/L — SIGNIFICANT CHANGE UP (ref 96–108)
CO2 SERPL-SCNC: 25 MMOL/L — SIGNIFICANT CHANGE UP (ref 22–31)
CREAT SERPL-MCNC: 6.35 MG/DL — HIGH (ref 0.5–1.3)
GLUCOSE BLDC GLUCOMTR-MCNC: 139 MG/DL — HIGH (ref 70–99)
GLUCOSE BLDC GLUCOMTR-MCNC: 164 MG/DL — HIGH (ref 70–99)
GLUCOSE BLDC GLUCOMTR-MCNC: 183 MG/DL — HIGH (ref 70–99)
GLUCOSE BLDC GLUCOMTR-MCNC: 227 MG/DL — HIGH (ref 70–99)
GLUCOSE SERPL-MCNC: 214 MG/DL — HIGH (ref 70–99)
HCT VFR BLD CALC: 23.9 % — LOW (ref 34.5–45)
HGB BLD-MCNC: 7.7 G/DL — LOW (ref 11.5–15.5)
MCHC RBC-ENTMCNC: 27.4 PG — SIGNIFICANT CHANGE UP (ref 27–34)
MCHC RBC-ENTMCNC: 32.2 GM/DL — SIGNIFICANT CHANGE UP (ref 32–36)
MCV RBC AUTO: 85.1 FL — SIGNIFICANT CHANGE UP (ref 80–100)
NRBC # BLD: 0 /100 WBCS — SIGNIFICANT CHANGE UP (ref 0–0)
PLATELET # BLD AUTO: 155 K/UL — SIGNIFICANT CHANGE UP (ref 150–400)
POTASSIUM SERPL-MCNC: 4.9 MMOL/L — SIGNIFICANT CHANGE UP (ref 3.5–5.3)
POTASSIUM SERPL-SCNC: 4.9 MMOL/L — SIGNIFICANT CHANGE UP (ref 3.5–5.3)
PROT SERPL-MCNC: 6.3 GM/DL — SIGNIFICANT CHANGE UP (ref 6–8.3)
RBC # BLD: 2.81 M/UL — LOW (ref 3.8–5.2)
RBC # FLD: 16.4 % — HIGH (ref 10.3–14.5)
SODIUM SERPL-SCNC: 136 MMOL/L — SIGNIFICANT CHANGE UP (ref 135–145)
WBC # BLD: 9.83 K/UL — SIGNIFICANT CHANGE UP (ref 3.8–10.5)
WBC # FLD AUTO: 9.83 K/UL — SIGNIFICANT CHANGE UP (ref 3.8–10.5)

## 2021-05-03 PROCEDURE — 99239 HOSP IP/OBS DSCHRG MGMT >30: CPT

## 2021-05-03 RX ORDER — LABETALOL HCL 100 MG
1 TABLET ORAL
Qty: 0 | Refills: 0 | DISCHARGE
Start: 2021-05-03

## 2021-05-03 RX ORDER — ASPIRIN/CALCIUM CARB/MAGNESIUM 324 MG
0 TABLET ORAL
Qty: 0 | Refills: 0 | DISCHARGE

## 2021-05-03 RX ORDER — ACETAMINOPHEN 500 MG
2 TABLET ORAL
Qty: 0 | Refills: 0 | DISCHARGE
Start: 2021-05-03

## 2021-05-03 RX ORDER — ERYTHROPOIETIN 10000 [IU]/ML
0 INJECTION, SOLUTION INTRAVENOUS; SUBCUTANEOUS
Qty: 0 | Refills: 0 | DISCHARGE
Start: 2021-05-03

## 2021-05-03 RX ORDER — PANTOPRAZOLE SODIUM 20 MG/1
1 TABLET, DELAYED RELEASE ORAL
Qty: 0 | Refills: 0 | DISCHARGE
Start: 2021-05-03

## 2021-05-03 RX ORDER — INSULIN LISPRO 100/ML
0 VIAL (ML) SUBCUTANEOUS
Qty: 0 | Refills: 0 | DISCHARGE
Start: 2021-05-03

## 2021-05-03 RX ORDER — SIMVASTATIN 20 MG/1
1 TABLET, FILM COATED ORAL
Qty: 0 | Refills: 0 | DISCHARGE
Start: 2021-05-03

## 2021-05-03 RX ORDER — SENNA PLUS 8.6 MG/1
2 TABLET ORAL
Qty: 0 | Refills: 0 | DISCHARGE
Start: 2021-05-03

## 2021-05-03 RX ORDER — AMLODIPINE BESYLATE 2.5 MG/1
1 TABLET ORAL
Qty: 0 | Refills: 0 | DISCHARGE
Start: 2021-05-03

## 2021-05-03 RX ORDER — LIDOCAINE 4 G/100G
0 CREAM TOPICAL
Qty: 0 | Refills: 0 | DISCHARGE
Start: 2021-05-03

## 2021-05-03 RX ORDER — POLYETHYLENE GLYCOL 3350 17 G/17G
17 POWDER, FOR SOLUTION ORAL
Qty: 0 | Refills: 0 | DISCHARGE
Start: 2021-05-03

## 2021-05-03 RX ORDER — GABAPENTIN 400 MG/1
1 CAPSULE ORAL
Qty: 0 | Refills: 0 | DISCHARGE
Start: 2021-05-03

## 2021-05-03 RX ORDER — HYDRALAZINE HCL 50 MG
1 TABLET ORAL
Qty: 0 | Refills: 0 | DISCHARGE
Start: 2021-05-03

## 2021-05-03 RX ADMIN — Medication 1 PATCH: at 05:00

## 2021-05-03 RX ADMIN — CHLORHEXIDINE GLUCONATE 1 APPLICATION(S): 213 SOLUTION TOPICAL at 05:19

## 2021-05-03 RX ADMIN — Medication 2: at 16:24

## 2021-05-03 RX ADMIN — Medication 5 UNIT(S): at 16:24

## 2021-05-03 RX ADMIN — PANTOPRAZOLE SODIUM 40 MILLIGRAM(S): 20 TABLET, DELAYED RELEASE ORAL at 05:14

## 2021-05-03 RX ADMIN — AMLODIPINE BESYLATE 10 MILLIGRAM(S): 2.5 TABLET ORAL at 05:14

## 2021-05-03 RX ADMIN — Medication 100 MILLIGRAM(S): at 14:11

## 2021-05-03 RX ADMIN — Medication 25 MILLIGRAM(S): at 14:11

## 2021-05-03 RX ADMIN — Medication 1 PATCH: at 05:13

## 2021-05-03 RX ADMIN — Medication 1 PATCH: at 07:00

## 2021-05-03 RX ADMIN — Medication 5 UNIT(S): at 08:09

## 2021-05-03 RX ADMIN — Medication 100 MILLIGRAM(S): at 05:14

## 2021-05-03 RX ADMIN — LIDOCAINE 1 PATCH: 4 CREAM TOPICAL at 14:10

## 2021-05-03 RX ADMIN — Medication 25 MILLIGRAM(S): at 05:14

## 2021-05-03 RX ADMIN — ERYTHROPOIETIN 10000 UNIT(S): 10000 INJECTION, SOLUTION INTRAVENOUS; SUBCUTANEOUS at 12:44

## 2021-05-03 NOTE — DISCHARGE NOTE PROVIDER - NSDCMRMEDTOKEN_GEN_ALL_CORE_FT
acetaminophen 325 mg oral tablet: 2 tab(s) orally every 4 hours, As needed, Temp greater or equal to 38C (100.4F)  aluminum hydroxide-magnesium hydroxide 200 mg-200 mg/5 mL oral suspension: 30 milliliter(s) orally every 6 hours, As needed, Dyspepsia  amLODIPine 10 mg oral tablet: 1 tab(s) orally once a day  cloNIDine 0.3 mg/24 hr transdermal film, extended release: 1 patch transdermal   epoetin torrie:   gabapentin 300 mg oral capsule: 1 cap(s) orally once a day (at bedtime)  hydrALAZINE 25 mg oral tablet: 1 tab(s) orally 3 times a day  insulin lispro 100 units/mL injectable solution: Sliding scale  labetalol 100 mg oral tablet: 1 tab(s) orally 3 times a day  lidocaine 5% topical film:  topically   LSO Brace:   pantoprazole 40 mg oral delayed release tablet: 1 tab(s) orally once a day (before a meal)  polyethylene glycol 3350 oral powder for reconstitution: 17 gram(s) orally once a day  senna oral tablet: 2 tab(s) orally once a day (at bedtime)  simvastatin 40 mg oral tablet: 1 tab(s) orally once a day (at bedtime)

## 2021-05-03 NOTE — DISCHARGE NOTE PROVIDER - HOSPITAL COURSE
75 year old female with PMH of CKD, HTN, diverticulitis, DVT? presents today c/o left sided back pain radiating into the left side since waking up this morning. Patient states that she was attempting to get out of bed to use the bathroom when she experienced sever pain described as an ache felt inside associated with numbness of the left leg. Patient  reports difficulty ambulating due to the pain worsening when she bears weight. Patient denies recent trauma but does report having physical therapy in 2007 due to problems in her lower back (-) urinary or bowel incontinence, fevers or chills, flu like symptoms, chest pain or sob. Called her PMD who told her to go to ER for an evaluation. During hospital stay developed ROSALES and anuria and started on HD.    ROSALES on CKD IV; Now ESRD, started dialysis on this admission   - Renal following  - HD as scheduled.   - S/P permacath , CT neck with small hematoma, hb stable, improved.    Left sided sciatica:  - Acute onset  - CT L spine: Multilevel degenerative changes, most prominent at L4/L5 with moderate to severe bilateral foraminal stenosis and high-grade spinal canal stenosis, CT hip: no fracture, unable to do MR.  - Seen by ortho (Christal), no intervention planned  - Her left groin pain is likely secondary to severe OA  - Pain management - Gabapentin, Lidoderm  - completed prednisone 50 mg daily X 5 days.  - PT re- eval : home PT but recommended SERGEI for safety reason as pt lives alone, pt now agreeable    HTN:  - Not on ACEi/ARB due to CKD 4, will hold off for now, if becomes HD dependant can start  - Continue rest of meds.    HLD:  - Continue Statin    DM:   - At home, uses NPH 70/30 bid about 15 units total per day at home, prescribed by her endocrine doctor  - Hb A1c 5.8  - Hypoglycemic yesterday am, Lantus stopped       Hyperkalemia:  - was, on Veltassa at home  - Added Lokelma initially; now stopped after HD started  - Low K diet    Anemia of chronic disease:  - Hb mostly stable  - high ferritin, but could be from chronic inflammatory state   - Epogen per nephro  - 1 unit PRBC on 4/27    Renal cysts:  - Seen on sono  - Follow up with PCP for recheck    Probable gout (or pseudogout)  - Colchicine given 1.2mg x 1, then 0.6mg x 1 (seems to have helped)    DVT ppx--heparin SQ  fall precautions     Pt is DNR, reviewed her living will. Pt initially was willing to sign molst but now refusing to sign and stating its unnecessary.    Discussed with Son

## 2021-05-03 NOTE — DISCHARGE NOTE PROVIDER - NSDCFUADDAPPT_GEN_ALL_CORE_FT
new HD slot at [facility] on Tuesday, Thursday and Saturday at 1pm.  If first treatment is Tuesday, May4, pt's son must accompany her at 12:30.  [place]:  [place], [phone].  [facility] is located at [address] [place] [address].

## 2021-05-03 NOTE — DISCHARGE NOTE PROVIDER - NSDCCPCAREPLAN_GEN_ALL_CORE_FT
PRINCIPAL DISCHARGE DIAGNOSIS  Diagnosis: Lumbar radiculopathy, acute  Assessment and Plan of Treatment: 75 year old female with PMH of CKD, HTN, diverticulitis, DVT? presents today c/o left sided back pain radiating into the left side since waking up this morning. Patient states that she was attempting to get out of bed to use the bathroom when she experienced sever pain described as an ache felt inside associated with numbness of the left leg. Patient  reports difficulty ambulating due to the pain worsening when she bears weight. Patient denies recent trauma but does report having physical therapy in 2007 due to problems in her lower back (-) urinary or bowel incontinence, fevers or chills, flu like symptoms, chest pain or sob. Called her PMD who told her to go to ER for an evaluation. During hospital stay developed ROSALES and anuria and started on HD.  ROSALES on CKD IV; Now ESRD, started dialysis on this admission   - Renal following  - HD as scheduled.   - S/P permacath , CT neck with small hematoma, hb stable, improved.  - Continue to hold IV contrast as we are monitoring for reciovery  - Low K diet  Left sided sciatica:  - Acute onset  - CT L spine: Multilevel degenerative changes, most prominent at L4/L5 with moderate to severe bilateral foraminal stenosis and high-grade spinal canal stenosis, CT hip: no fracture, unable to do MR.  - Seen by ortho (Christal), no intervention planned  - Her left groin pain is likely secondary to severe OA  - Pain management - Gabapentin, Lidoderm  - completed prednisone 50 mg daily X 5 days.  HTN:  - Not on ACEi/ARB due to CKD 4, will hold off for now  - Continue rest of meds.  DM:   - At home, uses NPH 70/30 bid about 15 units total per day at home, prescribed by her endocrine doctor  - Hb A1c 5.8  - Hypoglycemic episode, Lantus stopped  Anemia of chronic disease:  - Hb mostly stable  - high ferritin, but could be from chronic inflammatory state   - Epogen per nephro  - 1 unit PRBC on 4/27  Renal cysts:  - Seen on sono  - Follow up with PCP for recheck      SECONDARY DISCHARGE DIAGNOSES  Diagnosis: Unable to ambulate  Assessment and Plan of Treatment:

## 2021-05-06 DIAGNOSIS — Z79.82 LONG TERM (CURRENT) USE OF ASPIRIN: ICD-10-CM

## 2021-05-06 DIAGNOSIS — M16.12 UNILATERAL PRIMARY OSTEOARTHRITIS, LEFT HIP: ICD-10-CM

## 2021-05-06 DIAGNOSIS — E87.5 HYPERKALEMIA: ICD-10-CM

## 2021-05-06 DIAGNOSIS — E11.22 TYPE 2 DIABETES MELLITUS WITH DIABETIC CHRONIC KIDNEY DISEASE: ICD-10-CM

## 2021-05-06 DIAGNOSIS — I12.0 HYPERTENSIVE CHRONIC KIDNEY DISEASE WITH STAGE 5 CHRONIC KIDNEY DISEASE OR END STAGE RENAL DISEASE: ICD-10-CM

## 2021-05-06 DIAGNOSIS — Z86.718 PERSONAL HISTORY OF OTHER VENOUS THROMBOSIS AND EMBOLISM: ICD-10-CM

## 2021-05-06 DIAGNOSIS — Z79.4 LONG TERM (CURRENT) USE OF INSULIN: ICD-10-CM

## 2021-05-06 DIAGNOSIS — E11.649 TYPE 2 DIABETES MELLITUS WITH HYPOGLYCEMIA WITHOUT COMA: ICD-10-CM

## 2021-05-06 DIAGNOSIS — N18.6 END STAGE RENAL DISEASE: ICD-10-CM

## 2021-05-06 DIAGNOSIS — E78.5 HYPERLIPIDEMIA, UNSPECIFIED: ICD-10-CM

## 2021-05-06 DIAGNOSIS — M54.32 SCIATICA, LEFT SIDE: ICD-10-CM

## 2021-05-06 DIAGNOSIS — M10.9 GOUT, UNSPECIFIED: ICD-10-CM

## 2021-05-06 DIAGNOSIS — N17.9 ACUTE KIDNEY FAILURE, UNSPECIFIED: ICD-10-CM

## 2021-05-06 DIAGNOSIS — M51.36 OTHER INTERVERTEBRAL DISC DEGENERATION, LUMBAR REGION: ICD-10-CM

## 2021-05-06 DIAGNOSIS — D63.1 ANEMIA IN CHRONIC KIDNEY DISEASE: ICD-10-CM

## 2021-05-06 DIAGNOSIS — Z95.0 PRESENCE OF CARDIAC PACEMAKER: ICD-10-CM

## 2021-05-06 DIAGNOSIS — E87.2 ACIDOSIS: ICD-10-CM

## 2021-05-06 DIAGNOSIS — N28.1 CYST OF KIDNEY, ACQUIRED: ICD-10-CM

## 2021-05-06 DIAGNOSIS — Z66 DO NOT RESUSCITATE: ICD-10-CM

## 2021-05-06 DIAGNOSIS — E11.65 TYPE 2 DIABETES MELLITUS WITH HYPERGLYCEMIA: ICD-10-CM

## 2021-05-06 DIAGNOSIS — R07.9 CHEST PAIN, UNSPECIFIED: ICD-10-CM

## 2021-05-06 DIAGNOSIS — B35.1 TINEA UNGUIUM: ICD-10-CM

## 2022-02-16 ENCOUNTER — INPATIENT (INPATIENT)
Facility: HOSPITAL | Age: 77
LOS: 13 days | Discharge: HOME | End: 2022-03-02
Attending: HOSPITALIST | Admitting: HOSPITALIST
Payer: MEDICARE

## 2022-02-16 VITALS
TEMPERATURE: 99 F | HEIGHT: 64 IN | SYSTOLIC BLOOD PRESSURE: 129 MMHG | WEIGHT: 179.9 LBS | OXYGEN SATURATION: 86 % | HEART RATE: 61 BPM | RESPIRATION RATE: 24 BRPM | DIASTOLIC BLOOD PRESSURE: 66 MMHG

## 2022-02-16 DIAGNOSIS — K92.2 GASTROINTESTINAL HEMORRHAGE, UNSPECIFIED: Chronic | ICD-10-CM

## 2022-02-16 LAB
ALBUMIN SERPL ELPH-MCNC: 3.2 G/DL — LOW (ref 3.3–5)
ALP SERPL-CCNC: 117 U/L — SIGNIFICANT CHANGE UP (ref 40–120)
ALT FLD-CCNC: 28 U/L — SIGNIFICANT CHANGE UP (ref 12–78)
ANION GAP SERPL CALC-SCNC: 4 MMOL/L — LOW (ref 5–17)
APTT BLD: 25.4 SEC — LOW (ref 27.5–35.5)
AST SERPL-CCNC: 53 U/L — HIGH (ref 15–37)
BASE EXCESS BLDA CALC-SCNC: 15.1 MMOL/L — HIGH (ref -2–3)
BASOPHILS # BLD AUTO: 0.04 K/UL — SIGNIFICANT CHANGE UP (ref 0–0.2)
BASOPHILS NFR BLD AUTO: 0.3 % — SIGNIFICANT CHANGE UP (ref 0–2)
BILIRUB SERPL-MCNC: 0.9 MG/DL — SIGNIFICANT CHANGE UP (ref 0.2–1.2)
BLOOD GAS COMMENTS: SIGNIFICANT CHANGE UP
BLOOD GAS COMMENTS: SIGNIFICANT CHANGE UP
BLOOD GAS SOURCE: SIGNIFICANT CHANGE UP
BUN SERPL-MCNC: 17 MG/DL — SIGNIFICANT CHANGE UP (ref 7–23)
CALCIUM SERPL-MCNC: 8.8 MG/DL — SIGNIFICANT CHANGE UP (ref 8.5–10.1)
CHLORIDE SERPL-SCNC: 101 MMOL/L — SIGNIFICANT CHANGE UP (ref 96–108)
CO2 BLDA-SCNC: 41 MMOL/L — HIGH (ref 19–24)
CO2 SERPL-SCNC: 34 MMOL/L — HIGH (ref 22–31)
CREAT SERPL-MCNC: 3.42 MG/DL — HIGH (ref 0.5–1.3)
D DIMER BLD IA.RAPID-MCNC: 969 NG/ML DDU — HIGH
EOSINOPHIL # BLD AUTO: 0.09 K/UL — SIGNIFICANT CHANGE UP (ref 0–0.5)
EOSINOPHIL NFR BLD AUTO: 0.6 % — SIGNIFICANT CHANGE UP (ref 0–6)
FLUAV AG NPH QL: SIGNIFICANT CHANGE UP
FLUBV AG NPH QL: SIGNIFICANT CHANGE UP
GLUCOSE SERPL-MCNC: 252 MG/DL — HIGH (ref 70–99)
HCO3 BLDA-SCNC: 39 MMOL/L — HIGH (ref 21–28)
HCT VFR BLD CALC: 26.3 % — LOW (ref 34.5–45)
HGB BLD-MCNC: 8.8 G/DL — LOW (ref 11.5–15.5)
HOROWITZ INDEX BLDA+IHG-RTO: 100 — SIGNIFICANT CHANGE UP
IMM GRANULOCYTES NFR BLD AUTO: 0.6 % — SIGNIFICANT CHANGE UP (ref 0–1.5)
INR BLD: 1.01 RATIO — SIGNIFICANT CHANGE UP (ref 0.88–1.16)
LYMPHOCYTES # BLD AUTO: 1.15 K/UL — SIGNIFICANT CHANGE UP (ref 1–3.3)
LYMPHOCYTES # BLD AUTO: 8.2 % — LOW (ref 13–44)
MCHC RBC-ENTMCNC: 30.2 PG — SIGNIFICANT CHANGE UP (ref 27–34)
MCHC RBC-ENTMCNC: 33.5 G/DL — SIGNIFICANT CHANGE UP (ref 32–36)
MCV RBC AUTO: 90.4 FL — SIGNIFICANT CHANGE UP (ref 80–100)
MONOCYTES # BLD AUTO: 0.63 K/UL — SIGNIFICANT CHANGE UP (ref 0–0.9)
MONOCYTES NFR BLD AUTO: 4.5 % — SIGNIFICANT CHANGE UP (ref 2–14)
NEUTROPHILS # BLD AUTO: 12.06 K/UL — HIGH (ref 1.8–7.4)
NEUTROPHILS NFR BLD AUTO: 85.8 % — HIGH (ref 43–77)
NRBC # BLD: 0 /100 WBCS — SIGNIFICANT CHANGE UP (ref 0–0)
NT-PROBNP SERPL-SCNC: HIGH PG/ML (ref 0–450)
PCO2 BLDA: 45 MMHG — SIGNIFICANT CHANGE UP (ref 32–46)
PH BLD: 7.55 — HIGH (ref 7.35–7.45)
PLATELET # BLD AUTO: 163 K/UL — SIGNIFICANT CHANGE UP (ref 150–400)
PO2 BLDA: 138 MMHG — HIGH (ref 83–108)
POTASSIUM SERPL-MCNC: 4.5 MMOL/L — SIGNIFICANT CHANGE UP (ref 3.5–5.3)
POTASSIUM SERPL-SCNC: 4.5 MMOL/L — SIGNIFICANT CHANGE UP (ref 3.5–5.3)
PROT SERPL-MCNC: 8.1 GM/DL — SIGNIFICANT CHANGE UP (ref 6–8.3)
PROTHROM AB SERPL-ACNC: 11.7 SEC — SIGNIFICANT CHANGE UP (ref 10.6–13.6)
RBC # BLD: 2.91 M/UL — LOW (ref 3.8–5.2)
RBC # FLD: 15.3 % — HIGH (ref 10.3–14.5)
SAO2 % BLDA: 98.6 % — HIGH (ref 94–98)
SARS-COV-2 RNA SPEC QL NAA+PROBE: SIGNIFICANT CHANGE UP
SODIUM SERPL-SCNC: 139 MMOL/L — SIGNIFICANT CHANGE UP (ref 135–145)
WBC # BLD: 14.05 K/UL — HIGH (ref 3.8–10.5)
WBC # FLD AUTO: 14.05 K/UL — HIGH (ref 3.8–10.5)

## 2022-02-16 PROCEDURE — 71275 CT ANGIOGRAPHY CHEST: CPT | Mod: 26,MA

## 2022-02-16 PROCEDURE — 93010 ELECTROCARDIOGRAM REPORT: CPT

## 2022-02-16 PROCEDURE — 99285 EMERGENCY DEPT VISIT HI MDM: CPT

## 2022-02-16 PROCEDURE — 99222 1ST HOSP IP/OBS MODERATE 55: CPT

## 2022-02-16 PROCEDURE — 74174 CTA ABD&PLVS W/CONTRAST: CPT | Mod: 26,MA,76

## 2022-02-16 PROCEDURE — 71045 X-RAY EXAM CHEST 1 VIEW: CPT | Mod: 26

## 2022-02-16 RX ORDER — PIPERACILLIN AND TAZOBACTAM 4; .5 G/20ML; G/20ML
3.38 INJECTION, POWDER, LYOPHILIZED, FOR SOLUTION INTRAVENOUS ONCE
Refills: 0 | Status: COMPLETED | OUTPATIENT
Start: 2022-02-16 | End: 2022-02-16

## 2022-02-16 RX ORDER — VANCOMYCIN HCL 1 G
1000 VIAL (EA) INTRAVENOUS ONCE
Refills: 0 | Status: COMPLETED | OUTPATIENT
Start: 2022-02-16 | End: 2022-02-16

## 2022-02-16 RX ORDER — FUROSEMIDE 40 MG
40 TABLET ORAL ONCE
Refills: 0 | Status: COMPLETED | OUTPATIENT
Start: 2022-02-16 | End: 2022-02-17

## 2022-02-16 RX ADMIN — Medication 250 MILLIGRAM(S): at 19:53

## 2022-02-16 RX ADMIN — PIPERACILLIN AND TAZOBACTAM 200 GRAM(S): 4; .5 INJECTION, POWDER, LYOPHILIZED, FOR SOLUTION INTRAVENOUS at 19:53

## 2022-02-16 RX ADMIN — Medication 1000 MILLIGRAM(S): at 22:22

## 2022-02-16 RX ADMIN — PIPERACILLIN AND TAZOBACTAM 3.38 GRAM(S): 4; .5 INJECTION, POWDER, LYOPHILIZED, FOR SOLUTION INTRAVENOUS at 21:50

## 2022-02-16 NOTE — ED PROVIDER NOTE - INTERPRETATION
EKG performed in ED, NSR at 60, anterolateral TWIs, normal intervals EKG performed in ED, NSR at 60, non-specific TW changes, normal intervals

## 2022-02-16 NOTE — ED PROVIDER NOTE - PROGRESS NOTE DETAILS
CTA negative for acute pathology, suspect PNA vs infiltrate on XR, CHF exacerbation  will admit to tele for further care  pt. will need dialysis again given contrast bolus from scans today

## 2022-02-16 NOTE — ED ADULT TRIAGE NOTE - CHIEF COMPLAINT QUOTE
BIBA- from home difficulty breathing  EMS POX 75%RA. NRB 15L applied and %  HX CHF, CRF, DM, HTN, Dialysis M/W/F completed today - left AV fistula and Right ACW permacath

## 2022-02-16 NOTE — ED PROVIDER NOTE - NSICDXPASTMEDICALHX_GEN_ALL_CORE_FT
PAST MEDICAL HISTORY:  CHF (congestive heart failure)     Chronic renal disease     Diverticulitis     DVT (deep venous thrombosis)     HTN (hypertension)     Pacemaker

## 2022-02-16 NOTE — ED ADULT NURSE NOTE - OBJECTIVE STATEMENT
AAOx3, resps labored, skin warm and dry. Pt comes in resp distress, c/o dry cough and SOB since Monday. Pt dialysis (M-W-F) last one completed today, PMHx CHF, HTN. SpO2 75% on RA, placed on non-rebreather at 15 L O2 now SpO2 100%. Sts she feels pain in her rib area when coughing. Denies CP, N/V, fever

## 2022-02-16 NOTE — ED PROVIDER NOTE - CARE PLAN
1 Principal Discharge DX:	Dyspnea   Principal Discharge DX:	Pneumonia  Secondary Diagnosis:	CHF exacerbation

## 2022-02-16 NOTE — ED ADULT NURSE NOTE - CHIEF COMPLAINT QUOTE
Normal vision: sees adequately in most situations; can see medication labels, newsprint
BIBA- from home difficulty breathing  EMS POX 75%RA. NRB 15L applied and %  HX CHF, CRF, DM, HTN, Dialysis M/W/F completed today - left AV fistula and Right ACW permacath

## 2022-02-16 NOTE — ED PROVIDER NOTE - OBJECTIVE STATEMENT
76 years old female by ems c/o chills and sob since two days ago, Pt had hemodialysis 3 1/4 hrs today hx of esrd dialysis M, W, F. Pt denies headache, dizziness, blurred visions, light sensitivities, focal/distal weakness or numbness, neck/back/calfs pain, cough, chest pain, nausea, vomiting, fever, chills, abd pain, or irregular bowel movements.

## 2022-02-16 NOTE — ED ADULT NURSE NOTE - NSIMPLEMENTINTERV_GEN_ALL_ED
Implemented All Fall Risk Interventions:  [place] to call system. Call bell, personal items and telephone within reach. Instruct patient to call for assistance. Room bathroom lighting operational. Non-slip footwear when patient is off stretcher. Physically safe environment: no spills, clutter or unnecessary equipment. Stretcher in lowest position, wheels locked, appropriate side rails in place. Provide visual cue, wrist band, yellow gown, etc. Monitor gait and stability. Monitor for mental status changes and reorient to person, place, and time. Review medications for side effects contributing to fall risk. Reinforce activity limits and safety measures with patient and family.

## 2022-02-17 DIAGNOSIS — E78.5 HYPERLIPIDEMIA, UNSPECIFIED: ICD-10-CM

## 2022-02-17 DIAGNOSIS — I10 ESSENTIAL (PRIMARY) HYPERTENSION: ICD-10-CM

## 2022-02-17 DIAGNOSIS — Z29.9 ENCOUNTER FOR PROPHYLACTIC MEASURES, UNSPECIFIED: ICD-10-CM

## 2022-02-17 DIAGNOSIS — N18.6 END STAGE RENAL DISEASE: ICD-10-CM

## 2022-02-17 DIAGNOSIS — J18.9 PNEUMONIA, UNSPECIFIED ORGANISM: ICD-10-CM

## 2022-02-17 DIAGNOSIS — E11.9 TYPE 2 DIABETES MELLITUS WITHOUT COMPLICATIONS: ICD-10-CM

## 2022-02-17 PROBLEM — I82.409 ACUTE EMBOLISM AND THROMBOSIS OF UNSPECIFIED DEEP VEINS OF UNSPECIFIED LOWER EXTREMITY: Chronic | Status: ACTIVE | Noted: 2021-04-15

## 2022-02-17 PROBLEM — Z95.0 PRESENCE OF CARDIAC PACEMAKER: Chronic | Status: ACTIVE | Noted: 2021-04-15

## 2022-02-17 PROBLEM — K57.92 DIVERTICULITIS OF INTESTINE, PART UNSPECIFIED, WITHOUT PERFORATION OR ABSCESS WITHOUT BLEEDING: Chronic | Status: ACTIVE | Noted: 2021-04-15

## 2022-02-17 LAB
A1C WITH ESTIMATED AVERAGE GLUCOSE RESULT: 6.4 % — HIGH (ref 4–5.6)
APPEARANCE UR: CLEAR — SIGNIFICANT CHANGE UP
BACTERIA # UR AUTO: ABNORMAL
BILIRUB UR-MCNC: NEGATIVE — SIGNIFICANT CHANGE UP
COLOR SPEC: YELLOW — SIGNIFICANT CHANGE UP
DIFF PNL FLD: NEGATIVE — SIGNIFICANT CHANGE UP
EPI CELLS # UR: SIGNIFICANT CHANGE UP
ESTIMATED AVERAGE GLUCOSE: 137 MG/DL — HIGH (ref 68–114)
GLUCOSE BLDC GLUCOMTR-MCNC: 157 MG/DL — HIGH (ref 70–99)
GLUCOSE UR QL: NEGATIVE MG/DL — SIGNIFICANT CHANGE UP
KETONES UR-MCNC: NEGATIVE — SIGNIFICANT CHANGE UP
LEUKOCYTE ESTERASE UR-ACNC: ABNORMAL
NITRITE UR-MCNC: NEGATIVE — SIGNIFICANT CHANGE UP
PH UR: 5 — SIGNIFICANT CHANGE UP (ref 5–8)
PROT UR-MCNC: 30 MG/DL
RBC CASTS # UR COMP ASSIST: SIGNIFICANT CHANGE UP /HPF (ref 0–4)
SP GR SPEC: 1 — LOW (ref 1.01–1.02)
UROBILINOGEN FLD QL: NEGATIVE MG/DL — SIGNIFICANT CHANGE UP
WBC UR QL: SIGNIFICANT CHANGE UP

## 2022-02-17 PROCEDURE — 99497 ADVNCD CARE PLAN 30 MIN: CPT

## 2022-02-17 PROCEDURE — 99233 SBSQ HOSP IP/OBS HIGH 50: CPT

## 2022-02-17 RX ORDER — HYDRALAZINE HCL 50 MG
25 TABLET ORAL THREE TIMES A DAY
Refills: 0 | Status: DISCONTINUED | OUTPATIENT
Start: 2022-02-17 | End: 2022-02-25

## 2022-02-17 RX ORDER — DEXTROSE 50 % IN WATER 50 %
25 SYRINGE (ML) INTRAVENOUS ONCE
Refills: 0 | Status: DISCONTINUED | OUTPATIENT
Start: 2022-02-17 | End: 2022-03-02

## 2022-02-17 RX ORDER — HEPARIN SODIUM 5000 [USP'U]/ML
5000 INJECTION INTRAVENOUS; SUBCUTANEOUS EVERY 12 HOURS
Refills: 0 | Status: DISCONTINUED | OUTPATIENT
Start: 2022-02-17 | End: 2022-03-02

## 2022-02-17 RX ORDER — SODIUM CHLORIDE 9 MG/ML
1000 INJECTION, SOLUTION INTRAVENOUS
Refills: 0 | Status: DISCONTINUED | OUTPATIENT
Start: 2022-02-17 | End: 2022-03-02

## 2022-02-17 RX ORDER — DEXTROSE 50 % IN WATER 50 %
15 SYRINGE (ML) INTRAVENOUS ONCE
Refills: 0 | Status: DISCONTINUED | OUTPATIENT
Start: 2022-02-17 | End: 2022-03-02

## 2022-02-17 RX ORDER — SENNA PLUS 8.6 MG/1
2 TABLET ORAL AT BEDTIME
Refills: 0 | Status: DISCONTINUED | OUTPATIENT
Start: 2022-02-17 | End: 2022-03-02

## 2022-02-17 RX ORDER — SIMVASTATIN 20 MG/1
40 TABLET, FILM COATED ORAL AT BEDTIME
Refills: 0 | Status: DISCONTINUED | OUTPATIENT
Start: 2022-02-17 | End: 2022-03-02

## 2022-02-17 RX ORDER — LABETALOL HCL 100 MG
100 TABLET ORAL
Refills: 0 | Status: DISCONTINUED | OUTPATIENT
Start: 2022-02-17 | End: 2022-03-02

## 2022-02-17 RX ORDER — ALBUTEROL 90 UG/1
2 AEROSOL, METERED ORAL EVERY 6 HOURS
Refills: 0 | Status: COMPLETED | OUTPATIENT
Start: 2022-02-17 | End: 2022-02-20

## 2022-02-17 RX ORDER — GLUCAGON INJECTION, SOLUTION 0.5 MG/.1ML
1 INJECTION, SOLUTION SUBCUTANEOUS ONCE
Refills: 0 | Status: DISCONTINUED | OUTPATIENT
Start: 2022-02-17 | End: 2022-03-02

## 2022-02-17 RX ORDER — DEXTROSE 50 % IN WATER 50 %
12.5 SYRINGE (ML) INTRAVENOUS ONCE
Refills: 0 | Status: DISCONTINUED | OUTPATIENT
Start: 2022-02-17 | End: 2022-03-02

## 2022-02-17 RX ORDER — INSULIN LISPRO 100/ML
VIAL (ML) SUBCUTANEOUS
Refills: 0 | Status: DISCONTINUED | OUTPATIENT
Start: 2022-02-17 | End: 2022-03-02

## 2022-02-17 RX ORDER — PIPERACILLIN AND TAZOBACTAM 4; .5 G/20ML; G/20ML
3.38 INJECTION, POWDER, LYOPHILIZED, FOR SOLUTION INTRAVENOUS EVERY 12 HOURS
Refills: 0 | Status: COMPLETED | OUTPATIENT
Start: 2022-02-17 | End: 2022-02-23

## 2022-02-17 RX ORDER — SEVELAMER CARBONATE 2400 MG/1
800 POWDER, FOR SUSPENSION ORAL
Refills: 0 | Status: DISCONTINUED | OUTPATIENT
Start: 2022-02-17 | End: 2022-03-02

## 2022-02-17 RX ADMIN — Medication 100 MILLIGRAM(S): at 12:17

## 2022-02-17 RX ADMIN — SEVELAMER CARBONATE 800 MILLIGRAM(S): 2400 POWDER, FOR SUSPENSION ORAL at 18:07

## 2022-02-17 RX ADMIN — Medication 25 MILLIGRAM(S): at 06:49

## 2022-02-17 RX ADMIN — SEVELAMER CARBONATE 800 MILLIGRAM(S): 2400 POWDER, FOR SUSPENSION ORAL at 07:59

## 2022-02-17 RX ADMIN — PIPERACILLIN AND TAZOBACTAM 25 GRAM(S): 4; .5 INJECTION, POWDER, LYOPHILIZED, FOR SOLUTION INTRAVENOUS at 07:59

## 2022-02-17 RX ADMIN — SEVELAMER CARBONATE 800 MILLIGRAM(S): 2400 POWDER, FOR SUSPENSION ORAL at 12:17

## 2022-02-17 RX ADMIN — Medication 25 MILLIGRAM(S): at 14:56

## 2022-02-17 RX ADMIN — Medication 4: at 12:16

## 2022-02-17 RX ADMIN — Medication 100 MILLIGRAM(S): at 18:07

## 2022-02-17 RX ADMIN — HEPARIN SODIUM 5000 UNIT(S): 5000 INJECTION INTRAVENOUS; SUBCUTANEOUS at 06:49

## 2022-02-17 RX ADMIN — PIPERACILLIN AND TAZOBACTAM 25 GRAM(S): 4; .5 INJECTION, POWDER, LYOPHILIZED, FOR SOLUTION INTRAVENOUS at 21:38

## 2022-02-17 RX ADMIN — Medication 1: at 07:59

## 2022-02-17 RX ADMIN — Medication 100 MILLIGRAM(S): at 06:50

## 2022-02-17 RX ADMIN — ALBUTEROL 2 PUFF(S): 90 AEROSOL, METERED ORAL at 18:07

## 2022-02-17 RX ADMIN — HEPARIN SODIUM 5000 UNIT(S): 5000 INJECTION INTRAVENOUS; SUBCUTANEOUS at 18:07

## 2022-02-17 RX ADMIN — SIMVASTATIN 40 MILLIGRAM(S): 20 TABLET, FILM COATED ORAL at 21:39

## 2022-02-17 RX ADMIN — Medication 40 MILLIGRAM(S): at 02:53

## 2022-02-17 RX ADMIN — Medication 25 MILLIGRAM(S): at 21:39

## 2022-02-17 NOTE — CHART NOTE - NSCHARTNOTEFT_GEN_A_CORE
pt admitted by nocturnist over night for Pna/ Pulm edema  ESRd on HD    pt seen/ examined- c/o sob  on 6 LNC 94% pulse ox- wean down O2 as tolerated  CTA negative for PE  IV Zosyn  Albuterol MDI  Pulm eval appreciated  check echo  HD in am (M,W,F) seen by renal.     CT also shows adrenal and pancreatic lesion- will need outpt Fu with Gi and Urology.     d/w pt, RN and consultants

## 2022-02-17 NOTE — H&P ADULT - NSHPLABSRESULTS_GEN_ALL_CORE
Recent Vitals  T(C): 37.8 (02-16-22 @ 19:49), Max: 37.8 (02-16-22 @ 19:49)  HR: 60 (02-17-22 @ 00:46) (60 - 62)  BP: 139/55 (02-17-22 @ 00:46) (108/82 - 141/59)  RR: 18 (02-17-22 @ 00:46) (18 - 24)  SpO2: 95% (02-17-22 @ 00:46) (86% - 100%)                        8.8    14.05 )-----------( 163      ( 16 Feb 2022 18:39 )             26.3     02-16    139  |  101  |  17  ----------------------------<  252<H>  4.5   |  34<H>  |  3.42<H>    Ca    8.8      16 Feb 2022 18:39    TPro  8.1  /  Alb  3.2<L>  /  TBili  0.9  /  DBili  x   /  AST  53<H>  /  ALT  28  /  AlkPhos  117  02-16    PT/INR - ( 16 Feb 2022 18:39 )   PT: 11.7 sec;   INR: 1.01 ratio         PTT - ( 16 Feb 2022 18:39 )  PTT:25.4 sec  LIVER FUNCTIONS - ( 16 Feb 2022 18:39 )  Alb: 3.2 g/dL / Pro: 8.1 gm/dL / ALK PHOS: 117 U/L / ALT: 28 U/L / AST: 53 U/L / GGT: x               Home Medications:  acetaminophen 325 mg oral tablet: 2 tab(s) orally every 4 hours, As needed, Temp greater or equal to 38C (100.4F) (03 May 2021 13:50)  aluminum hydroxide-magnesium hydroxide 200 mg-200 mg/5 mL oral suspension: 30 milliliter(s) orally every 6 hours, As needed, Dyspepsia (03 May 2021 13:50)  amLODIPine 10 mg oral tablet: 1 tab(s) orally once a day (03 May 2021 13:50)  cloNIDine 0.3 mg/24 hr transdermal film, extended release: 1 patch transdermal  (03 May 2021 13:50)  epoetin torrie:  (03 May 2021 13:50)  gabapentin 300 mg oral capsule: 1 cap(s) orally once a day (at bedtime) (03 May 2021 13:50)  hydrALAZINE 25 mg oral tablet: 1 tab(s) orally 3 times a day (03 May 2021 13:50)  insulin lispro 100 units/mL injectable solution: Sliding scale (03 May 2021 13:50)  labetalol 100 mg oral tablet: 1 tab(s) orally 3 times a day (03 May 2021 13:50)  lidocaine 5% topical film:  topically  (03 May 2021 13:50)  pantoprazole 40 mg oral delayed release tablet: 1 tab(s) orally once a day (before a meal) (03 May 2021 13:50)  polyethylene glycol 3350 oral powder for reconstitution: 17 gram(s) orally once a day (03 May 2021 13:50)  senna oral tablet: 2 tab(s) orally once a day (at bedtime) (03 May 2021 13:50)  simvastatin 40 mg oral tablet: 1 tab(s) orally once a day (at bedtime) (03 May 2021 13:50)

## 2022-02-17 NOTE — CONSULT NOTE ADULT - SUBJECTIVE AND OBJECTIVE BOX
Patient is a 76y old  Female who presents with a chief complaint of PNA (2022 00:40)    HPI:  76F with HTN, CHF S/P PPM, ESRD (on HD MWF), Diverticulitis, GI bleed and DVT  Presents to the ED for dyspnea.  Patient states that 2 days ago at HD she developed chills and dyspnea during her treatment.  EMS was called for her however she refused to go to a hospital.  Patient states that yesterday she stayed home and rested all day, felt unwell.  Went  for HD treatment today and EMS was called for her again as she looked even worse.  Admits to subjective fever but has never taken her temperature.  States that she has had a dry cough for > 7 days, also has associated rib pain with coughing.    Hypoxic on RA to 75% with EMS, and 86% in ED.  Leukocytosis on blood work.  Never smoked.  Admitted for possible pneumonia.    PAST MEDICAL & SURGICAL HISTORY:  HTN (hypertension)    Pacemaker    DVT (deep venous thrombosis)    Diverticulitis    CHF (congestive heart failure)    ESRD on dialysis    Intestinal bleeding    FAMILY HISTORY:  FH: HTN (hypertension)    SOCIAL HISTORY: BMI (kg/m2): 30.9 ( @ 05:20). non smoker    Allergies  Eliquis (Other)    MEDICATIONS  (STANDING):  dextrose 40% Gel 15 Gram(s) Oral once  dextrose 5%. 1000 milliLiter(s) (50 mL/Hr) IV Continuous <Continuous>  dextrose 5%. 1000 milliLiter(s) (100 mL/Hr) IV Continuous <Continuous>  dextrose 50% Injectable 25 Gram(s) IV Push once  dextrose 50% Injectable 12.5 Gram(s) IV Push once  dextrose 50% Injectable 25 Gram(s) IV Push once  glucagon  Injectable 1 milliGRAM(s) IntraMuscular once  heparin   Injectable 5000 Unit(s) SubCutaneous every 12 hours  hydrALAZINE 25 milliGRAM(s) Oral three times a day  insulin lispro (ADMELOG) corrective regimen sliding scale   SubCutaneous three times a day before meals  labetalol 100 milliGRAM(s) Oral two times a day  piperacillin/tazobactam IVPB.. 3.375 Gram(s) IV Intermittent every 12 hours  senna 2 Tablet(s) Oral at bedtime  sevelamer carbonate 800 milliGRAM(s) Oral three times a day with meals  simvastatin 40 milliGRAM(s) Oral at bedtime    MEDICATIONS  (PRN):  guaiFENesin Oral Liquid (Sugar-Free) 100 milliGRAM(s) Oral every 6 hours PRN Cough    REVIEW OF SYSTEMS:    Constitutional:            subjective fever,  fatigue  HEENT:                       No difficulty hearing, tinnitus, vertigo; No sinus or throat pain  Respiratory:                SOB, dry cough  Cardiovascular:           No chest pain, palpitations  Gastrointestinal:        No abdominal or epigastric pain. No N/V/diarrhea or hematemesis  Genitourinary:            No dysuria, frequency, hematuria or incontinence  SKIN:                             no rash  Musculoskeletal:        No joint pain or swelling  Extremities:                No swelling  Neurological:              No headaches  Psychiatric:                 No depression, anxiety    MACRA & MIPS:  Vaccines - Influenza: yes and Pneumovax: yes  Tobacco:  Blood Pressure Screening / Control of: 131/60  Current Medications Reviewed: yes    Vital Signs Last 24 Hrs  T(C): 37.1 (2022 10:52), Max: 37.8 (2022 19:49)  T(F): 98.8 (2022 10:52), Max: 100 (2022 19:49)  HR: 60 (2022 10:52) (60 - 76)  BP: 131/60 (2022 10:52) (108/82 - 173/66)  BP(mean): --  RR: 20 (2022 10:52) (18 - 24)  SpO2: 94% (2022 10:52) (86% - 100%)    PHYSICAL EXAM:  GEN:         Awake, responsive dyspneic.  HEENT:     Normal.    RESP:       Decreased air entry.  CVS:          Regular rate and rhythm.   ABD:         Soft, non-tender, non-distended;   SKIN:           Warm and dry.  EXTR:            No clubbing, cyanosis or edema  CNS:              Intact sensory and motor function.  PSYCH:        cooperative, no anxiety or depression    LABS:                        8.8    14.05 )-----------( 163      ( 2022 18:39 )             26.3     02-16    139  |  101  |  17  ----------------------------<  252<H>  4.5   |  34<H>  |  3.42<H>    Ca    8.8      2022 18:39    TPro  8.1  /  Alb  3.2<L>  /  TBili  0.9  /  DBili  x   /  AST  53<H>  /  ALT  28  /  AlkPhos  117      PT/INR - ( 2022 18:39 )   PT: 11.7 sec;   INR: 1.01 ratio      PTT - ( 2022 18:39 )  PTT:25.4 sec   @ 18:37  pH: 7.55  pCO2: 45  pO2: 138  SaO2: 98.6    Urinalysis Basic - ( 2022 08:45 )    Color: Yellow / Appearance: Clear / S.005 / pH: x  Gluc: x / Ketone: Negative  / Bili: Negative / Urobili: Negative mg/dL   Blood: x / Protein: 30 mg/dL / Nitrite: Negative   Leuk Esterase: Small / RBC: 0-2 /HPF / WBC 3-5   Sq Epi: x / Non Sq Epi: Few / Bacteria: Few    EKG: Pacer rhythm    RADIOLOGY & ADDITIONAL STUDIES:  < from: CT Angio Chest PE Protocol w/ IV Cont (22 @ 21:57) >  ACC: [id number] EXAM:  CT ANGIO ABD PELV (W)AW IC                        ACC: [id number] EXAM:  CT ANGIO CHEST PULM ART [facility]                          PROCEDURE DATE:  2022      INTERPRETATION:  CLINICAL INFORMATION: 75% pulse ox, shortness of breath,   Chills, hemodialysis, evaluate for pulmonary embolism , GI bleeding,   evaluate for active bleeding    COMPARISON: None.    CONTRAST/COMPLICATIONS:  IV Contrast: IV contrast documented in associated exam (accession   [id number]), Omnipaque 350 (accession [id number])  120 cc administered   80   cc discarded  Oral Contrast: NONE  Complications: None reported at time of study completion    PROCEDURE:  CT Angiography of the Chest.  GIB triple phase study of the abdomen and   pelvis. Sagittal and coronal reformats were performed as well as 3D (MIP)   reconstructions.    FINDINGS:  CHEST:  LUNGS AND LARGE AIRWAYS: Patent central airways. Scattered areas of   groundglass attenuation with interlobular septal thickening suggests   interstitial primary edema. Bilateral lower lobe subsegmental atelectasis.  PLEURA: Mild right pleural effusion and trace left pleural effusion.  Pleural effusion.  VESSELS: Pulmonary arterial opacification is adequate. There is no   pulmonary embolism. The main pulmonary artery is dilated, measuring 3.9   cm, suggesting pulmonary arterial hypertension. No aortic dissection.   Atherosclerotic calcification of the thoracic aorta.  HEART: Heart size is enlarged.  Trace pericardial effusion.  MEDIASTINUM AND PA: No lymphadenopathy. Pacemaker wires in the right   atrium and right ventricle.  CHEST WALL AND LOWER NECK: Within normal limits.    ABDOMEN AND PELVIS:  Evaluation of the solid abdominal organs is limited without IV contrast.    LIVER: Within normallimits.  BILE DUCTS: Normal caliber.  GALLBLADDER: Within normal limits.  SPLEEN: Within normal limits.  PANCREAS: There is a 1.1 cm hypodense lesion in the posterior aspect of   the body of the pancreas which may be further characterized with MRI/MRCP   if there are no prior studies for comparison.  ADRENALS: There is a 2.9 x 2.2 cm hypodense nodule in the left adrenal   gland.  KIDNEYS/URETERS: There is no hydronephrosis or obstructing stone. There   are numerous bilateral simple and complex cysts. Evaluation for subtle   renal mass is limited without contrast. There is a 2.1 cm left posterior   exophytic complex cyst.    BLADDER: Decompressed.  REPRODUCTIVE ORGANS: Calcified fibroids.    BOWEL: The site of active GI bleeding is not elucidated on this study.   There is no intraluminal accumulation of contrast. No bowel obstruction.   Appendix is not visualized. The patient is status post subtotal colectomy   with colorectal anastomosis in the lower abdomen. No focal bowel wall   thickening.  PERITONEUM: No ascites.  VESSELS: The aorta is not dilated. The celiac axis, superior mesenteric   artery and bilateral renal arteries are widely patent. There are 2 left   renal arteries. Scattered diffuse atherosclerotic irregularity. Mild   atherosclerotic vascular calcification. IVC the filter in place.    RETROPERITONEUM/LYMPH NODES: No lymphadenopathy.  ABDOMINAL WALL: Laparotomy incisional scar.    BONES: The bones are diffusely osteopenic. Chronic degenerative changes   of the spine.Chronic degenerative changes of the spine. Moderate left   hip DJD with bulky osteophytes.    IMPRESSION:    No pulmonary embolism.  Mild interstitial pulmonary edema.  Mild right, trace left pleural   effusions.  Cardiomegaly.  Bilateral lower lobe subsegmental atelectasis.  Dilated main pulmonary artery suggests pulmonary arterial hypertension.    The site of active GI bleeding is not elucidated on this study.  Status post subtotal colectomy with lower abdominal anastomosis.    Indeterminate 2.9 x 2.2 cm left adrenal low-density nodule probably   represents an adenoma. It may be further characterized with MRI.  Indeterminate 1.1 cm hypodense lesion in the posterior body of the   pancreas. If there are no prior studies for comparison, MRI/MRCP may be   obtained.    MAGGI ROMERO MD; Attending Radiologist  This document has been electronically signed. 2022 10:52PM  < from: TTE Echo Complete w/o Contrast w/ Doppler (21 @ 19:20) >   EXAM:  ECHO TTE WO CON COMP W DOPP      PROCEDURE DATE:  2021      INTERPRETATION:  REPORT:  TRANSTHORACIC ECHOCARDIOGRAM REPORT    Patient Name:   CLAUDETTE STKITTS Patient Location: [facility] Rec #:  [id number]        Accession #:      [id number]  Account #:                        Height:           63.8 in 162.0 cm  YOB: 1945         Weight:           189.6 lb 86.00 kg  Patient Age:    75 years          BSA:              1.91 m²  Patient Gender: F BP:               156/61 mmHg    Date of Exam:        2021 7:20:08 PM  Sonographer:         LUIGI  Referring Physician: MARLENI    Procedure:     2D Echo/Doppler/Color Doppler Complete.  Indications:   Chest pain, unspecified - R07.9  Diagnosis:     Mitral Regurgitation - I34.0  Study Details: Technically limited study. Study quality was adversely affected                 due to the patient being uncooperative.    2D AND M-MODE MEASUREMENTS (normal ranges within parentheses):  Left Ventricle:                  Normal   Aorta/Left Atrium:          Normal  IVSd (2D):              1.01 cm (0.7-1.1) Aortic Root (2D):  3.12 cm (2.4-3.7)  LVPWd (2D):             0.92 cm (0.7-1.1) Left Atrium (2D):  3.53 cm (1.9-4.0)  LVIDd (2D):  5.09 cm (3.4-5.7) Right Ventricle:  LVIDs (2D):             3.14 cm           TAPSE:           2.04 cm  LV FS (2D):             38.4 %   (>25%)  Relative Wall Thickness  0.36    (<0.42)    LV DIASTOLIC FUNCTION:  MV Peak E: 0.86 m/s Decel Time:  200 msec  MV Peak A: 1.06 m/s Septal E/e'  12.3  E/A Ratio: 0.81     Lateral E/e' 6.7  Septal e'  0.1 m/s  Lateral e' 0.1 m/s    SPECTRAL DOPPLER ANALYSIS (where applicable):  Mitral Valve:  MV P1/2 Time: 57.91 msec  MV Area, PHT: 3.80 cm²    Aortic Valve: AoV Max Miguel: 2.21 m/s AoV Peak P.6 mmHg AoV Mean P.2 mmHg    LVOT Vmax: 1.92 m/s LVOT VTI: 0.353 m LVOT Diameter: 2.12 cm    AoV Area, Vmax: 3.06 cm² AoV Area, VTI: 2.81 cm² AoV Area, Vmn: 2.39 cm²    Tricuspid Valve and PA/RV Systolic Pressure: TR Max Velocity: 2.89 m/s RA Pressure: 5 mmHg RVSP/PASP: 38.4 mmHg    PHYSICIAN INTERPRETATION:  Left Ventricle: Normal left ventricular size and wall thicknesses, with normal systolic and diastolic function.  Spectral Doppler shows impairedrelaxation pattern of left ventricular myocardial filling (Grade I diastolic dysfunction). Normal LV filling pressures.  Right Ventricle: Normal right ventricular size and function.  Left Atrium: The left atrium is normal in size.  Right Atrium: The right atrium is normal in size.  Pericardium: Trivial pericardial effusion is present. The pericardial effusion is localized near the right atrium and localized near the right ventricle.  Mitral Valve: Structurally normal mitral valve, with normal leaflet excursion. Mild mitral valve regurgitation is seen.  Tricuspid Valve: Structurally normal tricuspid valve, with normal leaflet excursion. Mild tricuspid regurgitation is visualized. Estimated pulmonary artery systolic pressure is 38.4 mmHg assuming a right atrial pressure of 5 mmHg, which is consistent with mild pulmonary hypertension.  Aortic Valve: Normal trileaflet aortic valve with normal opening. Peak transaortic gradient equals 19.6 mmHg, mean transaortic gradient equals 9.2 mmHg, the calculated aortic valve area equals 2.81 cm² by the continuity equation consistent with normally opening aortic valve. No evidence of aortic valve regurgitation is seen.  Pulmonic Valve: Structurally normal pulmonic valve, with normal leaflet excursion. Trace pulmonic valve regurgitation.  Aorta: The aortic root and ascending aorta are structurally normal, with no evidence of dilitation.  Pulmonary Artery: The main pulmonary artery is normal in size.  Additional Comments: A pacer wire is visualizedin the right atrium and right ventricle.    Summary:   1. Spectral Doppler shows impaired relaxation pattern of left ventricular myocardial filling (Grade I diastolic dysfunction).   2. Trivial pericardial effusion.   3. Mild mitral valve regurgitation.   4. Mild tricuspid regurgitation.   5. Estimated pulmonary artery systolic pressure is 38.4 mmHg assuming a right atrial pressure of 5 mmHg, which is consistent with mild pulmonary hypertension.      [id number] Memo Fischer MD, [facility]  Electronically signed on 2021 at 9:46:11 PM    MEMO FISCHER MD; Attending Cardiologist  This document has been electronically signed. 2021  7:20PM    ASSESSMENT AND PLAN:  ·	Acute Respiratory distress.  ·	Hypoxia.  ·	Suspected Pneumonia.  ·	Leukocytosis.  ·	ESRD, on HD.  ·	Anemia.  ·	CHF hx, S/P PPM.  ·	HTN.    SPO2 93% on 5 litre nasal O2.  No PE on CTA.  No large consolidation.  Continue O2.  On empiric antibiotics.  Will repeat Echo.

## 2022-02-17 NOTE — H&P ADULT - NSHPPHYSICALEXAM_GEN_ALL_CORE
Physical exam:  General: patient in no acute distress, resting comfortably  Head:  Atraumatic, Normocephalic  Eyes: EOMI, PERRLA, clear sclera  Neck: Supple, thyroid nontender, non enlarged  Cardio: S1/S2 +ve, regular rate and rhythm, no M/G/R  Resp: mild rales, mid to upper zones  GI: abdomen soft, nontender, non distended, no guarding, BS +ve x 4  Ext: no significant pedal edema  Neuro: CN 2-12 intact, no significant motor or sensory deficits.  Skin: No rashes or lesions

## 2022-02-17 NOTE — H&P ADULT - HISTORY OF PRESENT ILLNESS
Patient is a 76F with a PMH of ESRD on HD MWF, HTN, diverticulitis who presents to the ED for dyspnea.  Patient states that 2 days ago at HD she developed chills and dyspnea during her treatment.  EMS was called for her however she refused to go to a hospital.  Patient states that yesterday she stayed home and rested all day, felt unwell.  Went to for HD treatment today and EMS was called for her again as she looked even worse.  Patient admits to subjective fever but has never taken her temperature.  States that she has had a dry cough for > 7 days, also has associated rib pain with coughing.  No other complaints.  Hypoxic on RA to 75%, SpO2 currently 97% on 3L via NC.  Labs show leukocytosis.  Will admit to tele.

## 2022-02-17 NOTE — H&P ADULT - PROBLEM/PLAN-4
Pt is concerned because her sister saw on Facebook that metformin causes cancer. Writer explained to the pt that there are certain lot numbers of metformin that have been recalled.  Writer advised for pt to contact her pharmacy to ask if her lot number was affected. Pt verbalized understanding and will call her pharmacy.   DISPLAY PLAN FREE TEXT

## 2022-02-17 NOTE — PROGRESS NOTE ADULT - SUBJECTIVE AND OBJECTIVE BOX
CHIEF COMPLAINT/INTERVAL HISTORY:    Patient is a 76y old  Female who presents with a chief complaint of PNA (2022 13:19)      HPI:  Patient is a 76F with a PMH of ESRD on HD MWF, HTN, diverticulitis who presents to the ED for dyspnea.  Patient states that 2 days ago at HD she developed chills and dyspnea during her treatment.  EMS was called for her however she refused to go to a hospital.  Patient states that yesterday she stayed home and rested all day, felt unwell.  Went to for HD treatment today and EMS was called for her again as she looked even worse.  Patient admits to subjective fever but has never taken her temperature.  States that she has had a dry cough for > 7 days, also has associated rib pain with coughing.  No other complaints.  Hypoxic on RA to 75%, SpO2 currently 97% on 3L via NC.  Labs show leukocytosis.  Will admit to tele.  (2022 00:40)      SUBJECTIVE & OBJECTIVE: Pt seen and examined at bedside. c/o SOB  denies CP, N/V/abd pain or any other c/o     Vital Signs Last 24 Hrs  T(C): 37.1 (2022 10:52), Max: 37.8 (2022 19:49)  T(F): 98.8 (2022 10:52), Max: 100 (2022 19:49)  HR: 60 (2022 10:52) (60 - 76)  BP: 131/60 (2022 10:52) (108/82 - 173/66)  BP(mean): --  ABP: --  ABP(mean): --  RR: 20 (2022 10:52) (18 - 24)  SpO2: 94% (2022 10:52) (86% - 100%)        MEDICATIONS  (STANDING):  ALBUTerol    90 MICROgram(s) HFA Inhaler 2 Puff(s) Inhalation every 6 hours  dextrose 40% Gel 15 Gram(s) Oral once  dextrose 5%. 1000 milliLiter(s) (50 mL/Hr) IV Continuous <Continuous>  dextrose 5%. 1000 milliLiter(s) (100 mL/Hr) IV Continuous <Continuous>  dextrose 50% Injectable 25 Gram(s) IV Push once  dextrose 50% Injectable 12.5 Gram(s) IV Push once  dextrose 50% Injectable 25 Gram(s) IV Push once  glucagon  Injectable 1 milliGRAM(s) IntraMuscular once  heparin   Injectable 5000 Unit(s) SubCutaneous every 12 hours  hydrALAZINE 25 milliGRAM(s) Oral three times a day  insulin lispro (ADMELOG) corrective regimen sliding scale   SubCutaneous three times a day before meals  labetalol 100 milliGRAM(s) Oral two times a day  piperacillin/tazobactam IVPB.. 3.375 Gram(s) IV Intermittent every 12 hours  senna 2 Tablet(s) Oral at bedtime  sevelamer carbonate 800 milliGRAM(s) Oral three times a day with meals  simvastatin 40 milliGRAM(s) Oral at bedtime    MEDICATIONS  (PRN):  guaiFENesin Oral Liquid (Sugar-Free) 100 milliGRAM(s) Oral every 6 hours PRN Cough        PHYSICAL EXAM:    GENERAL: NAD, well-developed  HEAD:  Atraumatic, Normocephalic  EYES: EOMI, PERRLA, conjunctiva and sclera clear  ENMT: Moist mucous membranes  NECK: Supple, No JVD  NERVOUS SYSTEM:  Alert & Oriented X3, normal speech  CHEST/LUNG: Clear to auscultation bilaterally; decreased bs at lung bases b/l  HEART: Regular rate and rhythm;  ABDOMEN: Soft, Nontender, Nondistended; Bowel sounds present  EXTREMITIES: no cyanosis, or edema    LABS:                        8.8    14.05 )-----------( 163      ( 2022 18:39 )             26.3     02-16    139  |  101  |  17  ----------------------------<  252<H>  4.5   |  34<H>  |  3.42<H>    Ca    8.8      2022 18:39    TPro  8.1  /  Alb  3.2<L>  /  TBili  0.9  /  DBili  x   /  AST  53<H>  /  ALT  28  /  AlkPhos  117  02-16    PT/INR - ( 2022 18:39 )   PT: 11.7 sec;   INR: 1.01 ratio         PTT - ( 2022 18:39 )  PTT:25.4 sec  Urinalysis Basic - ( 2022 08:45 )    Color: Yellow / Appearance: Clear / S.005 / pH: x  Gluc: x / Ketone: Negative  / Bili: Negative / Urobili: Negative mg/dL   Blood: x / Protein: 30 mg/dL / Nitrite: Negative   Leuk Esterase: Small / RBC: 0-2 /HPF / WBC 3-5   Sq Epi: x / Non Sq Epi: Few / Bacteria: Few        CAPILLARY BLOOD GLUCOSE      POCT Blood Glucose.: 328 mg/dL (2022 12:13)  POCT Blood Glucose.: 157 mg/dL (2022 07:57)      labs/imaging reviewed

## 2022-02-17 NOTE — H&P ADULT - PROBLEM SELECTOR PLAN 1
Started on Zosyn and Vanco in the ED, will continue.  Follow blood cultures - de-escalate antibiotics as needed

## 2022-02-17 NOTE — CONSULT NOTE ADULT - SUBJECTIVE AND OBJECTIVE BOX
NEPHROLOGY CONSULTATION    CHIEF COMPLAINT:  ESRD      HPI:  She has been on dialysis since May 2021 and comes to ER because she has had chills and dyspnea for 2 days and dry cough for one week.  Dyspnea not relieved by dialysis on Monday or Wednesday.    ROS:  denies fever, chest pain      PAST MEDICAL & SURGICAL HISTORY:  HTN (hypertension)  Pacemaker  DVT (deep venous thrombosis)  Diverticulitis  CHF (congestive heart failure)  ESRD on dialysis  Intestinal bleeding      SOCIAL HISTORY:  NA    FAMILY HISTORY:  NA    MEDICATIONS  (STANDING):  dextrose 40% Gel 15 Gram(s) Oral once  dextrose 5%. 1000 milliLiter(s) (50 mL/Hr) IV Continuous <Continuous>  dextrose 5%. 1000 milliLiter(s) (100 mL/Hr) IV Continuous <Continuous>  dextrose 50% Injectable 25 Gram(s) IV Push once  dextrose 50% Injectable 12.5 Gram(s) IV Push once  dextrose 50% Injectable 25 Gram(s) IV Push once  glucagon  Injectable 1 milliGRAM(s) IntraMuscular once  heparin   Injectable 5000 Unit(s) SubCutaneous every 12 hours  hydrALAZINE 25 milliGRAM(s) Oral three times a day  insulin lispro (ADMELOG) corrective regimen sliding scale   SubCutaneous three times a day before meals  labetalol 100 milliGRAM(s) Oral two times a day  piperacillin/tazobactam IVPB.. 3.375 Gram(s) IV Intermittent every 12 hours  senna 2 Tablet(s) Oral at bedtime  sevelamer carbonate 800 milliGRAM(s) Oral three times a day with meals  simvastatin 40 milliGRAM(s) Oral at bedtime      PHYSICAL EXAMINATION:  T(F): 98.8 (22 @ 10:52)  HR: 60 (22 @ 10:52)  BP: 131/60 (22 @ 10:52)  RR: 20 (22 @ 10:52)  SpO2: 94% (22 @ 10:52)  Conversant, no apparent distress  PERRLA, pink conjunctivae, no ptosis  Good dentition, no pharyngeal erythema  Neck non tender, no mass, no thyromegaly or nodules  Increased respiratory effort, lungs basilar rales  Heart with RRR, no murmurs or rubs, no peripheral edema  Abdomen soft, no masses, no organomegaly  Skin no rashes, ulcers or lesions, normal turgor and temperature  Appropriate affect, AO x 3    LABS:                        8.8    14.05 )-----------( 163      ( 2022 18:39 )             26.3     02-16    139  |  101  |  17  ----------------------------<  252<H>  4.5   |  34<H>  |  3.42<H>    Ca    8.8      2022 18:39    TPro  8.1  /  Alb  3.2<L>  /  TBili  0.9  /  DBili  x   /  AST  53<H>  /  ALT  28  /  AlkPhos  117  02-    Urinalysis Basic - ( 2022 08:45 )  Color: Yellow / Appearance: Clear / S.005 / pH: x  Gluc: x / Ketone: Negative  / Bili: Negative / Urobili: Negative mg/dL   Blood: x / Protein: 30 mg/dL / Nitrite: Negative   Leuk Esterase: Small / RBC: 0-2 /HPF / WBC 3-5   Sq Epi: x / Non Sq Epi: Few / Bacteria: Few    pro BNP 12,029      RADIOLOGY:  CT shows mild interstitial pulmonary edema, no PE, dilated PA suggestive of pulmonary hypertension        ASSESSMENT:  1.  Dyspnea - the data and imaging is more c/w heart failure, though leukocytosis and subjective fever suggest a lung infection;    2.  ESRD on hemodialysis    PLAN:  Antibiotics  Will consider dialysis treatment today versus tomorrow;  usually MWF  Consider checking for covid again                     NEPHROLOGY CONSULTATION    CHIEF COMPLAINT:  ESRD      HPI:  She has been on dialysis since May 2021 and comes to ER because she has had chills and dyspnea for 2 days and dry cough for one week.  Dyspnea not relieved by dialysis on Monday or Wednesday.    ROS:  denies fever, chest pain      PAST MEDICAL & SURGICAL HISTORY:  HTN (hypertension)  Pacemaker  DVT (deep venous thrombosis)  Diverticulitis  CHF (congestive heart failure)  ESRD on dialysis  Intestinal bleeding      SOCIAL HISTORY:  NA    FAMILY HISTORY:  NA    MEDICATIONS  (STANDING):  dextrose 40% Gel 15 Gram(s) Oral once  dextrose 5%. 1000 milliLiter(s) (50 mL/Hr) IV Continuous <Continuous>  dextrose 5%. 1000 milliLiter(s) (100 mL/Hr) IV Continuous <Continuous>  dextrose 50% Injectable 25 Gram(s) IV Push once  dextrose 50% Injectable 12.5 Gram(s) IV Push once  dextrose 50% Injectable 25 Gram(s) IV Push once  glucagon  Injectable 1 milliGRAM(s) IntraMuscular once  heparin   Injectable 5000 Unit(s) SubCutaneous every 12 hours  hydrALAZINE 25 milliGRAM(s) Oral three times a day  insulin lispro (ADMELOG) corrective regimen sliding scale   SubCutaneous three times a day before meals  labetalol 100 milliGRAM(s) Oral two times a day  piperacillin/tazobactam IVPB.. 3.375 Gram(s) IV Intermittent every 12 hours  senna 2 Tablet(s) Oral at bedtime  sevelamer carbonate 800 milliGRAM(s) Oral three times a day with meals  simvastatin 40 milliGRAM(s) Oral at bedtime      PHYSICAL EXAMINATION:  T(F): 98.8 (22 @ 10:52)  HR: 60 (22 @ 10:52)  BP: 131/60 (22 @ 10:52)  RR: 20 (22 @ 10:52)  SpO2: 94% (22 @ 10:52)  Conversant, no apparent distress  PERRLA, pink conjunctivae, no ptosis  Good dentition, no pharyngeal erythema  Neck non tender, no mass, no thyromegaly or nodules  Increased respiratory effort, lungs basilar rales  Heart with RRR, no murmurs or rubs, no peripheral edema  Abdomen soft, no masses, no organomegaly  Skin no rashes, ulcers or lesions, normal turgor and temperature  Appropriate affect, AO x 3    LABS:                        8.8    14.05 )-----------( 163      ( 2022 18:39 )             26.3     02-16    139  |  101  |  17  ----------------------------<  252<H>  4.5   |  34<H>  |  3.42<H>    Ca    8.8      2022 18:39    TPro  8.1  /  Alb  3.2<L>  /  TBili  0.9  /  DBili  x   /  AST  53<H>  /  ALT  28  /  AlkPhos  117  02-16    Urinalysis Basic - ( 2022 08:45 )  Color: Yellow / Appearance: Clear / S.005 / pH: x  Gluc: x / Ketone: Negative  / Bili: Negative / Urobili: Negative mg/dL   Blood: x / Protein: 30 mg/dL / Nitrite: Negative   Leuk Esterase: Small / RBC: 0-2 /HPF / WBC 3-5   Sq Epi: x / Non Sq Epi: Few / Bacteria: Few    pro BNP 12,029      RADIOLOGY:  CT shows mild interstitial pulmonary edema, no PE, dilated PA suggestive of pulmonary hypertension.  Bilateral lower lobe atelectasis versus infiltrate        ASSESSMENT:  1.  Dyspnea - CHF and/or pneumonia  2.  ESRD on hemodialysis    PLAN:  Antibiotics  I offered her a dialysis session today but she prefers AM  Consider checking for covid again

## 2022-02-17 NOTE — PATIENT PROFILE ADULT - FALL HARM RISK - HARM RISK INTERVENTIONS

## 2022-02-17 NOTE — GOALS OF CARE CONVERSATION - ADVANCED CARE PLANNING - CONVERSATION DETAILS
Patient said she filled out a MOLST in April and said she does not want to be resuscitated and "wants to go to [facility]".  Does not want any tubes to keep her alive.  Would be agreeable to trial of NIV if needed.  MOLST completed indicating DNR/I/NO feeding tube

## 2022-02-17 NOTE — H&P ADULT - NSICDXPASTMEDICALHX_GEN_ALL_CORE_FT
PAST MEDICAL HISTORY:  CHF (congestive heart failure)     Diverticulitis     DVT (deep venous thrombosis)     ESRD on dialysis     HTN (hypertension)     Pacemaker

## 2022-02-17 NOTE — H&P ADULT - NSHPREVIEWOFSYSTEMS_GEN_ALL_CORE
Constitutional: fever, chills positive.  No night sweats  Ears: no hearing changes or ear pain,   Nose: no nasal congestion, sinus pain, or rhinorrhea  Cardio: no chest pain, orthopnea, edema, or palpitations  Resp: dyspnea, cough positive.  No wheezing  GI: no nausea, vomiting, diarrhea, constipation, hematochezia, or melena  : no dysuria, urinary frequency, hematuria  MSK: no back pain, neck pain  Skin: no rash, pruritis   Neuro: no weakness, dizziness, lightheadedness, syncope   Heme/Lymph: no bruising or bleeding

## 2022-02-17 NOTE — ED ADULT NURSE REASSESSMENT NOTE - NS ED NURSE REASSESS COMMENT FT1
Resting in bed, respirations even and unlabored. Pt tolerating 6L nasal cannula. NAD noted.
Pt A&ox4, resting in bed with cardiac monitoring ongoing, SPO2 100% on 15L non-rebreather. Respirations even and unlabored and pt speaking in full sentences.

## 2022-02-17 NOTE — H&P ADULT - ASSESSMENT
Patient is a 76F with a PMH of ESRD on HD MWF, HTN, diverticulitis who presents to the ED for dyspnea.  Patient states that 2 days ago at HD she developed chills and dyspnea during her treatment.  EMS was called for her however she refused to go to a hospital.  Patient states that yesterday she stayed home and rested all day, felt unwell.  Went to for HD treatment today and EMS was called for her again as she looked even worse.  Patient admits to subjective fever but has never taken her temperature.  States that she has had a dry cough for > 7 days, also has associated rib pain with coughing.  No other complaints.  Hypoxic on RA to 75%, SpO2 currently 97% on 3L via NC.  Labs show leukocytosis.  Will admit to tele.     IMPROVE VTE Individual Risk Assessment          RISK                                                          Points  [  ] Previous VTE                                                3  [  ] Thrombophilia                                             2  [  ] Lower limb paralysis                                    2        (unable to hold up >15 seconds)    [  ] Current Cancer                                             2         (within 6 months)  [  ] Immobilization > 24 hrs                              1  [  ] ICU/CCU stay > 24 hours                            1  [  ] Age > 60                                                    1    IMPROVE VTE Score - 1

## 2022-02-17 NOTE — H&P ADULT - PROBLEM/PLAN-1
SUBJECTIVE:   Fallon Pizano is a 52 year old female who presents to clinic today for the following health issues:              Problem list and histories reviewed & adjusted, as indicated.  Additional history:     eviewed and updated as needed this visit by clinical staff  Tobacco  Allergies  Meds  Med Hx  Surg Hx  Fam Hx  Soc Hx      Reviewed and updated as needed this visit by Provider         Champ Sam     DISPLAY PLAN FREE TEXT

## 2022-02-17 NOTE — CONSULT NOTE ADULT - SUBJECTIVE AND OBJECTIVE BOX
UROLOGY CONSULT NOTE    Patient is a 76y old  Female who presents with a chief complaint of PNA (2022 14:37)      HPI:  Patient is a 76F with a PMH of ESRD on HD MWF, HTN, diverticulitis who presents to the ED for dyspnea.  Patient states that 2 days ago at HD she developed chills and dyspnea during her treatment.  EMS was called for her however she refused to go to a hospital.  Patient states that yesterday she stayed home and rested all day, felt unwell.  Went to for HD treatment today and EMS was called for her again as she looked even worse.  Patient admits to subjective fever but has never taken her temperature.  States that she has had a dry cough for > 7 days, also has associated rib pain with coughing.  No other complaints.  Hypoxic on RA to 75%, SpO2 currently 97% on 3L via NC.  Labs show leukocytosis.  Will admit to tele.  (2022 00:40)    Urology consulted for incidental finding of L adrenal mass on CT. Patient is a 76F with a PMH of ESRD on HD MWF, HTN, diverticulitis who presented to the ED for dyspnea. During the course of her stay she had a CT angio done of chest, abd, pelvis which shows incidental finding of 2.9cmx2.2cm L adrenal mass. Patient states that she has never been told of this before. Has a hx of DM, seeing endocrinologist. Long history of hypertension, now well controlled with medication. Patient is currently on dialysis MWF. No cushingoid symptoms on exam.      PAST MEDICAL & SURGICAL HISTORY:  HTN (hypertension)  Pacemaker  DVT (deep venous thrombosis)  Diverticulitis  CHF (congestive heart failure)  ESRD on dialysis  Intestinal bleeding    Review of Systems:      MEDICATIONS  (STANDING):  ALBUTerol    90 MICROgram(s) HFA Inhaler 2 Puff(s) Inhalation every 6 hours  dextrose 40% Gel 15 Gram(s) Oral once  dextrose 5%. 1000 milliLiter(s) (50 mL/Hr) IV Continuous <Continuous>  dextrose 5%. 1000 milliLiter(s) (100 mL/Hr) IV Continuous <Continuous>  dextrose 50% Injectable 25 Gram(s) IV Push once  dextrose 50% Injectable 12.5 Gram(s) IV Push once  dextrose 50% Injectable 25 Gram(s) IV Push once  glucagon  Injectable 1 milliGRAM(s) IntraMuscular once  heparin   Injectable 5000 Unit(s) SubCutaneous every 12 hours  hydrALAZINE 25 milliGRAM(s) Oral three times a day  insulin lispro (ADMELOG) corrective regimen sliding scale   SubCutaneous three times a day before meals  labetalol 100 milliGRAM(s) Oral two times a day  piperacillin/tazobactam IVPB.. 3.375 Gram(s) IV Intermittent every 12 hours  senna 2 Tablet(s) Oral at bedtime  sevelamer carbonate 800 milliGRAM(s) Oral three times a day with meals  simvastatin 40 milliGRAM(s) Oral at bedtime    MEDICATIONS  (PRN):  guaiFENesin Oral Liquid (Sugar-Free) 100 milliGRAM(s) Oral every 6 hours PRN Cough      Allergies  Eliquis (Other)    SOCIAL HISTORY          Smoking: Yes [ ]  No [x ]   ______pk yrs          ETOH  Yes [ ]  No [x ]  Social [ ]          DRUGS:  Yes [ ]  No [x ]  if so what______________    FAMILY HISTORY:  FH: HTN (hypertension)        Vital Signs Last 24 Hrs  T(C): 37.1 (2022 10:52), Max: 37.8 (2022 19:49)  T(F): 98.8 (2022 10:52), Max: 100 (2022 19:49)  HR: 60 (2022 14:54) (60 - 76)  BP: 153/67 (2022 14:54) (108/82 - 173/66)  BP(mean): --  RR: 20 (2022 10:52) (18 - 24)  SpO2: 94% (2022 10:52) (86% - 100%)    Physical Exam:    GENERAL: NAD, well-groomed, thin  HEAD:  Atraumatic, Normocephalic  EYES: EOMI, PERRLA, conjunctiva and sclera clear  NECK: Supple, No JVD, Normal thyroid  NERVOUS SYSTEM:  Alert & Oriented X3, Good concentration  CHEST/LUNG: Clear to percussion bilaterally  HEART: Regular rate and rhythm  ABDOMEN: Soft, mildly diffuse tenderness, Nondistended  EXTREMITIES:  2+ Peripheral Pulses  LYMPH: No cervical adenopathy  : No suprapubic tenderness, no bladder distention, no gross hematuria. Voiding spontaneously   SKIN: No rashes or lesions      LABS:                        8.8    14.05 )-----------( 163      ( 2022 18:39 )             26.3     02-16    139  |  101  |  17  ----------------------------<  252<H>  4.5   |  34<H>  |  3.42<H>    Ca    8.8      2022 18:39    TPro  8.1  /  Alb  3.2<L>  /  TBili  0.9  /  DBili  x   /  AST  53<H>  /  ALT  28  /  AlkPhos  117  02-16    PT/INR - ( 2022 18:39 )   PT: 11.7 sec;   INR: 1.01 ratio         PTT - ( 2022 18:39 )  PTT:25.4 sec  Urinalysis Basic - ( 2022 08:45 )    Color: Yellow / Appearance: Clear / S.005 / pH: x  Gluc: x / Ketone: Negative  / Bili: Negative / Urobili: Negative mg/dL   Blood: x / Protein: 30 mg/dL / Nitrite: Negative   Leuk Esterase: Small / RBC: 0-2 /HPF / WBC 3-5   Sq Epi: x / Non Sq Epi: Few / Bacteria: Few        RADIOLOGY & ADDITIONAL STUDIES:  INTERPRETATION:  CLINICAL INFORMATION: 75% pulse ox, shortness of breath,   Chills, hemodialysis, evaluate for pulmonary embolism , GI bleeding,   evaluate for active bleeding    COMPARISON: None.    CONTRAST/COMPLICATIONS:  IV Contrast: IV contrast documented in associated exam (accession   [id number]), Omnipaque 350 (accession [id number])  120 cc administered   80   cc discarded  Oral Contrast: NONE  Complications: None reported at time of study completion    PROCEDURE:  CT Angiography of the Chest.  GIB triple phase study of the abdomen and   pelvis. Sagittal and coronal reformats were performed as well as 3D (MIP)   reconstructions.    FINDINGS:  CHEST:  LUNGS AND LARGE AIRWAYS: Patent central airways. Scattered areas of   groundglass attenuation with interlobular septal thickening suggests   interstitial primary edema. Bilateral lower lobe subsegmental atelectasis.  PLEURA: Mild right pleural effusion and trace left pleural effusion.  Pleural effusion.  VESSELS: Pulmonary arterial opacification is adequate. There is no   pulmonary embolism. The main pulmonary artery is dilated, measuring 3.9   cm, suggesting pulmonary arterial hypertension. No aortic dissection.   Atherosclerotic calcification of the thoracic aorta.  HEART: Heart size is enlarged.  Trace pericardial effusion.  MEDIASTINUM AND PA: No lymphadenopathy. Pacemaker wires in the right   atrium and right ventricle.  CHEST WALL AND LOWER NECK: Within normal limits.    ABDOMEN AND PELVIS:  Evaluation of the solid abdominal organs is limited without IV contrast.    LIVER: Within normallimits.  BILE DUCTS: Normal caliber.  GALLBLADDER: Within normal limits.  SPLEEN: Within normal limits.  PANCREAS: There is a 1.1 cm hypodense lesion in the posterior aspect of   the body of the pancreas which may be further characterized with MRI/MRCP   if there are no prior studies for comparison.  ADRENALS: There is a 2.9 x 2.2 cm hypodense nodule in the left adrenal   gland.  KIDNEYS/URETERS: There is no hydronephrosis or obstructing stone. There   are numerous bilateral simple and complex cysts. Evaluation for subtle   renal mass is limited without contrast. There is a 2.1 cm left posterior   exophytic complex cyst.    BLADDER: Decompressed.  REPRODUCTIVE ORGANS: Calcified fibroids.    BOWEL: The site of active GI bleeding is not elucidated on this study.   There is no intraluminal accumulation of contrast. No bowel obstruction.   Appendix is not visualized. The patient is status post subtotal colectomy   with colorectal anastomosis in the lower abdomen. No focal bowel wall   thickening.  PERITONEUM: No ascites.  VESSELS: The aorta is not dilated. The celiac axis, superior mesenteric   artery and bilateral renal arteries are widely patent. There are 2 left   renal arteries. Scattered diffuse atherosclerotic irregularity. Mild   atherosclerotic vascular calcification. IVC the filter in place.    RETROPERITONEUM/LYMPH NODES: No lymphadenopathy.  ABDOMINAL WALL: Laparotomy incisional scar.    BONES: The bones are diffusely osteopenic. Chronic degenerative changes   of the spine.Chronic degenerative changes of the spine. Moderate left   hip DJD with bulky osteophytes.    IMPRESSION:    No pulmonary embolism.  Mild interstitial pulmonary edema.  Mild right, trace left pleural   effusions.  Cardiomegaly.  Bilateral lower lobe subsegmental atelectasis.  Dilated main pulmonary artery suggests pulmonary arterial hypertension.    The site of active GI bleeding is not elucidated on this study.  Status post subtotal colectomy with lower abdominal anastomosis.    Indeterminate 2.9 x 2.2 cm left adrenal low-density nodule probably   represents an adenoma. It may be further characterized with MRI.  Indeterminate 1.1 cm hypodense lesion in the posterior body of the   pancreas. If there are no prior studies for comparison, MRI/MRCP may be   obtained.        --- End of Report ---

## 2022-02-17 NOTE — GOALS OF CARE CONVERSATION - ADVANCED CARE PLANNING - LOCATION OF DISCUSSION
Subjective:   Patient ID: Ruth Sloan is a 47year old female. Patient notes yesterday nausea, abdominal cramping, headache, tactile fever and chills. Tactile fever and chills have resolved, but notes increased abdominal cramping.  Mild diarrhea ye Abdominal:      General: Abdomen is protuberant. Bowel sounds are decreased. There is distension. Tenderness: There is abdominal tenderness in the epigastric area and periumbilical area. Comments: Firm to palpation where marked.     Musculos Face to face

## 2022-02-18 DIAGNOSIS — Z51.5 ENCOUNTER FOR PALLIATIVE CARE: ICD-10-CM

## 2022-02-18 DIAGNOSIS — R06.02 SHORTNESS OF BREATH: ICD-10-CM

## 2022-02-18 DIAGNOSIS — R53.81 OTHER MALAISE: ICD-10-CM

## 2022-02-18 LAB
ANION GAP SERPL CALC-SCNC: 6 MMOL/L — SIGNIFICANT CHANGE UP (ref 5–17)
BUN SERPL-MCNC: 35 MG/DL — HIGH (ref 7–23)
CALCIUM SERPL-MCNC: 8.9 MG/DL — SIGNIFICANT CHANGE UP (ref 8.5–10.1)
CHLORIDE SERPL-SCNC: 101 MMOL/L — SIGNIFICANT CHANGE UP (ref 96–108)
CO2 SERPL-SCNC: 31 MMOL/L — SIGNIFICANT CHANGE UP (ref 22–31)
CREAT SERPL-MCNC: 6.32 MG/DL — HIGH (ref 0.5–1.3)
CULTURE RESULTS: NO GROWTH — SIGNIFICANT CHANGE UP
GLUCOSE SERPL-MCNC: 150 MG/DL — HIGH (ref 70–99)
HCT VFR BLD CALC: 24.3 % — LOW (ref 34.5–45)
HGB BLD-MCNC: 7.9 G/DL — LOW (ref 11.5–15.5)
MCHC RBC-ENTMCNC: 29.5 PG — SIGNIFICANT CHANGE UP (ref 27–34)
MCHC RBC-ENTMCNC: 32.5 G/DL — SIGNIFICANT CHANGE UP (ref 32–36)
MCV RBC AUTO: 90.7 FL — SIGNIFICANT CHANGE UP (ref 80–100)
NRBC # BLD: 0 /100 WBCS — SIGNIFICANT CHANGE UP (ref 0–0)
PLATELET # BLD AUTO: 166 K/UL — SIGNIFICANT CHANGE UP (ref 150–400)
POTASSIUM SERPL-MCNC: 3.8 MMOL/L — SIGNIFICANT CHANGE UP (ref 3.5–5.3)
POTASSIUM SERPL-SCNC: 3.8 MMOL/L — SIGNIFICANT CHANGE UP (ref 3.5–5.3)
RBC # BLD: 2.68 M/UL — LOW (ref 3.8–5.2)
RBC # FLD: 15.2 % — HIGH (ref 10.3–14.5)
SODIUM SERPL-SCNC: 138 MMOL/L — SIGNIFICANT CHANGE UP (ref 135–145)
SPECIMEN SOURCE: SIGNIFICANT CHANGE UP
WBC # BLD: 7.38 K/UL — SIGNIFICANT CHANGE UP (ref 3.8–10.5)
WBC # FLD AUTO: 7.38 K/UL — SIGNIFICANT CHANGE UP (ref 3.8–10.5)

## 2022-02-18 PROCEDURE — 99232 SBSQ HOSP IP/OBS MODERATE 35: CPT

## 2022-02-18 PROCEDURE — 99223 1ST HOSP IP/OBS HIGH 75: CPT

## 2022-02-18 RX ORDER — FUROSEMIDE 40 MG
40 TABLET ORAL ONCE
Refills: 0 | Status: COMPLETED | OUTPATIENT
Start: 2022-02-18 | End: 2022-02-18

## 2022-02-18 RX ORDER — FUROSEMIDE 40 MG
40 TABLET ORAL DAILY
Refills: 0 | Status: COMPLETED | OUTPATIENT
Start: 2022-02-19 | End: 2022-02-20

## 2022-02-18 RX ORDER — PANTOPRAZOLE SODIUM 20 MG/1
40 TABLET, DELAYED RELEASE ORAL DAILY
Refills: 0 | Status: DISCONTINUED | OUTPATIENT
Start: 2022-02-18 | End: 2022-03-02

## 2022-02-18 RX ORDER — CHLORHEXIDINE GLUCONATE 213 G/1000ML
1 SOLUTION TOPICAL
Refills: 0 | Status: DISCONTINUED | OUTPATIENT
Start: 2022-02-18 | End: 2022-03-02

## 2022-02-18 RX ORDER — MORPHINE SULFATE 50 MG/1
2 CAPSULE, EXTENDED RELEASE ORAL EVERY 6 HOURS
Refills: 0 | Status: DISCONTINUED | OUTPATIENT
Start: 2022-02-18 | End: 2022-02-22

## 2022-02-18 RX ADMIN — Medication 2: at 12:17

## 2022-02-18 RX ADMIN — PIPERACILLIN AND TAZOBACTAM 25 GRAM(S): 4; .5 INJECTION, POWDER, LYOPHILIZED, FOR SOLUTION INTRAVENOUS at 08:47

## 2022-02-18 RX ADMIN — Medication 25 MILLIGRAM(S): at 21:38

## 2022-02-18 RX ADMIN — Medication 10 MILLIGRAM(S): at 12:16

## 2022-02-18 RX ADMIN — Medication 100 MILLIGRAM(S): at 18:47

## 2022-02-18 RX ADMIN — Medication 25 MILLIGRAM(S): at 05:47

## 2022-02-18 RX ADMIN — Medication 10 MILLIGRAM(S): at 18:48

## 2022-02-18 RX ADMIN — HEPARIN SODIUM 5000 UNIT(S): 5000 INJECTION INTRAVENOUS; SUBCUTANEOUS at 05:46

## 2022-02-18 RX ADMIN — Medication 100 MILLIGRAM(S): at 08:53

## 2022-02-18 RX ADMIN — SIMVASTATIN 40 MILLIGRAM(S): 20 TABLET, FILM COATED ORAL at 21:39

## 2022-02-18 RX ADMIN — ALBUTEROL 2 PUFF(S): 90 AEROSOL, METERED ORAL at 18:48

## 2022-02-18 RX ADMIN — Medication 100 MILLIGRAM(S): at 05:47

## 2022-02-18 RX ADMIN — PANTOPRAZOLE SODIUM 40 MILLIGRAM(S): 20 TABLET, DELAYED RELEASE ORAL at 12:58

## 2022-02-18 RX ADMIN — Medication 1: at 08:47

## 2022-02-18 RX ADMIN — ALBUTEROL 2 PUFF(S): 90 AEROSOL, METERED ORAL at 05:51

## 2022-02-18 RX ADMIN — HEPARIN SODIUM 5000 UNIT(S): 5000 INJECTION INTRAVENOUS; SUBCUTANEOUS at 18:48

## 2022-02-18 RX ADMIN — Medication 40 MILLIGRAM(S): at 10:12

## 2022-02-18 RX ADMIN — ALBUTEROL 2 PUFF(S): 90 AEROSOL, METERED ORAL at 12:18

## 2022-02-18 RX ADMIN — SEVELAMER CARBONATE 800 MILLIGRAM(S): 2400 POWDER, FOR SUSPENSION ORAL at 18:48

## 2022-02-18 RX ADMIN — SEVELAMER CARBONATE 800 MILLIGRAM(S): 2400 POWDER, FOR SUSPENSION ORAL at 12:16

## 2022-02-18 RX ADMIN — SEVELAMER CARBONATE 800 MILLIGRAM(S): 2400 POWDER, FOR SUSPENSION ORAL at 08:49

## 2022-02-18 RX ADMIN — ALBUTEROL 2 PUFF(S): 90 AEROSOL, METERED ORAL at 21:41

## 2022-02-18 RX ADMIN — PIPERACILLIN AND TAZOBACTAM 25 GRAM(S): 4; .5 INJECTION, POWDER, LYOPHILIZED, FOR SOLUTION INTRAVENOUS at 21:38

## 2022-02-18 NOTE — DIETITIAN INITIAL EVALUATION ADULT. - OTHER INFO
Pt lives alone. Pt is independent and does own shopping/cooking. One son in [place], one in [place], and another in [place].   Pt reports poor po intake and appetite at hospital.   Pt denies n/v/d; denies chewing/swallowing difficulties.  Reports she is constipated sometimes but goes to bathroom a lot.  Pt agreeable to supplements, Nepro and Glucerna Vanilla. Pt lives alone. Pt is independent and does own shopping/cooking. One son in [place], one in [place], and another in [place].   Pt visibly upset during visit, reports she does not want to be at hospital.  Pt reports poor po intake and appetite at hospital.   Pt denies n/v/d; denies chewing/swallowing difficulties. Reports she is constipated sometimes but goes to bathroom a lot.  Pt agreeable to supplements, Nepro and Glucerna Vanilla.  Pt reports she is familiar with dialysis diet. Revised RD remains available.

## 2022-02-18 NOTE — DIETITIAN INITIAL EVALUATION ADULT. - PERTINENT MEDS FT
MEDICATIONS  (STANDING):  ALBUTerol    90 MICROgram(s) HFA Inhaler 2 Puff(s) Inhalation every 6 hours  chlorhexidine 4% Liquid 1 Application(s) Topical <User Schedule>  dextrose 40% Gel 15 Gram(s) Oral once  dextrose 5%. 1000 milliLiter(s) (50 mL/Hr) IV Continuous <Continuous>  dextrose 5%. 1000 milliLiter(s) (100 mL/Hr) IV Continuous <Continuous>  dextrose 50% Injectable 25 Gram(s) IV Push once  dextrose 50% Injectable 12.5 Gram(s) IV Push once  dextrose 50% Injectable 25 Gram(s) IV Push once  dicyclomine 10 milliGRAM(s) Oral three times a day before meals  glucagon  Injectable 1 milliGRAM(s) IntraMuscular once  heparin   Injectable 5000 Unit(s) SubCutaneous every 12 hours  hydrALAZINE 25 milliGRAM(s) Oral three times a day  insulin lispro (ADMELOG) corrective regimen sliding scale   SubCutaneous three times a day before meals  labetalol 100 milliGRAM(s) Oral two times a day  pantoprazole  Injectable 40 milliGRAM(s) IV Push daily  piperacillin/tazobactam IVPB.. 3.375 Gram(s) IV Intermittent every 12 hours  senna 2 Tablet(s) Oral at bedtime  sevelamer carbonate 800 milliGRAM(s) Oral three times a day with meals  simvastatin 40 milliGRAM(s) Oral at bedtime    MEDICATIONS  (PRN):  hydrocodone/homatropine Syrup 5 milliLiter(s) Oral four times a day PRN Cough

## 2022-02-18 NOTE — DIETITIAN INITIAL EVALUATION ADULT. - PERTINENT LABORATORY DATA
02-18 Na 138 mmol/L Glu 150 mg/dL<H> K+ 3.8 mmol/L Cr  6.32 mg/dL<H> BUN 35 mg/dL<H> Phos n/a   Alb n/a   PAB n/a   Hgb 7.9 g/dL<L> Hct 24.3 %<L> POCT  mg/dL <H>  02-17 POCT , 181, 328 mg/dL <H> HgbA1c 6.4% Estimated Average Glu 137 mg/dL <H>

## 2022-02-18 NOTE — PROGRESS NOTE ADULT - SUBJECTIVE AND OBJECTIVE BOX
CHIEF COMPLAINT/INTERVAL HISTORY:    Patient is a 76y old  Female who presents with a chief complaint of PNA (2022 12:42)      HPI:  Patient is a 76F with a PMH of ESRD on HD MWF, HTN, diverticulitis who presents to the ED for dyspnea.  Patient states that 2 days ago at HD she developed chills and dyspnea during her treatment.  EMS was called for her however she refused to go to a hospital.  Patient states that yesterday she stayed home and rested all day, felt unwell.  Went to for HD treatment today and EMS was called for her again as she looked even worse.  Patient admits to subjective fever but has never taken her temperature.  States that she has had a dry cough for > 7 days, also has associated rib pain with coughing.  No other complaints.  Hypoxic on RA to 75%, SpO2 currently 97% on 3L via NC.  Labs show leukocytosis.  Will admit to tele.  (2022 00:40)      SUBJECTIVE & OBJECTIVE: Pt seen and examined at bedside this am  c/o sob/ dry cough, epigastric pain due to persistent cough  remains on Oxygen 6L via NC  denies CP.       Vital Signs Last 24 Hrs  T(C): 36.9 (2022 11:52), Max: 37.1 (2022 17:17)  T(F): 98.4 (2022 11:52), Max: 98.7 (2022 17:17)  HR: 60 (2022 11:52) (60 - 62)  BP: 135/68 (2022 11:52) (118/68 - 135/68)  BP(mean): --  ABP: --  ABP(mean): --  RR: 18 (2022 11:52) (18 - 20)  SpO2: 94% (2022 11:52) (94% - 98%)        MEDICATIONS  (STANDING):  ALBUTerol    90 MICROgram(s) HFA Inhaler 2 Puff(s) Inhalation every 6 hours  chlorhexidine 4% Liquid 1 Application(s) Topical <User Schedule>  dextrose 40% Gel 15 Gram(s) Oral once  dextrose 5%. 1000 milliLiter(s) (50 mL/Hr) IV Continuous <Continuous>  dextrose 5%. 1000 milliLiter(s) (100 mL/Hr) IV Continuous <Continuous>  dextrose 50% Injectable 25 Gram(s) IV Push once  dextrose 50% Injectable 12.5 Gram(s) IV Push once  dextrose 50% Injectable 25 Gram(s) IV Push once  dicyclomine 10 milliGRAM(s) Oral three times a day before meals  glucagon  Injectable 1 milliGRAM(s) IntraMuscular once  heparin   Injectable 5000 Unit(s) SubCutaneous every 12 hours  hydrALAZINE 25 milliGRAM(s) Oral three times a day  insulin lispro (ADMELOG) corrective regimen sliding scale   SubCutaneous three times a day before meals  labetalol 100 milliGRAM(s) Oral two times a day  pantoprazole  Injectable 40 milliGRAM(s) IV Push daily  piperacillin/tazobactam IVPB.. 3.375 Gram(s) IV Intermittent every 12 hours  senna 2 Tablet(s) Oral at bedtime  sevelamer carbonate 800 milliGRAM(s) Oral three times a day with meals  simvastatin 40 milliGRAM(s) Oral at bedtime    MEDICATIONS  (PRN):  hydrocodone/homatropine Syrup 5 milliLiter(s) Oral four times a day PRN Cough  morphine  - Injectable 2 milliGRAM(s) IV Push every 6 hours PRN Severe Pain (7 - 10)        PHYSICAL EXAM:    GENERAL: NAD,well-developed  HEAD:  Atraumatic, Normocephalic  EYES: EOMI, PERRLA, conjunctiva and sclera clear  ENMT: Moist mucous membranes  NECK: Supple  NERVOUS SYSTEM:  Alert & Oriented X3, normal speech  CHEST/LUNG: b/l air entry,  bs at lung bases  HEART: s1, s2  ABDOMEN: Soft, Nontender, Nondistended; Bowel sounds present  EXTREMITIES: no cyanosis, or edema    LABS:                        7.9    7.38  )-----------( 166      ( 2022 06:38 )             24.3     02-18    138  |  101  |  35<H>  ----------------------------<  150<H>  3.8   |  31  |  6.32<H>    Ca    8.9      2022 06:38    TPro  8.1  /  Alb  3.2<L>  /  TBili  0.9  /  DBili  x   /  AST  53<H>  /  ALT  28  /  AlkPhos  117  02-16    PT/INR - ( 2022 18:39 )   PT: 11.7 sec;   INR: 1.01 ratio         PTT - ( 2022 18:39 )  PTT:25.4 sec  Urinalysis Basic - ( 2022 08:45 )    Color: Yellow / Appearance: Clear / S.005 / pH: x  Gluc: x / Ketone: Negative  / Bili: Negative / Urobili: Negative mg/dL   Blood: x / Protein: 30 mg/dL / Nitrite: Negative   Leuk Esterase: Small / RBC: 0-2 /HPF / WBC 3-5   Sq Epi: x / Non Sq Epi: Few / Bacteria: Few        CAPILLARY BLOOD GLUCOSE      POCT Blood Glucose.: 247 mg/dL (2022 12:11)  POCT Blood Glucose.: 184 mg/dL (2022 08:42)  POCT Blood Glucose.: 181 mg/dL (2022 21:37)  POCT Blood Glucose.: 151 mg/dL (2022 17:17)

## 2022-02-18 NOTE — PROGRESS NOTE ADULT - SUBJECTIVE AND OBJECTIVE BOX
[facility] NEPHROLOGY SERVICES, [facility]  NEPHROLOGY AND HYPERTENSION  [address]  [address]  [place] [address]  [phone]    MD PRAVEEN LYNNE MD ANDREY GONCHARUK, MD MADHU KORRAPATI, MD YELENA ROSENBERG, MD BINNY KOSHY, MD CHRISTOPHER CAPUTO, MD EDWARD BOVER, MD          Patient events noted  Coughing earlier; mild sob  on hd now, feels better   Mild diffuse abd discomfort     MEDICATIONS  (STANDING):  ALBUTerol    90 MICROgram(s) HFA Inhaler 2 Puff(s) Inhalation every 6 hours  chlorhexidine 4% Liquid 1 Application(s) Topical <User Schedule>  dextrose 40% Gel 15 Gram(s) Oral once  dextrose 5%. 1000 milliLiter(s) (50 mL/Hr) IV Continuous <Continuous>  dextrose 5%. 1000 milliLiter(s) (100 mL/Hr) IV Continuous <Continuous>  dextrose 50% Injectable 25 Gram(s) IV Push once  dextrose 50% Injectable 12.5 Gram(s) IV Push once  dextrose 50% Injectable 25 Gram(s) IV Push once  dicyclomine 10 milliGRAM(s) Oral three times a day before meals  glucagon  Injectable 1 milliGRAM(s) IntraMuscular once  heparin   Injectable 5000 Unit(s) SubCutaneous every 12 hours  hydrALAZINE 25 milliGRAM(s) Oral three times a day  insulin lispro (ADMELOG) corrective regimen sliding scale   SubCutaneous three times a day before meals  labetalol 100 milliGRAM(s) Oral two times a day  pantoprazole  Injectable 40 milliGRAM(s) IV Push daily  piperacillin/tazobactam IVPB.. 3.375 Gram(s) IV Intermittent every 12 hours  senna 2 Tablet(s) Oral at bedtime  sevelamer carbonate 800 milliGRAM(s) Oral three times a day with meals  simvastatin 40 milliGRAM(s) Oral at bedtime    MEDICATIONS  (PRN):  hydrocodone/homatropine Syrup 5 milliLiter(s) Oral four times a day PRN Cough  morphine  - Injectable 2 milliGRAM(s) IV Push every 6 hours PRN Severe Pain (7 - 10)      02-18-22 @ 07:01  -  02-18-22 @ 21:17  --------------------------------------------------------  IN: 0 mL / OUT: 3000 mL / NET: -3000 mL      PHYSICAL EXAM:      T(C): 36.7 (02-18-22 @ 18:30), Max: 37 (02-18-22 @ 14:40)  HR: 63 (02-18-22 @ 18:30) (60 - 63)  BP: 129/63 (02-18-22 @ 18:30) (118/68 - 135/68)  RR: 16 (02-18-22 @ 18:30) (16 - 20)  SpO2: 100% (02-18-22 @ 18:30) (94% - 100%)  Wt(kg): --  Lungs clear ant decreased BS at bases   Heart S1S2  Abd soft NT ND + BS  Extremities:   tr edema                                    7.9    7.38  )-----------( 166      ( 18 Feb 2022 06:38 )             24.3     02-18    138  |  101  |  35<H>  ----------------------------<  150<H>  3.8   |  31  |  6.32<H>    Ca    8.9      18 Feb 2022 06:38          Creatinine Trend: 6.32<--, 3.42<--      Assessment   ESRD; fluid overload, PNA    Plan:  HD for today and tomorrow  UF as tolerated   Will follow     Erickson Ray MD

## 2022-02-18 NOTE — CONSULT NOTE ADULT - PROBLEM SELECTOR RECOMMENDATION 3
MWF schedule   due today  followed by Dr. Ray's group   has RUCW permacath on HD for approx a year still makes some urine

## 2022-02-18 NOTE — DIETITIAN INITIAL EVALUATION ADULT. - PROBLEM SELECTOR PROBLEM 2
Patient complains of frequent, painful, foul smelling urine.  She tried Azo but states it hasnt really helped.  Patient has had symptoms since last week. She has appt at the end of the week with Dr MCDUFFIE    ESRD on dialysis

## 2022-02-18 NOTE — CONSULT NOTE ADULT - SUBJECTIVE AND OBJECTIVE BOX
HPI:  Patient is a 76F with a PMH of ESRD on HD MWF, HTN, diverticulitis who presents to the ED for dyspnea.  Patient states that 2 days ago at HD she developed chills and dyspnea during her treatment.  EMS was called for her however she refused to go to a hospital.  Patient states that yesterday she stayed home and rested all day, felt unwell.  Went to for HD treatment today and EMS was called for her again as she looked even worse.  Patient admits to subjective fever but has never taken her temperature.  States that she has had a dry cough for > 7 days, also has associated rib pain with coughing.  No other complaints.  Hypoxic on RA to 75%, SpO2 currently 97% on 3L via NC.  Labs show leukocytosis.  Will admit to tele.  (2022 00:40)    PERTINENT PM/SXH:   HTN (hypertension)    Pacemaker    DVT (deep venous thrombosis)    Diverticulitis    Chronic renal disease    CHF (congestive heart failure)    ESRD on dialysis      Intestinal bleeding      FAMILY HISTORY:  FH: HTN (hypertension)      ITEMS NOT CHECKED ARE NOT PRESENT    SOCIAL HISTORY:   Significant other/partner[ ]  Children[x ]  Denominational/Spirituality: Evangelical   Substance hx:  [ ]   Tobacco hx:  [ ]   Alcohol hx: [ ]   Home Opioid hx:  [ ] I-Stop Reference No:[id number]  Living Situation: [x ]Home  [ ]Long term care  [ ]Rehab [ ]Other    ADVANCE DIRECTIVES:    DNR  MOLST  [ x]  Living Will  [ ]   DECISION MAKER(s):  [ ] Health Care Proxy(s)  [x ] Surrogate(s)  [ ] Guardian           Name(s): Phone Number(s): Jeff (child) [phone]    BASELINE (I)ADL(s) (prior to admission):  [place]: [ ]Total  [x ] Moderate [ ]Dependent    Allergies    Eliquis (Other)    Intolerances    MEDICATIONS  (STANDING):  ALBUTerol    90 MICROgram(s) HFA Inhaler 2 Puff(s) Inhalation every 6 hours  dextrose 40% Gel 15 Gram(s) Oral once  dextrose 5%. 1000 milliLiter(s) (50 mL/Hr) IV Continuous <Continuous>  dextrose 5%. 1000 milliLiter(s) (100 mL/Hr) IV Continuous <Continuous>  dextrose 50% Injectable 25 Gram(s) IV Push once  dextrose 50% Injectable 12.5 Gram(s) IV Push once  dextrose 50% Injectable 25 Gram(s) IV Push once  dicyclomine 10 milliGRAM(s) Oral three times a day before meals  glucagon  Injectable 1 milliGRAM(s) IntraMuscular once  heparin   Injectable 5000 Unit(s) SubCutaneous every 12 hours  hydrALAZINE 25 milliGRAM(s) Oral three times a day  insulin lispro (ADMELOG) corrective regimen sliding scale   SubCutaneous three times a day before meals  labetalol 100 milliGRAM(s) Oral two times a day  pantoprazole  Injectable 40 milliGRAM(s) IV Push daily  piperacillin/tazobactam IVPB.. 3.375 Gram(s) IV Intermittent every 12 hours  senna 2 Tablet(s) Oral at bedtime  sevelamer carbonate 800 milliGRAM(s) Oral three times a day with meals  simvastatin 40 milliGRAM(s) Oral at bedtime    MEDICATIONS  (PRN):  hydrocodone/homatropine Syrup 5 milliLiter(s) Oral four times a day PRN Cough    PRESENT SYMPTOMS: [ ]Unable to obtain due to poor mentation   Source if other than patient:  [ ]Family   [ ]Team     Pain: [ ]yes [x ]no  QOL impact -   Location -                    Aggravating factors -  Quality -  Radiation -  Timing-  Severity (0-10 scale):  Minimal acceptable level (0-10 scale):     CPOT:    https://[email or web address]/getattachment/[email or web address]/Critical-Care-Pain-Observation-Tool-(CPOT)      PAIN AD Score:     http://geriatrictoolkit.[email or web address]/painad.pdf (press ctrl +  left click to view)    Dyspnea:                           [ ]Mild [ ]Moderate [ x]Severe  Anxiety:                             [x ]Mild [ ]Moderate [ ]Severe  Fatigue:                             [ ]Mild [ ]Moderate [ ]Severe  Nausea:                             [ ]Mild [ ]Moderate [ ]Severe  Loss of appetite:              [ ]Mild [ ]Moderate [ ]Severe  Constipation:                    [ ]Mild [ ]Moderate [ ]Severe    Other Symptoms:  [x ]All other review of systems negative     Palliative Performance Status Version 2:     30    %    http://[facility].org/files/news/palliative_performance_scale_ppsv2.pdf  PHYSICAL EXAM:  Vital Signs Last 24 Hrs  T(C): 36.9 (2022 11:52), Max: 37.1 (2022 17:17)  T(F): 98.4 (2022 11:52), Max: 98.7 (2022 17:17)  HR: 60 (2022 11:52) (60 - 62)  BP: 135/68 (2022 11:52) (118/68 - 153/67)  BP(mean): --  RR: 18 (2022 11:52) (18 - 20)  SpO2: 94% (2022 11:52) (94% - 98%) I&O's Summary    GENERAL:  [x ]Alert  [x ]Oriented x 4  [ ]Lethargic  [ ]Cachexia  [ ]Unarousable  [ x]Verbal  [ ]Non-Verbal  Behavioral:   [ ] Anxiety  [ ] Delirium [ ] Agitation [ ] Other  HEENT:  [ ]Normal   [ ]Dry mouth   [ ]ET Tube/Trach  [ ]Oral lesions  PULMONARY:   [ ]Clear [ x]Tachypnea  [ ]Audible excessive secretions   [ ]Rhonchi        [ ]Right [ ]Left [ ]Bilateral  [x]Crackles        [ ]Right [ ]Left [x ]Bilateral  [ ]Wheezing     [ ]Right [ ]Left [ ]Bilateral  [ ]Diminished breath sounds [ ]right [ ]left [ ]bilateral  CARDIOVASCULAR:    [x ]Regular [ ]Irregular [ ]Tachy  [ ]Ceferino [ ]Murmur [ ]Other  GASTROINTESTINAL:  [x ]Soft  [ ]Distended   [x ]+BS  [x ]Non tender [ ]Tender  [ ]PEG [ ]OGT/ NGT  Last BM: pta  GENITOURINARY:  [ ]Normal [ ] Incontinent   [x]Oliguria/Anuria   [ ]Burrows  MUSCULOSKELETAL:   [ ]Normal   [ x]Weakness  [ ]Bed/Wheelchair bound [ x]Edema  NEUROLOGIC:   [ x]No focal deficits  [ ]Cognitive impairment  [ ]Dysphagia [ ]Dysarthria [ ]Paresis [ ]Other   SKIN:   [x ]Normal    [ ]Rash  [ ]Pressure ulcer(s)       Present on admission [ ]y [ ]n    CRITICAL CARE:  [ ] Shock Present  [ ]Septic [ ]Cardiogenic [ ]Neurologic [ ]Hypovolemic  [ ]  Vasopressors [ ]  Inotropes   [ ]Respiratory failure present [ ]Mechanical ventilation [ ]Non-invasive ventilatory support [ ]High flow    [ ]Acute  [ ]Chronic [ ]Hypoxic  [ ]Hypercarbic [ ]Other  [ ]Other organ failure     LABS:                        7.9    7.38  )-----------( 166      ( 2022 06:38 )             24.3   02-    138  |  101  |  35<H>  ----------------------------<  150<H>  3.8   |  31  |  6.32<H>    Ca    8.9      2022 06:38    TPro  8.1  /  Alb  3.2<L>  /  TBili  0.9  /  DBili  x   /  AST  53<H>  /  ALT  28  /  AlkPhos  117  -  PT/INR - ( 2022 18:39 )   PT: 11.7 sec;   INR: 1.01 ratio         PTT - ( 2022 18:39 )  PTT:25.4 sec    Urinalysis Basic - ( 2022 08:45 )    Color: Yellow / Appearance: Clear / S.005 / pH: x  Gluc: x / Ketone: Negative  / Bili: Negative / Urobili: Negative mg/dL   Blood: x / Protein: 30 mg/dL / Nitrite: Negative   Leuk Esterase: Small / RBC: 0-2 /HPF / WBC 3-5   Sq Epi: x / Non Sq Epi: Few / Bacteria: Few      RADIOLOGY & ADDITIONAL STUDIES:   < from: CT Angio Abdomen and Pelvis w/ IV Cont (22 @ 21:57) >    ACC: [id number] EXAM:  CT ANGIO ABD PELV (W)AW IC                        ACC: [id number] EXAM:  CT ANGIO CHEST PULM ART [facility]                          PROCEDURE DATE:  2022          INTERPRETATION:  CLINICAL INFORMATION: 75% pulse ox, shortness of breath,   Chills, hemodialysis, evaluate for pulmonary embolism , GI bleeding,   evaluate for active bleeding    COMPARISON: None.    CONTRAST/COMPLICATIONS:  IV Contrast: IV contrast documented in associated exam (accession   [id number]), Omnipaque 350 (accession [id number])  120 cc administered   80   cc discarded  Oral Contrast: NONE  Complications: None reported at time of study completion    PROCEDURE:  CT Angiography of the Chest.  GIB triple phase study of the abdomen and   pelvis. Sagittal and coronal reformats were performed as well as 3D (MIP)   reconstructions.    FINDINGS:  CHEST:  LUNGS AND LARGE AIRWAYS: Patent central airways. Scattered areas of   groundglass attenuation with interlobular septal thickening suggests   interstitial primary edema. Bilateral lower lobe subsegmental atelectasis.  PLEURA: Mild right pleural effusion and trace left pleural effusion.  Pleural effusion.  VESSELS: Pulmonary arterial opacification is adequate. There is no   pulmonary embolism. The main pulmonary artery is dilated, measuring 3.9   cm, suggesting pulmonary arterial hypertension. No aortic dissection.   Atherosclerotic calcification of the thoracic aorta.  HEART: Heart size is enlarged.  Trace pericardial effusion.  MEDIASTINUM AND PA: No lymphadenopathy. Pacemaker wires in the right   atrium and right ventricle.  CHEST WALL AND LOWER NECK: Within normal limits.    ABDOMEN AND PELVIS:  Evaluation of the solid abdominal organs is limited without IV contrast.    LIVER: Within normallimits.  BILE DUCTS: Normal caliber.  GALLBLADDER: Within normal limits.  SPLEEN: Within normal limits.  PANCREAS: There is a 1.1 cm hypodense lesion in the posterior aspect of   the body of the pancreas which may be further characterized with MRI/MRCP   if there are no prior studies for comparison.  ADRENALS: There is a 2.9 x 2.2 cm hypodense nodule in the left adrenal   gland.  KIDNEYS/URETERS: There is no hydronephrosis or obstructing stone. There   are numerous bilateral simple and complex cysts. Evaluation for subtle   renal mass is limited without contrast. There is a 2.1 cm left posterior   exophytic complex cyst.    BLADDER: Decompressed.  REPRODUCTIVE ORGANS: Calcified fibroids.    BOWEL: The site of active GI bleeding is not elucidated on this study.   There is no intraluminal accumulation of contrast. No bowel obstruction.   Appendix is not visualized. The patient is status post subtotal colectomy   with colorectal anastomosis in the lower abdomen. No focal bowel wall   thickening.  PERITONEUM: No ascites.  VESSELS: The aorta is not dilated. The celiac axis, superior mesenteric   artery and bilateral renal arteries are widely patent. There are 2 left   renal arteries. Scattered diffuse atherosclerotic irregularity. Mild   atherosclerotic vascular calcification. IVC the filter in place.    RETROPERITONEUM/LYMPH NODES: No lymphadenopathy.  ABDOMINAL WALL: Laparotomy incisional scar.    BONES: The bones are diffusely osteopenic. Chronic degenerative changes   of the spine.Chronic degenerative changes of the spine. Moderate left   hip DJD with bulky osteophytes.    IMPRESSION:    No pulmonary embolism.  Mild interstitial pulmonary edema.  Mild right, trace left pleural   effusions.  Cardiomegaly.  Bilateral lower lobe subsegmental atelectasis.  Dilated main pulmonary artery suggests pulmonary arterial hypertension.    The site of active GI bleeding is not elucidated on this study.  Status post subtotal colectomy with lower abdominal anastomosis.    Indeterminate 2.9 x 2.2 cm left adrenal low-density nodule probably   represents an adenoma. It may be further characterized with MRI.  Indeterminate 1.1 cm hypodense lesion in the posterior body of the   pancreas. If there are no prior studies for comparison, MRI/MRCP may be   obtained.        --- End of Report ---            MAGGI ROMERO MD; Attending Radiologist  This document has been electronically signed. 2022 10:52PM    < end of copied text >    PROTEIN CALORIE MALNUTRITION PRESENT: [ ]mild [ ]moderate [ ]severe [ ]underweight [ ]morbid obesity  https://www.andeal.org/vault/2440/web/files/ONC/Table_Clinical%20Characteristics%20to%20Document%20Malnutrition-White%20JV%20et%20al%2020.pdf    Height (cm): 162.6 (22 @ 05:20), 162.6 (04-15-21 @ 13:33)  Weight (kg): 81.6 (22 @ 18:04), 94.1 (21 @ 07:59)  BMI (kg/m2): 30.9 (22 @ 05:20), 30.9 (22 @ 18:04), 35.6 (21 @ 07:59)    [ ]PPSV2 < or = to 30% [ ]significant weight loss  [ ]poor nutritional intake  [ ]anasarca      [ ]Artificial Nutrition      REFERRALS:   [ ]Chaplaincy  [ ]Hospice  [ ]Child Life  [ ]Social Work  [ ]Case management [ ]Holistic Therapy     Goals of Care Document:

## 2022-02-18 NOTE — CONSULT NOTE ADULT - PROBLEM SELECTOR RECOMMENDATION 9
pt due for HD today, waiting to go down for treatment but is in resp distress  pt still makes urine will try dose of lasix and have respiratory come and adjust oxygen to maximize comfort  can add low dose opioid or anxiolytic to help with dyspnea  pt is opioid naive

## 2022-02-18 NOTE — DIETITIAN INITIAL EVALUATION ADULT. - OTHER CALCULATIONS
Ht (cm):   162.6 cm Wt (kg):  81.7 kg  BMI: 30.7    IBW: 54.4 kg %IBW: 150%  UBW:  93.7 kg (04/21/21) %UBW: 87%

## 2022-02-18 NOTE — CONSULT NOTE ADULT - ASSESSMENT
Impression: Patient is a 76F with a PMH of ESRD on HD MWF, HTN, diverticulitis who presents to the ED for dyspnea. Urology consulted for incidental finding of L adrenal mass on CT    Recommendations  --Outpt f/u with urology and endocrinology for surveillance imaging, possible biopsy.       Discussed with Dr. Pham
76F with a PMH of ESRD on HD MWF, HTN, diverticulitis admitted for dyspnea, diagnosed with PNA. Pt had prior MOLST. Palliative Care consulted for symptom management.

## 2022-02-18 NOTE — CONSULT NOTE ADULT - PROBLEM SELECTOR RECOMMENDATION 8
pt with prior MOLST- discussed with pt yesterday she believes the MOLST is in the hospital but could not locate it thus new one written and placed in chart, see GOC discussion details yesterday. DNR/I no tubes  pt is dyspneic and could benefit from symptom management as well as her usual dialysis treatment due today.   will reassess breathing status frequently and alter meds as condition calls for  d/w Dr. Ray

## 2022-02-19 LAB
ANION GAP SERPL CALC-SCNC: 6 MMOL/L — SIGNIFICANT CHANGE UP (ref 5–17)
BUN SERPL-MCNC: 26 MG/DL — HIGH (ref 7–23)
CALCIUM SERPL-MCNC: 8.5 MG/DL — SIGNIFICANT CHANGE UP (ref 8.5–10.1)
CHLORIDE SERPL-SCNC: 100 MMOL/L — SIGNIFICANT CHANGE UP (ref 96–108)
CO2 SERPL-SCNC: 31 MMOL/L — SIGNIFICANT CHANGE UP (ref 22–31)
CREAT SERPL-MCNC: 5.2 MG/DL — HIGH (ref 0.5–1.3)
GLUCOSE SERPL-MCNC: 202 MG/DL — HIGH (ref 70–99)
HCT VFR BLD CALC: 22.9 % — LOW (ref 34.5–45)
HGB BLD-MCNC: 7.3 G/DL — LOW (ref 11.5–15.5)
MCHC RBC-ENTMCNC: 29.7 PG — SIGNIFICANT CHANGE UP (ref 27–34)
MCHC RBC-ENTMCNC: 31.9 G/DL — LOW (ref 32–36)
MCV RBC AUTO: 93.1 FL — SIGNIFICANT CHANGE UP (ref 80–100)
NRBC # BLD: 0 /100 WBCS — SIGNIFICANT CHANGE UP (ref 0–0)
PLATELET # BLD AUTO: 179 K/UL — SIGNIFICANT CHANGE UP (ref 150–400)
POTASSIUM SERPL-MCNC: 3.8 MMOL/L — SIGNIFICANT CHANGE UP (ref 3.5–5.3)
POTASSIUM SERPL-SCNC: 3.8 MMOL/L — SIGNIFICANT CHANGE UP (ref 3.5–5.3)
RBC # BLD: 2.46 M/UL — LOW (ref 3.8–5.2)
RBC # FLD: 15 % — HIGH (ref 10.3–14.5)
SODIUM SERPL-SCNC: 137 MMOL/L — SIGNIFICANT CHANGE UP (ref 135–145)
WBC # BLD: 7.53 K/UL — SIGNIFICANT CHANGE UP (ref 3.8–10.5)
WBC # FLD AUTO: 7.53 K/UL — SIGNIFICANT CHANGE UP (ref 3.8–10.5)

## 2022-02-19 PROCEDURE — 99232 SBSQ HOSP IP/OBS MODERATE 35: CPT

## 2022-02-19 RX ORDER — ERYTHROPOIETIN 10000 [IU]/ML
10000 INJECTION, SOLUTION INTRAVENOUS; SUBCUTANEOUS ONCE
Refills: 0 | Status: COMPLETED | OUTPATIENT
Start: 2022-02-19 | End: 2022-02-19

## 2022-02-19 RX ORDER — SODIUM CHLORIDE 0.65 %
1 AEROSOL, SPRAY (ML) NASAL EVERY 4 HOURS
Refills: 0 | Status: DISCONTINUED | OUTPATIENT
Start: 2022-02-19 | End: 2022-02-26

## 2022-02-19 RX ADMIN — CHLORHEXIDINE GLUCONATE 1 APPLICATION(S): 213 SOLUTION TOPICAL at 06:40

## 2022-02-19 RX ADMIN — SEVELAMER CARBONATE 800 MILLIGRAM(S): 2400 POWDER, FOR SUSPENSION ORAL at 14:18

## 2022-02-19 RX ADMIN — ALBUTEROL 2 PUFF(S): 90 AEROSOL, METERED ORAL at 23:34

## 2022-02-19 RX ADMIN — SEVELAMER CARBONATE 800 MILLIGRAM(S): 2400 POWDER, FOR SUSPENSION ORAL at 08:19

## 2022-02-19 RX ADMIN — PIPERACILLIN AND TAZOBACTAM 25 GRAM(S): 4; .5 INJECTION, POWDER, LYOPHILIZED, FOR SOLUTION INTRAVENOUS at 20:03

## 2022-02-19 RX ADMIN — Medication 100 MILLIGRAM(S): at 17:27

## 2022-02-19 RX ADMIN — PANTOPRAZOLE SODIUM 40 MILLIGRAM(S): 20 TABLET, DELAYED RELEASE ORAL at 14:19

## 2022-02-19 RX ADMIN — Medication 3: at 17:26

## 2022-02-19 RX ADMIN — Medication 10 MILLIGRAM(S): at 14:19

## 2022-02-19 RX ADMIN — Medication 1 SPRAY(S): at 22:59

## 2022-02-19 RX ADMIN — HEPARIN SODIUM 5000 UNIT(S): 5000 INJECTION INTRAVENOUS; SUBCUTANEOUS at 05:39

## 2022-02-19 RX ADMIN — Medication 10 MILLIGRAM(S): at 17:26

## 2022-02-19 RX ADMIN — PIPERACILLIN AND TAZOBACTAM 25 GRAM(S): 4; .5 INJECTION, POWDER, LYOPHILIZED, FOR SOLUTION INTRAVENOUS at 08:20

## 2022-02-19 RX ADMIN — Medication 25 MILLIGRAM(S): at 14:19

## 2022-02-19 RX ADMIN — Medication 25 MILLIGRAM(S): at 22:59

## 2022-02-19 RX ADMIN — ALBUTEROL 2 PUFF(S): 90 AEROSOL, METERED ORAL at 05:39

## 2022-02-19 RX ADMIN — ALBUTEROL 2 PUFF(S): 90 AEROSOL, METERED ORAL at 14:19

## 2022-02-19 RX ADMIN — ERYTHROPOIETIN 10000 UNIT(S): 10000 INJECTION, SOLUTION INTRAVENOUS; SUBCUTANEOUS at 11:19

## 2022-02-19 RX ADMIN — HEPARIN SODIUM 5000 UNIT(S): 5000 INJECTION INTRAVENOUS; SUBCUTANEOUS at 17:27

## 2022-02-19 RX ADMIN — Medication 40 MILLIGRAM(S): at 05:38

## 2022-02-19 RX ADMIN — Medication 10 MILLIGRAM(S): at 05:42

## 2022-02-19 RX ADMIN — ALBUTEROL 2 PUFF(S): 90 AEROSOL, METERED ORAL at 17:26

## 2022-02-19 RX ADMIN — SEVELAMER CARBONATE 800 MILLIGRAM(S): 2400 POWDER, FOR SUSPENSION ORAL at 17:25

## 2022-02-19 RX ADMIN — SIMVASTATIN 40 MILLIGRAM(S): 20 TABLET, FILM COATED ORAL at 22:59

## 2022-02-19 RX ADMIN — Medication 2: at 08:21

## 2022-02-19 NOTE — PROGRESS NOTE ADULT - SUBJECTIVE AND OBJECTIVE BOX
CHIEF COMPLAINT/INTERVAL HISTORY:    Patient is a 76y old  Female who presents with a chief complaint of PNA (19 Feb 2022 10:30)      HPI:  Patient is a 76F with a PMH of ESRD on HD MWF, HTN, diverticulitis who presents to the ED for dyspnea.  Patient states that 2 days ago at HD she developed chills and dyspnea during her treatment.  EMS was called for her however she refused to go to a hospital.  Patient states that yesterday she stayed home and rested all day, felt unwell.  Went to for HD treatment today and EMS was called for her again as she looked even worse.  Patient admits to subjective fever but has never taken her temperature.  States that she has had a dry cough for > 7 days, also has associated rib pain with coughing.  No other complaints.  Hypoxic on RA to 75%, SpO2 currently 97% on 3L via NC.  Labs show leukocytosis.  Will admit to tele.  (17 Feb 2022 00:40)      SUBJECTIVE & OBJECTIVE: Pt seen and examined at bedside. c/o sob and dry cough.   c/o epigastric discomfort only while coughing.  denies nausea. vomiting  pt denies CP, palpitations  pt seen to be able to hold long conversation without any need to stop to take breath or dyspnea.  pt states, feels better after having HD yesterday but very upset as someone took her cleaning wipes she brought from home.          Vital Signs Last 24 Hrs  T(C): 36.9 (19 Feb 2022 12:50), Max: 37.3 (19 Feb 2022 00:31)  T(F): 98.4 (19 Feb 2022 12:50), Max: 99.2 (19 Feb 2022 00:31)  HR: 60 (19 Feb 2022 12:50) (60 - 97)  BP: 135/58 (19 Feb 2022 12:50) (111/63 - 135/63)  BP(mean): --  ABP: --  ABP(mean): --  RR: 20 (19 Feb 2022 12:50) (16 - 20)  SpO2: 100% (19 Feb 2022 12:50) (96% - 100%)        MEDICATIONS  (STANDING):  ALBUTerol    90 MICROgram(s) HFA Inhaler 2 Puff(s) Inhalation every 6 hours  chlorhexidine 4% Liquid 1 Application(s) Topical <User Schedule>  dextrose 40% Gel 15 Gram(s) Oral once  dextrose 5%. 1000 milliLiter(s) (50 mL/Hr) IV Continuous <Continuous>  dextrose 5%. 1000 milliLiter(s) (100 mL/Hr) IV Continuous <Continuous>  dextrose 50% Injectable 25 Gram(s) IV Push once  dextrose 50% Injectable 12.5 Gram(s) IV Push once  dextrose 50% Injectable 25 Gram(s) IV Push once  dicyclomine 10 milliGRAM(s) Oral three times a day before meals  furosemide   Injectable 40 milliGRAM(s) IV Push daily  glucagon  Injectable 1 milliGRAM(s) IntraMuscular once  heparin   Injectable 5000 Unit(s) SubCutaneous every 12 hours  hydrALAZINE 25 milliGRAM(s) Oral three times a day  insulin lispro (ADMELOG) corrective regimen sliding scale   SubCutaneous three times a day before meals  labetalol 100 milliGRAM(s) Oral two times a day  pantoprazole  Injectable 40 milliGRAM(s) IV Push daily  piperacillin/tazobactam IVPB.. 3.375 Gram(s) IV Intermittent every 12 hours  senna 2 Tablet(s) Oral at bedtime  sevelamer carbonate 800 milliGRAM(s) Oral three times a day with meals  simvastatin 40 milliGRAM(s) Oral at bedtime    MEDICATIONS  (PRN):  hydrocodone/homatropine Syrup 5 milliLiter(s) Oral four times a day PRN Cough  morphine  - Injectable 2 milliGRAM(s) IV Push every 6 hours PRN Severe Pain (7 - 10)        PHYSICAL EXAM:    GENERAL: NAD,  well-developed  HEAD:  Atraumatic, Normocephalic  EYES: EOMI, PERRLA, conjunctiva and sclera clear  ENMT: Moist mucous membranes  NECK: Supple,  NERVOUS SYSTEM:  Alert & Oriented X3, normal speech  CHEST/LUNG: b/l air entry, decreased bs b/l at lung bases  HEART:S1, S2  ABDOMEN: Soft, Nontender, Nondistended; Bowel sounds present  EXTREMITIES: no cyanosis, or edema    LABS:                        7.3    7.53  )-----------( 179      ( 19 Feb 2022 10:26 )             22.9     02-19    137  |  100  |  26<H>  ----------------------------<  202<H>  3.8   |  31  |  5.20<H>    Ca    8.5      19 Feb 2022 10:26            CAPILLARY BLOOD GLUCOSE      POCT Blood Glucose.: 226 mg/dL (19 Feb 2022 07:43)  POCT Blood Glucose.: 121 mg/dL (18 Feb 2022 18:40)

## 2022-02-19 NOTE — PROGRESS NOTE ADULT - SUBJECTIVE AND OBJECTIVE BOX
NEPHROLOGY PROGRESS NOTE    CHIEF COMPLAINT:  ESRD    HPI:  Seen on dialysis.  Breathing easier.  Residual cough.  Access working well, tolerating more fluid removal.    EXAM:  T(F): 98.4 (02-19-22 @ 09:25)  HR: 60 (02-19-22 @ 09:25)  BP: 111/63 (02-19-22 @ 09:25)  RR: 20 (02-19-22 @ 09:25)  SpO2: 96% (02-19-22 @ 09:25)    Conversant, in no apparent distress  Normal respiratory effort, lungs clear bilaterally  Heart RRR with no murmur, no peripheral edema         LABS                             7.9    7.38  )-----------( 166      ( 18 Feb 2022 06:38 )             24.3          02-18    138  |  101  |  35<H>  ----------------------------<  150<H>  3.8   |  31  |  6.32<H>    Ca    8.9      18 Feb 2022 06:38             Assessment   ESRD; fluid overload, PNA    Plan:  Complete HD as ordered today then resume Monday     NEPHROLOGY PROGRESS NOTE    CHIEF COMPLAINT:  ESRD    HPI:  Seen on dialysis.  Breathing easier.  Residual cough.  Access working well, tolerating more fluid removal.    EXAM:  T(F): 98.4 (02-19-22 @ 09:25)  HR: 60 (02-19-22 @ 09:25)  BP: 111/63 (02-19-22 @ 09:25)  RR: 20 (02-19-22 @ 09:25)  SpO2: 96% (02-19-22 @ 09:25)    Conversant, in no apparent distress  Normal respiratory effort, lungs clear bilaterally  Heart RRR with no murmur, no peripheral edema         LABS                             7.9    7.38  )-----------( 166      ( 18 Feb 2022 06:38 )             24.3          02-18    138  |  101  |  35<H>  ----------------------------<  150<H>  3.8   |  31  |  6.32<H>    Ca    8.9      18 Feb 2022 06:38             Assessment   ESRD; fluid overload, PNA, clinically improved post dialysis  Chronic anemia    Plan:  Complete HD as ordered today then resume Monday  Retacrit 10,000  units IVP @ dialysis today

## 2022-02-19 NOTE — PROGRESS NOTE ADULT - SUBJECTIVE AND OBJECTIVE BOX
INTERVAL HPI:  76F with HTN, CHF S/P PPM, ESRD (on HD MWF), Diverticulitis, GI bleed and DVT  Presents to the ED for dyspnea.  Patient states that 2 days ago at HD she developed chills and dyspnea during her treatment.  EMS was called for her however she refused to go to a hospital.  Patient states that yesterday she stayed home and rested all day, felt unwell.  Went  for HD treatment today and EMS was called for her again as she looked even worse.  Admits to subjective fever but has never taken her temperature.  States that she has had a dry cough for > 7 days, also has associated rib pain with coughing.    Hypoxic on RA to 75% with EMS, and 86% in ED.  Leukocytosis on blood work.  Never smoked.  Admitted for possible pneumonia.    OVERNIGHT EVENTS:  Feels much better.    Vital Signs Last 24 Hrs  T(C): 36.9 (2022 12:50), Max: 37.3 (2022 00:31)  T(F): 98.4 (2022 12:50), Max: 99.2 (2022 00:31)  HR: 60 (2022 12:50) (60 - 97)  BP: 135/58 (2022 12:50) (111/63 - 135/58)  BP(mean): --  RR: 20 (2022 12:50) (16 - 20)  SpO2: 100% (2022 12:50) (96% - 100%)    PHYSICAL EXAM:  GEN:         Awake, responsive and comfortable.  HEENT:    Normal.    RESP:       no wheezing  CVS:          Regular rate and rhythm.     MEDICATIONS  (STANDING):  ALBUTerol    90 MICROgram(s) HFA Inhaler 2 Puff(s) Inhalation every 6 hours  chlorhexidine 4% Liquid 1 Application(s) Topical <User Schedule>  dextrose 40% Gel 15 Gram(s) Oral once  dextrose 5%. 1000 milliLiter(s) (50 mL/Hr) IV Continuous <Continuous>  dextrose 5%. 1000 milliLiter(s) (100 mL/Hr) IV Continuous <Continuous>  dextrose 50% Injectable 25 Gram(s) IV Push once  dextrose 50% Injectable 12.5 Gram(s) IV Push once  dextrose 50% Injectable 25 Gram(s) IV Push once  dicyclomine 10 milliGRAM(s) Oral three times a day before meals  furosemide   Injectable 40 milliGRAM(s) IV Push daily  glucagon  Injectable 1 milliGRAM(s) IntraMuscular once  heparin   Injectable 5000 Unit(s) SubCutaneous every 12 hours  hydrALAZINE 25 milliGRAM(s) Oral three times a day  insulin lispro (ADMELOG) corrective regimen sliding scale   SubCutaneous three times a day before meals  labetalol 100 milliGRAM(s) Oral two times a day  pantoprazole  Injectable 40 milliGRAM(s) IV Push daily  piperacillin/tazobactam IVPB.. 3.375 Gram(s) IV Intermittent every 12 hours  senna 2 Tablet(s) Oral at bedtime  sevelamer carbonate 800 milliGRAM(s) Oral three times a day with meals  simvastatin 40 milliGRAM(s) Oral at bedtime    MEDICATIONS  (PRN):  hydrocodone/homatropine Syrup 5 milliLiter(s) Oral four times a day PRN Cough  morphine  - Injectable 2 milliGRAM(s) IV Push every 6 hours PRN Severe Pain (7 - 10)    LABS:                        7.3    7.53  )-----------( 179      ( 2022 10:26 )             22.9     02-19    137  |  100  |  26<H>  ----------------------------<  202<H>  3.8   |  31  |  5.20<H>    Ca    8.5      2022 10:26    02-16 @ 18:37  pH: 7.55  pCO2: 45  pO2: 138  SaO2: 98.6  < from: TTE Echo Complete w/o Contrast w/ Doppler (22 @ 18:45) >   EXAM:  ECHO TTE WO CON COMP W DOPP      PROCEDURE DATE:  2022      INTERPRETATION:  TRANSTHORACIC ECHOCARDIOGRAM REPORT    Patient Name:   CLAUDETTE STKITTS Patient Location: [facility] Rec #:  [id number]        Accession #:      [id number]  Account #:                        Height:           63.8 in 162.0 cm  YOB: 1945         Weight:           179.9 lb 81.60 kg  Patient Age:    76 years          BSA:              1.87 m²  Patient Gender: F                 BP:          135/68 mmHg      Date of Exam:        2022 6:45:18 PM  Sonographer:         REKHA  Referring Physician: RODO RAYO    Procedure:     2D Echo/Doppler/Color Doppler Complete.  Indications:   Dyspnea, unspecified - R06.00  Diagnosis:     Dyspnea, unspecified - R06.00  Study Details: Technically fair study.    2D AND M-MODE MEASUREMENTS (normal ranges within parentheses):  Left                 Normal   Aorta/Left            Normal  Ventricle:                    Atrium:  IVSd (2D): 1.05  (0.7-1.1) Aortic Root    3.46  (2.4-3.7)                 cm             (2D):           cm  LVPWd (2D):   1.08  (0.7-1.1) Left Atrium    3.67  (1.9-4.0)                 cm             (2D):           cm  LVIDd (2D):   5.18  (3.4-5.7) LA Vol Index   30.8                 cm             (A4C):        ml/m²  LVIDs (2D):   3.53            LA Vol Index   21.5                 cm             (A2C):        ml/m²  LV FS (2D):   31.9   (>25%)   LA Vol Index   26.2                  %             (BP):        ml/m²  Relative Wall 0.42   (<0.42)  Right  Thickness                     Ventricle:                                TAPSE:          2.67 cm    LV DIASTOLIC FUNCTION:  MV Peak E: 0.74 m/s Decel Time:  264 msec  MV Peak A: 0.92 m/s Septal E/e'11.4  E/A Ratio: 0.80     Lateral E/e' 8.0  Septal e'  0.1 m/s  Lateral e' 0.1 m/s    SPECTRAL DOPPLER ANALYSIS (where applicable):  Mitral Valve:  MV P1/2 Time: 76.54 msec  MV Area, PHT: 2.87 cm²    Aortic Valve: AoV Max Miguel: 1.80 m/s AoV Peak P.0 mmHg AoV Mean P.8 mmHg    LVOT Vmax: 1.42 m/s LVOT VTI: 0.277 m LVOT Diameter: 2.08 cm    AoV Area, Vmax: 2.68 cm² AoV Area, VTI: 2.69 cm² AoV Area, Vmn: 2.14 cm²    Tricuspid Valve and PA/RV Systolic Pressure: TR Max Velocity: 3.10 m/s RA   Pressure: 10 mmHg RVSP/PASP: 48.6 mmHg    PHYSICIAN INTERPRETATION:  Left Ventricle: The left ventricular internal cavity size is normal. Left   ventricular wall thickness is normal.  Global LV systolic function was normal. Left ventricular ejection   fraction, by visual estimation, is 60 to 65%. Spectral Doppler shows   impaired relaxation pattern of left ventricular myocardial filling (Grade   I diastolic dysfunction).  Right Ventricle: Normal right ventricular size and function.  Left Atrium: Normal left atrial size.  Right Atrium: Normal right atrial size.  Pericardium: A small pericardial effusion is present. The pericardial   effusion is globally located around the entire heart. There is a large   pleural effusion in the left lateral region.  Mitral Valve: Mild thickening and calcification of the anterior and   posterior mitral valve leaflets. Mitral leaflet mobility is normal. There   is mild mitral annular calcification. Mild mitral valve regurgitation is   seen.  Tricuspid Valve: Structurally normal tricuspid valve, with normal leaflet   excursion. The tricuspid valve is normal in structure. Mild-moderate   tricuspid regurgitation is visualized. Estimated pulmonary artery   systolic pressure is 48.6 mmHg assuming a right atrial pressure of 10   mmHg, which is consistent with mild pulmonary hypertension.  Aortic Valve: The aortic valve is trileaflet. Sclerotic aortic valve with   normal opening. Peak transaortic gradient equals 13.0 mmHg, mean   transaortic gradient equals 6.8 mmHg, the calculated aortic valve area   equals 2.69 cm² by the continuity equation consistent with normally   opening aortic valve. No evidence of aortic valve regurgitation is seen.  Pulmonic Valve: The pulmonic valve was not well visualized.Trace   pulmonic valve regurgitation.  Aorta: Aortic root measured at Sinus of Valsalva is normal.  Venous: The inferior vena cava was normal sized, with respiratory size   variation greater than 50%.  Additional Comments: A pacer wire is visualized in the right atrium and   right ventricle.      Summary:   1. Left ventricular ejection fraction, by visual estimation, is 60 to   65%.   2. Technically fair study.   3. Normal global left ventricular systolic function.   4. Normal left ventricular internal cavity size.   5. Spectral Doppler shows impaired relaxation pattern of left   ventricular myocardial filling (Grade I diastolic dysfunction).   6. Normal right ventricular size and function.   7. Normal left atrial size.   8. Normal right atrial size.   9. Large pleural effusion in the left lateral region.  10. Small pericardial effusion.  11. Mild mitral annular calcification.  12. Mild mitral valve regurgitation.  13. Mild thickening and calcification of the anterior and posterior   mitral valve leaflets.  14. Mild-moderate tricuspid regurgitation.  15. Sclerotic aortic valve with normal opening.  16. Estimated pulmonary artery systolic pressure is 48.6 mmHg assuming a   right atrial pressure of 10 mmHg, which is consistent with mildpulmonary   hypertension.    [id number] Jah Tompkins MD FACC, FASE, FACP  Electronically signed on 2022 at 11:11:28 AM    JAH TOMPKINS   This document has been electronically signed. 2022 11:11AM    ASSESSMENT AND PLAN:  ·	Acute Respiratory distress.  ·	Hypoxia.  ·	Suspected Pneumonia.  ·	Leukocytosis.  ·	ESRD, on HD.  ·	Anemia.  ·	CHF hx, S/P PPM.  ·	HTN.    SPO2 100% on nasal O2.  Clinically much better.  No PE on CTA.  No large consolidation.  Continue O2.  On empiric antibiotics.  Repeat Echo noted.

## 2022-02-20 LAB — OB PNL STL: NEGATIVE — SIGNIFICANT CHANGE UP

## 2022-02-20 PROCEDURE — 99232 SBSQ HOSP IP/OBS MODERATE 35: CPT

## 2022-02-20 RX ORDER — LORATADINE 10 MG/1
10 TABLET ORAL DAILY
Refills: 0 | Status: DISCONTINUED | OUTPATIENT
Start: 2022-02-20 | End: 2022-03-02

## 2022-02-20 RX ORDER — MONTELUKAST 4 MG/1
10 TABLET, CHEWABLE ORAL EVERY 24 HOURS
Refills: 0 | Status: DISCONTINUED | OUTPATIENT
Start: 2022-02-20 | End: 2022-03-02

## 2022-02-20 RX ORDER — MOMETASONE FUROATE 220 UG/1
1 INHALANT RESPIRATORY (INHALATION) DAILY
Refills: 0 | Status: DISCONTINUED | OUTPATIENT
Start: 2022-02-20 | End: 2022-03-02

## 2022-02-20 RX ADMIN — ALBUTEROL 2 PUFF(S): 90 AEROSOL, METERED ORAL at 06:47

## 2022-02-20 RX ADMIN — Medication 1: at 17:23

## 2022-02-20 RX ADMIN — ALBUTEROL 2 PUFF(S): 90 AEROSOL, METERED ORAL at 11:33

## 2022-02-20 RX ADMIN — PIPERACILLIN AND TAZOBACTAM 25 GRAM(S): 4; .5 INJECTION, POWDER, LYOPHILIZED, FOR SOLUTION INTRAVENOUS at 20:17

## 2022-02-20 RX ADMIN — Medication 2: at 12:09

## 2022-02-20 RX ADMIN — Medication 25 MILLIGRAM(S): at 13:52

## 2022-02-20 RX ADMIN — PIPERACILLIN AND TAZOBACTAM 25 GRAM(S): 4; .5 INJECTION, POWDER, LYOPHILIZED, FOR SOLUTION INTRAVENOUS at 08:28

## 2022-02-20 RX ADMIN — Medication 1: at 08:28

## 2022-02-20 RX ADMIN — MONTELUKAST 10 MILLIGRAM(S): 4 TABLET, CHEWABLE ORAL at 11:35

## 2022-02-20 RX ADMIN — SEVELAMER CARBONATE 800 MILLIGRAM(S): 2400 POWDER, FOR SUSPENSION ORAL at 08:28

## 2022-02-20 RX ADMIN — HEPARIN SODIUM 5000 UNIT(S): 5000 INJECTION INTRAVENOUS; SUBCUTANEOUS at 06:46

## 2022-02-20 RX ADMIN — LORATADINE 10 MILLIGRAM(S): 10 TABLET ORAL at 11:33

## 2022-02-20 RX ADMIN — SIMVASTATIN 40 MILLIGRAM(S): 20 TABLET, FILM COATED ORAL at 22:40

## 2022-02-20 RX ADMIN — MOMETASONE FUROATE 1 PUFF(S): 220 INHALANT RESPIRATORY (INHALATION) at 12:12

## 2022-02-20 RX ADMIN — Medication 10 MILLIGRAM(S): at 06:45

## 2022-02-20 RX ADMIN — HEPARIN SODIUM 5000 UNIT(S): 5000 INJECTION INTRAVENOUS; SUBCUTANEOUS at 17:23

## 2022-02-20 RX ADMIN — SEVELAMER CARBONATE 800 MILLIGRAM(S): 2400 POWDER, FOR SUSPENSION ORAL at 17:23

## 2022-02-20 RX ADMIN — SEVELAMER CARBONATE 800 MILLIGRAM(S): 2400 POWDER, FOR SUSPENSION ORAL at 11:33

## 2022-02-20 RX ADMIN — PANTOPRAZOLE SODIUM 40 MILLIGRAM(S): 20 TABLET, DELAYED RELEASE ORAL at 12:19

## 2022-02-20 RX ADMIN — CHLORHEXIDINE GLUCONATE 1 APPLICATION(S): 213 SOLUTION TOPICAL at 06:48

## 2022-02-20 RX ADMIN — Medication 25 MILLIGRAM(S): at 22:39

## 2022-02-20 RX ADMIN — Medication 40 MILLIGRAM(S): at 06:46

## 2022-02-20 NOTE — PROGRESS NOTE ADULT - SUBJECTIVE AND OBJECTIVE BOX
INTERVAL HPI:  76F with HTN, CHF S/P PPM, ESRD (on HD MWF), Diverticulitis, GI bleed and DVT  Presents to the ED for dyspnea.  Patient states that 2 days ago at HD she developed chills and dyspnea during her treatment.  EMS was called for her however she refused to go to a hospital.  Patient states that yesterday she stayed home and rested all day, felt unwell.  Went  for HD treatment today and EMS was called for her again as she looked even worse.  Admits to subjective fever but has never taken her temperature.  States that she has had a dry cough for > 7 days, also has associated rib pain with coughing.    Hypoxic on RA to 75% with EMS, and 86% in ED.  Leukocytosis on blood work.  Never smoked.  Admitted for possible pneumonia.    OVERNIGHT EVENTS:  Still reports dry cough.    Vital Signs Last 24 Hrs  T(C): 36.5 (20 Feb 2022 16:32), Max: 36.9 (20 Feb 2022 11:57)  T(F): 97.7 (20 Feb 2022 16:32), Max: 98.5 (20 Feb 2022 11:57)  HR: 60 (20 Feb 2022 16:32) (60 - 90)  BP: 119/66 (20 Feb 2022 16:32) (115/54 - 128/70)  BP(mean): --  RR: 18 (20 Feb 2022 16:32) (17 - 18)  SpO2: 99% (20 Feb 2022 16:32) (88% - 99%)    PHYSICAL EXAM:  GEN:         Awake, responsive and comfortable.  HEENT:    Normal.    RESP:      no wheezing  CVS:         Regular rate and rhythm.     MEDICATIONS  (STANDING):  chlorhexidine 4% Liquid 1 Application(s) Topical <User Schedule>  dextrose 40% Gel 15 Gram(s) Oral once  dextrose 5%. 1000 milliLiter(s) (50 mL/Hr) IV Continuous <Continuous>  dextrose 5%. 1000 milliLiter(s) (100 mL/Hr) IV Continuous <Continuous>  dextrose 50% Injectable 25 Gram(s) IV Push once  dextrose 50% Injectable 12.5 Gram(s) IV Push once  dextrose 50% Injectable 25 Gram(s) IV Push once  glucagon  Injectable 1 milliGRAM(s) IntraMuscular once  heparin   Injectable 5000 Unit(s) SubCutaneous every 12 hours  hydrALAZINE 25 milliGRAM(s) Oral three times a day  hydrocodone/homatropine Syrup 5 milliLiter(s) Oral three times a day  insulin lispro (ADMELOG) corrective regimen sliding scale   SubCutaneous three times a day before meals  labetalol 100 milliGRAM(s) Oral two times a day  loratadine 10 milliGRAM(s) Oral daily  mometasone 220 MICROgram(s) Inhaler 1 Puff(s) Inhalation daily  montelukast 10 milliGRAM(s) Oral every 24 hours  pantoprazole  Injectable 40 milliGRAM(s) IV Push daily  piperacillin/tazobactam IVPB.. 3.375 Gram(s) IV Intermittent every 12 hours  senna 2 Tablet(s) Oral at bedtime  sevelamer carbonate 800 milliGRAM(s) Oral three times a day with meals  simvastatin 40 milliGRAM(s) Oral at bedtime    MEDICATIONS  (PRN):  aluminum hydroxide/magnesium hydroxide/simethicone Suspension 15 milliLiter(s) Oral every 8 hours PRN Dyspepsia  morphine  - Injectable 2 milliGRAM(s) IV Push every 6 hours PRN Severe Pain (7 - 10)  sodium chloride 0.65% Nasal 1 Spray(s) Both Nostrils every 4 hours PRN irritation    LABS:                        7.3    7.53  )-----------( 179      ( 19 Feb 2022 10:26 )             22.9     02-19    137  |  100  |  26<H>  ----------------------------<  202<H>  3.8   |  31  |  5.20<H>    Ca    8.5      19 Feb 2022 10:26    02-16 @ 18:37  pH: 7.55  pCO2: 45  pO2: 138  SaO2: 98.6    ASSESSMENT AND PLAN:  ·	Acute Respiratory distress.  ·	Hypoxia.  ·	Suspected Pneumonia.  ·	Leukocytosis.  ·	ESRD, on HD.  ·	Anemia.  ·	CHF hx, S/P PPM.  ·	HTN.    SPO2 99% on nasal O2.  Continue humidified O2.  Continue saline nasal spray,  No PE on CTA.  No large consolidation.  On empiric antibiotics.

## 2022-02-20 NOTE — PROGRESS NOTE ADULT - SUBJECTIVE AND OBJECTIVE BOX
CHIEF COMPLAINT/INTERVAL HISTORY:    Patient is a 76y old  Female who presents with a chief complaint of PNA (19 Feb 2022 16:25)      HPI:  Patient is a 76F with a PMH of ESRD on HD MWF, HTN, diverticulitis who presents to the ED for dyspnea.  Patient states that 2 days ago at HD she developed chills and dyspnea during her treatment.  EMS was called for her however she refused to go to a hospital.  Patient states that yesterday she stayed home and rested all day, felt unwell.  Went to for HD treatment today and EMS was called for her again as she looked even worse.  Patient admits to subjective fever but has never taken her temperature.  States that she has had a dry cough for > 7 days, also has associated rib pain with coughing.  No other complaints.  Hypoxic on RA to 75%, SpO2 currently 97% on 3L via NC.  Labs show leukocytosis.  Will admit to tele.  (17 Feb 2022 00:40)      SUBJECTIVE & OBJECTIVE: Pt seen and examined at bedside in am, c/o dry cough, and epigastric pain only while coughing, + SOB associated with cough.  Mild improvement in s/s today  denies CP, palpitations, N/V/D        Vital Signs Last 24 Hrs  T(C): 36.9 (20 Feb 2022 11:57), Max: 37.2 (19 Feb 2022 16:10)  T(F): 98.5 (20 Feb 2022 11:57), Max: 99 (19 Feb 2022 16:10)  HR: 90 (20 Feb 2022 11:57) (60 - 90)  BP: 128/70 (20 Feb 2022 11:57) (115/54 - 128/70)  BP(mean): --  ABP: --  ABP(mean): --  RR: 17 (20 Feb 2022 11:57) (17 - 18)  SpO2: 88% (20 Feb 2022 11:57) (88% - 97%)        MEDICATIONS  (STANDING):  chlorhexidine 4% Liquid 1 Application(s) Topical <User Schedule>  dextrose 40% Gel 15 Gram(s) Oral once  dextrose 5%. 1000 milliLiter(s) (50 mL/Hr) IV Continuous <Continuous>  dextrose 5%. 1000 milliLiter(s) (100 mL/Hr) IV Continuous <Continuous>  dextrose 50% Injectable 25 Gram(s) IV Push once  dextrose 50% Injectable 12.5 Gram(s) IV Push once  dextrose 50% Injectable 25 Gram(s) IV Push once  glucagon  Injectable 1 milliGRAM(s) IntraMuscular once  heparin   Injectable 5000 Unit(s) SubCutaneous every 12 hours  hydrALAZINE 25 milliGRAM(s) Oral three times a day  hydrocodone/homatropine Syrup 5 milliLiter(s) Oral three times a day  insulin lispro (ADMELOG) corrective regimen sliding scale   SubCutaneous three times a day before meals  labetalol 100 milliGRAM(s) Oral two times a day  loratadine 10 milliGRAM(s) Oral daily  mometasone 220 MICROgram(s) Inhaler 1 Puff(s) Inhalation daily  montelukast 10 milliGRAM(s) Oral every 24 hours  pantoprazole  Injectable 40 milliGRAM(s) IV Push daily  piperacillin/tazobactam IVPB.. 3.375 Gram(s) IV Intermittent every 12 hours  senna 2 Tablet(s) Oral at bedtime  sevelamer carbonate 800 milliGRAM(s) Oral three times a day with meals  simvastatin 40 milliGRAM(s) Oral at bedtime    MEDICATIONS  (PRN):  aluminum hydroxide/magnesium hydroxide/simethicone Suspension 15 milliLiter(s) Oral every 8 hours PRN Dyspepsia  morphine  - Injectable 2 milliGRAM(s) IV Push every 6 hours PRN Severe Pain (7 - 10)  sodium chloride 0.65% Nasal 1 Spray(s) Both Nostrils every 4 hours PRN irritation        PHYSICAL EXAM:    GENERAL: NAD,obese, well-developed  HEAD:  Atraumatic, Normocephalic  EYES: EOMI, PERRLA, conjunctiva and sclera clear  ENMT: Moist mucous membranes  NECK: Supple  NERVOUS SYSTEM:  Alert & Oriented X3, normal speech  CHEST/LUNG: generalized decreased bs b/l  HEART: S1, S2  ABDOMEN: Soft, Nontender,obese, Bowel sounds present  EXTREMITIES:  no cyanosis, or edema    LABS:                        7.3    7.53  )-----------( 179      ( 19 Feb 2022 10:26 )             22.9     02-19    137  |  100  |  26<H>  ----------------------------<  202<H>  3.8   |  31  |  5.20<H>    Ca    8.5      19 Feb 2022 10:26            CAPILLARY BLOOD GLUCOSE      POCT Blood Glucose.: 229 mg/dL (20 Feb 2022 11:53)  POCT Blood Glucose.: 190 mg/dL (20 Feb 2022 08:18)  POCT Blood Glucose.: 204 mg/dL (19 Feb 2022 23:04)  POCT Blood Glucose.: 255 mg/dL (19 Feb 2022 17:05)  POCT Blood Glucose.: 118 mg/dL (19 Feb 2022 14:16)

## 2022-02-21 LAB
GLUCOSE BLDC GLUCOMTR-MCNC: 141 MG/DL — HIGH (ref 70–99)
GLUCOSE BLDC GLUCOMTR-MCNC: 192 MG/DL — HIGH (ref 70–99)

## 2022-02-21 PROCEDURE — 99232 SBSQ HOSP IP/OBS MODERATE 35: CPT

## 2022-02-21 RX ADMIN — SEVELAMER CARBONATE 800 MILLIGRAM(S): 2400 POWDER, FOR SUSPENSION ORAL at 14:37

## 2022-02-21 RX ADMIN — SEVELAMER CARBONATE 800 MILLIGRAM(S): 2400 POWDER, FOR SUSPENSION ORAL at 17:27

## 2022-02-21 RX ADMIN — Medication 100 MILLIGRAM(S): at 17:27

## 2022-02-21 RX ADMIN — HEPARIN SODIUM 5000 UNIT(S): 5000 INJECTION INTRAVENOUS; SUBCUTANEOUS at 05:12

## 2022-02-21 RX ADMIN — SIMVASTATIN 40 MILLIGRAM(S): 20 TABLET, FILM COATED ORAL at 21:07

## 2022-02-21 RX ADMIN — MONTELUKAST 10 MILLIGRAM(S): 4 TABLET, CHEWABLE ORAL at 14:37

## 2022-02-21 RX ADMIN — Medication 1: at 17:26

## 2022-02-21 RX ADMIN — Medication 25 MILLIGRAM(S): at 05:29

## 2022-02-21 RX ADMIN — Medication 25 MILLIGRAM(S): at 21:07

## 2022-02-21 RX ADMIN — Medication 25 MILLIGRAM(S): at 14:38

## 2022-02-21 RX ADMIN — PIPERACILLIN AND TAZOBACTAM 25 GRAM(S): 4; .5 INJECTION, POWDER, LYOPHILIZED, FOR SOLUTION INTRAVENOUS at 08:28

## 2022-02-21 RX ADMIN — HEPARIN SODIUM 5000 UNIT(S): 5000 INJECTION INTRAVENOUS; SUBCUTANEOUS at 17:26

## 2022-02-21 RX ADMIN — MOMETASONE FUROATE 1 PUFF(S): 220 INHALANT RESPIRATORY (INHALATION) at 14:45

## 2022-02-21 RX ADMIN — Medication 1: at 08:28

## 2022-02-21 RX ADMIN — CHLORHEXIDINE GLUCONATE 1 APPLICATION(S): 213 SOLUTION TOPICAL at 05:07

## 2022-02-21 RX ADMIN — PANTOPRAZOLE SODIUM 40 MILLIGRAM(S): 20 TABLET, DELAYED RELEASE ORAL at 14:38

## 2022-02-21 RX ADMIN — PIPERACILLIN AND TAZOBACTAM 25 GRAM(S): 4; .5 INJECTION, POWDER, LYOPHILIZED, FOR SOLUTION INTRAVENOUS at 21:08

## 2022-02-21 RX ADMIN — LORATADINE 10 MILLIGRAM(S): 10 TABLET ORAL at 14:38

## 2022-02-21 NOTE — PROGRESS NOTE ADULT - SUBJECTIVE AND OBJECTIVE BOX
Patient is a 76y old  Female who presents with a chief complaint of PNA (21 Feb 2022 10:26)    INTERVAL HPI/OVERNIGHT EVENTS:  Pt was seen and examined, no acute events.    MEDICATIONS  (STANDING):  chlorhexidine 4% Liquid 1 Application(s) Topical <User Schedule>  dextrose 40% Gel 15 Gram(s) Oral once  dextrose 5%. 1000 milliLiter(s) (50 mL/Hr) IV Continuous <Continuous>  dextrose 5%. 1000 milliLiter(s) (100 mL/Hr) IV Continuous <Continuous>  dextrose 50% Injectable 25 Gram(s) IV Push once  dextrose 50% Injectable 12.5 Gram(s) IV Push once  dextrose 50% Injectable 25 Gram(s) IV Push once  glucagon  Injectable 1 milliGRAM(s) IntraMuscular once  heparin   Injectable 5000 Unit(s) SubCutaneous every 12 hours  hydrALAZINE 25 milliGRAM(s) Oral three times a day  hydrocodone/homatropine Syrup 5 milliLiter(s) Oral three times a day  insulin lispro (ADMELOG) corrective regimen sliding scale   SubCutaneous three times a day before meals  labetalol 100 milliGRAM(s) Oral two times a day  loratadine 10 milliGRAM(s) Oral daily  mometasone 220 MICROgram(s) Inhaler 1 Puff(s) Inhalation daily  montelukast 10 milliGRAM(s) Oral every 24 hours  pantoprazole  Injectable 40 milliGRAM(s) IV Push daily  piperacillin/tazobactam IVPB.. 3.375 Gram(s) IV Intermittent every 12 hours  senna 2 Tablet(s) Oral at bedtime  sevelamer carbonate 800 milliGRAM(s) Oral three times a day with meals  simvastatin 40 milliGRAM(s) Oral at bedtime    MEDICATIONS  (PRN):  aluminum hydroxide/magnesium hydroxide/simethicone Suspension 15 milliLiter(s) Oral every 8 hours PRN Dyspepsia  morphine  - Injectable 2 milliGRAM(s) IV Push every 6 hours PRN Severe Pain (7 - 10)  sodium chloride 0.65% Nasal 1 Spray(s) Both Nostrils every 4 hours PRN irritation      Allergies  Eliquis (Other)      Vital Signs Last 24 Hrs  T(C): 37 (21 Feb 2022 21:23), Max: 37 (21 Feb 2022 00:22)  T(F): 98.6 (21 Feb 2022 21:23), Max: 98.6 (21 Feb 2022 00:22)  HR: 60 (21 Feb 2022 21:23) (59 - 60)  BP: 115/62 (21 Feb 2022 21:23) (109/67 - 126/62)  BP(mean): --  RR: 17 (21 Feb 2022 21:23) (17 - 18)  SpO2: 97% (21 Feb 2022 21:23) (95% - 99%)    PHYSICAL EXAM:  GENERAL: NAD,obese, well-developed  HEAD:  Atraumatic, Normocephalic  EYES: EOMI, PERRLA, conjunctiva and sclera clear  ENMT: Moist mucous membranes  NECK: Supple  NERVOUS SYSTEM:  Alert & Oriented X3, normal speech  CHEST/LUNG: generalized decreased bs b/l, on 4L NC  HEART: S1, S2  ABDOMEN: Soft, Nontender,obese, Bowel sounds present  EXTREMITIES:  no cyanosis, or edema    LABS:        CAPILLARY BLOOD GLUCOSE      POCT Blood Glucose.: 141 mg/dL (21 Feb 2022 21:04)  POCT Blood Glucose.: 192 mg/dL (21 Feb 2022 17:21)  POCT Blood Glucose.: 186 mg/dL (21 Feb 2022 08:15)      Culture - Urine (collected 17 Feb 2022 14:39)  Source: Clean Catch Clean Catch (Midstream)  Final Report (18 Feb 2022 12:30):    No growth    Culture - Blood (collected 17 Feb 2022 01:48)  Source: .Blood Blood-Peripheral  Preliminary Report (18 Feb 2022 02:00):    No growth to date.    Culture - Blood (collected 17 Feb 2022 01:27)  Source: .Blood Blood-Peripheral  Preliminary Report (18 Feb 2022 02:00):    No growth to date.      RADIOLOGY & ADDITIONAL TESTS:    Imaging Personally Reviewed:  [ ] YES  [ ] NO    Consultant(s) Notes Reviewed:  [ ] YES  [ ] NO    Care Discussed with Consultants/Other Providers [ ] YES  [ ] NO

## 2022-02-21 NOTE — PROGRESS NOTE ADULT - SUBJECTIVE AND OBJECTIVE BOX
NEPHROLOGY PROGRESS NOTE    CHIEF COMPLAINT:  ESRD    HPI:  Seen on dialysis.  Access working well.  Breathing overall improved but not back to 100% yet.    ROS:  + dry cough    EXAM:  T(F): 98.2 (02-21-22 @ 05:25)  HR: 60 (02-21-22 @ 07:00)  BP: 126/62 (02-21-22 @ 05:25)  RR: 18 (02-21-22 @ 05:25)  SpO2: 95% (02-21-22 @ 05:25)    Conversant, in no apparent distress  Normal respiratory effort, lungs clear bilaterally  Heart RRR with no murmur, no peripheral edema      Assessment   ESRD; fluid overload, PNA, clinically improved post dialysis  Chronic anemia    Plan:  Complete HD as ordered today with 2 kg fluid off

## 2022-02-22 LAB
ALBUMIN SERPL ELPH-MCNC: 2.6 G/DL — LOW (ref 3.3–5)
ALP SERPL-CCNC: 81 U/L — SIGNIFICANT CHANGE UP (ref 40–120)
ALT FLD-CCNC: 12 U/L — SIGNIFICANT CHANGE UP (ref 12–78)
ANION GAP SERPL CALC-SCNC: 6 MMOL/L — SIGNIFICANT CHANGE UP (ref 5–17)
AST SERPL-CCNC: 20 U/L — SIGNIFICANT CHANGE UP (ref 15–37)
BILIRUB SERPL-MCNC: 0.3 MG/DL — SIGNIFICANT CHANGE UP (ref 0.2–1.2)
BUN SERPL-MCNC: 31 MG/DL — HIGH (ref 7–23)
CALCIUM SERPL-MCNC: 8.9 MG/DL — SIGNIFICANT CHANGE UP (ref 8.5–10.1)
CHLORIDE SERPL-SCNC: 99 MMOL/L — SIGNIFICANT CHANGE UP (ref 96–108)
CO2 SERPL-SCNC: 30 MMOL/L — SIGNIFICANT CHANGE UP (ref 22–31)
CREAT SERPL-MCNC: 5.62 MG/DL — HIGH (ref 0.5–1.3)
CULTURE RESULTS: SIGNIFICANT CHANGE UP
CULTURE RESULTS: SIGNIFICANT CHANGE UP
GLUCOSE BLDC GLUCOMTR-MCNC: 153 MG/DL — HIGH (ref 70–99)
GLUCOSE BLDC GLUCOMTR-MCNC: 153 MG/DL — HIGH (ref 70–99)
GLUCOSE BLDC GLUCOMTR-MCNC: 177 MG/DL — HIGH (ref 70–99)
GLUCOSE BLDC GLUCOMTR-MCNC: 254 MG/DL — HIGH (ref 70–99)
GLUCOSE SERPL-MCNC: 159 MG/DL — HIGH (ref 70–99)
HCT VFR BLD CALC: 25.2 % — LOW (ref 34.5–45)
HGB BLD-MCNC: 8 G/DL — LOW (ref 11.5–15.5)
MCHC RBC-ENTMCNC: 29.5 PG — SIGNIFICANT CHANGE UP (ref 27–34)
MCHC RBC-ENTMCNC: 31.7 G/DL — LOW (ref 32–36)
MCV RBC AUTO: 93 FL — SIGNIFICANT CHANGE UP (ref 80–100)
MRSA PCR RESULT.: SIGNIFICANT CHANGE UP
NRBC # BLD: 0 /100 WBCS — SIGNIFICANT CHANGE UP (ref 0–0)
PLATELET # BLD AUTO: 204 K/UL — SIGNIFICANT CHANGE UP (ref 150–400)
POTASSIUM SERPL-MCNC: 4.1 MMOL/L — SIGNIFICANT CHANGE UP (ref 3.5–5.3)
POTASSIUM SERPL-SCNC: 4.1 MMOL/L — SIGNIFICANT CHANGE UP (ref 3.5–5.3)
PROT SERPL-MCNC: 6.9 GM/DL — SIGNIFICANT CHANGE UP (ref 6–8.3)
RBC # BLD: 2.71 M/UL — LOW (ref 3.8–5.2)
RBC # FLD: 15.2 % — HIGH (ref 10.3–14.5)
S AUREUS DNA NOSE QL NAA+PROBE: SIGNIFICANT CHANGE UP
SODIUM SERPL-SCNC: 135 MMOL/L — SIGNIFICANT CHANGE UP (ref 135–145)
SPECIMEN SOURCE: SIGNIFICANT CHANGE UP
SPECIMEN SOURCE: SIGNIFICANT CHANGE UP
WBC # BLD: 7.28 K/UL — SIGNIFICANT CHANGE UP (ref 3.8–10.5)
WBC # FLD AUTO: 7.28 K/UL — SIGNIFICANT CHANGE UP (ref 3.8–10.5)

## 2022-02-22 PROCEDURE — 99232 SBSQ HOSP IP/OBS MODERATE 35: CPT

## 2022-02-22 PROCEDURE — 99233 SBSQ HOSP IP/OBS HIGH 50: CPT

## 2022-02-22 RX ADMIN — Medication 1: at 17:02

## 2022-02-22 RX ADMIN — Medication 3: at 12:46

## 2022-02-22 RX ADMIN — HEPARIN SODIUM 5000 UNIT(S): 5000 INJECTION INTRAVENOUS; SUBCUTANEOUS at 17:03

## 2022-02-22 RX ADMIN — Medication 100 MILLIGRAM(S): at 05:55

## 2022-02-22 RX ADMIN — HEPARIN SODIUM 5000 UNIT(S): 5000 INJECTION INTRAVENOUS; SUBCUTANEOUS at 05:55

## 2022-02-22 RX ADMIN — SIMVASTATIN 40 MILLIGRAM(S): 20 TABLET, FILM COATED ORAL at 21:55

## 2022-02-22 RX ADMIN — SEVELAMER CARBONATE 800 MILLIGRAM(S): 2400 POWDER, FOR SUSPENSION ORAL at 12:50

## 2022-02-22 RX ADMIN — MONTELUKAST 10 MILLIGRAM(S): 4 TABLET, CHEWABLE ORAL at 12:51

## 2022-02-22 RX ADMIN — MOMETASONE FUROATE 1 PUFF(S): 220 INHALANT RESPIRATORY (INHALATION) at 12:51

## 2022-02-22 RX ADMIN — Medication 25 MILLIGRAM(S): at 17:01

## 2022-02-22 RX ADMIN — PIPERACILLIN AND TAZOBACTAM 25 GRAM(S): 4; .5 INJECTION, POWDER, LYOPHILIZED, FOR SOLUTION INTRAVENOUS at 21:51

## 2022-02-22 RX ADMIN — SEVELAMER CARBONATE 800 MILLIGRAM(S): 2400 POWDER, FOR SUSPENSION ORAL at 09:40

## 2022-02-22 RX ADMIN — Medication 25 MILLIGRAM(S): at 05:55

## 2022-02-22 RX ADMIN — Medication 1: at 08:33

## 2022-02-22 RX ADMIN — SEVELAMER CARBONATE 800 MILLIGRAM(S): 2400 POWDER, FOR SUSPENSION ORAL at 17:03

## 2022-02-22 RX ADMIN — Medication 25 MILLIGRAM(S): at 21:55

## 2022-02-22 RX ADMIN — LORATADINE 10 MILLIGRAM(S): 10 TABLET ORAL at 12:51

## 2022-02-22 RX ADMIN — Medication 100 MILLIGRAM(S): at 17:04

## 2022-02-22 RX ADMIN — PANTOPRAZOLE SODIUM 40 MILLIGRAM(S): 20 TABLET, DELAYED RELEASE ORAL at 17:01

## 2022-02-22 NOTE — PROGRESS NOTE ADULT - SUBJECTIVE AND OBJECTIVE BOX
INTERVAL HPI:   76F with HTN, CHF S/P PPM, ESRD (on HD MWF), Diverticulitis, GI bleed and DVT  Presents to the ED for dyspnea.  Patient states that 2 days ago at HD she developed chills and dyspnea during her treatment.  EMS was called for her however she refused to go to a hospital.  Patient states that yesterday she stayed home and rested all day, felt unwell.  Went  for HD treatment today and EMS was called for her again as she looked even worse.  Admits to subjective fever but has never taken her temperature.  States that she has had a dry cough for > 7 days, also has associated rib pain with coughing.    Hypoxic on RA to 75% with EMS, and 86% in ED.  Leukocytosis on blood work.  Never smoked.  Admitted for possible pneumonia.    OVERNIGHT EVENTS:  Reports episode of cough.    Vital Signs Last 24 Hrs  T(C): 36.6 (22 Feb 2022 17:09), Max: 37.1 (21 Feb 2022 22:30)  T(F): 97.8 (22 Feb 2022 17:09), Max: 98.7 (21 Feb 2022 22:30)  HR: 60 (22 Feb 2022 17:09) (60 - 61)  BP: 125/64 (22 Feb 2022 17:09) (109/63 - 134/74)  BP(mean): --  RR: 18 (22 Feb 2022 17:09) (17 - 20)  SpO2: 99% (22 Feb 2022 17:09) (96% - 99%)    PHYSICAL EXAM:  GEN:         Awake, responsive and comfortable.  HEENT:    Normal.    RESP:      decreased air entry.  CVS:         Regular rate and rhythm.     MEDICATIONS  (STANDING):  chlorhexidine 4% Liquid 1 Application(s) Topical <User Schedule>  dextrose 40% Gel 15 Gram(s) Oral once  dextrose 5%. 1000 milliLiter(s) (50 mL/Hr) IV Continuous <Continuous>  dextrose 5%. 1000 milliLiter(s) (100 mL/Hr) IV Continuous <Continuous>  dextrose 50% Injectable 25 Gram(s) IV Push once  dextrose 50% Injectable 12.5 Gram(s) IV Push once  dextrose 50% Injectable 25 Gram(s) IV Push once  glucagon  Injectable 1 milliGRAM(s) IntraMuscular once  heparin   Injectable 5000 Unit(s) SubCutaneous every 12 hours  hydrALAZINE 25 milliGRAM(s) Oral three times a day  insulin lispro (ADMELOG) corrective regimen sliding scale   SubCutaneous three times a day before meals  labetalol 100 milliGRAM(s) Oral two times a day  loratadine 10 milliGRAM(s) Oral daily  mometasone 220 MICROgram(s) Inhaler 1 Puff(s) Inhalation daily  montelukast 10 milliGRAM(s) Oral every 24 hours  pantoprazole  Injectable 40 milliGRAM(s) IV Push daily  piperacillin/tazobactam IVPB.. 3.375 Gram(s) IV Intermittent every 12 hours  senna 2 Tablet(s) Oral at bedtime  sevelamer carbonate 800 milliGRAM(s) Oral three times a day with meals  simvastatin 40 milliGRAM(s) Oral at bedtime    MEDICATIONS  (PRN):  aluminum hydroxide/magnesium hydroxide/simethicone Suspension 15 milliLiter(s) Oral every 8 hours PRN Dyspepsia  hydrocodone/homatropine Syrup 5 milliLiter(s) Oral every 6 hours PRN Cough  sodium chloride 0.65% Nasal 1 Spray(s) Both Nostrils every 4 hours PRN irritation    LABS:                        8.0    7.28  )-----------( 204      ( 22 Feb 2022 06:33 )             25.2     02-22    135  |  99  |  31<H>  ----------------------------<  159<H>  4.1   |  30  |  5.62<H>    Ca    8.9      22 Feb 2022 06:33    TPro  6.9  /  Alb  2.6<L>  /  TBili  0.3  /  DBili  x   /  AST  20  /  ALT  12  /  AlkPhos  81  02-22    02-16 @ 18:37  pH: 7.55  pCO2: 45  pO2: 138  SaO2: 98.6    ASSESSMENT AND PLAN:  ·	Acute Respiratory distress.  ·	Hypoxia.  ·	No Pneumonia on chest CT.  ·	Bilateral small pleural effusion with atelectasis.  ·	Leukocytosis better.  ·	ESRD, on HD.  ·	Anemia.  ·	CHF hx, S/P PPM.  ·	HTN.    SPO2 99% on 4 litre  nasal O2.  Continue humidified O2.  Continue saline nasal spray,  No PE or pneumonia on CTA chest  On empiric antibiotics.  Hydrocodone as cough suppressant.

## 2022-02-22 NOTE — PROGRESS NOTE ADULT - SUBJECTIVE AND OBJECTIVE BOX
SUBJECTIVE AND OBJECTIVE: "I am just fatigued"   INTERVAL HPI/OVERNIGHT EVENTS:    DNR on chart: yes  Allergies    Eliquis (Other)    Intolerances    MEDICATIONS  (STANDING):  chlorhexidine 4% Liquid 1 Application(s) Topical <User Schedule>  dextrose 40% Gel 15 Gram(s) Oral once  dextrose 5%. 1000 milliLiter(s) (50 mL/Hr) IV Continuous <Continuous>  dextrose 5%. 1000 milliLiter(s) (100 mL/Hr) IV Continuous <Continuous>  dextrose 50% Injectable 25 Gram(s) IV Push once  dextrose 50% Injectable 12.5 Gram(s) IV Push once  dextrose 50% Injectable 25 Gram(s) IV Push once  glucagon  Injectable 1 milliGRAM(s) IntraMuscular once  heparin   Injectable 5000 Unit(s) SubCutaneous every 12 hours  hydrALAZINE 25 milliGRAM(s) Oral three times a day  hydrocodone/homatropine Syrup 5 milliLiter(s) Oral three times a day  insulin lispro (ADMELOG) corrective regimen sliding scale   SubCutaneous three times a day before meals  labetalol 100 milliGRAM(s) Oral two times a day  loratadine 10 milliGRAM(s) Oral daily  mometasone 220 MICROgram(s) Inhaler 1 Puff(s) Inhalation daily  montelukast 10 milliGRAM(s) Oral every 24 hours  pantoprazole  Injectable 40 milliGRAM(s) IV Push daily  piperacillin/tazobactam IVPB.. 3.375 Gram(s) IV Intermittent every 12 hours  senna 2 Tablet(s) Oral at bedtime  sevelamer carbonate 800 milliGRAM(s) Oral three times a day with meals  simvastatin 40 milliGRAM(s) Oral at bedtime    MEDICATIONS  (PRN):  aluminum hydroxide/magnesium hydroxide/simethicone Suspension 15 milliLiter(s) Oral every 8 hours PRN Dyspepsia  morphine  - Injectable 2 milliGRAM(s) IV Push every 6 hours PRN Severe Pain (7 - 10)  sodium chloride 0.65% Nasal 1 Spray(s) Both Nostrils every 4 hours PRN irritation      ITEMS UNCHECKED ARE NOT PRESENT    PRESENT SYMPTOMS: [ ]Unable to obtain due to poor mentation   Source if other than patient:  [ ]Family   [ ]Team     Pain:  [ ]yes [ x]no  QOL impact -   Location -                    Aggravating factors -  Quality -  Radiation -  Timing-  Severity (0-10 scale):  Minimal acceptable level (0-10 scale):     Dyspnea:                           [ ]Mild [ x]Moderate [ ]Severe  Anxiety:                             [ ]Mild [ ]Moderate [ ]Severe  Fatigue:                             [ ]Mild [ ]Moderate [ ]Severe  Nausea:                             [ ]Mild [ ]Moderate [ ]Severe  Loss of appetite:              [ ]Mild [ ]Moderate [ ]Severe  Constipation:                    [ ]Mild [ ]Moderate [ ]Severe    CPOT:    https://www.[facility].org/getattachment/[email or web address]/Critical-Care-Pain-Observation-Tool-(CPOT)    PAIN AD Score:	  http://geriatrictoolkit.[facility]/cog/painad.pdf (Ctrl + left click to view)    Other Symptoms:  [ ]All other review of systems negative     Palliative Performance Status Version 2:      30   %      http://[facility].org/files/news/palliative_performance_scale_[email or web address].pdf  PHYSICAL EXAM:  Vital Signs Last 24 Hrs  T(C): 37 (22 Feb 2022 11:13), Max: 37.1 (21 Feb 2022 22:30)  T(F): 98.6 (22 Feb 2022 11:13), Max: 98.7 (21 Feb 2022 22:30)  HR: 60 (22 Feb 2022 11:13) (60 - 61)  BP: 109/63 (22 Feb 2022 11:13) (109/63 - 134/74)  BP(mean): --  RR: 17 (22 Feb 2022 11:13) (17 - 20)  SpO2: 98% (22 Feb 2022 11:13) (96% - 99%) I&O's Summary    21 Feb 2022 07:01  -  22 Feb 2022 07:00  --------------------------------------------------------  IN: 150 mL / OUT: 2000 mL / NET: -1850 mL       GENERAL:  [ x]Alert  [x ]Oriented x 4  [ ]Lethargic  [ ]Cachexia  [ ]Unarousable  [x ]Verbal  [ ]Non-Verbal  Behavioral:   [ ]Anxiety  [ ]Delirium [ ]Agitation [ ]Other  HEENT:  [ ]Normal   [ x]Dry mouth   [ ]ET Tube/Trach  [ ]Oral lesions  PULMONARY:   [ ]Clear [x ]Tachypnea  [ ]Audible excessive secretions   [ ]Rhonchi        [ ]Right [ ]Left [ ]Bilateral  [ ]Crackles        [ ]Right [ ]Left [ ]Bilateral  [ ]Wheezing     [ ]Right [ ]Left [ ]Bilateral  [ ]Diminished BS [ ] Right [ ]Left [ ]Bilateral  CARDIOVASCULAR:    [ x]Regular [ ]Irregular [ ]Tachy  [ ]Ceferino [ ]Murmur [ ]Other  GASTROINTESTINAL:  [x ]Soft  [ ]Distended   [x ]+BS  [x ]Non tender [ ]Tender  [ ]PEG [ ]OGT/ NGT   Last BM:    GENITOURINARY:  [ ]Normal [ ]Incontinent   [ x]Oliguria/Anuria   [ ]Burrows  MUSCULOSKELETAL:   [ ]Normal   [x ]Weakness  [ ]Bed/Wheelchair bound [ ]Edema  NEUROLOGIC:   [x ]No focal deficits  [ ] Cognitive impairment  [ ] Dysphagia [ ]Dysarthria [ ] Paresis [ ]Other   SKIN:   [x ]Normal  [ ]Rash   [ ]Pressure ulcer(s) [ ]y [ ]n present on admission    CRITICAL CARE:  [ ]Shock Present  [ ]Septic [ ]Cardiogenic [ ]Neurologic [ ]Hypovolemic  [ ]Vasopressors [ ]Inotropes  [ ]Respiratory failure present [ ]Mechanical Ventilation [ ]Non-invasive ventilatory support [ ]High-Flow   [ ]Acute  [ ]Chronic [ ]Hypoxic  [ ]Hypercarbic [ ]Other  [x ]Other organ failure     LABS:                        8.0    7.28  )-----------( 204      ( 22 Feb 2022 06:33 )             25.2   02-22    135  |  99  |  31<H>  ----------------------------<  159<H>  4.1   |  30  |  5.62<H>    Ca    8.9      22 Feb 2022 06:33    TPro  6.9  /  Alb  2.6<L>  /  TBili  0.3  /  DBili  x   /  AST  20  /  ALT  12  /  AlkPhos  81  02-22        RADIOLOGY & ADDITIONAL STUDIES:    Protein Calorie Malnutrition Present: [ ]mild [x ]moderate [ ]severe [ ]underweight [ ]morbid obesity  https://www.[employer].org/vault/2440/web/files/ONC/Table_Clinical%20Characteristics%20to%20Document%20Malnutrition-White%20JV%20et%20al%202012.pdf    Height (cm): 162.6 (02-17-22 @ 05:20), 162.6 (04-15-21 @ 13:33)  Weight (kg): 81.6 (02-16-22 @ 18:04), 94.1 (04-29-21 @ 07:59)  BMI (kg/m2): 30.9 (02-17-22 @ 05:20), 30.9 (02-16-22 @ 18:04), 35.6 (04-29-21 @ 07:59)    [x ]PPSV2 < or = 30%  [ ]significant weight loss [ ]poor nutritional intake [ ]anasarca    [ ]Artificial Nutrition    REFERRALS:   [ ]Chaplaincy  [ ]Hospice  [ ]Child Life  [ ]Social Work  [ ]Case management [ ]Holistic Therapy     Goals of Care Document:

## 2022-02-22 NOTE — PROGRESS NOTE ADULT - SUBJECTIVE AND OBJECTIVE BOX
NEPHROLOGY PROGRESS NOTE    CHIEF COMPLAINT:  ESRD    HPI:  Coughing more today but no phlegm;  also c/o nose bleed      EXAM:  T(F): 98.6 (02-22-22 @ 11:13)  HR: 60 (02-22-22 @ 11:13)  BP: 109/63 (02-22-22 @ 11:13)  RR: 17 (02-22-22 @ 11:13)  SpO2: 98% (02-22-22 @ 11:13)    Conversant, in no apparent distress  Normal respiratory effort, lungs coarse rales 1/2 up + croupy cough  Heart RRR with no murmur, no peripheral edema         LABS                             8.0    7.28  )-----------( 204      ( 22 Feb 2022 06:33 )             25.2          02-22    135  |  99  |  31<H>  ----------------------------<  159<H>  4.1   |  30  |  5.62<H>    Ca    8.9      22 Feb 2022 06:33    TPro  6.9  /  Alb  2.6<L>  /  TBili  0.3  /  DBili  x   /  AST  20  /  ALT  12  /  AlkPhos  81  02-22      ASSESSMENT:  Pneumonia  ESRD on hemodialysis MWF  Fluid overload, resolved with dialysis  Chronic anemia    RECOMMEND:  Resume HD tomorrow as ordered

## 2022-02-22 NOTE — PROGRESS NOTE ADULT - SUBJECTIVE AND OBJECTIVE BOX
Patient is a 76y old  Female who presents with a chief complaint of PNA (22 Feb 2022 16:53)    INTERVAL HPI/OVERNIGHT EVENTS:  Pt was seen and examined, no acute events.    MEDICATIONS  (STANDING):  chlorhexidine 4% Liquid 1 Application(s) Topical <User Schedule>  dextrose 40% Gel 15 Gram(s) Oral once  dextrose 5%. 1000 milliLiter(s) (50 mL/Hr) IV Continuous <Continuous>  dextrose 5%. 1000 milliLiter(s) (100 mL/Hr) IV Continuous <Continuous>  dextrose 50% Injectable 25 Gram(s) IV Push once  dextrose 50% Injectable 12.5 Gram(s) IV Push once  dextrose 50% Injectable 25 Gram(s) IV Push once  glucagon  Injectable 1 milliGRAM(s) IntraMuscular once  heparin   Injectable 5000 Unit(s) SubCutaneous every 12 hours  hydrALAZINE 25 milliGRAM(s) Oral three times a day  insulin lispro (ADMELOG) corrective regimen sliding scale   SubCutaneous three times a day before meals  labetalol 100 milliGRAM(s) Oral two times a day  loratadine 10 milliGRAM(s) Oral daily  mometasone 220 MICROgram(s) Inhaler 1 Puff(s) Inhalation daily  montelukast 10 milliGRAM(s) Oral every 24 hours  pantoprazole  Injectable 40 milliGRAM(s) IV Push daily  piperacillin/tazobactam IVPB.. 3.375 Gram(s) IV Intermittent every 12 hours  senna 2 Tablet(s) Oral at bedtime  sevelamer carbonate 800 milliGRAM(s) Oral three times a day with meals  simvastatin 40 milliGRAM(s) Oral at bedtime    MEDICATIONS  (PRN):  aluminum hydroxide/magnesium hydroxide/simethicone Suspension 15 milliLiter(s) Oral every 8 hours PRN Dyspepsia  hydrocodone/homatropine Syrup 5 milliLiter(s) Oral every 6 hours PRN Cough  sodium chloride 0.65% Nasal 1 Spray(s) Both Nostrils every 4 hours PRN irritation      Allergies  Eliquis (Other)      Vital Signs Last 24 Hrs  T(C): 36.6 (22 Feb 2022 17:09), Max: 37.1 (21 Feb 2022 22:30)  T(F): 97.8 (22 Feb 2022 17:09), Max: 98.7 (21 Feb 2022 22:30)  HR: 60 (22 Feb 2022 17:09) (60 - 61)  BP: 125/64 (22 Feb 2022 17:09) (109/63 - 134/74)  BP(mean): --  RR: 18 (22 Feb 2022 17:09) (17 - 20)  SpO2: 99% (22 Feb 2022 17:09) (96% - 99%)    PHYSICAL EXAM:  GENERAL: NAD,obese, well-developed  HEAD:  Atraumatic, Normocephalic  EYES: EOMI, PERRLA, conjunctiva and sclera clear  ENMT: Moist mucous membranes  NECK: Supple  NERVOUS SYSTEM:  Alert & Oriented X3, normal speech  CHEST/LUNG: generalized decreased bs b/l, on 3L NC  HEART: S1, S2  ABDOMEN: Soft, Nontender,obese, Bowel sounds present  EXTREMITIES:  no cyanosis, or edema      LABS:                        8.0    7.28  )-----------( 204      ( 22 Feb 2022 06:33 )             25.2     02-22    135  |  99  |  31<H>  ----------------------------<  159<H>  4.1   |  30  |  5.62<H>    Ca    8.9      22 Feb 2022 06:33    TPro  6.9  /  Alb  2.6<L>  /  TBili  0.3  /  DBili  x   /  AST  20  /  ALT  12  /  AlkPhos  81  02-22        CAPILLARY BLOOD GLUCOSE      POCT Blood Glucose.: 153 mg/dL (22 Feb 2022 16:37)  POCT Blood Glucose.: 254 mg/dL (22 Feb 2022 11:51)  POCT Blood Glucose.: 177 mg/dL (22 Feb 2022 07:43)  POCT Blood Glucose.: 141 mg/dL (21 Feb 2022 21:04)      RADIOLOGY & ADDITIONAL TESTS:    Imaging Personally Reviewed:  [ ] YES  [ ] NO    Consultant(s) Notes Reviewed:  [ ] YES  [ ] NO    Care Discussed with Consultants/Other Providers [ ] YES  [ ] NO

## 2022-02-23 LAB
ALBUMIN SERPL ELPH-MCNC: 2.9 G/DL — LOW (ref 3.3–5)
ALP SERPL-CCNC: 85 U/L — SIGNIFICANT CHANGE UP (ref 40–120)
ALT FLD-CCNC: 16 U/L — SIGNIFICANT CHANGE UP (ref 12–78)
ANION GAP SERPL CALC-SCNC: 8 MMOL/L — SIGNIFICANT CHANGE UP (ref 5–17)
AST SERPL-CCNC: 21 U/L — SIGNIFICANT CHANGE UP (ref 15–37)
BILIRUB SERPL-MCNC: 0.4 MG/DL — SIGNIFICANT CHANGE UP (ref 0.2–1.2)
BUN SERPL-MCNC: 55 MG/DL — HIGH (ref 7–23)
CALCIUM SERPL-MCNC: 8.9 MG/DL — SIGNIFICANT CHANGE UP (ref 8.5–10.1)
CHLORIDE SERPL-SCNC: 102 MMOL/L — SIGNIFICANT CHANGE UP (ref 96–108)
CO2 SERPL-SCNC: 26 MMOL/L — SIGNIFICANT CHANGE UP (ref 22–31)
CREAT SERPL-MCNC: 8.21 MG/DL — HIGH (ref 0.5–1.3)
FLUAV AG NPH QL: SIGNIFICANT CHANGE UP
FLUBV AG NPH QL: SIGNIFICANT CHANGE UP
GLUCOSE BLDC GLUCOMTR-MCNC: 138 MG/DL — HIGH (ref 70–99)
GLUCOSE BLDC GLUCOMTR-MCNC: 236 MG/DL — HIGH (ref 70–99)
GLUCOSE BLDC GLUCOMTR-MCNC: 253 MG/DL — HIGH (ref 70–99)
GLUCOSE SERPL-MCNC: 110 MG/DL — HIGH (ref 70–99)
HCT VFR BLD CALC: 26.2 % — LOW (ref 34.5–45)
HGB BLD-MCNC: 8.5 G/DL — LOW (ref 11.5–15.5)
MCHC RBC-ENTMCNC: 29.6 PG — SIGNIFICANT CHANGE UP (ref 27–34)
MCHC RBC-ENTMCNC: 32.4 G/DL — SIGNIFICANT CHANGE UP (ref 32–36)
MCV RBC AUTO: 91.3 FL — SIGNIFICANT CHANGE UP (ref 80–100)
NRBC # BLD: 0 /100 WBCS — SIGNIFICANT CHANGE UP (ref 0–0)
PLATELET # BLD AUTO: 222 K/UL — SIGNIFICANT CHANGE UP (ref 150–400)
POTASSIUM SERPL-MCNC: 4.8 MMOL/L — SIGNIFICANT CHANGE UP (ref 3.5–5.3)
POTASSIUM SERPL-SCNC: 4.8 MMOL/L — SIGNIFICANT CHANGE UP (ref 3.5–5.3)
PROT SERPL-MCNC: 7.7 GM/DL — SIGNIFICANT CHANGE UP (ref 6–8.3)
RBC # BLD: 2.87 M/UL — LOW (ref 3.8–5.2)
RBC # FLD: 15.3 % — HIGH (ref 10.3–14.5)
SARS-COV-2 RNA SPEC QL NAA+PROBE: SIGNIFICANT CHANGE UP
SODIUM SERPL-SCNC: 136 MMOL/L — SIGNIFICANT CHANGE UP (ref 135–145)
WBC # BLD: 7.61 K/UL — SIGNIFICANT CHANGE UP (ref 3.8–10.5)
WBC # FLD AUTO: 7.61 K/UL — SIGNIFICANT CHANGE UP (ref 3.8–10.5)

## 2022-02-23 PROCEDURE — 99233 SBSQ HOSP IP/OBS HIGH 50: CPT

## 2022-02-23 PROCEDURE — 71045 X-RAY EXAM CHEST 1 VIEW: CPT | Mod: 26

## 2022-02-23 RX ORDER — HYDROMORPHONE HYDROCHLORIDE 2 MG/ML
0.5 INJECTION INTRAMUSCULAR; INTRAVENOUS; SUBCUTANEOUS EVERY 4 HOURS
Refills: 0 | Status: DISCONTINUED | OUTPATIENT
Start: 2022-02-23 | End: 2022-02-25

## 2022-02-23 RX ADMIN — Medication 100 MILLIGRAM(S): at 17:45

## 2022-02-23 RX ADMIN — PANTOPRAZOLE SODIUM 40 MILLIGRAM(S): 20 TABLET, DELAYED RELEASE ORAL at 12:50

## 2022-02-23 RX ADMIN — Medication 2: at 12:53

## 2022-02-23 RX ADMIN — MOMETASONE FUROATE 1 PUFF(S): 220 INHALANT RESPIRATORY (INHALATION) at 12:52

## 2022-02-23 RX ADMIN — Medication 25 MILLIGRAM(S): at 22:03

## 2022-02-23 RX ADMIN — Medication 100 MILLIGRAM(S): at 05:52

## 2022-02-23 RX ADMIN — SIMVASTATIN 40 MILLIGRAM(S): 20 TABLET, FILM COATED ORAL at 22:03

## 2022-02-23 RX ADMIN — SEVELAMER CARBONATE 800 MILLIGRAM(S): 2400 POWDER, FOR SUSPENSION ORAL at 07:45

## 2022-02-23 RX ADMIN — LORATADINE 10 MILLIGRAM(S): 10 TABLET ORAL at 12:50

## 2022-02-23 RX ADMIN — MONTELUKAST 10 MILLIGRAM(S): 4 TABLET, CHEWABLE ORAL at 12:50

## 2022-02-23 RX ADMIN — HEPARIN SODIUM 5000 UNIT(S): 5000 INJECTION INTRAVENOUS; SUBCUTANEOUS at 17:47

## 2022-02-23 RX ADMIN — SEVELAMER CARBONATE 800 MILLIGRAM(S): 2400 POWDER, FOR SUSPENSION ORAL at 12:50

## 2022-02-23 RX ADMIN — HEPARIN SODIUM 5000 UNIT(S): 5000 INJECTION INTRAVENOUS; SUBCUTANEOUS at 05:52

## 2022-02-23 RX ADMIN — PIPERACILLIN AND TAZOBACTAM 25 GRAM(S): 4; .5 INJECTION, POWDER, LYOPHILIZED, FOR SOLUTION INTRAVENOUS at 22:03

## 2022-02-23 RX ADMIN — SEVELAMER CARBONATE 800 MILLIGRAM(S): 2400 POWDER, FOR SUSPENSION ORAL at 17:45

## 2022-02-23 RX ADMIN — Medication 25 MILLIGRAM(S): at 05:52

## 2022-02-23 RX ADMIN — HYDROMORPHONE HYDROCHLORIDE 0.5 MILLIGRAM(S): 2 INJECTION INTRAMUSCULAR; INTRAVENOUS; SUBCUTANEOUS at 11:43

## 2022-02-23 RX ADMIN — Medication 20 MILLIGRAM(S): at 17:46

## 2022-02-23 RX ADMIN — HYDROMORPHONE HYDROCHLORIDE 0.5 MILLIGRAM(S): 2 INJECTION INTRAMUSCULAR; INTRAVENOUS; SUBCUTANEOUS at 12:13

## 2022-02-23 NOTE — PROGRESS NOTE ADULT - SUBJECTIVE AND OBJECTIVE BOX
NEPHROLOGY PROGRESS NOTE    CHIEF COMPLAINT:  ESRD    HPI:  Seen on dialysis.  Access working well.  BP stable.  Still coughing alot and gets dyspneic easily.      EXAM:  T(F): 98.2 (02-23-22 @ 13:10)  HR: 60 (02-23-22 @ 13:10)  BP: 133/66 (02-23-22 @ 13:10)  RR: 17 (02-23-22 @ 13:10)  SpO2: 98% (02-23-22 @ 13:10)    Conversant, in no apparent distress  Normal respiratory effort, lungs clear bilaterally  Heart RRR with no murmur, no peripheral edema         LABS                             8.5    7.61  )-----------( 222      ( 23 Feb 2022 07:03 )             26.2          02-23    136  |  102  |  55<H>  ----------------------------<  110<H>  4.8   |  26  |  8.21<H>    Ca    8.9      23 Feb 2022 07:03    TPro  7.7  /  Alb  2.9<L>  /  TBili  0.4  /  DBili  x   /  AST  21  /  ALT  16  /  AlkPhos  85  02-23           RADIOLOGY  Chest X-ray personally reviewed and shows decrease in patchy basilar airspace disease      ASSESSMENT:  Pneumonia  ESRD on hemodialysis MWF  Fluid overload, resolved with dialysis  Chronic anemia    RECOMMEND:  Complete HD as ordered today

## 2022-02-23 NOTE — PROGRESS NOTE ADULT - SUBJECTIVE AND OBJECTIVE BOX
SUBJECTIVE AND OBJECTIVE: short of breathing, appears distressed   INTERVAL HPI/OVERNIGHT EVENTS:    DNR on chart: yes  Allergies    Eliquis (Other)    Intolerances    MEDICATIONS  (STANDING):  chlorhexidine 4% Liquid 1 Application(s) Topical <User Schedule>  dextrose 40% Gel 15 Gram(s) Oral once  dextrose 5%. 1000 milliLiter(s) (50 mL/Hr) IV Continuous <Continuous>  dextrose 5%. 1000 milliLiter(s) (100 mL/Hr) IV Continuous <Continuous>  dextrose 50% Injectable 25 Gram(s) IV Push once  dextrose 50% Injectable 12.5 Gram(s) IV Push once  dextrose 50% Injectable 25 Gram(s) IV Push once  glucagon  Injectable 1 milliGRAM(s) IntraMuscular once  heparin   Injectable 5000 Unit(s) SubCutaneous every 12 hours  hydrALAZINE 25 milliGRAM(s) Oral three times a day  insulin lispro (ADMELOG) corrective regimen sliding scale   SubCutaneous three times a day before meals  labetalol 100 milliGRAM(s) Oral two times a day  loratadine 10 milliGRAM(s) Oral daily  mometasone 220 MICROgram(s) Inhaler 1 Puff(s) Inhalation daily  montelukast 10 milliGRAM(s) Oral every 24 hours  pantoprazole  Injectable 40 milliGRAM(s) IV Push daily  piperacillin/tazobactam IVPB.. 3.375 Gram(s) IV Intermittent every 12 hours  predniSONE   Tablet 20 milliGRAM(s) Oral every 12 hours  senna 2 Tablet(s) Oral at bedtime  sevelamer carbonate 800 milliGRAM(s) Oral three times a day with meals  simvastatin 40 milliGRAM(s) Oral at bedtime    MEDICATIONS  (PRN):  aluminum hydroxide/magnesium hydroxide/simethicone Suspension 15 milliLiter(s) Oral every 8 hours PRN Dyspepsia  hydrocodone/homatropine Syrup 5 milliLiter(s) Oral every 6 hours PRN Cough  HYDROmorphone  Injectable 0.5 milliGRAM(s) IV Push every 4 hours PRN Severe Pain (7 - 10)  sodium chloride 0.65% Nasal 1 Spray(s) Both Nostrils every 4 hours PRN irritation      ITEMS UNCHECKED ARE NOT PRESENT    PRESENT SYMPTOMS: [ ]Unable to obtain due to poor mentation   Source if other than patient:  [ ]Family   [ ]Team     Pain:  [ x]yes [ ]no  QOL impact - bothersome when coughing - abdominal pain  Location -    abdominal muscles from coughing                 Aggravating factors - cough  Quality - ache   Radiation -no  Timing- lasts for several minutes during and after coughing spells   Severity (0-10 scale):  6  Minimal acceptable level (0-10 scale): 0    Dyspnea:                           [ ]Mild [ ]Moderate [xSevere  Anxiety:                             [ ]Mild [ x]Moderate [ ]Severe  Fatigue:                             [ ]Mild [ ]Moderate [x ]Severe  Nausea:                             [ ]Mild [ ]Moderate [ ]Severe  Loss of appetite:              [ ]Mild [ ]Moderate [ ]Severe  Constipation:                    [ ]Mild [ ]Moderate [ ]Severe    CPOT:    https://www.[facility].org/getattachment/[email or web address]/Critical-Care-Pain-Observation-Tool-(CPOT)    PAIN AD Score:	  http://geriatrictoolkit.[facility]/cog/painad.pdf (Ctrl + left click to view)    Other Symptoms:  [x ]All other review of systems negative     Palliative Performance Status Version 2:       20-30%      http://npcrc.org/files/news/palliative_performance_scale_ppsv2.pdf  PHYSICAL EXAM:  Vital Signs Last 24 Hrs  T(C): 36.8 (23 Feb 2022 13:10), Max: 36.8 (23 Feb 2022 13:10)  T(F): 98.2 (23 Feb 2022 13:10), Max: 98.2 (23 Feb 2022 13:10)  HR: 60 (23 Feb 2022 13:10) (60 - 61)  BP: 133/66 (23 Feb 2022 13:10) (123/58 - 153/73)  BP(mean): --  RR: 17 (23 Feb 2022 13:10) (17 - 18)  SpO2: 98% (23 Feb 2022 13:10) (97% - 99%) I&O's Summary    22 Feb 2022 07:01  -  23 Feb 2022 07:00  --------------------------------------------------------  IN: 340 mL / OUT: 0 mL / NET: 340 mL       GENERAL:  [ x]Alert  [x ]Oriented x 4  [ ]Lethargic  [ ]Cachexia  [ ]Unarousable  [ x]Verbal  [ ]Non-Verbal  Behavioral:   [x ]Anxiety  [ ]Delirium [ ]Agitation [ ]Other  HEENT:  [ ]Normal   [ x]Dry mouth   [ ]ET Tube/Trach  [ ]Oral lesions  PULMONARY:   [ ]Clear [ x]Tachypnea  [ ]Audible excessive secretions   [ ]Rhonchi        [ ]Right [ ]Left [ ]Bilateral  [ x]Crackles        [ ]Right [ ]Left [ x]Bilateral  [ ]Wheezing     [ ]Right [ ]Left [ ]Bilateral  [ ]Diminished BS [ ] Right [ ]Left [ ]Bilateral  CARDIOVASCULAR:    [x ]Regular [ ]Irregular [ ]Tachy  [ ]Ceferino [ ]Murmur [ ]Other  GASTROINTESTINAL:  [x ]Soft  [ ]Distended   [ x]+BS  [ x]Non tender [ ]Tender  [ ]PEG [ ]OGT/ NGT   Last BM:  2/23  GENITOURINARY:  [ ]Normal [x ]Incontinent   [ x]Oliguria/Anuria   [ ]Burrows  MUSCULOSKELETAL:   [ ]Normal   [ x]Weakness  [ ]Bed/Wheelchair bound [ ]Edema  NEUROLOGIC:   [ x]No focal deficits  [ ] Cognitive impairment  [ ] Dysphagia [ ]Dysarthria [ ] Paresis [ ]Other   SKIN:   [ x]Normal  [ ]Rash   [ ]Pressure ulcer(s) [ ]y [ ]n present on admission    CRITICAL CARE:  [ ]Shock Present  [ ]Septic [ ]Cardiogenic [ ]Neurologic [ ]Hypovolemic  [ ]Vasopressors [ ]Inotropes  [ ]Respiratory failure present [ ]Mechanical Ventilation [ ]Non-invasive ventilatory support [ ]High-Flow   [ ]Acute  [ ]Chronic [ ]Hypoxic  [ ]Hypercarbic [ ]Other  [ ]Other organ failure     LABS:                        8.5    7.61  )-----------( 222      ( 23 Feb 2022 07:03 )             26.2   02-23    136  |  102  |  55<H>  ----------------------------<  110<H>  4.8   |  26  |  8.21<H>    Ca    8.9      23 Feb 2022 07:03    TPro  7.7  /  Alb  2.9<L>  /  TBili  0.4  /  DBili  x   /  AST  21  /  ALT  16  /  AlkPhos  85  02-23        RADIOLOGY & ADDITIONAL STUDIES:    Protein Calorie Malnutrition Present: [ ]mild [ ]moderate [ ]severe [ ]underweight [ x]morbid obesity  https://www.andeal.org/vault/2440/web/files/ONC/Table_Clinical%20Characteristics%20to%20Document%20Malnutrition-White%20JV%20et%20al%202012.pdf    Height (cm): 162.6 (02-17-22 @ 05:20), 162.6 (04-15-21 @ 13:33)  Weight (kg): 81.6 (02-16-22 @ 18:04), 94.1 (04-29-21 @ 07:59)  BMI (kg/m2): 30.9 (02-17-22 @ 05:20), 30.9 (02-16-22 @ 18:04), 35.6 (04-29-21 @ 07:59)    [ x]PPSV2 < or = 30%  [ ]significant weight loss [ x]poor nutritional intake [ ]anasarca    [ ]Artificial Nutrition    REFERRALS:   [ ]Chaplaincy  [ ]Hospice  [ ]Child Life  [ ]Social Work  [ ]Case management [ ]Holistic Therapy     Goals of Care Document:

## 2022-02-23 NOTE — PROGRESS NOTE ADULT - SUBJECTIVE AND OBJECTIVE BOX
INTERVAL HPI:  76F with HTN, CHF S/P PPM, ESRD (on HD MWF), Diverticulitis, GI bleed and DVT  Presents to the ED for dyspnea.  Patient states that 2 days ago at HD she developed chills and dyspnea during her treatment.  EMS was called for her however she refused to go to a hospital.  Patient states that yesterday she stayed home and rested all day, felt unwell.  Went  for HD treatment today and EMS was called for her again as she looked even worse.  Admits to subjective fever but has never taken her temperature.  States that she has had a dry cough for > 7 days, also has associated rib pain with coughing.    Hypoxic on RA to 75% with EMS, and 86% in ED.  Leukocytosis on blood work.  Never smoked.  Admitted for possible pneumonia.    OVERNIGHT EVENTS:  Still with cough, mostly dry    Vital Signs Last 24 Hrs  T(C): 36.6 (23 Feb 2022 12:21), Max: 36.7 (22 Feb 2022 23:15)  T(F): 97.9 (23 Feb 2022 12:21), Max: 98.1 (22 Feb 2022 23:15)  HR: 60 (23 Feb 2022 12:21) (60 - 61)  BP: 153/73 (23 Feb 2022 12:21) (123/58 - 153/73)  BP(mean): --  RR: 18 (23 Feb 2022 12:21) (17 - 18)  SpO2: 97% (23 Feb 2022 12:21) (97% - 99%)    PHYSICAL EXAM:  GEN:         Awake, responsive and comfortable.  HEENT:    Normal.    RESP:      no wheezing  CVS:          Regular rate and rhythm.     MEDICATIONS  (STANDING):  chlorhexidine 4% Liquid 1 Application(s) Topical <User Schedule>  dextrose 40% Gel 15 Gram(s) Oral once  dextrose 5%. 1000 milliLiter(s) (50 mL/Hr) IV Continuous <Continuous>  dextrose 5%. 1000 milliLiter(s) (100 mL/Hr) IV Continuous <Continuous>  dextrose 50% Injectable 25 Gram(s) IV Push once  dextrose 50% Injectable 12.5 Gram(s) IV Push once  dextrose 50% Injectable 25 Gram(s) IV Push once  glucagon  Injectable 1 milliGRAM(s) IntraMuscular once  heparin   Injectable 5000 Unit(s) SubCutaneous every 12 hours  hydrALAZINE 25 milliGRAM(s) Oral three times a day  insulin lispro (ADMELOG) corrective regimen sliding scale   SubCutaneous three times a day before meals  labetalol 100 milliGRAM(s) Oral two times a day  loratadine 10 milliGRAM(s) Oral daily  mometasone 220 MICROgram(s) Inhaler 1 Puff(s) Inhalation daily  montelukast 10 milliGRAM(s) Oral every 24 hours  pantoprazole  Injectable 40 milliGRAM(s) IV Push daily  piperacillin/tazobactam IVPB.. 3.375 Gram(s) IV Intermittent every 12 hours  senna 2 Tablet(s) Oral at bedtime  sevelamer carbonate 800 milliGRAM(s) Oral three times a day with meals  simvastatin 40 milliGRAM(s) Oral at bedtime    MEDICATIONS  (PRN):  aluminum hydroxide/magnesium hydroxide/simethicone Suspension 15 milliLiter(s) Oral every 8 hours PRN Dyspepsia  hydrocodone/homatropine Syrup 5 milliLiter(s) Oral every 6 hours PRN Cough  HYDROmorphone  Injectable 0.5 milliGRAM(s) IV Push every 4 hours PRN Severe Pain (7 - 10)  sodium chloride 0.65% Nasal 1 Spray(s) Both Nostrils every 4 hours PRN irritation    LABS:                        8.5    7.61  )-----------( 222      ( 23 Feb 2022 07:03 )             26.2     02-23    136  |  102  |  55<H>  ----------------------------<  110<H>  4.8   |  26  |  8.21<H>    Ca    8.9      23 Feb 2022 07:03    TPro  7.7  /  Alb  2.9<L>  /  TBili  0.4  /  DBili  x   /  AST  21  /  ALT  16  /  AlkPhos  85  02-23 02-16 @ 18:37  pH: 7.55  pCO2: 45  pO2: 138  SaO2: 98.6    ASSESSMENT AND PLAN:  ·	Acute Respiratory distress.  ·	Hypoxia.  ·	No Pneumonia on chest CT.  ·	Bilateral small pleural effusion with atelectasis.  ·	Leukocytosis better.  ·	ESRD, on HD.  ·	Anemia.  ·	CHF hx, S/P PPM.  ·	HTN.    Continue to have dry cough, will try on steroids.  SPO2 97% on nasal O2.  Continue humidified O2.  Continue saline nasal spray,  No PE or pneumonia on CTA chest  On empiric antibiotics.  Hydrocodone as cough suppressant.

## 2022-02-23 NOTE — PROGRESS NOTE ADULT - SUBJECTIVE AND OBJECTIVE BOX
Patient is a 76y old  Female who presents with a chief complaint of PNA (22 Feb 2022 16:53)    INTERVAL HPI/OVERNIGHT EVENTS:  Pt was seen and examined, no acute events. still requiring o2     MEDICATIONS  (STANDING):  chlorhexidine 4% Liquid 1 Application(s) Topical <User Schedule>  dextrose 40% Gel 15 Gram(s) Oral once  dextrose 5%. 1000 milliLiter(s) (50 mL/Hr) IV Continuous <Continuous>  dextrose 5%. 1000 milliLiter(s) (100 mL/Hr) IV Continuous <Continuous>  dextrose 50% Injectable 25 Gram(s) IV Push once  dextrose 50% Injectable 12.5 Gram(s) IV Push once  dextrose 50% Injectable 25 Gram(s) IV Push once  glucagon  Injectable 1 milliGRAM(s) IntraMuscular once  heparin   Injectable 5000 Unit(s) SubCutaneous every 12 hours  hydrALAZINE 25 milliGRAM(s) Oral three times a day  insulin lispro (ADMELOG) corrective regimen sliding scale   SubCutaneous three times a day before meals  labetalol 100 milliGRAM(s) Oral two times a day  loratadine 10 milliGRAM(s) Oral daily  mometasone 220 MICROgram(s) Inhaler 1 Puff(s) Inhalation daily  montelukast 10 milliGRAM(s) Oral every 24 hours  pantoprazole  Injectable 40 milliGRAM(s) IV Push daily  piperacillin/tazobactam IVPB.. 3.375 Gram(s) IV Intermittent every 12 hours  predniSONE   Tablet 20 milliGRAM(s) Oral every 12 hours  senna 2 Tablet(s) Oral at bedtime  sevelamer carbonate 800 milliGRAM(s) Oral three times a day with meals  simvastatin 40 milliGRAM(s) Oral at bedtime    MEDICATIONS  (PRN):  aluminum hydroxide/magnesium hydroxide/simethicone Suspension 15 milliLiter(s) Oral every 8 hours PRN Dyspepsia  hydrocodone/homatropine Syrup 5 milliLiter(s) Oral every 6 hours PRN Cough  HYDROmorphone  Injectable 0.5 milliGRAM(s) IV Push every 4 hours PRN Severe Pain (7 - 10)  sodium chloride 0.65% Nasal 1 Spray(s) Both Nostrils every 4 hours PRN irritation      Allergies  Eliquis (Other)      Vital Signs Last 24 Hrs  T(C): 37 (23 Feb 2022 17:45), Max: 37 (23 Feb 2022 17:45)  T(F): 98.6 (23 Feb 2022 17:45), Max: 98.6 (23 Feb 2022 17:45)  HR: 60 (23 Feb 2022 17:45) (60 - 61)  BP: 141/66 (23 Feb 2022 17:00) (123/58 - 153/73)  BP(mean): --  RR: 18 (23 Feb 2022 17:45) (16 - 18)  SpO2: 92% (23 Feb 2022 17:45) (92% - 99%)    PHYSICAL EXAM:  GENERAL: NAD,obese, well-developed  HEAD:  Atraumatic, Normocephalic  EYES: EOMI, PERRLA, conjunctiva and sclera clear  ENMT: Moist mucous membranes  NECK: Supple  NERVOUS SYSTEM:  Alert & Oriented X3, normal speech  CHEST/LUNG: left sided crackles, no distress   HEART: S1, S2  ABDOMEN: Soft, Nontender, obese, Bowel sounds present  EXTREMITIES:  no cyanosis, or edema      LABS:                                 8.5    7.61  )-----------( 222      ( 23 Feb 2022 07:03 )             26.2     02-23    136  |  102  |  55<H>  ----------------------------<  110<H>  4.8   |  26  |  8.21<H>    Ca    8.9      23 Feb 2022 07:03    TPro  7.7  /  Alb  2.9<L>  /  TBili  0.4  /  DBili  x   /  AST  21  /  ALT  16  /  AlkPhos  85  02-23              CAPILLARY BLOOD GLUCOSE      POCT Blood Glucose.: 153 mg/dL (22 Feb 2022 16:37)  POCT Blood Glucose.: 254 mg/dL (22 Feb 2022 11:51)  POCT Blood Glucose.: 177 mg/dL (22 Feb 2022 07:43)  POCT Blood Glucose.: 141 mg/dL (21 Feb 2022 21:04)      RADIOLOGY & ADDITIONAL TESTS:    Imaging Personally Reviewed:  [ ] YES  [ ] NO    Consultant(s) Notes Reviewed:  [x ] YES  [ ] NO    Care Discussed with Consultants/Other Providers [ ] YES  [ ] NO

## 2022-02-24 LAB
ALBUMIN SERPL ELPH-MCNC: 2.8 G/DL — LOW (ref 3.3–5)
ALP SERPL-CCNC: 98 U/L — SIGNIFICANT CHANGE UP (ref 40–120)
ALT FLD-CCNC: 20 U/L — SIGNIFICANT CHANGE UP (ref 12–78)
ANION GAP SERPL CALC-SCNC: 7 MMOL/L — SIGNIFICANT CHANGE UP (ref 5–17)
AST SERPL-CCNC: 27 U/L — SIGNIFICANT CHANGE UP (ref 15–37)
BASE EXCESS BLDA CALC-SCNC: 1.7 MMOL/L — SIGNIFICANT CHANGE UP (ref -2–3)
BILIRUB SERPL-MCNC: 0.3 MG/DL — SIGNIFICANT CHANGE UP (ref 0.2–1.2)
BLOOD GAS COMMENTS: SIGNIFICANT CHANGE UP
BLOOD GAS COMMENTS: SIGNIFICANT CHANGE UP
BLOOD GAS SOURCE: SIGNIFICANT CHANGE UP
BUN SERPL-MCNC: 43 MG/DL — HIGH (ref 7–23)
CALCIUM SERPL-MCNC: 9.3 MG/DL — SIGNIFICANT CHANGE UP (ref 8.5–10.1)
CHLORIDE SERPL-SCNC: 101 MMOL/L — SIGNIFICANT CHANGE UP (ref 96–108)
CO2 BLDA-SCNC: 29 MMOL/L — HIGH (ref 19–24)
CO2 SERPL-SCNC: 25 MMOL/L — SIGNIFICANT CHANGE UP (ref 22–31)
CREAT SERPL-MCNC: 5.63 MG/DL — HIGH (ref 0.5–1.3)
GLUCOSE BLDC GLUCOMTR-MCNC: 270 MG/DL — HIGH (ref 70–99)
GLUCOSE BLDC GLUCOMTR-MCNC: 280 MG/DL — HIGH (ref 70–99)
GLUCOSE BLDC GLUCOMTR-MCNC: 290 MG/DL — HIGH (ref 70–99)
GLUCOSE BLDC GLUCOMTR-MCNC: 320 MG/DL — HIGH (ref 70–99)
GLUCOSE SERPL-MCNC: 274 MG/DL — HIGH (ref 70–99)
HCO3 BLDA-SCNC: 28 MMOL/L — SIGNIFICANT CHANGE UP (ref 21–28)
HCT VFR BLD CALC: 27.3 % — LOW (ref 34.5–45)
HGB BLD-MCNC: 8.8 G/DL — LOW (ref 11.5–15.5)
HOROWITZ INDEX BLDA+IHG-RTO: 21 — SIGNIFICANT CHANGE UP
MCHC RBC-ENTMCNC: 29.6 PG — SIGNIFICANT CHANGE UP (ref 27–34)
MCHC RBC-ENTMCNC: 32.2 G/DL — SIGNIFICANT CHANGE UP (ref 32–36)
MCV RBC AUTO: 91.9 FL — SIGNIFICANT CHANGE UP (ref 80–100)
NRBC # BLD: 0 /100 WBCS — SIGNIFICANT CHANGE UP (ref 0–0)
PCO2 BLDA: 48 MMHG — HIGH (ref 32–46)
PH BLD: 7.37 — SIGNIFICANT CHANGE UP (ref 7.35–7.45)
PLATELET # BLD AUTO: 224 K/UL — SIGNIFICANT CHANGE UP (ref 150–400)
PO2 BLDA: 53 MMHG — LOW (ref 83–108)
POTASSIUM SERPL-MCNC: 4.7 MMOL/L — SIGNIFICANT CHANGE UP (ref 3.5–5.3)
POTASSIUM SERPL-SCNC: 4.7 MMOL/L — SIGNIFICANT CHANGE UP (ref 3.5–5.3)
PROCALCITONIN SERPL-MCNC: 9.02 NG/ML — HIGH (ref 0.02–0.1)
PROT SERPL-MCNC: 7.6 GM/DL — SIGNIFICANT CHANGE UP (ref 6–8.3)
RAPID RVP RESULT: SIGNIFICANT CHANGE UP
RBC # BLD: 2.97 M/UL — LOW (ref 3.8–5.2)
RBC # FLD: 15.6 % — HIGH (ref 10.3–14.5)
SAO2 % BLDA: 86.1 % — LOW (ref 94–98)
SARS-COV-2 RNA SPEC QL NAA+PROBE: SIGNIFICANT CHANGE UP
SODIUM SERPL-SCNC: 133 MMOL/L — LOW (ref 135–145)
WBC # BLD: 7.98 K/UL — SIGNIFICANT CHANGE UP (ref 3.8–10.5)
WBC # FLD AUTO: 7.98 K/UL — SIGNIFICANT CHANGE UP (ref 3.8–10.5)

## 2022-02-24 PROCEDURE — 99233 SBSQ HOSP IP/OBS HIGH 50: CPT

## 2022-02-24 RX ADMIN — LORATADINE 10 MILLIGRAM(S): 10 TABLET ORAL at 11:32

## 2022-02-24 RX ADMIN — Medication 20 MILLIGRAM(S): at 06:02

## 2022-02-24 RX ADMIN — Medication 4: at 11:32

## 2022-02-24 RX ADMIN — Medication 3: at 08:08

## 2022-02-24 RX ADMIN — MOMETASONE FUROATE 1 PUFF(S): 220 INHALANT RESPIRATORY (INHALATION) at 13:50

## 2022-02-24 RX ADMIN — SEVELAMER CARBONATE 800 MILLIGRAM(S): 2400 POWDER, FOR SUSPENSION ORAL at 08:08

## 2022-02-24 RX ADMIN — Medication 25 MILLIGRAM(S): at 06:02

## 2022-02-24 RX ADMIN — HEPARIN SODIUM 5000 UNIT(S): 5000 INJECTION INTRAVENOUS; SUBCUTANEOUS at 06:02

## 2022-02-24 RX ADMIN — HEPARIN SODIUM 5000 UNIT(S): 5000 INJECTION INTRAVENOUS; SUBCUTANEOUS at 17:26

## 2022-02-24 RX ADMIN — Medication 25 MILLIGRAM(S): at 21:30

## 2022-02-24 RX ADMIN — CHLORHEXIDINE GLUCONATE 1 APPLICATION(S): 213 SOLUTION TOPICAL at 06:03

## 2022-02-24 RX ADMIN — SEVELAMER CARBONATE 800 MILLIGRAM(S): 2400 POWDER, FOR SUSPENSION ORAL at 17:26

## 2022-02-24 RX ADMIN — PANTOPRAZOLE SODIUM 40 MILLIGRAM(S): 20 TABLET, DELAYED RELEASE ORAL at 11:32

## 2022-02-24 RX ADMIN — Medication 3: at 17:20

## 2022-02-24 RX ADMIN — Medication 25 MILLIGRAM(S): at 13:15

## 2022-02-24 RX ADMIN — Medication 100 MILLIGRAM(S): at 17:26

## 2022-02-24 RX ADMIN — SIMVASTATIN 40 MILLIGRAM(S): 20 TABLET, FILM COATED ORAL at 21:30

## 2022-02-24 RX ADMIN — MONTELUKAST 10 MILLIGRAM(S): 4 TABLET, CHEWABLE ORAL at 11:31

## 2022-02-24 RX ADMIN — SEVELAMER CARBONATE 800 MILLIGRAM(S): 2400 POWDER, FOR SUSPENSION ORAL at 11:31

## 2022-02-24 RX ADMIN — Medication 20 MILLIGRAM(S): at 17:26

## 2022-02-24 NOTE — PROGRESS NOTE ADULT - TIME BILLING
evaluation of pt, review of records, collaboration with care teams
Evaluation of patient, review of records and collaboration with care teams
evaluation of pt, review of records, collaboration with care teams

## 2022-02-24 NOTE — PROGRESS NOTE ADULT - PROBLEM SELECTOR PLAN 8
pt with continued symptoms of dyspnea and fatigue. Will continue to follow for management of dyspnea. GOC are established.  D/w Dr. Mosley
pt with continued symptoms of dyspnea and fatigue. Will continue to follow for management of dyspnea. GOC are established.  D/w Dr. Bustillo
pt with continued symptoms of dyspnea and fatigue, but she reports breathing has improved and the pain in her lower abdomen/chest when she coughs has also improved.  Will continue to follow for management of dyspnea. GOC are established.

## 2022-02-24 NOTE — PROGRESS NOTE ADULT - SUBJECTIVE AND OBJECTIVE BOX
Patient is a 76y old  Female who presents with a chief complaint of PNA (22 Feb 2022 16:53)    INTERVAL HPI/OVERNIGHT EVENTS:  Pt was seen and examined, no acute events. still requiring o2     MEDICATIONS  (STANDING):  chlorhexidine 4% Liquid 1 Application(s) Topical <User Schedule>  dextrose 40% Gel 15 Gram(s) Oral once  dextrose 5%. 1000 milliLiter(s) (50 mL/Hr) IV Continuous <Continuous>  dextrose 5%. 1000 milliLiter(s) (100 mL/Hr) IV Continuous <Continuous>  dextrose 50% Injectable 25 Gram(s) IV Push once  dextrose 50% Injectable 12.5 Gram(s) IV Push once  dextrose 50% Injectable 25 Gram(s) IV Push once  glucagon  Injectable 1 milliGRAM(s) IntraMuscular once  heparin   Injectable 5000 Unit(s) SubCutaneous every 12 hours  hydrALAZINE 25 milliGRAM(s) Oral three times a day  insulin lispro (ADMELOG) corrective regimen sliding scale   SubCutaneous three times a day before meals  labetalol 100 milliGRAM(s) Oral two times a day  loratadine 10 milliGRAM(s) Oral daily  mometasone 220 MICROgram(s) Inhaler 1 Puff(s) Inhalation daily  montelukast 10 milliGRAM(s) Oral every 24 hours  pantoprazole  Injectable 40 milliGRAM(s) IV Push daily  predniSONE   Tablet 20 milliGRAM(s) Oral every 12 hours  senna 2 Tablet(s) Oral at bedtime  sevelamer carbonate 800 milliGRAM(s) Oral three times a day with meals  simvastatin 40 milliGRAM(s) Oral at bedtime    MEDICATIONS  (PRN):  aluminum hydroxide/magnesium hydroxide/simethicone Suspension 15 milliLiter(s) Oral every 8 hours PRN Dyspepsia  hydrocodone/homatropine Syrup 5 milliLiter(s) Oral every 6 hours PRN Cough  HYDROmorphone  Injectable 0.5 milliGRAM(s) IV Push every 4 hours PRN Severe Pain (7 - 10)  sodium chloride 0.65% Nasal 1 Spray(s) Both Nostrils every 4 hours PRN irritation        Allergies  Eliquis (Other)      Vital Signs Last 24 Hrs  Vital Signs Last 24 Hrs  T(C): 36.9 (24 Feb 2022 11:25), Max: 37.1 (23 Feb 2022 22:02)  T(F): 98.4 (24 Feb 2022 11:25), Max: 98.8 (23 Feb 2022 22:02)  HR: 60 (24 Feb 2022 11:25) (59 - 62)  BP: 144/62 (24 Feb 2022 11:25) (122/66 - 144/62)  BP(mean): --  RR: 20 (24 Feb 2022 11:25) (16 - 20)  SpO2: 95% (24 Feb 2022 11:25) (74% - 99%)%)    PHYSICAL EXAM:  GENERAL: NAD,obese, well-developed  HEAD:  Atraumatic, Normocephalic  EYES: EOMI, PERRLA, conjunctiva and sclera clear  ENMT: Moist mucous membranes  NECK: Supple  NERVOUS SYSTEM:  Alert & Oriented X3, normal speech  CHEST/LUNG: left sided crackles, no distress   HEART: S1, S2  ABDOMEN: Soft, Nontender, obese, Bowel sounds present  EXTREMITIES:  no cyanosis, or edema      LABS:                                                 8.8    7.98  )-----------( 224      ( 24 Feb 2022 07:06 )             27.3     02-24    133<L>  |  101  |  43<H>  ----------------------------<  274<H>  4.7   |  25  |  5.63<H>    Ca    9.3      24 Feb 2022 07:06    TPro  7.6  /  Alb  2.8<L>  /  TBili  0.3  /  DBili  x   /  AST  27  /  ALT  20  /  AlkPhos  98  02-24                  CAPILLARY BLOOD GLUCOSE      POCT Blood Glucose.: 153 mg/dL (22 Feb 2022 16:37)  POCT Blood Glucose.: 254 mg/dL (22 Feb 2022 11:51)  POCT Blood Glucose.: 177 mg/dL (22 Feb 2022 07:43)  POCT Blood Glucose.: 141 mg/dL (21 Feb 2022 21:04)      RADIOLOGY & ADDITIONAL TESTS:    Imaging Personally Reviewed:  [ ] YES  [ ] NO    Consultant(s) Notes Reviewed:  [x ] YES  [ ] NO    Care Discussed with Consultants/Other Providers [ ] YES  [ ] NO

## 2022-02-24 NOTE — SWALLOW BEDSIDE ASSESSMENT ADULT - COMMENTS
CXR 2/23/2022IMPRESSION:Tunneled double lumen catheter tip within SVC. LEFT leg lower lobe linear discoid atelectasis. Otherwise no interval change.

## 2022-02-24 NOTE — SWALLOW BEDSIDE ASSESSMENT ADULT - SLP GENERAL OBSERVATIONS
pt seen bedside on 4l NC during breakfast alert and oriented x4. pt responded to orientation/autobiographical questions with good accuracy, she verbalized wants and was able to follow one step directions. noted good speech intelligibility with ? decreased breath support/volume.

## 2022-02-24 NOTE — PROGRESS NOTE ADULT - SUBJECTIVE AND OBJECTIVE BOX
[facility] NEPHROLOGY SERVICES, [facility]  NEPHROLOGY AND HYPERTENSION  [address]  [address]  [place] [address]  [phone]    MD PRAVEEN LYNNE MD ANDREY GONCHARUK, MD MADHU KORRAPATI, MD YELENA ROSENBERG, MD BINNY KOSHY, MD CHRISTOPHER CAPUTO, MD EDWARD BOVER, MD          Patient events noted  Feels well no distress      MEDICATIONS  (STANDING):  chlorhexidine 4% Liquid 1 Application(s) Topical <User Schedule>  dextrose 40% Gel 15 Gram(s) Oral once  dextrose 5%. 1000 milliLiter(s) (50 mL/Hr) IV Continuous <Continuous>  dextrose 5%. 1000 milliLiter(s) (100 mL/Hr) IV Continuous <Continuous>  dextrose 50% Injectable 25 Gram(s) IV Push once  dextrose 50% Injectable 12.5 Gram(s) IV Push once  dextrose 50% Injectable 25 Gram(s) IV Push once  glucagon  Injectable 1 milliGRAM(s) IntraMuscular once  heparin   Injectable 5000 Unit(s) SubCutaneous every 12 hours  hydrALAZINE 25 milliGRAM(s) Oral three times a day  insulin lispro (ADMELOG) corrective regimen sliding scale   SubCutaneous three times a day before meals  labetalol 100 milliGRAM(s) Oral two times a day  loratadine 10 milliGRAM(s) Oral daily  mometasone 220 MICROgram(s) Inhaler 1 Puff(s) Inhalation daily  montelukast 10 milliGRAM(s) Oral every 24 hours  pantoprazole  Injectable 40 milliGRAM(s) IV Push daily  predniSONE   Tablet 20 milliGRAM(s) Oral every 12 hours  senna 2 Tablet(s) Oral at bedtime  sevelamer carbonate 800 milliGRAM(s) Oral three times a day with meals  simvastatin 40 milliGRAM(s) Oral at bedtime    MEDICATIONS  (PRN):  aluminum hydroxide/magnesium hydroxide/simethicone Suspension 15 milliLiter(s) Oral every 8 hours PRN Dyspepsia  hydrocodone/homatropine Syrup 5 milliLiter(s) Oral every 6 hours PRN Cough  HYDROmorphone  Injectable 0.5 milliGRAM(s) IV Push every 4 hours PRN Severe Pain (7 - 10)  sodium chloride 0.65% Nasal 1 Spray(s) Both Nostrils every 4 hours PRN irritation      02-23-22 @ 07:01  -  02-24-22 @ 07:00  --------------------------------------------------------  IN: 240 mL / OUT: 1500 mL / NET: -1260 mL    02-24-22 @ 07:01  -  02-24-22 @ 17:47  --------------------------------------------------------  IN: 400 mL / OUT: 0 mL / NET: 400 mL      PHYSICAL EXAM:      T(C): 36.7 (02-24-22 @ 17:29), Max: 37.1 (02-23-22 @ 22:02)  HR: 60 (02-24-22 @ 17:29) (59 - 62)  BP: 173/61 (02-24-22 @ 17:29) (122/66 - 173/61)  RR: 20 (02-24-22 @ 11:25) (18 - 20)  SpO2: 100% (02-24-22 @ 17:29) (74% - 100%)  Wt(kg): --  Lungs clear  Heart S1S2  Abd soft NT ND  Extremities:   tr edema                                    8.8    7.98  )-----------( 224      ( 24 Feb 2022 07:06 )             27.3     02-24    133<L>  |  101  |  43<H>  ----------------------------<  274<H>  4.7   |  25  |  5.63<H>    Ca    9.3      24 Feb 2022 07:06    TPro  7.6  /  Alb  2.8<L>  /  TBili  0.3  /  DBili  x   /  AST  27  /  ALT  20  /  AlkPhos  98  02-24    ABG - ( 24 Feb 2022 05:53 )  pH, Arterial: x     pH, Blood: 7.37  /  pCO2: 48    /  pO2: 53    / HCO3: 28    / Base Excess: 1.7   /  SaO2: 86.1              LIVER FUNCTIONS - ( 24 Feb 2022 07:06 )  Alb: 2.8 g/dL / Pro: 7.6 gm/dL / ALK PHOS: 98 U/L / ALT: 20 U/L / AST: 27 U/L / GGT: x           Creatinine Trend: 5.63<--, 8.21<--, 5.62<--, 5.20<--, 6.32<--, 3.42<--      Assessment   ESRD; PNA; element of fluid overload      Plan:  HD for tomorrow  Discharge planning     Erickson Ray MD

## 2022-02-24 NOTE — SWALLOW BEDSIDE ASSESSMENT ADULT - H & P REVIEW
Patient is a 76F with a PMH of ESRD on HD MWF, HTN, diverticulitis who presents to the ED for dyspnea.  Patient states that 2 days ago at HD she developed chills and dyspnea during her treatment.  EMS was called for her however she refused to go to a hospital.  Patient states that yesterday she stayed home and rested all day, felt unwell.  Went to for HD treatment today and EMS was called for her again as she looked even worse.  Patient admits to subjective fever but has never taken her temperature.  States that she has had a dry cough for > 7 days, also has associated rib pain with coughing.  No other complaints.  Hypoxic on RA to 75%, SpO2 currently 97% on 3L via NC.  Labs show leukocytosis.  Will admit to tele./yes

## 2022-02-24 NOTE — PROGRESS NOTE ADULT - SUBJECTIVE AND OBJECTIVE BOX
SUBJECTIVE AND OBJECTIVE: Patient sitting in bed, watching TV and conversing with staff.    INTERVAL HPI/OVERNIGHT EVENTS:    DNR on chart:   Allergies    Eliquis (Other)    Intolerances    MEDICATIONS  (STANDING):  chlorhexidine 4% Liquid 1 Application(s) Topical <User Schedule>  dextrose 40% Gel 15 Gram(s) Oral once  dextrose 5%. 1000 milliLiter(s) (50 mL/Hr) IV Continuous <Continuous>  dextrose 5%. 1000 milliLiter(s) (100 mL/Hr) IV Continuous <Continuous>  dextrose 50% Injectable 25 Gram(s) IV Push once  dextrose 50% Injectable 12.5 Gram(s) IV Push once  dextrose 50% Injectable 25 Gram(s) IV Push once  glucagon  Injectable 1 milliGRAM(s) IntraMuscular once  heparin   Injectable 5000 Unit(s) SubCutaneous every 12 hours  hydrALAZINE 25 milliGRAM(s) Oral three times a day  insulin lispro (ADMELOG) corrective regimen sliding scale   SubCutaneous three times a day before meals  labetalol 100 milliGRAM(s) Oral two times a day  loratadine 10 milliGRAM(s) Oral daily  mometasone 220 MICROgram(s) Inhaler 1 Puff(s) Inhalation daily  montelukast 10 milliGRAM(s) Oral every 24 hours  pantoprazole  Injectable 40 milliGRAM(s) IV Push daily  predniSONE   Tablet 20 milliGRAM(s) Oral every 12 hours  senna 2 Tablet(s) Oral at bedtime  sevelamer carbonate 800 milliGRAM(s) Oral three times a day with meals  simvastatin 40 milliGRAM(s) Oral at bedtime    MEDICATIONS  (PRN):  aluminum hydroxide/magnesium hydroxide/simethicone Suspension 15 milliLiter(s) Oral every 8 hours PRN Dyspepsia  hydrocodone/homatropine Syrup 5 milliLiter(s) Oral every 6 hours PRN Cough  HYDROmorphone  Injectable 0.5 milliGRAM(s) IV Push every 4 hours PRN Severe Pain (7 - 10)  sodium chloride 0.65% Nasal 1 Spray(s) Both Nostrils every 4 hours PRN irritation      ITEMS UNCHECKED ARE NOT PRESENT    PRESENT SYMPTOMS: [ ]Unable to obtain due to poor mentation   Source if other than patient:  [ ]Family   [ ]Team     Pain:  [ x]yes [ ]no  QOL impact -   Location -lower abdomen                    Aggravating factors -coughing  Quality -  Radiation -  Timing-occurs when she coughs  Severity (0-10 scale):  Minimal acceptable level (0-10 scale):     Dyspnea:                           [ x]Mild [ ]Moderate [ ]Severe  Anxiety:                             [ ]Mild [ ]Moderate [ ]Severe  Fatigue:                             [x ]Mild [ ]Moderate [ ]Severe  Nausea:                             [ ]Mild [ ]Moderate [ ]Severe  Loss of appetite:              [ ]Mild [x ]Moderate [ ]Severe  Constipation:                    [ ]Mild [ ]Moderate [ ]Severe    PAIN AD Score:	  [email or web address] (Ctrl + left click to view)    Other Symptoms:  [x ]All other review of systems negative     Palliative Performance Status Version 2:       20-30  %      [email or web address]/files/[email or web address]_[email or web address]_[email or web address]  PHYSICAL EXAM:  Vital Signs Last 24 Hrs  T(C): 36.9 (24 Feb 2022 11:25), Max: 37.1 (23 Feb 2022 22:02)  T(F): 98.4 (24 Feb 2022 11:25), Max: 98.8 (23 Feb 2022 22:02)  HR: 60 (24 Feb 2022 11:25) (59 - 62)  BP: 144/62 (24 Feb 2022 11:25) (122/66 - 144/62)  BP(mean): --  RR: 20 (24 Feb 2022 11:25) (16 - 20)  SpO2: 95% (24 Feb 2022 11:25) (74% - 99%) I&O's Summary    23 Feb 2022 07:01  -  24 Feb 2022 07:00  --------------------------------------------------------  IN: 240 mL / OUT: 1500 mL / NET: -1260 mL    24 Feb 2022 07:01  -  24 Feb 2022 15:03  --------------------------------------------------------  IN: 400 mL / OUT: 0 mL / NET: 400 mL       GENERAL:  [ x]Alert  [x ]Oriented x 3  [ ]Lethargic  [ ]Cachexia  [ ]Unarousable  [ x]Verbal  [ ]Non-Verbal  Behavioral:   [ ]Anxiety  [ ]Delirium [ ]Agitation [ ]Other  HEENT:  [ ]Normal   [ ]Dry mouth   [ ]ET Tube/Trach  [ ]Oral lesions  PULMONARY:   [x ]Clear [ ]Tachypnea  [ ]Audible excessive secretions   [ ]Rhonchi        [ ]Right [ ]Left [ ]Bilateral  [ ]Crackles        [ ]Right [ ]Left [ ]Bilateral  [ ]Wheezing     [ ]Right [ ]Left [ ]Bilateral  [ ]Diminished BS [ ] Right [ ]Left [ ]Bilateral  CARDIOVASCULAR:    [x ]Regular [ ]Irregular [ ]Tachy  [ ]Ceferino [ ]Murmur [ ]Other  GASTROINTESTINAL:  [ x]Soft  [ ]Distended   [x ]+BS  [ ]Non tender [ ]Tender  [ ]PEG [ ]OGT/ NGT   Last BM:  2/23/22  GENITOURINARY:  [ ]Normal [ ]Incontinent   [ x]Oliguria/Anuria   [ ]Burrows  MUSCULOSKELETAL:   [ ]Normal   [x ]Weakness  [ ]Bed/Wheelchair bound [ ]Edema  NEUROLOGIC:   [ x]No focal deficits  [ ] Cognitive impairment  [ ] Dysphagia [ ]Dysarthria [ ] Paresis [ ]Other   SKIN:   [x ]Normal  [ ]Rash   [ ]Pressure ulcer(s) [ ]y [ ]n present on admission    CRITICAL CARE:  [ ]Shock Present  [ ]Septic [ ]Cardiogenic [ ]Neurologic [ ]Hypovolemic  [ ]Vasopressors [ ]Inotropes  [ ]Respiratory failure present [ ]Mechanical Ventilation [ ]Non-invasive ventilatory support [ ]High-Flow  [ ]Acute  [ ]Chronic [ ]Hypoxic  [ ]Hypercarbic [ ]Other  [ ]Other organ failure     LABS:                        8.8    7.98  )-----------( 224      ( 24 Feb 2022 07:06 )             27.3   02-24    133<L>  |  101  |  43<H>  ----------------------------<  274<H>  4.7   |  25  |  5.63<H>    Ca    9.3      24 Feb 2022 07:06    TPro  7.6  /  Alb  2.8<L>  /  TBili  0.3  /  DBili  x   /  AST  27  /  ALT  20  /  AlkPhos  98  02-24      RADIOLOGY & ADDITIONAL STUDIES:   < from: Xray Chest 1 View- PORTABLE-Urgent (Xray Chest 1 View- PORTABLE-Urgent .) (02.23.22 @ 10:41) >  ACC: [id number] EXAM:  XR CHEST PORTABLE URGENT 1V                        PROCEDURE DATE:  02/23/2022    INTERPRETATION:  Portable chest radiograph  CLINICAL INFORMATION: Hypoxia  TECHNIQUE:  Portable  AP chest radiograph.  COMPARISON: 4/21/2021 chest .  FINDINGS:  CATHETERS AND TUBES:  Tunneled double lumen catheter tip within SVC.  PULMONARY: LEFT basilar of linear atelectasis. LEFT upper zone and RIGHT   lung parenchyma clear. No pneumothorax.  HEART/VASCULAR: The  heart is enlarged in transverse diameter. Cardiac   device wire leads are within right atrium and right ventricle.   BONES: Visualized osseous structures are intact.  IMPRESSION:  Tunneled double lumen catheter tip within SVC. LEFT leg lower lobe linear   discoid atelectasis. Otherwise no interval change.  --- End of Report ---  < end of copied text >    Protein Calorie Malnutrition Present: [ ]mild [ x]moderate [ ]severe [ ]underweight [ ]morbid obesity  https://www.andeal.org/vault/2440/web/files/ONC/Table_Clinical%20Characteristics%20to%20Document%20Malnutrition-White%20JV%20et%20al%159744.pdf    Height (cm): 162.6 (02-17-22 @ 05:20), 162.6 (04-15-21 @ 13:33)  Weight (kg): 81.6 (02-16-22 @ 18:04), 94.1 (04-29-21 @ 07:59)  BMI (kg/m2): 30.9 (02-17-22 @ 05:20), 30.9 (02-16-22 @ 18:04), 35.6 (04-29-21 @ 07:59)    [ ]PPSV2 < or = 30%  [ ]significant weight loss [ ]poor nutritional intake [ ]anasarca    [ ]Artificial Nutrition    REFERRALS:   [ ]Chaplaincy  [ ]Hospice  [ ]Child Life  [ ]Social Work  [ ]Case management [ ]Holistic Therapy     Goals of Care Document:

## 2022-02-25 LAB
GLUCOSE BLDC GLUCOMTR-MCNC: 314 MG/DL — HIGH (ref 70–99)
GLUCOSE BLDC GLUCOMTR-MCNC: 347 MG/DL — HIGH (ref 70–99)
GLUCOSE BLDC GLUCOMTR-MCNC: 431 MG/DL — HIGH (ref 70–99)
GLUCOSE BLDC GLUCOMTR-MCNC: 432 MG/DL — HIGH (ref 70–99)

## 2022-02-25 PROCEDURE — 99232 SBSQ HOSP IP/OBS MODERATE 35: CPT

## 2022-02-25 RX ORDER — INSULIN GLARGINE 100 [IU]/ML
10 INJECTION, SOLUTION SUBCUTANEOUS AT BEDTIME
Refills: 0 | Status: DISCONTINUED | OUTPATIENT
Start: 2022-02-25 | End: 2022-02-26

## 2022-02-25 RX ORDER — HYDRALAZINE HCL 50 MG
10 TABLET ORAL EVERY 8 HOURS
Refills: 0 | Status: DISCONTINUED | OUTPATIENT
Start: 2022-02-25 | End: 2022-03-02

## 2022-02-25 RX ORDER — AZITHROMYCIN 500 MG/1
500 TABLET, FILM COATED ORAL DAILY
Refills: 0 | Status: DISCONTINUED | OUTPATIENT
Start: 2022-02-25 | End: 2022-03-02

## 2022-02-25 RX ADMIN — CHLORHEXIDINE GLUCONATE 1 APPLICATION(S): 213 SOLUTION TOPICAL at 05:56

## 2022-02-25 RX ADMIN — Medication 20 MILLIGRAM(S): at 05:56

## 2022-02-25 RX ADMIN — AZITHROMYCIN 500 MILLIGRAM(S): 500 TABLET, FILM COATED ORAL at 16:33

## 2022-02-25 RX ADMIN — Medication 100 MILLIGRAM(S): at 16:33

## 2022-02-25 RX ADMIN — SEVELAMER CARBONATE 800 MILLIGRAM(S): 2400 POWDER, FOR SUSPENSION ORAL at 16:33

## 2022-02-25 RX ADMIN — MOMETASONE FUROATE 1 PUFF(S): 220 INHALANT RESPIRATORY (INHALATION) at 16:34

## 2022-02-25 RX ADMIN — Medication 6: at 16:34

## 2022-02-25 RX ADMIN — Medication 10 MILLIGRAM(S): at 16:35

## 2022-02-25 RX ADMIN — Medication 4: at 08:28

## 2022-02-25 RX ADMIN — INSULIN GLARGINE 10 UNIT(S): 100 INJECTION, SOLUTION SUBCUTANEOUS at 21:33

## 2022-02-25 RX ADMIN — Medication 25 MILLIGRAM(S): at 05:56

## 2022-02-25 RX ADMIN — Medication 100 MILLIGRAM(S): at 18:54

## 2022-02-25 RX ADMIN — Medication 20 MILLIGRAM(S): at 18:53

## 2022-02-25 RX ADMIN — Medication 10 MILLIGRAM(S): at 21:20

## 2022-02-25 RX ADMIN — SIMVASTATIN 40 MILLIGRAM(S): 20 TABLET, FILM COATED ORAL at 21:22

## 2022-02-25 RX ADMIN — PANTOPRAZOLE SODIUM 40 MILLIGRAM(S): 20 TABLET, DELAYED RELEASE ORAL at 16:33

## 2022-02-25 RX ADMIN — Medication 100 MILLIGRAM(S): at 21:20

## 2022-02-25 RX ADMIN — HEPARIN SODIUM 5000 UNIT(S): 5000 INJECTION INTRAVENOUS; SUBCUTANEOUS at 18:53

## 2022-02-25 RX ADMIN — SEVELAMER CARBONATE 800 MILLIGRAM(S): 2400 POWDER, FOR SUSPENSION ORAL at 08:28

## 2022-02-25 RX ADMIN — HEPARIN SODIUM 5000 UNIT(S): 5000 INJECTION INTRAVENOUS; SUBCUTANEOUS at 05:56

## 2022-02-25 RX ADMIN — LORATADINE 10 MILLIGRAM(S): 10 TABLET ORAL at 16:33

## 2022-02-25 RX ADMIN — MONTELUKAST 10 MILLIGRAM(S): 4 TABLET, CHEWABLE ORAL at 16:33

## 2022-02-25 RX ADMIN — Medication 100 MILLIGRAM(S): at 03:07

## 2022-02-25 NOTE — PROGRESS NOTE ADULT - SUBJECTIVE AND OBJECTIVE BOX
[facility] NEPHROLOGY SERVICES, [facility]  NEPHROLOGY AND HYPERTENSION  [address]  [address]  [place] [address]  [phone]    MD PRAVEEN LYNNE MD ANDREY GONCHARUK, MD MADHU KORRAPATI, MD YELENA ROSENBERG, MD BINNY KOSHY, MD CHRISTOPHER CAPUTO, MD EDWARD BOVER, MD          Patient events noted  Periods of sob    MEDICATIONS  (STANDING):  azithromycin   Tablet 500 milliGRAM(s) Oral daily  benzonatate 100 milliGRAM(s) Oral three times a day  chlorhexidine 4% Liquid 1 Application(s) Topical <User Schedule>  dextrose 40% Gel 15 Gram(s) Oral once  dextrose 5%. 1000 milliLiter(s) (50 mL/Hr) IV Continuous <Continuous>  dextrose 5%. 1000 milliLiter(s) (100 mL/Hr) IV Continuous <Continuous>  dextrose 50% Injectable 25 Gram(s) IV Push once  dextrose 50% Injectable 12.5 Gram(s) IV Push once  dextrose 50% Injectable 25 Gram(s) IV Push once  glucagon  Injectable 1 milliGRAM(s) IntraMuscular once  heparin   Injectable 5000 Unit(s) SubCutaneous every 12 hours  hydrALAZINE 10 milliGRAM(s) Oral every 8 hours  insulin glargine Injectable (LANTUS) 10 Unit(s) SubCutaneous at bedtime  insulin lispro (ADMELOG) corrective regimen sliding scale   SubCutaneous three times a day before meals  labetalol 100 milliGRAM(s) Oral two times a day  loratadine 10 milliGRAM(s) Oral daily  mometasone 220 MICROgram(s) Inhaler 1 Puff(s) Inhalation daily  montelukast 10 milliGRAM(s) Oral every 24 hours  pantoprazole  Injectable 40 milliGRAM(s) IV Push daily  predniSONE   Tablet 20 milliGRAM(s) Oral every 12 hours  senna 2 Tablet(s) Oral at bedtime  sevelamer carbonate 800 milliGRAM(s) Oral three times a day with meals  simvastatin 40 milliGRAM(s) Oral at bedtime    MEDICATIONS  (PRN):  aluminum hydroxide/magnesium hydroxide/simethicone Suspension 15 milliLiter(s) Oral every 8 hours PRN Dyspepsia  sodium chloride 0.65% Nasal 1 Spray(s) Both Nostrils every 4 hours PRN irritation      02-24-22 @ 07:01 - 02-25-22 @ 07:00  --------------------------------------------------------  IN: 640 mL / OUT: 0 mL / NET: 640 mL    02-25-22 @ 07:01  -  02-25-22 @ 21:57  --------------------------------------------------------  IN: 0 mL / OUT: 2500 mL / NET: -2500 mL      PHYSICAL EXAM:      T(C): 36.7 (02-25-22 @ 17:21), Max: 37.1 (02-25-22 @ 00:21)  HR: 67 (02-25-22 @ 17:21) (60 - 67)  BP: 155/61 (02-25-22 @ 17:21) (143/57 - 159/60)  RR: 18 (02-25-22 @ 17:21) (18 - 20)  SpO2: 97% (02-25-22 @ 17:21) (97% - 99%)  Wt(kg): --  Lungs crackles at bases  Heart S1S2  Abd soft NT ND  Extremities:   tr edema                                    8.8    7.98  )-----------( 224      ( 24 Feb 2022 07:06 )             27.3     02-24    133<L>  |  101  |  43<H>  ----------------------------<  274<H>  4.7   |  25  |  5.63<H>    Ca    9.3      24 Feb 2022 07:06    TPro  7.6  /  Alb  2.8<L>  /  TBili  0.3  /  DBili  x   /  AST  27  /  ALT  20  /  AlkPhos  98  02-24    ABG - ( 24 Feb 2022 05:53 )  pH, Arterial: x     pH, Blood: 7.37  /  pCO2: 48    /  pO2: 53    / HCO3: 28    / Base Excess: 1.7   /  SaO2: 86.1              LIVER FUNCTIONS - ( 24 Feb 2022 07:06 )  Alb: 2.8 g/dL / Pro: 7.6 gm/dL / ALK PHOS: 98 U/L / ALT: 20 U/L / AST: 27 U/L / GGT: x           Creatinine Trend: 5.63<--, 8.21<--, 5.62<--, 5.20<--, 6.32<--, 3.42<--      Assessment   ESRD; PNA; fluid overload    Plan:  Dialyze tomorrow  UF as tolerated  Follow CXR and BNP    Erickson Ray MD SUBJECTIVE / OVERNIGHT EVENTS:  --- Coverage for Dr. Garza ---   Pt seen and examined at bedside.   No overnight event.  Feeling better.  no cp, no sob, no n/v/d.   pain well controlled  strength recovering.         --------------------------------------------------------------------------------------------  LABS:                        11.1   11.89 )-----------( 217      ( 24 Apr 2021 07:28 )             33.3     04-24    135  |  101  |  16  ----------------------------<  219<H>  4.4   |  22  |  0.88    Ca    8.6      24 Apr 2021 07:28  Phos  3.1     04-24  Mg     1.9     04-24        CAPILLARY BLOOD GLUCOSE      POCT Blood Glucose.: 241 mg/dL (24 Apr 2021 07:55)  POCT Blood Glucose.: 162 mg/dL (23 Apr 2021 21:23)  POCT Blood Glucose.: 161 mg/dL (23 Apr 2021 17:28)  POCT Blood Glucose.: 123 mg/dL (23 Apr 2021 12:12)            RADIOLOGY & ADDITIONAL TESTS:    Imaging Personally Reviewed:  [x] YES  [ ] NO    Consultant(s) Notes Reviewed:  [x] YES  [ ] NO    MEDICATIONS  (STANDING):  albumin human  5% IVPB 3750 milliLiter(s) IV Intermittent every 48 hours  amLODIPine   Tablet 10 milliGRAM(s) Oral daily  bisacodyl 5 milliGRAM(s) Oral at bedtime  calcium gluconate IVPB 2 Gram(s) IV Intermittent <User Schedule>  chlorhexidine 2% Cloths 1 Application(s) Topical daily  dextrose 40% Gel 15 Gram(s) Oral once  dextrose 5%. 1000 milliLiter(s) (50 mL/Hr) IV Continuous <Continuous>  dextrose 5%. 1000 milliLiter(s) (100 mL/Hr) IV Continuous <Continuous>  dextrose 50% Injectable 25 Gram(s) IV Push once  dextrose 50% Injectable 12.5 Gram(s) IV Push once  dextrose 50% Injectable 25 Gram(s) IV Push once  enoxaparin Injectable 40 milliGRAM(s) SubCutaneous daily  gabapentin 100 milliGRAM(s) Oral three times a day  glucagon  Injectable 1 milliGRAM(s) IntraMuscular once  hydrALAZINE 25 milliGRAM(s) Oral three times a day  insulin glargine Injectable (LANTUS) 44 Unit(s) SubCutaneous at bedtime  insulin lispro (ADMELOG) corrective regimen sliding scale   SubCutaneous three times a day before meals  insulin lispro (ADMELOG) corrective regimen sliding scale   SubCutaneous at bedtime  insulin lispro Injectable (ADMELOG) 14 Unit(s) SubCutaneous before lunch  insulin lispro Injectable (ADMELOG) 14 Unit(s) SubCutaneous before dinner  insulin lispro Injectable (ADMELOG) 16 Unit(s) SubCutaneous before breakfast  losartan 100 milliGRAM(s) Oral daily  metoprolol tartrate 12.5 milliGRAM(s) Oral two times a day  mineral oil 30 milliLiter(s) Oral once  senna 2 Tablet(s) Oral two times a day  sodium chloride 0.9%. 1000 milliLiter(s) (75 mL/Hr) IV Continuous <Continuous>    MEDICATIONS  (PRN):  acetaminophen   Tablet .. 650 milliGRAM(s) Oral every 6 hours PRN Temp greater or equal to 38C (100.4F), Mild Pain (1 - 3), Moderate Pain (4 - 6)  acetaminophen   Tablet .. 650 milliGRAM(s) Oral every 6 hours PRN Mild Pain (1 - 3)  aluminum hydroxide/magnesium hydroxide/simethicone Suspension 30 milliLiter(s) Oral every 6 hours PRN Dyspepsia  dicyclomine 10 milliGRAM(s) Oral once PRN abdominal pain  HYDROmorphone  Injectable 1 milliGRAM(s) IV Push every 8 hours PRN Severe Pain (7 - 10)  oxycodone    5 mG/acetaminophen 325 mG 1 Tablet(s) Oral every 6 hours PRN Moderate Pain (4 - 6)  sodium chloride 0.9% lock flush 10 milliLiter(s) IV Push every 1 hour PRN Pre/post blood products, medications, blood draw, and to maintain line patency      Care Discussed with Consultants/Other Providers [x] YES  [ ] NO    Vital Signs Last 24 Hrs  T(C): 37.1 (24 Apr 2021 05:46), Max: 37.1 (24 Apr 2021 05:46)  T(F): 98.7 (24 Apr 2021 05:46), Max: 98.7 (24 Apr 2021 05:46)  HR: 88 (24 Apr 2021 05:46) (87 - 100)  BP: 144/88 (24 Apr 2021 05:46) (125/76 - 144/88)  BP(mean): --  RR: 18 (24 Apr 2021 05:46) (18 - 20)  SpO2: 97% (24 Apr 2021 05:46) (95% - 99%)  I&O's Summary    23 Apr 2021 07:01  -  24 Apr 2021 07:00  --------------------------------------------------------  IN: 0 mL / OUT: 850 mL / NET: -850 mL      PHYSICAL EXAM:  GENERAL: NAD, well-developed, comfortable, on room air  HEAD:  Atraumatic, Normocephalic  EYES: EOMI, PERRLA, conjunctiva and sclera clear  NECK: Supple, No JVD  CHEST/LUNG: Clear to auscultation bilaterally; No wheeze, right sided cath in place, dressing d/c/i  HEART: Regular rate and rhythm; No murmurs, rubs, or gallops  ABDOMEN: Soft, Nontender, Nondistended; Bowel sounds present  Neuro: AAOx3, no focal weakness, 5/5 b/l extremity strength  EXTREMITIES:  2+ Peripheral Pulses, No clubbing, cyanosis, or edema  SKIN: No rashes or lesions

## 2022-02-25 NOTE — PROGRESS NOTE ADULT - SUBJECTIVE AND OBJECTIVE BOX
INTERVAL HPI:  76F with HTN, CHF S/P PPM, ESRD (on HD MWF), Diverticulitis, GI bleed and DVT  Presents to the ED for dyspnea.  Patient states that 2 days ago at HD she developed chills and dyspnea during her treatment.  EMS was called for her however she refused to go to a hospital.  Patient states that yesterday she stayed home and rested all day, felt unwell.  Went  for HD treatment today and EMS was called for her again as she looked even worse.  Admits to subjective fever but has never taken her temperature.  States that she has had a dry cough for > 7 days, also has associated rib pain with coughing.    Hypoxic on RA to 75% with EMS, and 86% in ED.  Leukocytosis on blood work.  Never smoked.  Admitted for possible pneumonia.    OVERNIGHT EVENTS:  Seen in dialysis.    Vital Signs Last 24 Hrs  T(C): 36.6 (25 Feb 2022 10:10), Max: 37.1 (25 Feb 2022 00:21)  T(F): 97.9 (25 Feb 2022 10:10), Max: 98.7 (25 Feb 2022 00:21)  HR: 60 (25 Feb 2022 10:10) (60 - 61)  BP: 146/62 (25 Feb 2022 10:10) (146/62 - 173/61)  BP(mean): --  RR: 20 (25 Feb 2022 10:10) (18 - 20)  SpO2: 98% (25 Feb 2022 10:10) (97% - 100%)    PHYSICAL EXAM:  GEN:         Awake, responsive and comfortable.  HEENT:    Normal.    RESP:      no wheezing.  CVS:          Regular rate and rhythm.     MEDICATIONS  (STANDING):  benzonatate 100 milliGRAM(s) Oral three times a day  chlorhexidine 4% Liquid 1 Application(s) Topical <User Schedule>  dextrose 40% Gel 15 Gram(s) Oral once  dextrose 5%. 1000 milliLiter(s) (50 mL/Hr) IV Continuous <Continuous>  dextrose 5%. 1000 milliLiter(s) (100 mL/Hr) IV Continuous <Continuous>  dextrose 50% Injectable 25 Gram(s) IV Push once  dextrose 50% Injectable 12.5 Gram(s) IV Push once  dextrose 50% Injectable 25 Gram(s) IV Push once  glucagon  Injectable 1 milliGRAM(s) IntraMuscular once  heparin   Injectable 5000 Unit(s) SubCutaneous every 12 hours  hydrALAZINE 10 milliGRAM(s) Oral every 8 hours  insulin lispro (ADMELOG) corrective regimen sliding scale   SubCutaneous three times a day before meals  labetalol 100 milliGRAM(s) Oral two times a day  loratadine 10 milliGRAM(s) Oral daily  mometasone 220 MICROgram(s) Inhaler 1 Puff(s) Inhalation daily  montelukast 10 milliGRAM(s) Oral every 24 hours  pantoprazole  Injectable 40 milliGRAM(s) IV Push daily  predniSONE   Tablet 20 milliGRAM(s) Oral every 12 hours  senna 2 Tablet(s) Oral at bedtime  sevelamer carbonate 800 milliGRAM(s) Oral three times a day with meals  simvastatin 40 milliGRAM(s) Oral at bedtime    MEDICATIONS  (PRN):  aluminum hydroxide/magnesium hydroxide/simethicone Suspension 15 milliLiter(s) Oral every 8 hours PRN Dyspepsia  hydrocodone/homatropine Syrup 5 milliLiter(s) Oral every 6 hours PRN Cough  HYDROmorphone  Injectable 0.5 milliGRAM(s) IV Push every 4 hours PRN Severe Pain (7 - 10)  sodium chloride 0.65% Nasal 1 Spray(s) Both Nostrils every 4 hours PRN irritation    LABS:                        8.8    7.98  )-----------( 224      ( 24 Feb 2022 07:06 )             27.3     02-24    133<L>  |  101  |  43<H>  ----------------------------<  274<H>  4.7   |  25  |  5.63<H>    Ca    9.3      24 Feb 2022 07:06    TPro  7.6  /  Alb  2.8<L>  /  TBili  0.3  /  DBili  x   /  AST  27  /  ALT  20  /  AlkPhos  98  02-24 02-24 @ 05:53  pH: 7.37  pCO2: 48  pO2: 53  SaO2: 86.1    ASSESSMENT AND PLAN:  ·	Acute Respiratory distress.  ·	Hypoxia.  ·	No Pneumonia on chest CT.  ·	Bilateral small pleural effusion with atelectasis.  ·	Leukocytosis better.  ·	ESRD, on HD.  ·	Anemia.  ·	CHF hx, S/P PPM.  ·	HTN.    Over all better but still with dry cough, more in supine position.  SPO2 98% on humidified nasal O2.  On saline nasal spray, steroids, Protonix  and cough suppressant.  Keep head side up 40 degree when supine.

## 2022-02-25 NOTE — CHART NOTE - NSCHARTNOTEFT_GEN_A_CORE
Pt admitted from dialysis center c dyspnea, chills, cough, fever; found c PNA.  PMHx includes ESRD on HD, HTN, HLD, T2DM    Factors impacting intake: [ X] none [ ] nausea  [ ] vomiting [ ] diarrhea [ ] constipation  [ ]chewing problems [ ] swallowing issues  [ ] other:     Diet Prescription: Diet, Consistent Carbohydrate Renal w/Evening Snack:   Supplement Feeding Modality:  Oral  Ensure Enlive Cans or Servings Per Day:  1       Frequency:  Daily  Nepro Cans or Servings Per Day:  1       Frequency:  Daily (02-22-22 @ 17:46)    Intake:   pt appetite improved; now consuming % most meals    Current Weight:   82.5 kg (2/25); admission wt 81.6 kg (2/16)  % Weight Change: 1.1% wt gain x 9 days    No edema noted; previously pt c 2+ edema left arm    Pertinent Medications: MEDICATIONS  (STANDING):  benzonatate 100 milliGRAM(s) Oral three times a day  chlorhexidine 4% Liquid 1 Application(s) Topical <User Schedule>  dextrose 40% Gel 15 Gram(s) Oral once  dextrose 5%. 1000 milliLiter(s) (50 mL/Hr) IV Continuous <Continuous>  dextrose 5%. 1000 milliLiter(s) (100 mL/Hr) IV Continuous <Continuous>  dextrose 50% Injectable 25 Gram(s) IV Push once  dextrose 50% Injectable 12.5 Gram(s) IV Push once  dextrose 50% Injectable 25 Gram(s) IV Push once  glucagon  Injectable 1 milliGRAM(s) IntraMuscular once  heparin   Injectable 5000 Unit(s) SubCutaneous every 12 hours  hydrALAZINE 10 milliGRAM(s) Oral every 8 hours  insulin lispro (ADMELOG) corrective regimen sliding scale   SubCutaneous three times a day before meals  labetalol 100 milliGRAM(s) Oral two times a day  loratadine 10 milliGRAM(s) Oral daily  mometasone 220 MICROgram(s) Inhaler 1 Puff(s) Inhalation daily  montelukast 10 milliGRAM(s) Oral every 24 hours  pantoprazole  Injectable 40 milliGRAM(s) IV Push daily  predniSONE   Tablet 20 milliGRAM(s) Oral every 12 hours  senna 2 Tablet(s) Oral at bedtime  sevelamer carbonate 800 milliGRAM(s) Oral three times a day with meals  simvastatin 40 milliGRAM(s) Oral at bedtime    MEDICATIONS  (PRN):  aluminum hydroxide/magnesium hydroxide/simethicone Suspension 15 milliLiter(s) Oral every 8 hours PRN Dyspepsia  HYDROmorphone  Injectable 0.5 milliGRAM(s) IV Push every 4 hours PRN Severe Pain (7 - 10)  sodium chloride 0.65% Nasal 1 Spray(s) Both Nostrils every 4 hours PRN irritation    Pertinent Labs: 02-24 Na133 mmol/L<L> Glu 274 mg/dL<H> K+ 4.7 mmol/L Cr  5.63 mg/dL<H> BUN 43 mg/dL<H> 02-24 Alb 2.8 g/dL<L>  02-17-22  A1C 6.4%; 02-24 POCT: 255, 270, 320, 290, 280 - elevated BG levels most likely steroid induced (pt on Prednisone)    Skin:   WDL    Estimated Needs:   [X ] no change since previous assessment  (2/18)  [ ] recalculated:     Previous Nutrition Diagnosis:   [X ]  Increased Nutrient Needs   Etiology:  Increased demand for protein  Signs & Symptoms:  ESRD on HD tx requires additional protein intake    GOAL:  Pt to consume >75% protein needs via meals/supplements - met as this time      Nutrition Diagnosis is [X ] ongoing  [ ] resolved [ ] not applicable     New Nutrition Diagnosis: [x ] not applicable      Interventions:  Recommend  [X ] Change Diet To:   [facility] Renal no snack  [X ] Nutrition Supplement:  Nepro x 1/day (provides 425 kcal, 19 g protein)   [ ] Nutrition Support  [ ] Other:     Monitoring and Evaluation:   [X ] PO intake [ x ] Tolerance to diet prescription [ x ] weights [ x ] labs[ x ] follow up per protocol  [ ] other:

## 2022-02-25 NOTE — PROGRESS NOTE ADULT - SUBJECTIVE AND OBJECTIVE BOX
Patient is a 76y old  Female who presents with a chief complaint of PNA (22 Feb 2022 16:53)    INTERVAL HPI/OVERNIGHT EVENTS:  Pt was seen and examined, no acute events. reports her breathing is worse with laying flat     MEDICATIONS  (STANDING):  chlorhexidine 4% Liquid 1 Application(s) Topical <User Schedule>  dextrose 40% Gel 15 Gram(s) Oral once  dextrose 5%. 1000 milliLiter(s) (50 mL/Hr) IV Continuous <Continuous>  dextrose 5%. 1000 milliLiter(s) (100 mL/Hr) IV Continuous <Continuous>  dextrose 50% Injectable 25 Gram(s) IV Push once  dextrose 50% Injectable 12.5 Gram(s) IV Push once  dextrose 50% Injectable 25 Gram(s) IV Push once  glucagon  Injectable 1 milliGRAM(s) IntraMuscular once  heparin   Injectable 5000 Unit(s) SubCutaneous every 12 hours  hydrALAZINE 25 milliGRAM(s) Oral three times a day  insulin lispro (ADMELOG) corrective regimen sliding scale   SubCutaneous three times a day before meals  labetalol 100 milliGRAM(s) Oral two times a day  loratadine 10 milliGRAM(s) Oral daily  mometasone 220 MICROgram(s) Inhaler 1 Puff(s) Inhalation daily  montelukast 10 milliGRAM(s) Oral every 24 hours  pantoprazole  Injectable 40 milliGRAM(s) IV Push daily  predniSONE   Tablet 20 milliGRAM(s) Oral every 12 hours  senna 2 Tablet(s) Oral at bedtime  sevelamer carbonate 800 milliGRAM(s) Oral three times a day with meals  simvastatin 40 milliGRAM(s) Oral at bedtime    MEDICATIONS  (PRN):  aluminum hydroxide/magnesium hydroxide/simethicone Suspension 15 milliLiter(s) Oral every 8 hours PRN Dyspepsia  hydrocodone/homatropine Syrup 5 milliLiter(s) Oral every 6 hours PRN Cough  HYDROmorphone  Injectable 0.5 milliGRAM(s) IV Push every 4 hours PRN Severe Pain (7 - 10)  sodium chloride 0.65% Nasal 1 Spray(s) Both Nostrils every 4 hours PRN irritation        Allergies  Eliquis (Other)        Vital Signs Last 24 Hrs  T(C): 36.8 (25 Feb 2022 14:26), Max: 37.1 (25 Feb 2022 00:21)  T(F): 98.3 (25 Feb 2022 14:26), Max: 98.7 (25 Feb 2022 00:21)  HR: 62 (25 Feb 2022 14:26) (60 - 62)  BP: 147/65 (25 Feb 2022 14:26) (146/62 - 173/61)  BP(mean): --  RR: 19 (25 Feb 2022 14:26) (18 - 20)  SpO2: 97% (25 Feb 2022 14:26) (97% - 100%)    PHYSICAL EXAM:  GENERAL: NAD,obese, well-developed  HEAD:  Atraumatic, Normocephalic  EYES: EOMI, PERRLA, conjunctiva and sclera clear  ENMT: Moist mucous membranes  NECK: Supple  NERVOUS SYSTEM:  Alert & Oriented X3, normal speech  CHEST/LUNG: left sided crackles, no distress   HEART: S1, S2  ABDOMEN: Soft, Nontender, obese, Bowel sounds present  EXTREMITIES:  no cyanosis, or edema      LABS:                                            8.8    7.98  )-----------( 224      ( 24 Feb 2022 07:06 )             27.3     02-24    133<L>  |  101  |  43<H>  ----------------------------<  274<H>  4.7   |  25  |  5.63<H>    Ca    9.3      24 Feb 2022 07:06    TPro  7.6  /  Alb  2.8<L>  /  TBili  0.3  /  DBili  x   /  AST  27  /  ALT  20  /  AlkPhos  98  02-24                        CAPILLARY BLOOD GLUCOSE      POCT Blood Glucose.: 153 mg/dL (22 Feb 2022 16:37)  POCT Blood Glucose.: 254 mg/dL (22 Feb 2022 11:51)  POCT Blood Glucose.: 177 mg/dL (22 Feb 2022 07:43)  POCT Blood Glucose.: 141 mg/dL (21 Feb 2022 21:04)      RADIOLOGY & ADDITIONAL TESTS:    Imaging Personally Reviewed:  [ ] YES  [ ] NO    Consultant(s) Notes Reviewed:  [x ] YES  [ ] NO    Care Discussed with Consultants/Other Providers [ ] YES  [ ] NO

## 2022-02-26 LAB
GLUCOSE BLDC GLUCOMTR-MCNC: 282 MG/DL — HIGH (ref 70–99)
GLUCOSE BLDC GLUCOMTR-MCNC: 309 MG/DL — HIGH (ref 70–99)
GLUCOSE BLDC GLUCOMTR-MCNC: 474 MG/DL — CRITICAL HIGH (ref 70–99)
GLUCOSE BLDC GLUCOMTR-MCNC: 483 MG/DL — CRITICAL HIGH (ref 70–99)

## 2022-02-26 PROCEDURE — 99232 SBSQ HOSP IP/OBS MODERATE 35: CPT

## 2022-02-26 RX ORDER — SODIUM CHLORIDE 0.65 %
2 AEROSOL, SPRAY (ML) NASAL EVERY 6 HOURS
Refills: 0 | Status: DISCONTINUED | OUTPATIENT
Start: 2022-02-26 | End: 2022-03-02

## 2022-02-26 RX ORDER — INSULIN LISPRO 100/ML
6 VIAL (ML) SUBCUTANEOUS ONCE
Refills: 0 | Status: COMPLETED | OUTPATIENT
Start: 2022-02-26 | End: 2022-02-26

## 2022-02-26 RX ORDER — INSULIN GLARGINE 100 [IU]/ML
20 INJECTION, SOLUTION SUBCUTANEOUS AT BEDTIME
Refills: 0 | Status: DISCONTINUED | OUTPATIENT
Start: 2022-02-26 | End: 2022-03-01

## 2022-02-26 RX ADMIN — Medication 10 MILLIGRAM(S): at 21:41

## 2022-02-26 RX ADMIN — SEVELAMER CARBONATE 800 MILLIGRAM(S): 2400 POWDER, FOR SUSPENSION ORAL at 17:03

## 2022-02-26 RX ADMIN — Medication 6: at 17:02

## 2022-02-26 RX ADMIN — Medication 2 SPRAY(S): at 17:05

## 2022-02-26 RX ADMIN — SEVELAMER CARBONATE 800 MILLIGRAM(S): 2400 POWDER, FOR SUSPENSION ORAL at 07:48

## 2022-02-26 RX ADMIN — Medication 20 MILLIGRAM(S): at 06:08

## 2022-02-26 RX ADMIN — Medication 100 MILLIGRAM(S): at 17:05

## 2022-02-26 RX ADMIN — Medication 100 MILLIGRAM(S): at 21:41

## 2022-02-26 RX ADMIN — Medication 100 MILLIGRAM(S): at 06:08

## 2022-02-26 RX ADMIN — Medication 20 MILLIGRAM(S): at 17:05

## 2022-02-26 RX ADMIN — SIMVASTATIN 40 MILLIGRAM(S): 20 TABLET, FILM COATED ORAL at 21:41

## 2022-02-26 RX ADMIN — HEPARIN SODIUM 5000 UNIT(S): 5000 INJECTION INTRAVENOUS; SUBCUTANEOUS at 17:05

## 2022-02-26 RX ADMIN — CHLORHEXIDINE GLUCONATE 1 APPLICATION(S): 213 SOLUTION TOPICAL at 06:08

## 2022-02-26 RX ADMIN — HEPARIN SODIUM 5000 UNIT(S): 5000 INJECTION INTRAVENOUS; SUBCUTANEOUS at 06:08

## 2022-02-26 RX ADMIN — Medication 6 UNIT(S): at 17:03

## 2022-02-26 RX ADMIN — Medication 4: at 07:48

## 2022-02-26 RX ADMIN — INSULIN GLARGINE 20 UNIT(S): 100 INJECTION, SOLUTION SUBCUTANEOUS at 21:41

## 2022-02-26 NOTE — PROGRESS NOTE ADULT - SUBJECTIVE AND OBJECTIVE BOX
INTERVAL HPI:  76F with HTN, CHF S/P PPM, ESRD (on HD MWF), Diverticulitis, GI bleed and DVT  Presents to the ED for dyspnea.  Patient states that 2 days ago at HD she developed chills and dyspnea during her treatment.  EMS was called for her however she refused to go to a hospital.  Patient states that yesterday she stayed home and rested all day, felt unwell.  Went  for HD treatment today and EMS was called for her again as she looked even worse.  Admits to subjective fever but has never taken her temperature.  States that she has had a dry cough for > 7 days, also has associated rib pain with coughing.    Hypoxic on RA to 75% with EMS, and 86% in ED.  Leukocytosis on blood work.  Never smoked.  Admitted for possible pneumonia.    OVERNIGHT EVENTS:  Again being dialyzed today.    Vital Signs Last 24 Hrs  T(C): 36.9 (26 Feb 2022 13:15), Max: 36.9 (26 Feb 2022 13:15)  T(F): 98.5 (26 Feb 2022 13:15), Max: 98.5 (26 Feb 2022 13:15)  HR: 60 (26 Feb 2022 13:15) (60 - 67)  BP: 136/57 (26 Feb 2022 13:15) (136/57 - 155/61)  BP(mean): --  RR: 18 (26 Feb 2022 13:15) (18 - 19)  SpO2: 99% (26 Feb 2022 13:15) (96% - 100%)    PHYSICAL EXAM:  GEN:         Awake, responsive and comfortable.  HEENT:    Normal.    RESP:      no wheezing.  CVS:         Regular rate and rhythm.     MEDICATIONS  (STANDING):  azithromycin   Tablet 500 milliGRAM(s) Oral daily  benzonatate 100 milliGRAM(s) Oral three times a day  chlorhexidine 4% Liquid 1 Application(s) Topical <User Schedule>  dextrose 40% Gel 15 Gram(s) Oral once  dextrose 5%. 1000 milliLiter(s) (50 mL/Hr) IV Continuous <Continuous>  dextrose 5%. 1000 milliLiter(s) (100 mL/Hr) IV Continuous <Continuous>  dextrose 50% Injectable 25 Gram(s) IV Push once  dextrose 50% Injectable 12.5 Gram(s) IV Push once  dextrose 50% Injectable 25 Gram(s) IV Push once  glucagon  Injectable 1 milliGRAM(s) IntraMuscular once  heparin   Injectable 5000 Unit(s) SubCutaneous every 12 hours  hydrALAZINE 10 milliGRAM(s) Oral every 8 hours  insulin glargine Injectable (LANTUS) 10 Unit(s) SubCutaneous at bedtime  insulin lispro (ADMELOG) corrective regimen sliding scale   SubCutaneous three times a day before meals  labetalol 100 milliGRAM(s) Oral two times a day  loratadine 10 milliGRAM(s) Oral daily  mometasone 220 MICROgram(s) Inhaler 1 Puff(s) Inhalation daily  montelukast 10 milliGRAM(s) Oral every 24 hours  pantoprazole  Injectable 40 milliGRAM(s) IV Push daily  predniSONE   Tablet 20 milliGRAM(s) Oral every 12 hours  senna 2 Tablet(s) Oral at bedtime  sevelamer carbonate 800 milliGRAM(s) Oral three times a day with meals  simvastatin 40 milliGRAM(s) Oral at bedtime    MEDICATIONS  (PRN):  aluminum hydroxide/magnesium hydroxide/simethicone Suspension 15 milliLiter(s) Oral every 8 hours PRN Dyspepsia  sodium chloride 0.65% Nasal 1 Spray(s) Both Nostrils every 4 hours PRN irritation  02-24 @ 05:53  pH: 7.37  pCO2: 48  pO2: 53  SaO2: 86.1    ASSESSMENT AND PLAN:  ·	Acute Respiratory distress.  ·	Hypoxia.  ·	No Pneumonia on chest CT.  ·	Bilateral small pleural effusion with atelectasis.  ·	Leukocytosis better.  ·	ESRD, on HD.  ·	Anemia.  ·	CHF hx, S/P PPM.  ·	HTN.    Over all better but still with dry cough, more in supine position.  SPO2 98% on humidified nasal O2.  Change saline nasal spray to every 6 hours.  steroids, Protonix  and cough suppressant.  keep  head side up 45 degree.  Keep head side up 40 degree when supine.

## 2022-02-26 NOTE — PROGRESS NOTE ADULT - SUBJECTIVE AND OBJECTIVE BOX
[facility] NEPHROLOGY SERVICES, [facility]  NEPHROLOGY AND HYPERTENSION  [address]  [address]  [place] [address]  [phone]    MD PRAVEEN LYNNE MD ANDREY GONCHARUK, MD MADHU KORRAPATI, MD YELENA ROSENBERG, MD BINNY KOSHY, MD CHRISTOPHER CAPUTO, MD EDWARD BOVER, MD          Patient events noted  On HD no distress    MEDICATIONS  (STANDING):  azithromycin   Tablet 500 milliGRAM(s) Oral daily  benzonatate 100 milliGRAM(s) Oral three times a day  chlorhexidine 4% Liquid 1 Application(s) Topical <User Schedule>  dextrose 40% Gel 15 Gram(s) Oral once  dextrose 5%. 1000 milliLiter(s) (50 mL/Hr) IV Continuous <Continuous>  dextrose 5%. 1000 milliLiter(s) (100 mL/Hr) IV Continuous <Continuous>  dextrose 50% Injectable 25 Gram(s) IV Push once  dextrose 50% Injectable 12.5 Gram(s) IV Push once  dextrose 50% Injectable 25 Gram(s) IV Push once  glucagon  Injectable 1 milliGRAM(s) IntraMuscular once  heparin   Injectable 5000 Unit(s) SubCutaneous every 12 hours  hydrALAZINE 10 milliGRAM(s) Oral every 8 hours  insulin glargine Injectable (LANTUS) 10 Unit(s) SubCutaneous at bedtime  insulin lispro (ADMELOG) corrective regimen sliding scale   SubCutaneous three times a day before meals  labetalol 100 milliGRAM(s) Oral two times a day  loratadine 10 milliGRAM(s) Oral daily  mometasone 220 MICROgram(s) Inhaler 1 Puff(s) Inhalation daily  montelukast 10 milliGRAM(s) Oral every 24 hours  pantoprazole  Injectable 40 milliGRAM(s) IV Push daily  predniSONE   Tablet 20 milliGRAM(s) Oral every 12 hours  senna 2 Tablet(s) Oral at bedtime  sevelamer carbonate 800 milliGRAM(s) Oral three times a day with meals  simvastatin 40 milliGRAM(s) Oral at bedtime  sodium chloride 0.65% Nasal 2 Spray(s) Both Nostrils every 6 hours    MEDICATIONS  (PRN):  aluminum hydroxide/magnesium hydroxide/simethicone Suspension 15 milliLiter(s) Oral every 8 hours PRN Dyspepsia      22 @ 07:01  -  22 @ 07:00  --------------------------------------------------------  IN: 0 mL / OUT: 2500 mL / NET: -2500 mL    22 @ 07:01  -  22 @ 15:20  --------------------------------------------------------  IN: 0 mL / OUT: 2500 mL / NET: -2500 mL      PHYSICAL EXAM:      T(C): 37 (22 @ 14:00), Max: 37 (22 @ 14:00)  HR: 61 (22 @ 14:00) (60 - 67)  BP: 125/56 (22 @ 14:00) (125/56 - 155/61)  RR: 18 (22 @ 14:00) (18 - 18)  SpO2: 100% (22 @ 14:00) (96% - 100%)  Wt(kg): --  Lungs clear  Heart S1S2  Abd soft NT ND  Extremities:   tr edema        Daily Weight Trend:   Weight in k.9 (22 @ 13:15)  Weight in k.4 (22 @ 09:45)  Weight in k (22 @ 13:50)  Weight in k.5 (22 @ 10:10)                  Creatinine Trend: 5.63<--, 8.21<--, 5.62<--, 5.20<--, 6.32<--, 3.42<--      Assessment   ESRD; PNA; fluid overload element suspected    Plan:  Dialysis today  UF as tolerated   Will follow course    Erickson Ray MD

## 2022-02-26 NOTE — PROGRESS NOTE ADULT - SUBJECTIVE AND OBJECTIVE BOX
Patient is a 76y old  Female who presents with a chief complaint of PNA (22 Feb 2022 16:53)    INTERVAL HPI/OVERNIGHT EVENTS:  Pt was seen and examined, no acute events. r    MEDICATIONS  (STANDING):  chlorhexidine 4% Liquid 1 Application(s) Topical <User Schedule>  dextrose 40% Gel 15 Gram(s) Oral once  dextrose 5%. 1000 milliLiter(s) (50 mL/Hr) IV Continuous <Continuous>  dextrose 5%. 1000 milliLiter(s) (100 mL/Hr) IV Continuous <Continuous>  dextrose 50% Injectable 25 Gram(s) IV Push once  dextrose 50% Injectable 12.5 Gram(s) IV Push once  dextrose 50% Injectable 25 Gram(s) IV Push once  glucagon  Injectable 1 milliGRAM(s) IntraMuscular once  heparin   Injectable 5000 Unit(s) SubCutaneous every 12 hours  hydrALAZINE 25 milliGRAM(s) Oral three times a day  insulin lispro (ADMELOG) corrective regimen sliding scale   SubCutaneous three times a day before meals  labetalol 100 milliGRAM(s) Oral two times a day  loratadine 10 milliGRAM(s) Oral daily  mometasone 220 MICROgram(s) Inhaler 1 Puff(s) Inhalation daily  montelukast 10 milliGRAM(s) Oral every 24 hours  pantoprazole  Injectable 40 milliGRAM(s) IV Push daily  predniSONE   Tablet 20 milliGRAM(s) Oral every 12 hours  senna 2 Tablet(s) Oral at bedtime  sevelamer carbonate 800 milliGRAM(s) Oral three times a day with meals  simvastatin 40 milliGRAM(s) Oral at bedtime    MEDICATIONS  (PRN):  aluminum hydroxide/magnesium hydroxide/simethicone Suspension 15 milliLiter(s) Oral every 8 hours PRN Dyspepsia  hydrocodone/homatropine Syrup 5 milliLiter(s) Oral every 6 hours PRN Cough  HYDROmorphone  Injectable 0.5 milliGRAM(s) IV Push every 4 hours PRN Severe Pain (7 - 10)  sodium chloride 0.65% Nasal 1 Spray(s) Both Nostrils every 4 hours PRN irritation        Allergies  Eliquis (Other)        Vital Signs Last 24 Hrs  Vital Signs Last 24 Hrs  T(C): 37 (26 Feb 2022 14:00), Max: 37 (26 Feb 2022 14:00)  T(F): 98.6 (26 Feb 2022 14:00), Max: 98.6 (26 Feb 2022 14:00)  HR: 61 (26 Feb 2022 14:00) (60 - 67)  BP: 125/56 (26 Feb 2022 14:00) (125/56 - 155/61)  BP(mean): --  RR: 18 (26 Feb 2022 14:00) (18 - 18)  SpO2: 100% (26 Feb 2022 14:00) (96% - 100%)    PHYSICAL EXAM:  GENERAL: NAD,obese, well-developed  HEAD:  Atraumatic, Normocephalic  EYES: EOMI, PERRLA, conjunctiva and sclera clear  ENMT: Moist mucous membranes  NECK: Supple  NERVOUS SYSTEM:  Alert & Oriented X3, normal speech  CHEST/LUNG: left sided crackles, no distress   HEART: S1, S2  ABDOMEN: Soft, Nontender, obese, Bowel sounds present  EXTREMITIES:  no cyanosis, or edema      LABS:                                            8.8    7.98  )-----------( 224      ( 24 Feb 2022 07:06 )             27.3                     CAPILLARY BLOOD GLUCOSE      POCT Blood Glucose.: 153 mg/dL (22 Feb 2022 16:37)  POCT Blood Glucose.: 254 mg/dL (22 Feb 2022 11:51)  POCT Blood Glucose.: 177 mg/dL (22 Feb 2022 07:43)  POCT Blood Glucose.: 141 mg/dL (21 Feb 2022 21:04)      RADIOLOGY & ADDITIONAL TESTS:    Imaging Personally Reviewed:  [ ] YES  [ ] NO    Consultant(s) Notes Reviewed:  [x ] YES  [ ] NO    Care Discussed with Consultants/Other Providers [ ] YES  [ ] NO

## 2022-02-27 LAB
ANION GAP SERPL CALC-SCNC: 7 MMOL/L — SIGNIFICANT CHANGE UP (ref 5–17)
BUN SERPL-MCNC: 55 MG/DL — HIGH (ref 7–23)
CALCIUM SERPL-MCNC: 8.8 MG/DL — SIGNIFICANT CHANGE UP (ref 8.5–10.1)
CHLORIDE SERPL-SCNC: 103 MMOL/L — SIGNIFICANT CHANGE UP (ref 96–108)
CO2 SERPL-SCNC: 27 MMOL/L — SIGNIFICANT CHANGE UP (ref 22–31)
CREAT SERPL-MCNC: 4.35 MG/DL — HIGH (ref 0.5–1.3)
GLUCOSE BLDC GLUCOMTR-MCNC: 238 MG/DL — HIGH (ref 70–99)
GLUCOSE BLDC GLUCOMTR-MCNC: 263 MG/DL — HIGH (ref 70–99)
GLUCOSE BLDC GLUCOMTR-MCNC: 301 MG/DL — HIGH (ref 70–99)
GLUCOSE BLDC GLUCOMTR-MCNC: 335 MG/DL — HIGH (ref 70–99)
GLUCOSE SERPL-MCNC: 274 MG/DL — HIGH (ref 70–99)
HCT VFR BLD CALC: 27.3 % — LOW (ref 34.5–45)
HGB BLD-MCNC: 8.9 G/DL — LOW (ref 11.5–15.5)
MCHC RBC-ENTMCNC: 29.6 PG — SIGNIFICANT CHANGE UP (ref 27–34)
MCHC RBC-ENTMCNC: 32.6 G/DL — SIGNIFICANT CHANGE UP (ref 32–36)
MCV RBC AUTO: 90.7 FL — SIGNIFICANT CHANGE UP (ref 80–100)
NRBC # BLD: 0 /100 WBCS — SIGNIFICANT CHANGE UP (ref 0–0)
PLATELET # BLD AUTO: 259 K/UL — SIGNIFICANT CHANGE UP (ref 150–400)
POTASSIUM SERPL-MCNC: 4.4 MMOL/L — SIGNIFICANT CHANGE UP (ref 3.5–5.3)
POTASSIUM SERPL-SCNC: 4.4 MMOL/L — SIGNIFICANT CHANGE UP (ref 3.5–5.3)
RBC # BLD: 3.01 M/UL — LOW (ref 3.8–5.2)
RBC # FLD: 16.1 % — HIGH (ref 10.3–14.5)
SODIUM SERPL-SCNC: 137 MMOL/L — SIGNIFICANT CHANGE UP (ref 135–145)
WBC # BLD: 11.98 K/UL — HIGH (ref 3.8–10.5)
WBC # FLD AUTO: 11.98 K/UL — HIGH (ref 3.8–10.5)

## 2022-02-27 PROCEDURE — 99232 SBSQ HOSP IP/OBS MODERATE 35: CPT

## 2022-02-27 RX ADMIN — MONTELUKAST 10 MILLIGRAM(S): 4 TABLET, CHEWABLE ORAL at 11:18

## 2022-02-27 RX ADMIN — Medication 4: at 16:58

## 2022-02-27 RX ADMIN — SEVELAMER CARBONATE 800 MILLIGRAM(S): 2400 POWDER, FOR SUSPENSION ORAL at 11:20

## 2022-02-27 RX ADMIN — Medication 100 MILLIGRAM(S): at 06:37

## 2022-02-27 RX ADMIN — LORATADINE 10 MILLIGRAM(S): 10 TABLET ORAL at 11:18

## 2022-02-27 RX ADMIN — Medication 2: at 09:02

## 2022-02-27 RX ADMIN — Medication 10 MILLIGRAM(S): at 13:56

## 2022-02-27 RX ADMIN — Medication 2 SPRAY(S): at 06:27

## 2022-02-27 RX ADMIN — Medication 100 MILLIGRAM(S): at 21:22

## 2022-02-27 RX ADMIN — SEVELAMER CARBONATE 800 MILLIGRAM(S): 2400 POWDER, FOR SUSPENSION ORAL at 16:59

## 2022-02-27 RX ADMIN — CHLORHEXIDINE GLUCONATE 1 APPLICATION(S): 213 SOLUTION TOPICAL at 06:28

## 2022-02-27 RX ADMIN — Medication 100 MILLIGRAM(S): at 13:56

## 2022-02-27 RX ADMIN — Medication 2 SPRAY(S): at 11:19

## 2022-02-27 RX ADMIN — HEPARIN SODIUM 5000 UNIT(S): 5000 INJECTION INTRAVENOUS; SUBCUTANEOUS at 17:02

## 2022-02-27 RX ADMIN — Medication 10 MILLIGRAM(S): at 21:22

## 2022-02-27 RX ADMIN — MOMETASONE FUROATE 1 PUFF(S): 220 INHALANT RESPIRATORY (INHALATION) at 13:55

## 2022-02-27 RX ADMIN — Medication 100 MILLIGRAM(S): at 17:02

## 2022-02-27 RX ADMIN — INSULIN GLARGINE 20 UNIT(S): 100 INJECTION, SOLUTION SUBCUTANEOUS at 21:23

## 2022-02-27 RX ADMIN — PANTOPRAZOLE SODIUM 40 MILLIGRAM(S): 20 TABLET, DELAYED RELEASE ORAL at 11:19

## 2022-02-27 RX ADMIN — Medication 2 SPRAY(S): at 17:01

## 2022-02-27 RX ADMIN — SIMVASTATIN 40 MILLIGRAM(S): 20 TABLET, FILM COATED ORAL at 21:22

## 2022-02-27 RX ADMIN — SEVELAMER CARBONATE 800 MILLIGRAM(S): 2400 POWDER, FOR SUSPENSION ORAL at 09:02

## 2022-02-27 RX ADMIN — Medication 10 MILLIGRAM(S): at 06:37

## 2022-02-27 RX ADMIN — Medication 2 SPRAY(S): at 00:04

## 2022-02-27 RX ADMIN — Medication 4: at 11:18

## 2022-02-27 RX ADMIN — HEPARIN SODIUM 5000 UNIT(S): 5000 INJECTION INTRAVENOUS; SUBCUTANEOUS at 06:39

## 2022-02-27 RX ADMIN — AZITHROMYCIN 500 MILLIGRAM(S): 500 TABLET, FILM COATED ORAL at 11:18

## 2022-02-27 RX ADMIN — Medication 20 MILLIGRAM(S): at 06:37

## 2022-02-27 NOTE — PHYSICAL THERAPY INITIAL EVALUATION ADULT - GAIT TRAINING, PT EVAL
To be able to perform ambulation independently using cane for 200 feet, using proper technique using AD, with proper posture and functional distance at home  in 2 weeks.

## 2022-02-27 NOTE — PHYSICAL THERAPY INITIAL EVALUATION ADULT - PERTINENT HX OF CURRENT PROBLEM, REHAB EVAL
This is the hx of SPEGGY. a 77 y/o female patient who was admitted to [facility] due to complications of PNA, CHF, Exacerbation affecting medical condition and with subsequent affection on functional mobility

## 2022-02-27 NOTE — PHYSICAL THERAPY INITIAL EVALUATION ADULT - DID THE PATIENT HAVE SURGERY?
This is the hx of SPEGGY. a 77 y/o female patient who was admitted to [facility] due to complications of PNA, CHF, Exacerbation affecting medical condition and with subsequent affection on functional mobility./n/a

## 2022-02-27 NOTE — PROGRESS NOTE ADULT - SUBJECTIVE AND OBJECTIVE BOX
INTERVAL HPI:   76F with HTN, CHF S/P PPM, ESRD (on HD MWF), Diverticulitis, GI bleed and DVT  Presents to the ED for dyspnea.  Patient states that 2 days ago at HD she developed chills and dyspnea during her treatment.  EMS was called for her however she refused to go to a hospital.  Patient states that yesterday she stayed home and rested all day, felt unwell.  Went  for HD treatment today and EMS was called for her again as she looked even worse.  Admits to subjective fever but has never taken her temperature.  States that she has had a dry cough for > 7 days, also has associated rib pain with coughing.    Hypoxic on RA to 75% with EMS, and 86% in ED.  Leukocytosis on blood work.  Never smoked.  Admitted for possible pneumonia.    OVERNIGHT EVENTS:  Feels better after getting started on saline nasal spray.    Vital Signs Last 24 Hrs  T(C): 36.5 (27 Feb 2022 17:15), Max: 36.9 (26 Feb 2022 23:16)  T(F): 97.7 (27 Feb 2022 17:15), Max: 98.5 (27 Feb 2022 11:04)  HR: 60 (27 Feb 2022 17:15) (60 - 62)  BP: 139/65 (27 Feb 2022 17:15) (130/67 - 160/70)  BP(mean): --  RR: 18 (27 Feb 2022 17:15) (18 - 22)  SpO2: 100% (27 Feb 2022 17:15) (88% - 100%)    PHYSICAL EXAM:  GEN:         Awake, responsive and comfortable.  HEENT:    Normal.    RESP:      no wheezing.  CVS:         Regular rate and rhythm.     MEDICATIONS  (STANDING):  azithromycin   Tablet 500 milliGRAM(s) Oral daily  benzonatate 100 milliGRAM(s) Oral three times a day  chlorhexidine 4% Liquid 1 Application(s) Topical <User Schedule>  dextrose 40% Gel 15 Gram(s) Oral once  dextrose 5%. 1000 milliLiter(s) (50 mL/Hr) IV Continuous <Continuous>  dextrose 5%. 1000 milliLiter(s) (100 mL/Hr) IV Continuous <Continuous>  dextrose 50% Injectable 25 Gram(s) IV Push once  dextrose 50% Injectable 12.5 Gram(s) IV Push once  dextrose 50% Injectable 25 Gram(s) IV Push once  glucagon  Injectable 1 milliGRAM(s) IntraMuscular once  heparin   Injectable 5000 Unit(s) SubCutaneous every 12 hours  hydrALAZINE 10 milliGRAM(s) Oral every 8 hours  insulin glargine Injectable (LANTUS) 20 Unit(s) SubCutaneous at bedtime  insulin lispro (ADMELOG) corrective regimen sliding scale   SubCutaneous three times a day before meals  labetalol 100 milliGRAM(s) Oral two times a day  loratadine 10 milliGRAM(s) Oral daily  mometasone 220 MICROgram(s) Inhaler 1 Puff(s) Inhalation daily  montelukast 10 milliGRAM(s) Oral every 24 hours  pantoprazole  Injectable 40 milliGRAM(s) IV Push daily  predniSONE   Tablet 20 milliGRAM(s) Oral daily  senna 2 Tablet(s) Oral at bedtime  sevelamer carbonate 800 milliGRAM(s) Oral three times a day with meals  simvastatin 40 milliGRAM(s) Oral at bedtime  sodium chloride 0.65% Nasal 2 Spray(s) Both Nostrils every 6 hours    MEDICATIONS  (PRN):  aluminum hydroxide/magnesium hydroxide/simethicone Suspension 15 milliLiter(s) Oral every 8 hours PRN Dyspepsia    LABS:                        8.9    11.98 )-----------( 259      ( 27 Feb 2022 07:03 )             27.3     02-27    137  |  103  |  55<H>  ----------------------------<  274<H>  4.4   |  27  |  4.35<H>    Ca    8.8      27 Feb 2022 07:03    02-24 @ 05:53  pH: 7.37  pCO2: 48  pO2: 53  SaO2: 86.1    ASSESSMENT AND PLAN:  ·	Acute Respiratory distress.  ·	Hypoxia.  ·	No Pneumonia on chest CT.  ·	Bilateral small pleural effusion with atelectasis.  ·	Leukocytosis better.  ·	ESRD, on HD.  ·	Anemia.  ·	CHF hx, S/P PPM.  ·	HTN.    Cough is better after getting saline nasal spray round the clock.  steroids, Protonix  and cough suppressant.  keep  head side up 45 degree  SPO2 98% on humidified nasal O2.

## 2022-02-27 NOTE — PROGRESS NOTE ADULT - SUBJECTIVE AND OBJECTIVE BOX
[facility] NEPHROLOGY SERVICES, [facility]  NEPHROLOGY AND HYPERTENSION  [address]  [address]  [place] [address]  [phone]    MD PRAVEEN LYNNE MD ANDREY GONCHARUK, MD MADHU KORRAPATI, MD YELENA ROSENBERG, MD BINNY KOSHY, MD CHRISTOPHER CAPUTO, MD EDWARD BOVER, MD          Patient events noted  Still with orthopnea  but feels better     MEDICATIONS  (STANDING):  azithromycin   Tablet 500 milliGRAM(s) Oral daily  benzonatate 100 milliGRAM(s) Oral three times a day  chlorhexidine 4% Liquid 1 Application(s) Topical <User Schedule>  dextrose 40% Gel 15 Gram(s) Oral once  dextrose 5%. 1000 milliLiter(s) (50 mL/Hr) IV Continuous <Continuous>  dextrose 5%. 1000 milliLiter(s) (100 mL/Hr) IV Continuous <Continuous>  dextrose 50% Injectable 25 Gram(s) IV Push once  dextrose 50% Injectable 12.5 Gram(s) IV Push once  dextrose 50% Injectable 25 Gram(s) IV Push once  glucagon  Injectable 1 milliGRAM(s) IntraMuscular once  heparin   Injectable 5000 Unit(s) SubCutaneous every 12 hours  hydrALAZINE 10 milliGRAM(s) Oral every 8 hours  insulin glargine Injectable (LANTUS) 20 Unit(s) SubCutaneous at bedtime  insulin lispro (ADMELOG) corrective regimen sliding scale   SubCutaneous three times a day before meals  labetalol 100 milliGRAM(s) Oral two times a day  loratadine 10 milliGRAM(s) Oral daily  mometasone 220 MICROgram(s) Inhaler 1 Puff(s) Inhalation daily  montelukast 10 milliGRAM(s) Oral every 24 hours  pantoprazole  Injectable 40 milliGRAM(s) IV Push daily  predniSONE   Tablet 20 milliGRAM(s) Oral daily  senna 2 Tablet(s) Oral at bedtime  sevelamer carbonate 800 milliGRAM(s) Oral three times a day with meals  simvastatin 40 milliGRAM(s) Oral at bedtime  sodium chloride 0.65% Nasal 2 Spray(s) Both Nostrils every 6 hours    MEDICATIONS  (PRN):  aluminum hydroxide/magnesium hydroxide/simethicone Suspension 15 milliLiter(s) Oral every 8 hours PRN Dyspepsia      02-26-22 @ 07:01 - 02-27-22 @ 07:00  --------------------------------------------------------  IN: 240 mL / OUT: 2500 mL / NET: -2260 mL    02-27-22 @ 07:01  -  02-27-22 @ 22:40  --------------------------------------------------------  IN: 950 mL / OUT: 0 mL / NET: 950 mL      PHYSICAL EXAM:      T(C): 36.5 (02-27-22 @ 17:15), Max: 36.9 (02-26-22 @ 23:16)  HR: 60 (02-27-22 @ 17:15) (60 - 62)  BP: 139/65 (02-27-22 @ 17:15) (130/67 - 160/70)  RR: 18 (02-27-22 @ 17:15) (18 - 22)  SpO2: 100% (02-27-22 @ 17:15) (88% - 100%)  Wt(kg): --  Lungs clear decreased BS at bases   Heart S1S2  Abd soft NT ND  Extremities:   tr edema                                    8.9    11.98 )-----------( 259      ( 27 Feb 2022 07:03 )             27.3     02-27    137  |  103  |  55<H>  ----------------------------<  274<H>  4.4   |  27  |  4.35<H>    Ca    8.8      27 Feb 2022 07:03          Creatinine Trend: 4.35<--, 5.63<--, 8.21<--, 5.62<--, 5.20<--, 6.32<--      Assessment   ESRD; PNA;   Orthopnea improved with sequential HD sessions     Plan:  HD for tomorrow   Will follow     Erickson Ray MD

## 2022-02-27 NOTE — PHYSICAL THERAPY INITIAL EVALUATION ADULT - ADDITIONAL COMMENTS
as per patient she lives Alone, she has 3 stairs to enter however she uses a ramp to access her apartment [address] is very close to her front door. no steps inside and she was independent with no assist from AD.

## 2022-02-27 NOTE — PROGRESS NOTE ADULT - SUBJECTIVE AND OBJECTIVE BOX
Patient is a 76y old  Female who presents with a chief complaint of PNA (22 Feb 2022 16:53)    INTERVAL HPI/OVERNIGHT EVENTS:  coughing much improved today and now off 02 at rest     MEDICATIONS  (STANDING):  azithromycin   Tablet 500 milliGRAM(s) Oral daily  benzonatate 100 milliGRAM(s) Oral three times a day  chlorhexidine 4% Liquid 1 Application(s) Topical <User Schedule>  dextrose 40% Gel 15 Gram(s) Oral once  dextrose 5%. 1000 milliLiter(s) (50 mL/Hr) IV Continuous <Continuous>  dextrose 5%. 1000 milliLiter(s) (100 mL/Hr) IV Continuous <Continuous>  dextrose 50% Injectable 25 Gram(s) IV Push once  dextrose 50% Injectable 12.5 Gram(s) IV Push once  dextrose 50% Injectable 25 Gram(s) IV Push once  glucagon  Injectable 1 milliGRAM(s) IntraMuscular once  heparin   Injectable 5000 Unit(s) SubCutaneous every 12 hours  hydrALAZINE 10 milliGRAM(s) Oral every 8 hours  insulin glargine Injectable (LANTUS) 20 Unit(s) SubCutaneous at bedtime  insulin lispro (ADMELOG) corrective regimen sliding scale   SubCutaneous three times a day before meals  labetalol 100 milliGRAM(s) Oral two times a day  loratadine 10 milliGRAM(s) Oral daily  mometasone 220 MICROgram(s) Inhaler 1 Puff(s) Inhalation daily  montelukast 10 milliGRAM(s) Oral every 24 hours  pantoprazole  Injectable 40 milliGRAM(s) IV Push daily  predniSONE   Tablet 20 milliGRAM(s) Oral daily  senna 2 Tablet(s) Oral at bedtime  sevelamer carbonate 800 milliGRAM(s) Oral three times a day with meals  simvastatin 40 milliGRAM(s) Oral at bedtime  sodium chloride 0.65% Nasal 2 Spray(s) Both Nostrils every 6 hours    MEDICATIONS  (PRN):  aluminum hydroxide/magnesium hydroxide/simethicone Suspension 15 milliLiter(s) Oral every 8 hours PRN Dyspepsia          Allergies  Eliquis (Other)        Vital Signs Last 24 Hrs  T(C): 36.5 (27 Feb 2022 05:18), Max: 37 (26 Feb 2022 14:00)  T(F): 97.7 (27 Feb 2022 05:18), Max: 98.6 (26 Feb 2022 14:00)  HR: 60 (27 Feb 2022 05:18) (60 - 64)  BP: 160/70 (27 Feb 2022 05:18) (125/56 - 160/70)  BP(mean): --  RR: 18 (27 Feb 2022 05:18) (18 - 18)  SpO2: 100% (27 Feb 2022 05:18) (99% - 100%)    PHYSICAL EXAM:  GENERAL: NAD,obese, well-developed  HEAD:  Atraumatic, Normocephalic  EYES: EOMI, PERRLA, conjunctiva and sclera clear  ENMT: Moist mucous membranes  NECK: Supple  NERVOUS SYSTEM:  Alert & Oriented X3, normal speech  CHEST/LUNG: left sided crackles, no distress   HEART: S1, S2  ABDOMEN: Soft, Nontender, obese, Bowel sounds present  EXTREMITIES:  no cyanosis, or edema      LABS:                                                       8.9    11.98 )-----------( 259      ( 27 Feb 2022 07:03 )             27.3     02-27    137  |  103  |  55<H>  ----------------------------<  274<H>  4.4   |  27  |  4.35<H>    Ca    8.8      27 Feb 2022 07:03                  RADIOLOGY & ADDITIONAL TESTS:    Imaging Personally Reviewed:  [ ] YES  [ ] NO    Consultant(s) Notes Reviewed:  [x ] YES  [ ] NO    Care Discussed with Consultants/Other Providers [ ] YES  [ ] NO

## 2022-02-28 LAB
GLUCOSE BLDC GLUCOMTR-MCNC: 120 MG/DL — HIGH (ref 70–99)
GLUCOSE BLDC GLUCOMTR-MCNC: 201 MG/DL — HIGH (ref 70–99)
GLUCOSE BLDC GLUCOMTR-MCNC: 335 MG/DL — HIGH (ref 70–99)

## 2022-02-28 PROCEDURE — 71250 CT THORAX DX C-: CPT | Mod: 26

## 2022-02-28 PROCEDURE — 99232 SBSQ HOSP IP/OBS MODERATE 35: CPT

## 2022-02-28 RX ORDER — ERYTHROPOIETIN 10000 [IU]/ML
20000 INJECTION, SOLUTION INTRAVENOUS; SUBCUTANEOUS ONCE
Refills: 0 | Status: COMPLETED | OUTPATIENT
Start: 2022-02-28 | End: 2022-02-28

## 2022-02-28 RX ADMIN — Medication 100 MILLIGRAM(S): at 05:23

## 2022-02-28 RX ADMIN — Medication 10 MILLIGRAM(S): at 21:39

## 2022-02-28 RX ADMIN — MONTELUKAST 10 MILLIGRAM(S): 4 TABLET, CHEWABLE ORAL at 13:51

## 2022-02-28 RX ADMIN — Medication 20 MILLIGRAM(S): at 05:23

## 2022-02-28 RX ADMIN — Medication 10 MILLIGRAM(S): at 13:55

## 2022-02-28 RX ADMIN — Medication 100 MILLIGRAM(S): at 21:39

## 2022-02-28 RX ADMIN — Medication 2 SPRAY(S): at 05:23

## 2022-02-28 RX ADMIN — Medication 2 SPRAY(S): at 13:54

## 2022-02-28 RX ADMIN — Medication 2 SPRAY(S): at 01:07

## 2022-02-28 RX ADMIN — CHLORHEXIDINE GLUCONATE 1 APPLICATION(S): 213 SOLUTION TOPICAL at 05:16

## 2022-02-28 RX ADMIN — INSULIN GLARGINE 20 UNIT(S): 100 INJECTION, SOLUTION SUBCUTANEOUS at 21:39

## 2022-02-28 RX ADMIN — Medication 100 MILLIGRAM(S): at 13:55

## 2022-02-28 RX ADMIN — SEVELAMER CARBONATE 800 MILLIGRAM(S): 2400 POWDER, FOR SUSPENSION ORAL at 13:51

## 2022-02-28 RX ADMIN — SEVELAMER CARBONATE 800 MILLIGRAM(S): 2400 POWDER, FOR SUSPENSION ORAL at 08:47

## 2022-02-28 RX ADMIN — SIMVASTATIN 40 MILLIGRAM(S): 20 TABLET, FILM COATED ORAL at 21:39

## 2022-02-28 RX ADMIN — HEPARIN SODIUM 5000 UNIT(S): 5000 INJECTION INTRAVENOUS; SUBCUTANEOUS at 19:06

## 2022-02-28 RX ADMIN — LORATADINE 10 MILLIGRAM(S): 10 TABLET ORAL at 13:51

## 2022-02-28 RX ADMIN — SEVELAMER CARBONATE 800 MILLIGRAM(S): 2400 POWDER, FOR SUSPENSION ORAL at 19:07

## 2022-02-28 RX ADMIN — AZITHROMYCIN 500 MILLIGRAM(S): 500 TABLET, FILM COATED ORAL at 13:52

## 2022-02-28 RX ADMIN — MOMETASONE FUROATE 1 PUFF(S): 220 INHALANT RESPIRATORY (INHALATION) at 13:54

## 2022-02-28 RX ADMIN — Medication 100 MILLIGRAM(S): at 19:07

## 2022-02-28 RX ADMIN — PANTOPRAZOLE SODIUM 40 MILLIGRAM(S): 20 TABLET, DELAYED RELEASE ORAL at 13:52

## 2022-02-28 RX ADMIN — Medication 2 SPRAY(S): at 19:07

## 2022-02-28 RX ADMIN — HEPARIN SODIUM 5000 UNIT(S): 5000 INJECTION INTRAVENOUS; SUBCUTANEOUS at 05:23

## 2022-02-28 RX ADMIN — Medication 10 MILLIGRAM(S): at 05:23

## 2022-02-28 RX ADMIN — ERYTHROPOIETIN 20000 UNIT(S): 10000 INJECTION, SOLUTION INTRAVENOUS; SUBCUTANEOUS at 11:48

## 2022-02-28 RX ADMIN — Medication 4: at 16:11

## 2022-02-28 NOTE — PROGRESS NOTE ADULT - SUBJECTIVE AND OBJECTIVE BOX
[facility] NEPHROLOGY SERVICES, [facility]  NEPHROLOGY AND HYPERTENSION  [address]  [address]  [place] [address]  [phone]    MD PRAVEEN LYNNE MD ANDREY GONCHARUK, MD MADHU KORRAPATI, MD YELENA ROSENBERG, MD BINNY KOSHY, MD CHRISTOPHER CAPUTO, MD EDWARD BOVER, MD          Patient events noted    MEDICATIONS  (STANDING):  azithromycin   Tablet 500 milliGRAM(s) Oral daily  benzonatate 100 milliGRAM(s) Oral three times a day  chlorhexidine 4% Liquid 1 Application(s) Topical <User Schedule>  dextrose 40% Gel 15 Gram(s) Oral once  dextrose 5%. 1000 milliLiter(s) (50 mL/Hr) IV Continuous <Continuous>  dextrose 5%. 1000 milliLiter(s) (100 mL/Hr) IV Continuous <Continuous>  dextrose 50% Injectable 25 Gram(s) IV Push once  dextrose 50% Injectable 12.5 Gram(s) IV Push once  dextrose 50% Injectable 25 Gram(s) IV Push once  glucagon  Injectable 1 milliGRAM(s) IntraMuscular once  heparin   Injectable 5000 Unit(s) SubCutaneous every 12 hours  hydrALAZINE 10 milliGRAM(s) Oral every 8 hours  insulin glargine Injectable (LANTUS) 20 Unit(s) SubCutaneous at bedtime  insulin lispro (ADMELOG) corrective regimen sliding scale   SubCutaneous three times a day before meals  labetalol 100 milliGRAM(s) Oral two times a day  loratadine 10 milliGRAM(s) Oral daily  mometasone 220 MICROgram(s) Inhaler 1 Puff(s) Inhalation daily  montelukast 10 milliGRAM(s) Oral every 24 hours  pantoprazole  Injectable 40 milliGRAM(s) IV Push daily  predniSONE   Tablet 20 milliGRAM(s) Oral daily  senna 2 Tablet(s) Oral at bedtime  sevelamer carbonate 800 milliGRAM(s) Oral three times a day with meals  simvastatin 40 milliGRAM(s) Oral at bedtime  sodium chloride 0.65% Nasal 2 Spray(s) Both Nostrils every 6 hours    MEDICATIONS  (PRN):  aluminum hydroxide/magnesium hydroxide/simethicone Suspension 15 milliLiter(s) Oral every 8 hours PRN Dyspepsia      02-27-22 @ 07:01  -  02-28-22 @ 07:00  --------------------------------------------------------  IN: 950 mL / OUT: 0 mL / NET: 950 mL    02-28-22 @ 07:01  -  02-28-22 @ 22:12  --------------------------------------------------------  IN: 220 mL / OUT: 2500 mL / NET: -2280 mL      PHYSICAL EXAM:      T(C): 36.9 (02-28-22 @ 16:59), Max: 36.9 (02-28-22 @ 16:59)  HR: 68 (02-28-22 @ 20:00) (61 - 68)  BP: 136/75 (02-28-22 @ 20:00) (117/56 - 164/70)  RR: 18 (02-28-22 @ 16:59) (17 - 18)  SpO2: 91% (02-28-22 @ 20:00) (91% - 100%)  Wt(kg): --  Lungs clear  Heart S1S2  Abd soft NT ND  Extremities:   tr edema                                    8.9    11.98 )-----------( 259      ( 27 Feb 2022 07:03 )             27.3     02-27    137  |  103  |  55<H>  ----------------------------<  274<H>  4.4   |  27  |  4.35<H>    Ca    8.8      27 Feb 2022 07:03          Creatinine Trend: 4.35<--, 5.63<--, 8.21<--, 5.62<--, 5.20<--, 6.32<--      Assessment   ESRD; periods of sob;   Fluid overload  Improving     Plan:  HD for today  UF as tolerated   Will follow     Erickson Ray MD

## 2022-02-28 NOTE — PROGRESS NOTE ADULT - SUBJECTIVE AND OBJECTIVE BOX
Patient is a 76y old  Female who presents with a chief complaint of PNA (22 Feb 2022 16:53)    INTERVAL HPI/OVERNIGHT EVENTS:  back on o2 today     MEDICATIONS  (STANDING):  azithromycin   Tablet 500 milliGRAM(s) Oral daily  benzonatate 100 milliGRAM(s) Oral three times a day  chlorhexidine 4% Liquid 1 Application(s) Topical <User Schedule>  dextrose 40% Gel 15 Gram(s) Oral once  dextrose 5%. 1000 milliLiter(s) (50 mL/Hr) IV Continuous <Continuous>  dextrose 5%. 1000 milliLiter(s) (100 mL/Hr) IV Continuous <Continuous>  dextrose 50% Injectable 25 Gram(s) IV Push once  dextrose 50% Injectable 12.5 Gram(s) IV Push once  dextrose 50% Injectable 25 Gram(s) IV Push once  glucagon  Injectable 1 milliGRAM(s) IntraMuscular once  heparin   Injectable 5000 Unit(s) SubCutaneous every 12 hours  hydrALAZINE 10 milliGRAM(s) Oral every 8 hours  insulin glargine Injectable (LANTUS) 20 Unit(s) SubCutaneous at bedtime  insulin lispro (ADMELOG) corrective regimen sliding scale   SubCutaneous three times a day before meals  labetalol 100 milliGRAM(s) Oral two times a day  loratadine 10 milliGRAM(s) Oral daily  mometasone 220 MICROgram(s) Inhaler 1 Puff(s) Inhalation daily  montelukast 10 milliGRAM(s) Oral every 24 hours  pantoprazole  Injectable 40 milliGRAM(s) IV Push daily  predniSONE   Tablet 20 milliGRAM(s) Oral daily  senna 2 Tablet(s) Oral at bedtime  sevelamer carbonate 800 milliGRAM(s) Oral three times a day with meals  simvastatin 40 milliGRAM(s) Oral at bedtime  sodium chloride 0.65% Nasal 2 Spray(s) Both Nostrils every 6 hours    MEDICATIONS  (PRN):  aluminum hydroxide/magnesium hydroxide/simethicone Suspension 15 milliLiter(s) Oral every 8 hours PRN Dyspepsia          Allergies  Eliquis (Other)        Vital Signs Last 24 Hrs  T(C): 36.6 (28 Feb 2022 09:10), Max: 36.6 (28 Feb 2022 09:10)  T(F): 97.9 (28 Feb 2022 09:10), Max: 97.9 (28 Feb 2022 09:10)  HR: 61 (28 Feb 2022 09:10) (60 - 65)  BP: 119/49 (28 Feb 2022 09:10) (119/49 - 164/70)  BP(mean): --  RR: 17 (28 Feb 2022 09:10) (17 - 18)  SpO2: 98% (28 Feb 2022 09:10) (98% - 100%)    PHYSICAL EXAM:  GENERAL: NAD,obese, well-developed  HEAD:  Atraumatic, Normocephalic  EYES: EOMI, PERRLA, conjunctiva and sclera clear  ENMT: Moist mucous membranes  NECK: Supple  NERVOUS SYSTEM:  Alert & Oriented X3, normal speech  CHEST/LUNG: left sided crackles, no distress   HEART: S1, S2  ABDOMEN: Soft, Nontender, obese, Bowel sounds present  EXTREMITIES:  no cyanosis, or edema      LABS:                                                             8.9    11.98 )-----------( 259      ( 27 Feb 2022 07:03 )             27.3     02-27    137  |  103  |  55<H>  ----------------------------<  274<H>  4.4   |  27  |  4.35<H>    Ca    8.8      27 Feb 2022 07:03                      RADIOLOGY & ADDITIONAL TESTS:    Imaging Personally Reviewed:  [ ] YES  [ ] NO    Consultant(s) Notes Reviewed:  [x ] YES  [ ] NO    Care Discussed with Consultants/Other Providers [ ] YES  [ ] NO

## 2022-03-01 LAB
FLUAV AG NPH QL: SIGNIFICANT CHANGE UP
FLUBV AG NPH QL: SIGNIFICANT CHANGE UP
GLUCOSE BLDC GLUCOMTR-MCNC: 125 MG/DL — HIGH (ref 70–99)
GLUCOSE BLDC GLUCOMTR-MCNC: 183 MG/DL — HIGH (ref 70–99)
GLUCOSE BLDC GLUCOMTR-MCNC: 287 MG/DL — HIGH (ref 70–99)
GLUCOSE BLDC GLUCOMTR-MCNC: 359 MG/DL — HIGH (ref 70–99)
GLUCOSE BLDC GLUCOMTR-MCNC: 51 MG/DL — CRITICAL LOW (ref 70–99)
GLUCOSE BLDC GLUCOMTR-MCNC: 52 MG/DL — CRITICAL LOW (ref 70–99)
GLUCOSE BLDC GLUCOMTR-MCNC: 70 MG/DL — SIGNIFICANT CHANGE UP (ref 70–99)
SARS-COV-2 RNA SPEC QL NAA+PROBE: SIGNIFICANT CHANGE UP

## 2022-03-01 PROCEDURE — 99232 SBSQ HOSP IP/OBS MODERATE 35: CPT

## 2022-03-01 RX ORDER — DEXTROSE 50 % IN WATER 50 %
12.5 SYRINGE (ML) INTRAVENOUS ONCE
Refills: 0 | Status: COMPLETED | OUTPATIENT
Start: 2022-03-01 | End: 2022-03-01

## 2022-03-01 RX ADMIN — HEPARIN SODIUM 5000 UNIT(S): 5000 INJECTION INTRAVENOUS; SUBCUTANEOUS at 19:03

## 2022-03-01 RX ADMIN — Medication 2 SPRAY(S): at 17:25

## 2022-03-01 RX ADMIN — Medication 100 MILLIGRAM(S): at 17:26

## 2022-03-01 RX ADMIN — Medication 10 MILLIGRAM(S): at 17:23

## 2022-03-01 RX ADMIN — MOMETASONE FUROATE 1 PUFF(S): 220 INHALANT RESPIRATORY (INHALATION) at 17:26

## 2022-03-01 RX ADMIN — Medication 5: at 17:22

## 2022-03-01 RX ADMIN — Medication 12.5 GRAM(S): at 04:57

## 2022-03-01 RX ADMIN — CHLORHEXIDINE GLUCONATE 1 APPLICATION(S): 213 SOLUTION TOPICAL at 06:04

## 2022-03-01 RX ADMIN — MONTELUKAST 10 MILLIGRAM(S): 4 TABLET, CHEWABLE ORAL at 17:25

## 2022-03-01 RX ADMIN — Medication 2 SPRAY(S): at 17:28

## 2022-03-01 RX ADMIN — Medication 10 MILLIGRAM(S): at 22:04

## 2022-03-01 RX ADMIN — Medication 100 MILLIGRAM(S): at 22:04

## 2022-03-01 RX ADMIN — Medication 10 MILLIGRAM(S): at 06:01

## 2022-03-01 RX ADMIN — Medication 2 SPRAY(S): at 00:21

## 2022-03-01 RX ADMIN — LORATADINE 10 MILLIGRAM(S): 10 TABLET ORAL at 17:26

## 2022-03-01 RX ADMIN — SEVELAMER CARBONATE 800 MILLIGRAM(S): 2400 POWDER, FOR SUSPENSION ORAL at 17:27

## 2022-03-01 RX ADMIN — Medication 100 MILLIGRAM(S): at 06:01

## 2022-03-01 RX ADMIN — Medication 1: at 10:34

## 2022-03-01 RX ADMIN — SEVELAMER CARBONATE 800 MILLIGRAM(S): 2400 POWDER, FOR SUSPENSION ORAL at 08:49

## 2022-03-01 RX ADMIN — Medication 100 MILLIGRAM(S): at 17:24

## 2022-03-01 RX ADMIN — Medication 20 MILLIGRAM(S): at 06:01

## 2022-03-01 RX ADMIN — AZITHROMYCIN 500 MILLIGRAM(S): 500 TABLET, FILM COATED ORAL at 17:25

## 2022-03-01 RX ADMIN — PANTOPRAZOLE SODIUM 40 MILLIGRAM(S): 20 TABLET, DELAYED RELEASE ORAL at 17:23

## 2022-03-01 RX ADMIN — SEVELAMER CARBONATE 800 MILLIGRAM(S): 2400 POWDER, FOR SUSPENSION ORAL at 17:24

## 2022-03-01 RX ADMIN — HEPARIN SODIUM 5000 UNIT(S): 5000 INJECTION INTRAVENOUS; SUBCUTANEOUS at 06:02

## 2022-03-01 RX ADMIN — SIMVASTATIN 40 MILLIGRAM(S): 20 TABLET, FILM COATED ORAL at 22:04

## 2022-03-01 NOTE — PROVIDER CONTACT NOTE (HYPOGLYCEMIA EVENT) - NS PROVIDER CONTACT SITUATION-HYPO
Blood glucose 55, repeat 51. orange juice given, dextrose 12.5 gram given iv push, repeat blood glucose 125mg/dl post intervention

## 2022-03-01 NOTE — PROGRESS NOTE ADULT - SUBJECTIVE AND OBJECTIVE BOX
[facility] NEPHROLOGY SERVICES, [facility]  NEPHROLOGY AND HYPERTENSION  [address]  [address]  [place] [address]  [phone]    MD PRAVEEN LYNNE MD ANDREY GONCHARUK, MD MADHU KORRAPATI, MD YELENA ROSENBERG, MD BINNY KOSHY, MD CHRISTOPHER CAPUTO, MD EDWARD BOVER, MD          Patient events noted last evening   No distress   feels better     MEDICATIONS  (STANDING):  azithromycin   Tablet 500 milliGRAM(s) Oral daily  benzonatate 100 milliGRAM(s) Oral three times a day  chlorhexidine 4% Liquid 1 Application(s) Topical <User Schedule>  dextrose 40% Gel 15 Gram(s) Oral once  dextrose 5%. 1000 milliLiter(s) (50 mL/Hr) IV Continuous <Continuous>  dextrose 5%. 1000 milliLiter(s) (100 mL/Hr) IV Continuous <Continuous>  dextrose 50% Injectable 25 Gram(s) IV Push once  dextrose 50% Injectable 12.5 Gram(s) IV Push once  dextrose 50% Injectable 25 Gram(s) IV Push once  glucagon  Injectable 1 milliGRAM(s) IntraMuscular once  heparin   Injectable 5000 Unit(s) SubCutaneous every 12 hours  hydrALAZINE 10 milliGRAM(s) Oral every 8 hours  insulin lispro (ADMELOG) corrective regimen sliding scale   SubCutaneous three times a day before meals  labetalol 100 milliGRAM(s) Oral two times a day  loratadine 10 milliGRAM(s) Oral daily  mometasone 220 MICROgram(s) Inhaler 1 Puff(s) Inhalation daily  montelukast 10 milliGRAM(s) Oral every 24 hours  pantoprazole  Injectable 40 milliGRAM(s) IV Push daily  predniSONE   Tablet 20 milliGRAM(s) Oral daily  senna 2 Tablet(s) Oral at bedtime  sevelamer carbonate 800 milliGRAM(s) Oral three times a day with meals  simvastatin 40 milliGRAM(s) Oral at bedtime  sodium chloride 0.65% Nasal 2 Spray(s) Both Nostrils every 6 hours    MEDICATIONS  (PRN):  aluminum hydroxide/magnesium hydroxide/simethicone Suspension 15 milliLiter(s) Oral every 8 hours PRN Dyspepsia      02-28-22 @ 07:01  -  03-01-22 @ 07:00  --------------------------------------------------------  IN: 220 mL / OUT: 2500 mL / NET: -2280 mL      PHYSICAL EXAM:      T(C): 36.8 (03-01-22 @ 11:44), Max: 36.8 (03-01-22 @ 00:07)  HR: 63 (03-01-22 @ 11:44) (60 - 68)  BP: 155/68 (03-01-22 @ 11:44) (121/67 - 155/68)  RR: 16 (03-01-22 @ 11:44) (16 - 18)  SpO2: 95% (03-01-22 @ 11:44) (91% - 95%)  Wt(kg): --  Lungs clear decreased BS at bases   Heart S1S2  Abd soft NT ND  Extremities:   tr edema                          Creatinine Trend: 4.35<--, 5.63<--, 8.21<--, 5.62<--, 5.20<--, 6.32<--      Assessment   ESRD; PNA; fluid overload  Improving with abx, prednisone and HD/ UF    Plan:    HD for tomorrow  Follow BS trend   Erickson Ray MD

## 2022-03-01 NOTE — PROGRESS NOTE ADULT - SUBJECTIVE AND OBJECTIVE BOX
INTERVAL HPI:  76F with HTN, CHF S/P PPM, ESRD (on HD MWF), Diverticulitis, GI bleed and DVT  Presents to the ED for dyspnea.  Patient states that 2 days ago at HD she developed chills and dyspnea during her treatment.  EMS was called for her however she refused to go to a hospital.  Patient states that yesterday she stayed home and rested all day, felt unwell.  Went  for HD treatment today and EMS was called for her again as she looked even worse.  Admits to subjective fever but has never taken her temperature.  States that she has had a dry cough for > 7 days, also has associated rib pain with coughing.    Hypoxic on RA to 75% with EMS, and 86% in ED.  Leukocytosis on blood work.  Never smoked.  Admitted for possible pneumonia.    OVERNIGHT EVENTS:  Cough is better.    Vital Signs Last 24 Hrs  T(C): 36.9 (01 Mar 2022 17:05), Max: 36.9 (01 Mar 2022 17:05)  T(F): 98.5 (01 Mar 2022 17:05), Max: 98.5 (01 Mar 2022 17:05)  HR: 63 (01 Mar 2022 17:05) (60 - 63)  BP: 176/69 (01 Mar 2022 17:05) (121/67 - 176/69)  BP(mean): --  RR: 17 (01 Mar 2022 17:05) (16 - 18)  SpO2: 95% (01 Mar 2022 17:05) (93% - 95%)    PHYSICAL EXAM:  GEN:         Awake, responsive and comfortable.  HEENT:    Normal.    RESP:        no distress    MEDICATIONS  (STANDING):  azithromycin   Tablet 500 milliGRAM(s) Oral daily  benzonatate 100 milliGRAM(s) Oral three times a day  chlorhexidine 4% Liquid 1 Application(s) Topical <User Schedule>  dextrose 40% Gel 15 Gram(s) Oral once  dextrose 5%. 1000 milliLiter(s) (50 mL/Hr) IV Continuous <Continuous>  dextrose 5%. 1000 milliLiter(s) (100 mL/Hr) IV Continuous <Continuous>  dextrose 50% Injectable 25 Gram(s) IV Push once  dextrose 50% Injectable 12.5 Gram(s) IV Push once  dextrose 50% Injectable 25 Gram(s) IV Push once  glucagon  Injectable 1 milliGRAM(s) IntraMuscular once  heparin   Injectable 5000 Unit(s) SubCutaneous every 12 hours  hydrALAZINE 10 milliGRAM(s) Oral every 8 hours  insulin lispro (ADMELOG) corrective regimen sliding scale   SubCutaneous three times a day before meals  labetalol 100 milliGRAM(s) Oral two times a day  loratadine 10 milliGRAM(s) Oral daily  mometasone 220 MICROgram(s) Inhaler 1 Puff(s) Inhalation daily  montelukast 10 milliGRAM(s) Oral every 24 hours  pantoprazole  Injectable 40 milliGRAM(s) IV Push daily  predniSONE   Tablet 20 milliGRAM(s) Oral daily  senna 2 Tablet(s) Oral at bedtime  sevelamer carbonate 800 milliGRAM(s) Oral three times a day with meals  simvastatin 40 milliGRAM(s) Oral at bedtime  sodium chloride 0.65% Nasal 2 Spray(s) Both Nostrils every 6 hours    MEDICATIONS  (PRN):  aluminum hydroxide/magnesium hydroxide/simethicone Suspension 15 milliLiter(s) Oral every 8 hours PRN Dyspepsia    02-24 @ 05:53  pH: 7.37  pCO2: 48  pO2: 53  SaO2: 86.1  ASSESSMENT AND PLAN:  ·	Acute Respiratory distress.  ·	Hypoxia.  ·	No Pneumonia on chest CT.  ·	Bilateral small pleural effusion with atelectasis.  ·	Leukocytosis better.  ·	ESRD, on HD.  ·	Anemia.  ·	CHF hx, S/P PPM.  ·	HTN.    Cough is much better with saline nasal spray,  Will reduce steroids.  Discharge planning.

## 2022-03-01 NOTE — PROVIDER CONTACT NOTE (HYPOGLYCEMIA EVENT) - NS PROVIDER CONTACT BACKGROUND-HYPO
Age: 76y    Gender: Female    POCT Blood Glucose:  125 mg/dL (03-01-22 @ 05:07)  51 mg/dL (03-01-22 @ 04:39)  52 mg/dL (03-01-22 @ 04:37)  201 mg/dL (02-28-22 @ 21:11)  335 mg/dL (02-28-22 @ 16:05)  120 mg/dL (02-28-22 @ 08:22)      eMAR:  dextrose 50% Injectable   12.5 Gram(s) IV Push (03-01-22 @ 04:57)    insulin glargine Injectable (LANTUS)   20 Unit(s) SubCutaneous (02-28-22 @ 21:39)    insulin lispro (ADMELOG) corrective regimen sliding scale   4 Unit(s) SubCutaneous (02-28-22 @ 16:11)    predniSONE   Tablet   20 milliGRAM(s) Oral (02-28-22 @ 05:23)    simvastatin   40 milliGRAM(s) Oral (02-28-22 @ 21:39)

## 2022-03-01 NOTE — PROGRESS NOTE ADULT - SUBJECTIVE AND OBJECTIVE BOX
Patient is a 76y old  Female who presents with a chief complaint of PNA (22 Feb 2022 16:53)    INTERVAL HPI/OVERNIGHT EVENTS:  had an episode of hypoglycemia overnight and now imrpoved. breathing improved today     MEDICATIONS  (STANDING):  azithromycin   Tablet 500 milliGRAM(s) Oral daily  benzonatate 100 milliGRAM(s) Oral three times a day  chlorhexidine 4% Liquid 1 Application(s) Topical <User Schedule>  dextrose 40% Gel 15 Gram(s) Oral once  dextrose 5%. 1000 milliLiter(s) (50 mL/Hr) IV Continuous <Continuous>  dextrose 5%. 1000 milliLiter(s) (100 mL/Hr) IV Continuous <Continuous>  dextrose 50% Injectable 25 Gram(s) IV Push once  dextrose 50% Injectable 12.5 Gram(s) IV Push once  dextrose 50% Injectable 25 Gram(s) IV Push once  glucagon  Injectable 1 milliGRAM(s) IntraMuscular once  heparin   Injectable 5000 Unit(s) SubCutaneous every 12 hours  hydrALAZINE 10 milliGRAM(s) Oral every 8 hours  insulin lispro (ADMELOG) corrective regimen sliding scale   SubCutaneous three times a day before meals  labetalol 100 milliGRAM(s) Oral two times a day  loratadine 10 milliGRAM(s) Oral daily  mometasone 220 MICROgram(s) Inhaler 1 Puff(s) Inhalation daily  montelukast 10 milliGRAM(s) Oral every 24 hours  pantoprazole  Injectable 40 milliGRAM(s) IV Push daily  predniSONE   Tablet 20 milliGRAM(s) Oral daily  senna 2 Tablet(s) Oral at bedtime  sevelamer carbonate 800 milliGRAM(s) Oral three times a day with meals  simvastatin 40 milliGRAM(s) Oral at bedtime  sodium chloride 0.65% Nasal 2 Spray(s) Both Nostrils every 6 hours    MEDICATIONS  (PRN):  aluminum hydroxide/magnesium hydroxide/simethicone Suspension 15 milliLiter(s) Oral every 8 hours PRN Dyspepsia          Allergies  Eliquis (Other)        Vital Signs Last 24 Hrs  T(C): 36.8 (01 Mar 2022 11:44), Max: 36.9 (28 Feb 2022 16:59)  T(F): 98.2 (01 Mar 2022 11:44), Max: 98.4 (28 Feb 2022 16:59)  HR: 63 (01 Mar 2022 11:44) (60 - 68)  BP: 155/68 (01 Mar 2022 11:44) (117/56 - 155/68)  BP(mean): --  RR: 16 (01 Mar 2022 11:44) (16 - 18)  SpO2: 95% (01 Mar 2022 11:44) (91% - 98%)      PHYSICAL EXAM:  GENERAL: NAD,obese, well-developed  HEAD:  Atraumatic, Normocephalic  EYES: EOMI, PERRLA, conjunctiva and sclera clear  ENMT: Moist mucous membranes  NECK: Supple  NERVOUS SYSTEM:  Alert & Oriented X3, normal speech  CHEST/LUNG: improved left sided crackles, no distress   HEART: S1, S2  ABDOMEN: Soft, Nontender, obese, Bowel sounds present  EXTREMITIES:  no cyanosis, or edema      LABS:                                                           RADIOLOGY & ADDITIONAL TESTS:    < from: CT Chest No Cont (02.28.22 @ 16:35) >  Bibasilar discoid atelectasis, mildly improved.    Moderate cardiomegaly. Small pericardial effusion has increased slightly.    Right jugular dialysis catheter tip in the rightatrium.    Multiple hyperdense renal masses. Please refer to prior CT abdomen and   pelvis.    < end of copied text >      Imaging Personally Reviewed:  [ ] YES  [ ] NO    Consultant(s) Notes Reviewed:  [x ] YES  [ ] NO    Care Discussed with Consultants/Other Providers [ ] YES  [ ] NO

## 2022-03-02 VITALS
RESPIRATION RATE: 18 BRPM | HEART RATE: 60 BPM | DIASTOLIC BLOOD PRESSURE: 70 MMHG | TEMPERATURE: 98 F | OXYGEN SATURATION: 99 % | SYSTOLIC BLOOD PRESSURE: 145 MMHG

## 2022-03-02 LAB
ANION GAP SERPL CALC-SCNC: 11 MMOL/L — SIGNIFICANT CHANGE UP (ref 5–17)
BUN SERPL-MCNC: 112 MG/DL — HIGH (ref 7–23)
CALCIUM SERPL-MCNC: 8.2 MG/DL — LOW (ref 8.5–10.1)
CHLORIDE SERPL-SCNC: 101 MMOL/L — SIGNIFICANT CHANGE UP (ref 96–108)
CO2 SERPL-SCNC: 24 MMOL/L — SIGNIFICANT CHANGE UP (ref 22–31)
CREAT SERPL-MCNC: 8.04 MG/DL — HIGH (ref 0.5–1.3)
EGFR: 5 ML/MIN/1.73M2 — LOW
GLUCOSE BLDC GLUCOMTR-MCNC: 165 MG/DL — HIGH (ref 70–99)
GLUCOSE BLDC GLUCOMTR-MCNC: 178 MG/DL — HIGH (ref 70–99)
GLUCOSE SERPL-MCNC: 357 MG/DL — HIGH (ref 70–99)
HCT VFR BLD CALC: 26 % — LOW (ref 34.5–45)
HGB BLD-MCNC: 8.8 G/DL — LOW (ref 11.5–15.5)
MCHC RBC-ENTMCNC: 29.8 PG — SIGNIFICANT CHANGE UP (ref 27–34)
MCHC RBC-ENTMCNC: 33.8 G/DL — SIGNIFICANT CHANGE UP (ref 32–36)
MCV RBC AUTO: 88.1 FL — SIGNIFICANT CHANGE UP (ref 80–100)
NRBC # BLD: 0 /100 WBCS — SIGNIFICANT CHANGE UP (ref 0–0)
PLATELET # BLD AUTO: 258 K/UL — SIGNIFICANT CHANGE UP (ref 150–400)
POTASSIUM SERPL-MCNC: 4.5 MMOL/L — SIGNIFICANT CHANGE UP (ref 3.5–5.3)
POTASSIUM SERPL-SCNC: 4.5 MMOL/L — SIGNIFICANT CHANGE UP (ref 3.5–5.3)
RBC # BLD: 2.95 M/UL — LOW (ref 3.8–5.2)
RBC # FLD: 16.7 % — HIGH (ref 10.3–14.5)
SODIUM SERPL-SCNC: 136 MMOL/L — SIGNIFICANT CHANGE UP (ref 135–145)
WBC # BLD: 13.44 K/UL — HIGH (ref 3.8–10.5)
WBC # FLD AUTO: 13.44 K/UL — HIGH (ref 3.8–10.5)

## 2022-03-02 PROCEDURE — 99239 HOSP IP/OBS DSCHRG MGMT >30: CPT

## 2022-03-02 RX ORDER — LORATADINE 10 MG/1
1 TABLET ORAL
Qty: 30 | Refills: 0
Start: 2022-03-02 | End: 2022-03-31

## 2022-03-02 RX ORDER — MONTELUKAST 4 MG/1
1 TABLET, CHEWABLE ORAL
Qty: 30 | Refills: 0
Start: 2022-03-02 | End: 2022-03-31

## 2022-03-02 RX ORDER — MOMETASONE FUROATE 220 UG/1
220 INHALANT RESPIRATORY (INHALATION)
Qty: 1 | Refills: 0
Start: 2022-03-02 | End: 2022-03-31

## 2022-03-02 RX ADMIN — Medication 100 MILLIGRAM(S): at 16:02

## 2022-03-02 RX ADMIN — Medication 10 MILLIGRAM(S): at 06:54

## 2022-03-02 RX ADMIN — MONTELUKAST 10 MILLIGRAM(S): 4 TABLET, CHEWABLE ORAL at 16:01

## 2022-03-02 RX ADMIN — AZITHROMYCIN 500 MILLIGRAM(S): 500 TABLET, FILM COATED ORAL at 16:01

## 2022-03-02 RX ADMIN — LORATADINE 10 MILLIGRAM(S): 10 TABLET ORAL at 16:01

## 2022-03-02 RX ADMIN — Medication 1: at 16:03

## 2022-03-02 RX ADMIN — Medication 100 MILLIGRAM(S): at 06:54

## 2022-03-02 RX ADMIN — HEPARIN SODIUM 5000 UNIT(S): 5000 INJECTION INTRAVENOUS; SUBCUTANEOUS at 06:53

## 2022-03-02 RX ADMIN — SEVELAMER CARBONATE 800 MILLIGRAM(S): 2400 POWDER, FOR SUSPENSION ORAL at 16:03

## 2022-03-02 RX ADMIN — Medication 1: at 08:29

## 2022-03-02 RX ADMIN — PANTOPRAZOLE SODIUM 40 MILLIGRAM(S): 20 TABLET, DELAYED RELEASE ORAL at 16:01

## 2022-03-02 RX ADMIN — Medication 10 MILLIGRAM(S): at 16:08

## 2022-03-02 RX ADMIN — CHLORHEXIDINE GLUCONATE 1 APPLICATION(S): 213 SOLUTION TOPICAL at 06:55

## 2022-03-02 RX ADMIN — MOMETASONE FUROATE 1 PUFF(S): 220 INHALANT RESPIRATORY (INHALATION) at 16:02

## 2022-03-02 NOTE — PROGRESS NOTE ADULT - SUBJECTIVE AND OBJECTIVE BOX
INTERVAL HPI:  76F with HTN, CHF S/P PPM, ESRD (on HD MWF), Diverticulitis, GI bleed and DVT  Presents to the ED for dyspnea.  Patient states that 2 days ago at HD she developed chills and dyspnea during her treatment.  EMS was called for her however she refused to go to a hospital.  Patient states that yesterday she stayed home and rested all day, felt unwell.  Went  for HD treatment today and EMS was called for her again as she looked even worse.  Admits to subjective fever but has never taken her temperature.  States that she has had a dry cough for > 7 days, also has associated rib pain with coughing.    Hypoxic on RA to 75% with EMS, and 86% in ED.  Leukocytosis on blood work.  Never smoked.  Admitted for possible pneumonia.    OVERNIGHT EVENTS:  Feels better.    Vital Signs Last 24 Hrs  T(C): 37.1 (02 Mar 2022 11:10), Max: 37.1 (02 Mar 2022 11:10)  T(F): 98.7 (02 Mar 2022 11:10), Max: 98.7 (02 Mar 2022 11:10)  HR: 61 (02 Mar 2022 11:10) (61 - 65)  BP: 144/63 (02 Mar 2022 11:10) (144/63 - 176/69)  BP(mean): --  RR: 18 (02 Mar 2022 11:10) (17 - 18)  SpO2: 98% (02 Mar 2022 11:10) (95% - 98%)    PHYSICAL EXAM:  GEN:         Awake, responsive and comfortable.  HEENT:    Normal.    RESP:       no wheezing.  CVS:          Regular rate and rhythm.     MEDICATIONS  (STANDING):  azithromycin   Tablet 500 milliGRAM(s) Oral daily  benzonatate 100 milliGRAM(s) Oral three times a day  chlorhexidine 4% Liquid 1 Application(s) Topical <User Schedule>  dextrose 40% Gel 15 Gram(s) Oral once  dextrose 5%. 1000 milliLiter(s) (50 mL/Hr) IV Continuous <Continuous>  dextrose 5%. 1000 milliLiter(s) (100 mL/Hr) IV Continuous <Continuous>  dextrose 50% Injectable 25 Gram(s) IV Push once  dextrose 50% Injectable 12.5 Gram(s) IV Push once  dextrose 50% Injectable 25 Gram(s) IV Push once  glucagon  Injectable 1 milliGRAM(s) IntraMuscular once  heparin   Injectable 5000 Unit(s) SubCutaneous every 12 hours  hydrALAZINE 10 milliGRAM(s) Oral every 8 hours  insulin lispro (ADMELOG) corrective regimen sliding scale   SubCutaneous three times a day before meals  labetalol 100 milliGRAM(s) Oral two times a day  loratadine 10 milliGRAM(s) Oral daily  mometasone 220 MICROgram(s) Inhaler 1 Puff(s) Inhalation daily  montelukast 10 milliGRAM(s) Oral every 24 hours  pantoprazole  Injectable 40 milliGRAM(s) IV Push daily  predniSONE   Tablet 10 milliGRAM(s) Oral daily  senna 2 Tablet(s) Oral at bedtime  sevelamer carbonate 800 milliGRAM(s) Oral three times a day with meals  simvastatin 40 milliGRAM(s) Oral at bedtime  sodium chloride 0.65% Nasal 2 Spray(s) Both Nostrils every 6 hours    MEDICATIONS  (PRN):  aluminum hydroxide/magnesium hydroxide/simethicone Suspension 15 milliLiter(s) Oral every 8 hours PRN Dyspepsia    LABS:                        8.8    13.44 )-----------( 258      ( 02 Mar 2022 11:49 )             26.0     03-02    136  |  101  |  112<H>  ----------------------------<  357<H>  4.5   |  24  |  8.04<H>    Ca    8.2<L>      02 Mar 2022 11:49    02-24 @ 05:53  pH: 7.37  pCO2: 48  pO2: 53  SaO2: 86.1  ASSESSMENT AND PLAN:  ·	Acute Respiratory distress.  ·	Hypoxia.  ·	No Pneumonia on chest CT.  ·	Bilateral small pleural effusion with atelectasis.  ·	Leukocytosis better.  ·	ESRD, on HD.  ·	Anemia.  ·	CHF hx, S/P PPM.  ·	HTN.    Clinically better, continue saline nasal spray.  Two more days of steroids.  For discharge home.

## 2022-03-02 NOTE — DISCHARGE NOTE PROVIDER - CARE PROVIDER_API CALL
Larry Cruz)  Medicine  [address], [address]  [place] [address]  Phone: [phone]  Fax: [phone]  Follow Up Time:     Erickson Ray  INTERNAL MEDICINE  [address], [address]  [place], [place] [id number]  Phone: [phone]  Fax: [phone]  Follow Up Time:     Arben Pham)  Urology  [address], [address] [place] [address]  Phone: [phone]  Fax: [phone]  Follow Up Time:    Larry Cruz)  Medicine  [address], [address]  [place] [address]  Phone: [phone]  Fax: [phone]  Follow Up Time:     Arben Pham)  Urology  [address] [place] [address]  Phone: [phone]  Fax: [phone]  Follow Up Time:     [place] [address]  Phone: [phone]  Fax: [phone]  Follow Up Time:

## 2022-03-02 NOTE — PROGRESS NOTE ADULT - PROVIDER SPECIALTY LIST ADULT
Nephrology
Pulmonology
Pulmonology
Nephrology
Pulmonology
Nephrology
Pulmonology
Hospitalist
Palliative Care
Hospitalist
Palliative Care
Hospitalist
Hospitalist
Palliative Care

## 2022-03-02 NOTE — PROGRESS NOTE ADULT - PROBLEM SELECTOR PLAN 3
c/w home meds- labetalol, hydralazine
HD per renal   as per schedule MWF  remains short of breath
c/w home meds- labetalol, hydralazine
HD per renal   had treatment yesterday as per schedule MWF  remains short of breath
c/w home meds- labetalol, hydralazine
HD per renal   as per schedule MWF
c/w home meds- labetalol, hydralazine

## 2022-03-02 NOTE — DISCHARGE NOTE PROVIDER - NSDCCPCAREPLAN_GEN_ALL_CORE_FT
PRINCIPAL DISCHARGE DIAGNOSIS  Diagnosis: Pneumonia  Assessment and Plan of Treatment: Pneumonia is an infection of the lungs that causes coughing, fever, and trouble breathing. It is a serious illness, especially in young children, people older than 65, and people with other health problems. Pneumonia can be caused by bacteria, viruses, and other germs.  Most people can get back to their normal routine within a week of starting treatment. Even so, you might feel tired or have a cough for a month or longer after you get treated. Although this cough can take a while to go away, it is usually milder than when you first got sick.  Please follow up with Dr. Cruz Pulmonology and your Primary Care Dr.      SECONDARY DISCHARGE DIAGNOSES  Diagnosis: CHF exacerbation  Assessment and Plan of Treatment:      PRINCIPAL DISCHARGE DIAGNOSIS  Diagnosis: Pneumonia  Assessment and Plan of Treatment: Pneumonia is an infection of the lungs that causes coughing, fever, and trouble breathing. It is a serious illness, especially in young children, people older than 65, and people with other health problems. Pneumonia can be caused by bacteria, viruses, and other germs.  Most people can get back to their normal routine within a week of starting treatment. Even so, you might feel tired or have a cough for a month or longer after you get treated. Although this cough can take a while to go away, it is usually milder than when you first got sick.  Please follow up with Dr. Cruz Pulmonology and your Primary Care Dr.  abnormal CT findings:  CT also shows adrenal and pancreatic lesion- will need outpt follow up with  Dr. Pham and Dr. Lopez -        SECONDARY DISCHARGE DIAGNOSES  Diagnosis: CHF exacerbation  Assessment and Plan of Treatment:

## 2022-03-02 NOTE — PROGRESS NOTE ADULT - PROBLEM SELECTOR PLAN 2
Renal eval- HD TIW
s/p Zosyn
Renal eval- HD TIW
Renal eval- HD TIW
remains on Zosyn
Renal eval- HD TIW
remains on Zosyn

## 2022-03-02 NOTE — PROGRESS NOTE ADULT - PROBLEM SELECTOR PROBLEM 2
Pneumonia
ESRD on dialysis
Pneumonia
ESRD on dialysis
Pneumonia

## 2022-03-02 NOTE — PROGRESS NOTE ADULT - PROBLEM SELECTOR PLAN 6
SQ heparin
continues on statins
SQ heparin
continues on statins
continues on statins

## 2022-03-02 NOTE — PROGRESS NOTE ADULT - SUBJECTIVE AND OBJECTIVE BOX
Patient is a 76y old  Female who presents with a chief complaint of PNA (22 Feb 2022 16:53)    INTERVAL HPI/OVERNIGHT EVENTS:  no more hypoglycemia.  breathing remains stable     MEDICATIONS  (STANDING):  azithromycin   Tablet 500 milliGRAM(s) Oral daily  benzonatate 100 milliGRAM(s) Oral three times a day  chlorhexidine 4% Liquid 1 Application(s) Topical <User Schedule>  dextrose 40% Gel 15 Gram(s) Oral once  dextrose 5%. 1000 milliLiter(s) (50 mL/Hr) IV Continuous <Continuous>  dextrose 5%. 1000 milliLiter(s) (100 mL/Hr) IV Continuous <Continuous>  dextrose 50% Injectable 25 Gram(s) IV Push once  dextrose 50% Injectable 12.5 Gram(s) IV Push once  dextrose 50% Injectable 25 Gram(s) IV Push once  glucagon  Injectable 1 milliGRAM(s) IntraMuscular once  heparin   Injectable 5000 Unit(s) SubCutaneous every 12 hours  hydrALAZINE 10 milliGRAM(s) Oral every 8 hours  insulin lispro (ADMELOG) corrective regimen sliding scale   SubCutaneous three times a day before meals  labetalol 100 milliGRAM(s) Oral two times a day  loratadine 10 milliGRAM(s) Oral daily  mometasone 220 MICROgram(s) Inhaler 1 Puff(s) Inhalation daily  montelukast 10 milliGRAM(s) Oral every 24 hours  pantoprazole  Injectable 40 milliGRAM(s) IV Push daily  predniSONE   Tablet 10 milliGRAM(s) Oral daily  senna 2 Tablet(s) Oral at bedtime  sevelamer carbonate 800 milliGRAM(s) Oral three times a day with meals  simvastatin 40 milliGRAM(s) Oral at bedtime  sodium chloride 0.65% Nasal 2 Spray(s) Both Nostrils every 6 hours    MEDICATIONS  (PRN):  aluminum hydroxide/magnesium hydroxide/simethicone Suspension 15 milliLiter(s) Oral every 8 hours PRN Dyspepsia        Allergies  Eliquis (Other)        Vital Signs Last 24 Hrs  T(C): 37.1 (02 Mar 2022 11:10), Max: 37.1 (02 Mar 2022 11:10)  T(F): 98.7 (02 Mar 2022 11:10), Max: 98.7 (02 Mar 2022 11:10)  HR: 61 (02 Mar 2022 11:10) (61 - 65)  BP: 144/63 (02 Mar 2022 11:10) (144/63 - 176/69)  BP(mean): --  RR: 18 (02 Mar 2022 11:10) (17 - 18)  SpO2: 98% (02 Mar 2022 11:10) (95% - 98%)    PHYSICAL EXAM:  GENERAL: NAD,obese, well-developed  HEAD:  Atraumatic, Normocephalic  EYES: EOMI, PERRLA, conjunctiva and sclera clear  ENMT: Moist mucous membranes  NECK: Supple  NERVOUS SYSTEM:  Alert & Oriented X3, normal speech  CHEST/LUNG: improved left sided crackles, no distress   HEART: S1, S2  ABDOMEN: Soft, Nontender, obese, Bowel sounds present  EXTREMITIES:  no cyanosis, or edema      LABS:                                                           RADIOLOGY & ADDITIONAL TESTS:    < from: CT Chest No Cont (02.28.22 @ 16:35) >  Bibasilar discoid atelectasis, mildly improved.    Moderate cardiomegaly. Small pericardial effusion has increased slightly.    Right jugular dialysis catheter tip in the rightatrium.    Multiple hyperdense renal masses. Please refer to prior CT abdomen and   pelvis.    < end of copied text >      Imaging Personally Reviewed:  [ ] YES  [ ] NO    Consultant(s) Notes Reviewed:  [x ] YES  [ ] NO    Care Discussed with Consultants/Other Providers [ ] YES  [ ] NO

## 2022-03-02 NOTE — DISCHARGE NOTE PROVIDER - HOSPITAL COURSE
This is a 76F with a PMH of ESRD on HD MWF, HTN, diverticulitis who presented to [facility] ED on 2/17 for fever/chills at HD, found to be hypoxic to 75% on RA with SPO2 improved to 97% on 3L. Workup revealed no PE on CTA, but evidence of small bilateral pleural effusion with atelectasis and leukocytosis, with patient started on empiric Zosyn for suspected PNA. Echo was performed revealing Grade I diastolic dysfunction. CT abdomen was performed showing adrenal and pancreatic lesion, with plans for outpatient followup with GI/Urology. Following pulmonology consult, patient was treated with Azithromycin, albuterol inhaler, asmanex, claritin and singulair, as patient continued to have chronic cough and was unable to be titrated down from O2 requirements. Over the course of hospitalization, O2 saturation improved, with patient able to tolerate weaning off of supplemental oxygen. Patient is medically stable for discharge with plans to follow up with PCP, Pulmonology and Urology as an outpatient.     Acute hypoxic resp. failure/ possible GN pna   still with non-producticve cough but etiology of hypoxia  unclear at this time. was started on zosyn for possible PNA but no significant consolidations seen on imaging and sats have not improved with abx . procal elevated but can be seen  in dialysis patients.  repeat chest xray shows LLL atelectasis bit no obvious consolidation. sxs improving with steroids but still hypoxic based on abg. RVP neg. PT now reports orthopnea. Discussed with nephro and will increase volume removal. will try to ween o2 as tolerated. will repeat ct if no improvement   CTA shows no PE but  pulm edema  2/27 sats much improved to day and is currently 93-97% on ra at rest,. I likely contribute this to the increase dialysis sessions. lung exam much improved also as crackles have dissipated. will discuss with nephro. dc planning if sats remain stable   2/28 became hypoxic overnight with ambulation and was placed  o2. pt to get dialysis again today and will get repeat ct scan. . Continues to be azithro for atypical coverage.   3/1 breathing improving today and remains off o2 for now. CT scan shows atelectasis, but no obvious pna pr edema. will get chest pt and encourage incentive spirometer. dc planning if her breathing remains stable     ESRD on HD    HTN  c/w anti HTNves- BP stable    epigastric discomfort while coughing  PPI, maalox prn     Pleural effusion  large per echo but trace to small on CTA-     DM2- hbA1C 6.4  monitor fsbs/ISS-   - dc lantus die to hypoglycemia     abnormal CT findings:  CT also shows adrenal and pancreatic lesion- will need outpt Fu with Gi and Urology.   / Dr. Pham - seen by - will need outpt fu w/ / endo

## 2022-03-02 NOTE — PROGRESS NOTE ADULT - PROBLEM SELECTOR PROBLEM 3
Essential hypertension
ESRD on dialysis
Essential hypertension
ESRD on dialysis
ESRD on dialysis

## 2022-03-02 NOTE — PROGRESS NOTE ADULT - SUBJECTIVE AND OBJECTIVE BOX
[facility] NEPHROLOGY SERVICES, [facility]  NEPHROLOGY AND HYPERTENSION  [address]  [address]  [place] [address]  [phone]    MD RPAVEEN MOORE MD ANDREY GONCHARUK, MD MADHU KORRAPATI, MD YELENA ROSENBERG, MD BINNY KOSHY, MD CHRISTOPHER CAPUTO, MD EDWARD BOVER, MD          Patient events noted  No distress  feels better    MEDICATIONS  (STANDING):  azithromycin   Tablet 500 milliGRAM(s) Oral daily  benzonatate 100 milliGRAM(s) Oral three times a day  chlorhexidine 4% Liquid 1 Application(s) Topical <User Schedule>  dextrose 40% Gel 15 Gram(s) Oral once  dextrose 5%. 1000 milliLiter(s) (50 mL/Hr) IV Continuous <Continuous>  dextrose 5%. 1000 milliLiter(s) (100 mL/Hr) IV Continuous <Continuous>  dextrose 50% Injectable 12.5 Gram(s) IV Push once  dextrose 50% Injectable 25 Gram(s) IV Push once  dextrose 50% Injectable 25 Gram(s) IV Push once  glucagon  Injectable 1 milliGRAM(s) IntraMuscular once  heparin   Injectable 5000 Unit(s) SubCutaneous every 12 hours  hydrALAZINE 10 milliGRAM(s) Oral every 8 hours  insulin lispro (ADMELOG) corrective regimen sliding scale   SubCutaneous three times a day before meals  labetalol 100 milliGRAM(s) Oral two times a day  loratadine 10 milliGRAM(s) Oral daily  mometasone 220 MICROgram(s) Inhaler 1 Puff(s) Inhalation daily  montelukast 10 milliGRAM(s) Oral every 24 hours  pantoprazole  Injectable 40 milliGRAM(s) IV Push daily  predniSONE   Tablet 10 milliGRAM(s) Oral daily  senna 2 Tablet(s) Oral at bedtime  sevelamer carbonate 800 milliGRAM(s) Oral three times a day with meals  simvastatin 40 milliGRAM(s) Oral at bedtime  sodium chloride 0.65% Nasal 2 Spray(s) Both Nostrils every 6 hours    MEDICATIONS  (PRN):  aluminum hydroxide/magnesium hydroxide/simethicone Suspension 15 milliLiter(s) Oral every 8 hours PRN Dyspepsia      PHYSICAL EXAM:      T(C): 37.1 (03-02-22 @ 11:10), Max: 37.1 (03-02-22 @ 11:10)  HR: 61 (03-02-22 @ 11:10) (61 - 65)  BP: 144/63 (03-02-22 @ 11:10) (144/63 - 176/69)  RR: 18 (03-02-22 @ 11:10) (17 - 18)  SpO2: 98% (03-02-22 @ 11:10) (95% - 98%)  Wt(kg): --  Lungs clear  Heart S1S2  Abd soft NT ND  Extremities:   tr edema                                    8.8    13.44 )-----------( 258      ( 02 Mar 2022 11:49 )             26.0     03-02    136  |  101  |  112<H>  ----------------------------<  357<H>  4.5   |  24  |  8.04<H>    Ca    8.2<L>      02 Mar 2022 11:49          Creatinine Trend: 8.04<--, 4.35<--, 5.63<--, 8.21<--, 5.62<--, 5.20<--        Assessment   ESRD; PNA; fluid overload  Improving with abx, prednisone and HD/ UF    Plan:    HD for today  UF as tolerated  Follow BS trend     MD Erickson Moore MD

## 2022-03-02 NOTE — DISCHARGE NOTE PROVIDER - CARE PROVIDERS DIRECT ADDRESSES
,DirectAddress_Unknown,[email or web address] ,DirectAddress_Unknown,[email or web address],DirectAddress_Unknown

## 2022-03-02 NOTE — PROGRESS NOTE ADULT - PROBLEM SELECTOR PLAN 1
pt on hycodan PRN  pt c/o aches in her muscles on abdomen and chest from coughing-improved   breathing much improved today, patient speaking in full sentences, laughing and reports feeling much better today  on prednisone 20mg q 12 hours  on nasal cannula
pt on hycodan, will change to PRN  pt c/o aches in her muscles on abdomen and chest from coughing
pt on hycodan PRN  pt c/o aches in her muscles on abdomen and chest from coughing  is in distress= will add PRN dilaudid IV 0.5mg and reassess symptoms
Iv abx  Follow blood cultures - de-escalate antibiotics as needed

## 2022-03-02 NOTE — PROGRESS NOTE ADULT - PROBLEM SELECTOR PROBLEM 4
Hyperlipidemia
Debility

## 2022-03-02 NOTE — PROGRESS NOTE ADULT - ASSESSMENT
75 y/o F with PMH of ESRD on HD MWF, HTN, diverticulitis p/w c/o chills and dyspnea.  Patient admits to subjective fever but has never taken her temperature.  States that she has had a dry cough for > 7 days, also has associated rib pain with coughing.  No other complaints.  Hypoxic on RA to 75%, SpO2 currently 97% on 3L via NC.  Labs show leukocytosis.    pt admitted by nocturnist over night for Pna/ Pulm edema  ESRd on HD      Acute hypoxic resp. failure/ possible GN pna   still with non-producticve cough but etiology of hypoxia  unclear at this time. was started on zosyn for possible PNA but no significant consolidations seen on imaging and sats have not improved with abx . procal elevated but can be seen  in dialysis patients.  repeat chest xray shows LLL atelectasis bit no obvious consolidation. sxs improving with steroids but still hypoxic based on abg. RVP neg. PT now reports orthopnea. Discussed with nephro and will increase volume removal. will try to ween o2 as tolerated. will repeat ct if no improvement   CTA shows no PE but  pulm edema  2/27 sats much imrpoved to day and is currently 93-97% on ra at rest,. I likelycontribute this to the increase dialysis sessions. lung exam much improved also as crackles have dissipated. will discuss with nephro. dc planning if sats remain stable   2/28 became hypoxic overnight with ambulation and was placed  o2. pt to get dialysis again today and will get repeat ct scan. . Continues to be azithro for atypical coverage.       ESRD on HD      HTN  c/w anti HTNves- BP stable    epigastric discomfort while coughing  PPI, maalox prn     Pleural effusion  large per echo but trace to small on CTA-       DM2- hbA1C 6.4  monitor fsbs/ISS- BG stable    abnormal CT findings:  CT also shows adrenal and pancreatic lesion- will need outpt Fu with Gi and Urology.   JAZMINE/ Dr. Pham - seen by - will need outpt fu w/ / endo         sq heparin for dvt ppx    palliative care following      
77 y/o F with PMH of ESRD on HD MWF, HTN, diverticulitis p/w c/o chills and dyspnea.  Patient admits to subjective fever but has never taken her temperature.  States that she has had a dry cough for > 7 days, also has associated rib pain with coughing.  No other complaints.  Hypoxic on RA to 75%, SpO2 currently 97% on 3L via NC.  Labs show leukocytosis.    pt admitted by nocturnist over night for Pna/ Pulm edema  ESRd on HD      Acute hypoxic resp. failure/ possible GN pna   still with non-producticve cough but etiology of hypoxia  unclear at this time. was started on zosyn for possible PNA but no significant consolidations seen on imaging and sats have not improved with abx . procal elevated but can be seen  in dialysis patients.  repeat chest xray shows LLL atelectasis bit no obvious consolidation. sxs improving with steroids but still hypoxic based on abg. RVP neg. PT now reports orthopnea. Discussed with nephro and will increase volume removal. will try to ween o2 as tolerated. will repeat ct if no improvement   CTA shows no PE but  pulm edema  Oxygen via NC- 4LPM   neg MRSA PCR.   Albuterol prn   echo w/ diast. dysfunctions  asmanex, claritin, singulair, added hycodan for cough  HD per schedule    ESRD on HD      HTN  c/w anti HTNves- BP stable    epigastric discomfort while coughing  PPI, maalox prn     Pleural effusion  large per echo but trace to small on CTA-       DM2- hbA1C 6.4  monitor fsbs/ISS- BG stable    abnormal CT findings:  CT also shows adrenal and pancreatic lesion- will need outpt Fu with Gi and Urology.   / Dr. Pham - seen by - will need outpt fu w/ / endo         sq heparin for dvt ppx    palliative care following      
77 y/o F with PMH of ESRD on HD MWF, HTN, diverticulitis p/w c/o chills and dyspnea.  Patient admits to subjective fever but has never taken her temperature.  States that she has had a dry cough for > 7 days, also has associated rib pain with coughing.  No other complaints.  Hypoxic on RA to 75%, SpO2 currently 97% on 3L via NC.  Labs show leukocytosis.    pt admitted by nocturnist over night for Pna/ Pulm edema  ESRd on HD      Acute hypoxic resp. failure/ possible GN pna but no e/o large consolidation on CT/ pulm edema  Oxygen via NC- 4LPM  desats when tried to wean down O2  wean down O2 as tolerated  CTA negative for PE  cont IV zosyn , s/sp one dose of vanc, check MRSA PCR  Albuterol inhaler- (duoneb not available at this hospital due to covid pandemic )  Pulm following- follow recomm.   echo w/ diast. dysfunctions  asmanex, claritin, singulair  HD per schedule    ESRD on HD  seen by renal- HD 2/18 and 2/19 and 2/21 given fluid overload.     HTN  c/w anti HTNves- BP stable    epigastric discomfort while coughing  PPI, maalox prn     Pleural effusion  large per echo but trace to small on CTA- Pulm does not recommend thoracoccentesis    DM2- hbA1C 6.4  monitor fsbs/ISS- BG stable    abnormal CT findings:  CT also shows adrenal and pancreatic lesion- will need outpt Fu with Gi and Urology.   / Dr. Pham - seen by - will need outpt fu w/ / endo     Prognosis guarded.    sq heparin for dvt ppx      palliative care following      
76F with a PMH of ESRD on HD MWF, HTN, diverticulitis admitted for dyspnea, diagnosed with PNA. Pt had prior MOLST. Palliative Care consulted for symptom management. 
77 y/o F with PMH of ESRD on HD MWF, HTN, diverticulitis p/w c/o chills and dyspnea.  Patient admits to subjective fever but has never taken her temperature.  States that she has had a dry cough for > 7 days, also has associated rib pain with coughing.  No other complaints.  Hypoxic on RA to 75%, SpO2 currently 97% on 3L via NC.  Labs show leukocytosis.    pt admitted by nocturnist over night for Pna/ Pulm edema  ESRd on HD      Acute hypoxic resp. failure/ possible GN pna   still with non-producticve cough but etiology of hypoxia  unclear at this time. was started on zosyn for possible PNA but no significant consolidations seen on imaging and sats have not improved with abx . procal elevated but can be seen  in dialysis patients.  repeat chest xray shows LLL atelectasis bit no obvious consolidation. sxs improving with steroids but still hypoxic based on abg. RVP neg. PT now reprots orthopnea. Discussed with nephro and will increase volume removal.   CTA shows no PE but  pulm edema  Oxygen via NC- 4LPM   neg MRSA PCR.   Albuterol prn   echo w/ diast. dysfunctions  asmanex, claritin, singulair, added hycodan for cough  HD per schedule    ESRD on HD      HTN  c/w anti HTNves- BP stable    epigastric discomfort while coughing  PPI, maalox prn     Pleural effusion  large per echo but trace to small on CTA-       DM2- hbA1C 6.4  monitor fsbs/ISS- BG stable    abnormal CT findings:  CT also shows adrenal and pancreatic lesion- will need outpt Fu with Gi and Urology.   JAZMINE/ Dr. Pham - seen by - will need outpt fu w/ / endo         sq heparin for dvt ppx    palliative care following      
75 y/o F with PMH of ESRD on HD MWF, HTN, diverticulitis p/w c/o chills and dyspnea.  Patient admits to subjective fever but has never taken her temperature.  States that she has had a dry cough for > 7 days, also has associated rib pain with coughing.  No other complaints.  Hypoxic on RA to 75%, SpO2 currently 97% on 3L via NC.  Labs show leukocytosis.    pt admitted by nocturnist over night for Pna/ Pulm edema  ESRd on HD      Acute hypoxic resp. failure/ possible GN pna   still with non-producticve cough but etiology of hypoxia  unclear at this time. was started on zosyn for possible PNA but no significant consolidations seen on imaging and sats have not improved with abx . procal elevated but can be seen  in dialysis patients.  repeat chest xray shows LLL atelectasis bit no obvious consolidation. sxs improving with steroids but still hypoxic based on abg. RVP neg. PT now reports orthopnea. Discussed with nephro and will increase volume removal. will try to ween o2 as tolerated. will repeat ct if no improvement   CTA shows no PE but  pulm edema  2/27 sats much imrpoved to day and is currently 93-97% on ra at rest,. I likelycontribute this to the increase dialysis sessions. lung exam much improved also as crackles have dissipated. will discuss with nephro. dc planning if sats remain stable       ESRD on HD      HTN  c/w anti HTNves- BP stable    epigastric discomfort while coughing  PPI, maalox prn     Pleural effusion  large per echo but trace to small on CTA-       DM2- hbA1C 6.4  monitor fsbs/ISS- BG stable    abnormal CT findings:  CT also shows adrenal and pancreatic lesion- will need outpt Fu with Gi and Urology.   / Dr. Pham - seen by - will need outpt fu w/ / endo         sq heparin for dvt ppx    palliative care following      
75 y/o F with PMH of ESRD on HD MWF, HTN, diverticulitis p/w c/o chills and dyspnea.  Patient admits to subjective fever but has never taken her temperature.  States that she has had a dry cough for > 7 days, also has associated rib pain with coughing.  No other complaints.  Hypoxic on RA to 75%, SpO2 currently 97% on 3L via NC.  Labs show leukocytosis.    pt admitted by nocturnist over night for Pna/ Pulm edema  ESRd on HD      Pna: / acute hypoxic resp. failure/ probably GN pna/ pulm edema  on 6 LNC- 100%  pulse ox- wean down O2 as tolerated- d/w RN to down titrate to 4l, to down titrate further to  keep pulse ox >92%  CTA negative for PE  cont w/ IV zosyn  Albuterol MDI (duoneb not available at this hospital - not for non covid pt also)  Pulm following  echo w/ diast. dysfunctions  Iv lasix x 1    ESRD on HD  seen by renal- HD yesterday and UF today given fluid overload.     HTN  c/w anti HTNves- BP stable    Pleural effusion  large per echo but trace to small on CTA  will d/w pulm re ?need for thoracocentesis    DM2- hbA1C 6.4  monitor fsbs/ISS- BG stable    abnormal CT findings:  CT also shows adrenal and pancreatic lesion- will need outpt Fu with Gi and Urology.   / Dr. Pham - seen by - will need outpt fu w/ / endo     Prognosis guarded.    sq heparin for dvt ppx    d/w pt , RN,     
77 y/o F with PMH of ESRD on HD MWF, HTN, diverticulitis p/w c/o chills and dyspnea.  Patient admits to subjective fever but has never taken her temperature.  States that she has had a dry cough for > 7 days, also has associated rib pain with coughing.  No other complaints.  Hypoxic on RA to 75%, SpO2 currently 97% on 3L via NC.  Labs show leukocytosis.    pt admitted by nocturnist over night for Pna/ Pulm edema  ESRd on HD      Acute hypoxic resp. failure/ possible GN pna   still with non-producticve cough but etiology of hypoxia  unclear at this time. was started on zosyn for possible PNA but no significant consolidations seen on imaging and sats have not improved with abx . procal elevated but can be seen  in dialysis patients.  repeat chest xray shows LLL atelectasis bit no obvious consolidation. sxs improving with steroids but still hypoxic based on abg. RVP neg. ultrafiltration?? will discuss with renal  CTA shows no PE but  pulm edema  Oxygen via NC- 4LPM   neg MRSA PCR.   Albuterol prn   echo w/ diast. dysfunctions  asmanex, claritin, singulair, added hycodan for cough  HD per schedule    ESRD on HD  seen by renal- HD 2/18 and 2/19 and 2/21 given fluid overload.     HTN  c/w anti HTNves- BP stable    epigastric discomfort while coughing  PPI, maalox prn     Pleural effusion  large per echo but trace to small on CTA-       DM2- hbA1C 6.4  monitor fsbs/ISS- BG stable    abnormal CT findings:  CT also shows adrenal and pancreatic lesion- will need outpt Fu with Gi and Urology.   / Dr. Pham - seen by - will need outpt fu w/ / endo         sq heparin for dvt ppx    palliative care following      
75 y/o F with PMH of ESRD on HD MWF, HTN, diverticulitis p/w c/o chills and dyspnea.  Patient admits to subjective fever but has never taken her temperature.  States that she has had a dry cough for > 7 days, also has associated rib pain with coughing.  No other complaints.  Hypoxic on RA to 75%, SpO2 currently 97% on 3L via NC.  Labs show leukocytosis.    pt admitted by nocturnist over night for Pna/ Pulm edema  ESRd on HD      Acute hypoxic resp. failure/ possible GN pna   etiology unclear at this time. was started on zosyn for possible PNA but no significant consolidations seen on imaging and sats have not improved. Will check procal and repeat chest xray. add steroids for persistent cough. check am abg   CTA shows no PE but  pulm edema  Oxygen via NC- 4LPM   neg MRSA PCR.   Albuterol inhaler- (duoneb not available at this hospital due to covid pandemic )  Pulm following- follow recomm.   echo w/ diast. dysfunctions  asmanex, claritin, singulair, added hycodan for cough  HD per schedule    ESRD on HD  seen by renal- HD 2/18 and 2/19 and 2/21 given fluid overload.     HTN  c/w anti HTNves- BP stable    epigastric discomfort while coughing  PPI, maalox prn     Pleural effusion  large per echo but trace to small on CTA- Pulm does not recommend thoracoccentesis    DM2- hbA1C 6.4  monitor fsbs/ISS- BG stable    abnormal CT findings:  CT also shows adrenal and pancreatic lesion- will need outpt Fu with Gi and Urology.   / Dr. Pham - seen by JAZMINE- will need outpt fu w/ / endo         sq heparin for dvt ppx    palliative care following      
76F with a PMH of ESRD on HD MWF, HTN, diverticulitis admitted for dyspnea, diagnosed with PNA. Pt had prior MOLST. Palliative Care consulted for symptom management. 
75 y/o F with PMH of ESRD on HD MWF, HTN, diverticulitis p/w c/o chills and dyspnea.  Patient admits to subjective fever but has never taken her temperature.  States that she has had a dry cough for > 7 days, also has associated rib pain with coughing.  No other complaints.  Hypoxic on RA to 75%, SpO2 currently 97% on 3L via NC.  Labs show leukocytosis.    pt admitted by nocturnist over night for Pna/ Pulm edema  ESRd on HD      Pna:   on 6 LNC 94% pulse ox- wean down O2 as tolerated  CTA negative for PE  IV Zosyn  Albuterol MDI  Pulm eval appreciated  check echo    ESRD on HD  HD in am (M,W,F) seen by renal.     HTN  c/w anti HTNves    DM2- hbA1C 6.4  monitor fsbs/ISS    abnormal CT findings:  CT also shows adrenal and pancreatic lesion- will need outpt Fu with Gi and Urology.   / Dr. Pham     d/w pt, RN and consultants.    Prognosis guarded.    sq heparin for dvt ppx    d/w pt       
77 y/o F with PMH of ESRD on HD MWF, HTN, diverticulitis p/w c/o chills and dyspnea.  Patient admits to subjective fever but has never taken her temperature.  States that she has had a dry cough for > 7 days, also has associated rib pain with coughing.  No other complaints.  Hypoxic on RA to 75%, SpO2 currently 97% on 3L via NC.  Labs show leukocytosis.    pt admitted by nocturnist over night for Pna/ Pulm edema  ESRd on HD      Acute hypoxic resp. failure/ possible GN pna but no e/o large consolidation on CT/ pulm edema  Oxygen via NC- 4LPM  desats when tried to wean down O2  wean down O2 as tolerated  CTA negative for PE  cont IV zosyn , s/sp one dose of vanc, neg MRSA PCR  Albuterol inhaler- (duoneb not available at this hospital due to covid pandemic )  Pulm following- follow recomm.   echo w/ diast. dysfunctions  asmanex, claritin, singulair, added hycodan for cough  HD per schedule    ESRD on HD  seen by renal- HD 2/18 and 2/19 and 2/21 given fluid overload.     HTN  c/w anti HTNves- BP stable    epigastric discomfort while coughing  PPI, maalox prn     Pleural effusion  large per echo but trace to small on CTA- Pulm does not recommend thoracoccentesis    DM2- hbA1C 6.4  monitor fsbs/ISS- BG stable    abnormal CT findings:  CT also shows adrenal and pancreatic lesion- will need outpt Fu with Gi and Urology.   JAZMINE/ Dr. Pham - seen by - will need outpt fu w/ / endo     Prognosis guarded.    sq heparin for dvt ppx    palliative care following      
75 y/o F with PMH of ESRD on HD MWF, HTN, diverticulitis p/w c/o chills and dyspnea.  Patient admits to subjective fever but has never taken her temperature.  States that she has had a dry cough for > 7 days, also has associated rib pain with coughing.  No other complaints.  Hypoxic on RA to 75%, SpO2 currently 97% on 3L via NC.  Labs show leukocytosis.    pt admitted by nocturnist over night for Pna/ Pulm edema  ESRd on HD      Pna: / acute hypoxic resp. failure/ possible GN pna but no e/o large consolidation on CT/ pulm edema  Oxygen via NC- 6LPM  desats when tried to wean down O2   wean down O2 as tolerated-  CTA negative for PE  cont IV zosyn day 4/7  Albuterol inhaler- (duoneb not available at this hospital due to covid pandemic )  Pulm following- follow recomm.   echo w/ diast. dysfunctions  s/p HD 2/18- then UF on 2/19    persistent dry cough vs allergic  vs asthmatic bronchitis  ?acute viral illness  add  asmanex, claritin, singulair    ESRD on HD  seen by renal- HD 2/18 and 2/19 given fluid overload.     HTN  c/w anti HTNves- BP stable    epigastric discomfort while coughing  PPI, maalox prn     Pleural effusion  large per echo but trace to small on CTA- Pulm does not recommend thoracoccentesis    DM2- hbA1C 6.4  monitor fsbs/ISS- BG stable    abnormal CT findings:  CT also shows adrenal and pancreatic lesion- will need outpt Fu with Gi and Urology.   JAZMINE/ Dr. Pham - seen by JAZMINE- will need outpt fu w/ / endo     Prognosis guarded.    sq heparin for dvt ppx    d/w pt , RN,     palliative care following-     d/w pt and RN     
75 y/o F with PMH of ESRD on HD MWF, HTN, diverticulitis p/w c/o chills and dyspnea.  Patient admits to subjective fever but has never taken her temperature.  States that she has had a dry cough for > 7 days, also has associated rib pain with coughing.  No other complaints.  Hypoxic on RA to 75%, SpO2 currently 97% on 3L via NC.  Labs show leukocytosis.    pt admitted by nocturnist over night for Pna/ Pulm edema  ESRd on HD      Pna: / acute hypoxic resp. failure  on 6 LNC 94% pulse ox- wean down O2 as tolerated  CTA negative for PE  cont w/ IV zosyn  Albuterol MDI (duoneb not available at this hospital - not for non covid pt also)  Pulm following  check echo- pending  IV lasix (pt still makes urine)    ESRD on HD  seen by renal- HD today- (M,W,F)    HTN  c/w anti HTNves- BP stable    DM2- hbA1C 6.4  monitor fsbs/ISS- BG stable    abnormal CT findings:  CT also shows adrenal and pancreatic lesion- will need outpt Fu with Gi and Urology.   / Dr. Pham - seen by - will need outpt fu w/ / endo     Prognosis guarded.    sq heparin for dvt ppx    add hycoden for persistent dry cough  iv morphine and PPI for epigastric pain     d/w pt , RN, and HD team.       
77 y/o F with PMH of ESRD on HD MWF, HTN, diverticulitis p/w c/o chills and dyspnea.  Patient admits to subjective fever but has never taken her temperature.  States that she has had a dry cough for > 7 days, also has associated rib pain with coughing.  No other complaints.  Hypoxic on RA to 75%, SpO2 currently 97% on 3L via NC.  Labs show leukocytosis.    pt admitted by nocturnist over night for Pna/ Pulm edema  ESRd on HD      Acute hypoxic resp. failure/ possible GN pna   still with non-producticve cough but etiology of hypoxia  unclear at this time. was started on zosyn for possible PNA but no significant consolidations seen on imaging and sats have not improved with abx . procal elevated but can be seen  in dialysis patients.  repeat chest xray shows LLL atelectasis bit no obvious consolidation. sxs improving with steroids but still hypoxic based on abg. RVP neg. PT now reports orthopnea. Discussed with nephro and will increase volume removal. will try to ween o2 as tolerated. will repeat ct if no improvement   CTA shows no PE but  pulm edema  2/27 sats much imrpoved to day and is currently 93-97% on ra at rest,. I likely contribute this to the increase dialysis sessions. lung exam much improved also as crackles have dissipated. will discuss with nephro. dc planning if sats remain stable   2/28 became hypoxic overnight with ambulation and was placed  o2. pt to get dialysis again today and will get repeat ct scan. . Continues to be azithro for atypical coverage.   3/1 breathing improving today and remains off o2 for now. CT scan shows atelectasis, but no obvious pna pr edema. will get chest pt and encourage incentive spirometer. dc planning if her breathing remains stable       ESRD on HD      HTN  c/w anti HTNves- BP stable    epigastric discomfort while coughing  PPI, maalox prn     Pleural effusion  large per echo but trace to small on CTA-       DM2- hbA1C 6.4  monitor fsbs/ISS-   - dc lantus die to hypoglycemia     abnormal CT findings:  CT also shows adrenal and pancreatic lesion- will need outpt Fu with Gi and Urology.   / Dr. Pham - seen by - will need outpt fu w/ / endo         sq heparin for dvt ppx    palliative care following      
77 y/o F with PMH of ESRD on HD MWF, HTN, diverticulitis p/w c/o chills and dyspnea.  Patient admits to subjective fever but has never taken her temperature.  States that she has had a dry cough for > 7 days, also has associated rib pain with coughing.  No other complaints.  Hypoxic on RA to 75%, SpO2 currently 97% on 3L via NC.  Labs show leukocytosis.    pt admitted by nocturnist over night for Pna/ Pulm edema  ESRd on HD      Acute hypoxic resp. failure/ possible GN pna   still with non-producticve cough but etiology of hypoxia  unclear at this time. was started on zosyn for possible PNA but no significant consolidations seen on imaging and sats have not improved with abx . procal elevated but can be seen  in dialysis patients.  repeat chest xray shows LLL atelectasis bit no obvious consolidation. sxs improving with steroids but still hypoxic based on abg. RVP neg. PT now reports orthopnea. Discussed with nephro and will increase volume removal. will try to ween o2 as tolerated. will repeat ct if no improvement   CTA shows no PE but  pulm edema  2/27 sats much imrpoved to day and is currently 93-97% on ra at rest,. I likely contribute this to the increase dialysis sessions. lung exam much improved also as crackles have dissipated. will discuss with nephro. dc planning if sats remain stable   2/28 became hypoxic overnight with ambulation and was placed  o2. pt to get dialysis again today and will get repeat ct scan. . Continues to be azithro for atypical coverage.   3/1 breathing improving today and remains off o2 for now. CT scan shows atelectasis, but no obvious pna pr edema. will get chest pt and encourage incentive spirometer. dc planning if her breathing remains stable   3/2 breathing remains  stable and off o2. will recheck after dialysis today and likely dc home with 2 more days of steroids. will complete 5 days of azithromax total. needs close follow up with pulmonologist      ESRD on HD      HTN  c/w anti HTNves- BP stable    epigastric discomfort while coughing  PPI, maalox prn     Pleural effusion  large per echo but trace to small on CTA-         DM2- hbA1C 6.4  monitor fsbs/ISS-   - dc lantus die to hypoglycemia     abnormal CT findings:  CT also shows adrenal and pancreatic lesion- will need outpt Fu with Gi and Urology.   / Dr. Pham - seen by - will need outpt fu w/ / endo         sq heparin for dvt ppx        
76F with a PMH of ESRD on HD MWF, HTN, diverticulitis admitted for dyspnea, diagnosed with PNA. Pt had prior MOLST. Palliative Care consulted for symptom management.

## 2022-03-02 NOTE — DISCHARGE NOTE NURSING/CASE MANAGEMENT/SOCIAL WORK - NSFLUVACAGEDISCH_IMM_ALL_CORE
Nursing History and Physical reviewed and agreed upon. Additional findings:    Allergies and medications have been reviewed. HENT: Airway patent and reviewed  Cardiovascular: Normal rate, regular rhythm, normal heart sounds. Pulmonary/Chest: No wheezes. No rhonchi. No rales. Abdominal: Soft. Bowel sounds are normal. No distension. Mallampati: 2    ASA CLASS:         []   I. Normal, healthy adult           [x]   II.  Mild systemic disease            []   III. Severe systemic disease      Sedation plan:   [x]  Local/MINIMAL     []  Minimal    MALLAMPATI:           []   I. Complete visualization of the soft palate           [x]   II. Complete visualization of the uvula            []   III. Visualization of only the base of the uvula           []   IV. Soft palate is not visibl    Patient's condition acceptable for planned procedure/sedation. Post Procedure Plan   Return to same level of care   ______________________     The risks and benefits as well as alternatives to the procedure have been discussed with the patient and or family. The patient and or next of kin understands and agrees to proceed.     @me Adult

## 2022-03-02 NOTE — DISCHARGE NOTE NURSING/CASE MANAGEMENT/SOCIAL WORK - PATIENT PORTAL LINK FT
You can access the FollowMyHealth Patient Portal offered by [facility] by registering at the following website: [email or web address]. By joining [employer]’s FollowMyHealth portal, you will also be able to view your health information using other applications (apps) compatible with our system.

## 2022-03-02 NOTE — PROGRESS NOTE ADULT - PROBLEM SELECTOR PROBLEM 1
Pneumonia
Pneumonia
Shortness of breath
Pneumonia
Shortness of breath
Pneumonia
Shortness of breath

## 2022-03-02 NOTE — DISCHARGE NOTE NURSING/CASE MANAGEMENT/SOCIAL WORK - NSDCPEFALRISK_GEN_ALL_CORE
For information on Fall & Injury Prevention, visit: [email or web address] OR  [email or web address] OR  https://www.cdc.gov/steadi/patient.html

## 2022-03-02 NOTE — PROGRESS NOTE ADULT - PROBLEM SELECTOR PLAN 4
statin
weakness - mobility limited by dyspnea
statin
weakness - mobility limited by dyspnea
statin
statin
weakness - mobility limited by dyspnea
statin

## 2022-03-02 NOTE — PROGRESS NOTE ADULT - PROBLEM SELECTOR PROBLEM 5
Diabetes mellitus
Essential hypertension
Diabetes mellitus
Essential hypertension
Essential hypertension

## 2022-03-02 NOTE — PROGRESS NOTE ADULT - PROBLEM SELECTOR PLAN 5
monitor fsbs/ISS
remains on current regimen with good BP control
monitor fsbs/ISS
remains on current regimen with good BP control
monitor fsbs/ISS
remains on current regimen with good BP control

## 2022-03-02 NOTE — PROGRESS NOTE ADULT - PROBLEM SELECTOR PROBLEM 6
Preventive measure
Hyperlipidemia
Preventive measure
Hyperlipidemia
Preventive measure
Preventive measure
Hyperlipidemia
Preventive measure

## 2022-03-02 NOTE — PROGRESS NOTE ADULT - REASON FOR ADMISSION
PNA

## 2022-03-02 NOTE — DISCHARGE NOTE PROVIDER - PROVIDER TOKENS
PROVIDER:[TOKEN:[[id number]]],PROVIDER:[TOKEN:[[id number]]],PROVIDER:[TOKEN:[[id number]]] PROVIDER:[TOKEN:[[id number]]],PROVIDER:[TOKEN:[[id number]]],PROVIDER:[TOKEN:[[id number]]]

## 2022-03-09 DIAGNOSIS — Z79.4 LONG TERM (CURRENT) USE OF INSULIN: ICD-10-CM

## 2022-03-09 DIAGNOSIS — K86.89 OTHER SPECIFIED DISEASES OF PANCREAS: ICD-10-CM

## 2022-03-09 DIAGNOSIS — Z99.2 DEPENDENCE ON RENAL DIALYSIS: ICD-10-CM

## 2022-03-09 DIAGNOSIS — Z86.711 PERSONAL HISTORY OF PULMONARY EMBOLISM: ICD-10-CM

## 2022-03-09 DIAGNOSIS — J18.9 PNEUMONIA, UNSPECIFIED ORGANISM: ICD-10-CM

## 2022-03-09 DIAGNOSIS — I50.33 ACUTE ON CHRONIC DIASTOLIC (CONGESTIVE) HEART FAILURE: ICD-10-CM

## 2022-03-09 DIAGNOSIS — D64.9 ANEMIA, UNSPECIFIED: ICD-10-CM

## 2022-03-09 DIAGNOSIS — N18.6 END STAGE RENAL DISEASE: ICD-10-CM

## 2022-03-09 DIAGNOSIS — J96.01 ACUTE RESPIRATORY FAILURE WITH HYPOXIA: ICD-10-CM

## 2022-03-09 DIAGNOSIS — E27.9 DISORDER OF ADRENAL GLAND, UNSPECIFIED: ICD-10-CM

## 2022-03-09 DIAGNOSIS — J98.11 ATELECTASIS: ICD-10-CM

## 2022-03-09 DIAGNOSIS — Z95.0 PRESENCE OF CARDIAC PACEMAKER: ICD-10-CM

## 2022-03-09 DIAGNOSIS — E11.649 TYPE 2 DIABETES MELLITUS WITH HYPOGLYCEMIA WITHOUT COMA: ICD-10-CM

## 2022-03-09 DIAGNOSIS — Z20.822 CONTACT WITH AND (SUSPECTED) EXPOSURE TO COVID-19: ICD-10-CM

## 2022-03-09 DIAGNOSIS — Z66 DO NOT RESUSCITATE: ICD-10-CM

## 2022-03-09 DIAGNOSIS — E11.22 TYPE 2 DIABETES MELLITUS WITH DIABETIC CHRONIC KIDNEY DISEASE: ICD-10-CM

## 2022-03-09 DIAGNOSIS — I13.2 HYPERTENSIVE HEART AND CHRONIC KIDNEY DISEASE WITH HEART FAILURE AND WITH STAGE 5 CHRONIC KIDNEY DISEASE, OR END STAGE RENAL DISEASE: ICD-10-CM

## 2022-03-09 DIAGNOSIS — E44.0 MODERATE PROTEIN-CALORIE MALNUTRITION: ICD-10-CM

## 2023-02-15 NOTE — PHYSICAL THERAPY INITIAL EVALUATION ADULT - PRECAUTIONS/LIMITATIONS, REHAB EVAL
Discharge Summary    CHIEF COMPLAINT ON ADMISSION  Chief Complaint   Patient presents with    Loss of Vision     Right eye, pt feels like she has a grey film over her right eye, happened 45 minutes ago. Pt had a dizzy feeling for the last couple of days. Hx of stroke in 2009, had a stent placed in her right carotid two years ago, and the left side was 40% occluded at that time.        Reason for Admission  Vision Changes     Admission Date  2/14/2023    CODE STATUS  Full Code    HPI & HOSPITAL COURSE    Ms. Natividad Sewell is a 66 y.o. 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      Patient reported symptoms occur approximately at 11 PM on 2/13/2023.  She came in as a stroke alert.  No acute etiology seen on CT head.  CTA head notable for distal right M3 nonopacification, likely chronic occlusion related to prior infarct, otherwise no LVO.  CTA neck > 70% stenosis of left ICA.  No perfusion defect seen on CT perfusion.  Patient was eval by neurology, tPA not recommended, noninvasive medical management and MRI brain.     During this course of stay, an MRI was obtained noting a small focus of hyperintensity with T2 signal abnormality in the right paramedian davey suspicious for a subacute infarct with encephalomalacia from remote right parietal infarct.    Patient seen and examined prior to being discharged.  Discussed MRI results with patient.  After extensive discussion with patient, since 2009, patient has not been placed on any anticoagulation as patient has not followed up with any of her providers.  Patient will be placed on low-dose aspirin and atorvastatin for secondary prevention of stroke/TIA.  Patient referred to follow-up with ophthalmology along with outpatient OT for vision therapy.  Patient highly recommended to follow-up with  PCP and establish for management of care.  Referral placed for follow-up with neurology due to significant history of CVA and carotid stenosis.  Patient to resume all other home medications.  All questions and concerns answered prior to being discharged.  Patient discharged home.    Therefore, she is discharged in good and stable condition to home with close outpatient follow-up.    The patient recovered much more quickly than anticipated on admission.    Discharge Date  02/15/23      FOLLOW UP ITEMS POST DISCHARGE  Please call [phone] to schedule PCP appointment for patient.    Required specialty appointments include:       Discharge Instructions per VIVI Tinsley    -Establish with a PCP and follow-up s/p hospitalization  -Referral placed to neurology due to significant history of CVA and carotid stenosis  -Referral placed to ophthalmology for management of vision loss  -Referral placed to outpatient OT for vision therapy  -Start taking aspirin 81 mg daily along with atorvastatin 40 mg nightly for secondary prevention of stroke/TIA  -Continue all other home medications    DIET: As tolerated    ACTIVITY: As tolerated    DIAGNOSIS: Vision loss    Return to ER if symptoms persist, chest pain, palpitations, shortness of breath, numbness, tingling, weakness, and high fevers.      DISCHARGE DIAGNOSES  Principal Problem (Resolved):    Stroke-like symptom POA: Yes  Active Problems:    Bilateral carotid artery stenosis POA: Yes    Hypertension POA: Yes    Other hyperlipidemia POA: Yes    H/O: CVA (cerebrovascular accident) POA: Yes  Resolved Problems:    Vision loss of right eye POA: Yes      FOLLOW UP  No future appointments.  [address] [address]  [phone]  Schedule an appointment as soon as possible for a visit in 1 week(s)      [facility] ([facility]) - Primary Care and Family Medicine  [address] [place] [address]  no known precautions/limitations "[id number]  [phone]  Schedule an appointment as soon as possible for a visit in 1 week(s)        MEDICATIONS ON DISCHARGE     Medication List        START taking these medications        Instructions   aspirin 81 MG EC tablet   Take 1 Tablet by mouth every day for 90 days.  Dose: 81 mg     atorvastatin 40 MG Tabs  Commonly known as: LIPITOR   Take 1 Tablet by mouth every evening for 90 days.  Dose: 40 mg              Allergies  Allergies   Allergen Reactions    Morphine      \"I don't know, it was during my recovery after the endarterectomy-they told me I had a bad reaction to it\"       DIET  Orders Placed This Encounter   Procedures    Diet Order Diet: Cardiac     Standing Status:   Standing     Number of Occurrences:   1     Order Specific Question:   Diet:     Answer:   Cardiac [6]       ACTIVITY  As tolerated.  Weight bearing as tolerated    CONSULTATIONS  NONE    PROCEDURES  NONE    IMAGING    MR-BRAIN-W/O   Final Result         Small focus of hyperintensity with T2 signal abnormality in the right paramedian davey is suspicious for a subacute infarct.         Encephalomalacia from remote right parietal infarct.         DX-CHEST-PORTABLE (1 VIEW)   Final Result         1.  Bibasilar atelectasis or early infiltrates.   2.  Atherosclerosis      CT-CTA HEAD WITH & W/O-POST PROCESS   Final Result         1.  Distal right M3 branch of nonopacification leading to area of encephalomalacia, likely chronic occlusion related to prior infarction and stable since prior study.   2.  Otherwise no large vessel occlusion or aneurysm identified      CT-CTA NECK WITH & W/O-POST PROCESSING   Final Result         1.  Dense calcification of the origin the left common carotid artery resulting in greater than 70% stenosis.         CT-CEREBRAL PERFUSION ANALYSIS   Final Result         1.  Cerebral blood flow less than 30% likely representing completed infarct = 0 mL.      2.  T Max more than 6 seconds likely representing combination of " completed infarct and ischemia = 0 mL.      3.  Mismatched volume likely representing ischemic brain/penumbra = None      4.  Please note that the cerebral perfusion was performed on the limited brain tissue around the basal ganglia region. Infarct/ischemia outside the CT perfusion sections can be missed in this study.      CT-HEAD W/O   Final Result         1.  No acute intracranial abnormality.   2.  Atherosclerosis.               LABORATORY  Lab Results   Component Value Date    SODIUM 137 02/14/2023    POTASSIUM 4.1 02/14/2023    CHLORIDE 103 02/14/2023    CO2 22 02/14/2023    GLUCOSE 98 02/14/2023    BUN 18 02/14/2023    CREATININE 0.54 02/14/2023        Lab Results   Component Value Date    WBC 6.5 02/14/2023    HEMOGLOBIN 14.5 02/14/2023    HEMATOCRIT 43.6 02/14/2023    PLATELETCT 238 02/14/2023        Total time of the discharge process took 36 minutes.  ======================================================================================================================================================================================================================================  Please note that this dictation was created using voice recognition software. I have made every reasonable attempt to correct obvious errors, but there may be errors of grammar and possibly content that I did not discover before finalizing the note.    Electronically signed by:  Dr. RUPAL Gutierrez, DNP, APRN, FNP-C  Hospitalist Services  [facility]  [phone]  [email or web address]  02/15/23                    1123

## 2023-03-07 NOTE — DIETITIAN INITIAL EVALUATION ADULT. - CALCULATED TO (CAL/KG)
If you have any non-emergent questions or concerns, please call the office or send me a message through the [employer] or [email or web address].  Messages and prescription refill requests through [employer] will be addressed Monday-Friday during business hours, excluding the observation days for Federal Holidays.  If you call the office ([phone]) after hours or on the weekends, you will be connected to the call center/answering service, and they know how to get in touch with me.    If at any point in time you start to experience thoughts of harming yourself or others, if you feel unsafe, experience auditory or visual hallucinations, or if your symptoms severely worsen please call 911 or present to your nearest emergency room.     Resources  [place] Warm Line: [phone].  Hours of Operation: Monday through Saturday, 8:00 a.m. - 8:00 p.m. except holidays  [facility] Crisis Line: 3-309-651-[facility] (3051). Hours of Operation: Monday - Friday 9:00 am- 9:00 pm  Suicide Hotline: 611.388.4903 or dial 194    Treatment plan/recommendations:  Continue amitriptyline 100 mg orally at bedtime  Discontinue cannabis use  
1904

## 2023-05-10 NOTE — DISCHARGE NOTE PROVIDER - NSDCMRMEDTOKEN_GEN_ALL_CORE_FT
[facility] MEDICINE PROGRESS NOTE   Patient: Nabil Wiggins  Today's Date: 5/10/2023    YOB: 1993  Admission Date: 5/9/2023    MRN: [id number]  Inpatient LOS: 1    Room: 278/01  Hospital Day: Hospital Day: 2    Subjective   HISTORY AND SUBJECTIVE COMPLAINTS     Chief Complaint:   Abdominal pain    Interval History / Subjective:   Events of night reviewed  Abdominal pain currently 5-6/10  Has some pain in the neck- he feels he slept wrong the night before admission  No chest pain  Does report some pain with deep breaths  Some nausea  No fevers or chills    Hospital Course:  Nabil Wiggins is a 29 year old male who presented on 5/9/2023 with complaints of Abdominal Pain and Nausea  .    ROS:  Pertinent systems negative except as above.    Objective   PHYSICAL EXAMINATION     Vital 24 Hour Range Most Recent Value   Temperature Temp  Min: 97.4 °F (36.3 °C)  Max: 98.7 °F (37.1 °C) 98.7 °F (37.1 °C)   Pulse Pulse  Min: 85  Max: 146 (!) 116   Respiratory Resp  Min: 16  Max: 45 (!) 45   Blood Pressure BP  Min: 112/74  Max: 155/96 (!) 127/94   Pulse Oximetry SpO2  Min: 90 %  Max: 99 % 94 %   Arterial BP No data recorded     O2 No data recorded       Recorded Intake and Output:    Intake/Output Summary (Last 24 hours) at 5/10/2023 0721  Last data filed at 5/10/2023 0401  Gross per 24 hour   Intake 2330 ml   Output 400 ml   Net 1930 ml      Recorded Last Stool Occurrence:       Vital Most Recent Value First Value   Weight 86 kg (189 lb 9.5 oz) Weight: 86.9 kg (191 lb 8 oz)   Height 5' 11\" (180.3 cm) Height: 5' 11\" (180.3 cm)   BMI 26.44 N/A     Telemetry: On     General: Well-developed male  Who appears moderately uncomfortable. Nontoxic.  HEENT: Conjunctivae are neither pale nor injected. Sclera are anicteric. Nares are clear. Oropharynx is moist without lesions.  Neck: Supple. No JVD (jugular venous distention), bruit, or thyromegaly. Trachea is midline.  Lungs:  Left base decreased breath  sounds compared to contralateral side.  Otherwise fairly good aeration.  No crackles or wheezes.  Heart:  Tachycardic and regular without murmurs, rubs, or gallops.  Abdomen:  Somewhat firm.  Diffusely tender.  Distended.  Mild tympany.  Bowel sounds hypoactive. There are no masses or hepatosplenomegaly.  There is guarding but no rebound.  Extremities: No cyanosis, clubbing, or edema.   Skin: Warm and dry without rashes.  No diaphoresis this morning.  Neurologic: Alert and oriented x3. Follows commands and answers questions appropriately.  Moving upper and lower extremities symmetrically.  No tremor.    TEST RESULTS     Labs: The Laboratory values listed below have been reviewed and pertinent findings discussed in the Assessment and Plan.    Laboratory values:   Recent Labs   Lab 05/10/23  0412 05/09/23  0802   WBC 11.8* 15.2*   HGB 19.2* 16.8   HCT 56.3* 46.1    227       Recent Labs   Lab 05/10/23  0412 05/10/23  0007 05/09/23 2007 05/09/23  1822 05/09/23  1649 05/09/23  0802   SODIUM 136  --  130* 131*   < > 135   POTASSIUM 4.7 6.9* 6.6* 6.3*   < > 3.1*   CHLORIDE 101  --  99 98   < > 97   CO2 22  --  19* 19*   < > 24   CALCIUM 6.6*  --  7.2*  --   --  9.2   GLUCOSE 191*  --  191*  --   --  145*   BUN 22*  --  14  --   --  8   CREATININE 1.86*  --  1.54*  --   --  0.93   MG 2.0  --   --   --   --  1.5*    < > = values in this interval not displayed.        Recent Labs   Lab 05/10/23  0729 05/10/23  0412 05/09/23  2007 05/09/23  0802   ALBUMIN  --  2.4* 3.0* 4.0   PHOS  --  3.9  --   --    AST  --  117* 104* 131*   GPT  --  129* 168* 243*   *  --   --   --    BILIRUBIN  --  1.7* 2.5* 1.6*     Recent Labs   Lab 05/09/23  0802   PCT 0.08   LACTA 0.9     No results available in last 24 hours    Recent Labs   Lab 05/10/23  0412 05/09/23 2007   * 240       No results found for: VB12, ROSMEARY, VITD25, TSH, HGBA1C     Lab Results   Component Value Date    CHOLESTEROL 130 05/10/2023    HDL 34 (L)  05/10/2023    CALCLDL 29 05/10/2023        Lab Results   Component Value Date    USPG >1.030 (H) 05/10/2023    UPROT 30 (A) 05/10/2023    UWBC Negative 05/10/2023    URBC Trace (A) 05/10/2023    UNITR Negative 05/10/2023    UPH 5.5 05/10/2023    UBACTRA Few (A) 05/10/2023       No results found      Lipase 11,542-> 1769    Radiology: Imaging studies have been reviewed and pertinent findings discussed in the Assessment and Plan.  Results for orders placed or performed during the hospital encounter of 05/09/23 (from the past 48 hour(s))   XR CHEST AP OR PA - PORTABLE    Impression    IMPRESSION:    1.  Negative chest.       CT ABDOMEN PELVIS W CONTRAST    Impression    IMPRESSION:    Findings consistent with acute interstitial pancreatitis.    Hepatomegaly and hepatic steatosis.       US Liver / Gallbladder / Pancreas    Impression    IMPRESSION:   1.  The pancreas appears slightly swollen.  2.  Hepatic steatosis with moderate hepatomegaly.  3.  Trace amount of pericholecystic free fluid. The gallbladder itself is  unremarkable.      ANCILLARY ORDERS     Diet:  Npo Diet With Exceptions; Sips Of Water, Medications, Ice Chips  Consults:    IP CONSULT TO GI  IP CONSULT TO NEPHROLOGY  Therapy Orders:   No orders of the defined types were placed in this encounter.      ADVANCED DIRECTIVES     Code Status: Full Resuscitation         Respiratory support    Vent Settings Last Value   O2     Mode     Rate     Tidal Volume     Pressure Support     PEEP/CPAC/EPAP     FiO2     Peak Inspiratory Pressure     Plateau Pressure        No results available in last 24 hours     ASSESSMENT AND PLAN     Abdominal pain due to   Acute pancreatitis, severe  likely due to alcohol, without necrosis or infection  Alcohol use  -lipase 11,542 at admission  -IVF Yes-see orders  -pain control  -antiemetic  -proton pump inhibition  -diet NPO except sips and ice chips  -GI consult -notified Dr. Chaudhari this morning-anticipate additional pancreatitis  workup  -discussed the importance of complete alcohol cessation   -gallbladder ultrasound without evidence of biliary disease   -triglycerides elevated but not substantially so  -suspect patient has developed some ileus given distention and tympany-consider repeat imaging such as abdominal series or CT if needed  Transaminitis, essentially stable  Elevated total bilirubin, improving  Likely due to alcohol.  Gallbladder ultrasound negative.  Does have underlying fatty liver disease which may contribute.  Outpatient follow-up recommended.  Continue to trend as inpatient.  GI on consult  Leukocytosis, improving  Suspect related to pancreatitis.  Do not suspect acute infection.  Meets criteria for SIRS but do not suspect sepsis  Hypokalemia-> hyperkalemia  Status post treatment for hypokalemia and subsequent hyperkalemia.  See overnight events.  Normal this morning.  Continue to monitor.  Suspect to acute renal failure    Acute kidney injury/acute renal failure   Suspect related to severity of pancreatitis.  Nephrology consulted.  Discussed with Dr. Trent.  Imaging yesterday revealed normal anatomic kidneys.  Likely ATN.  Follow labs closely.  Avoid nephrotoxins.    Decreased breath sounds left lung base   Suspect pleural effusion has developed related to pancreatitis.  Stat chest x-ray ordered.  Additional imaging/workup if needed.  Consider thoracentesis if worsening.  Follow fluid status closely.    Sinus tachycardia, mildly improved   Likely due to pain and acute pancreatitis.  Place on telemetry.  Follow.  Additional workup if needed.  Fluid bolus x1 as patient has had fairly low urinary output.  Continue IV fluids.  Consider ECHO if persists.  Hyperglycemia   Suspect related to acute stress and pancreatitis.  Follow.  Consider initiation of insulin if needed.  Did receive glucose and insulin overnight.  Hypocalcemia   Suspect related to pancreatitis and IV fluid administration.  Speaks to the severity of the  amLODIPine 10 mg oral tablet: 1 tab(s) orally once a day  hydrALAZINE 25 mg oral tablet: 1 tab(s) orally 3 times a day  loratadine 10 mg oral tablet: 1 tab(s) orally once a day  Metoprolol Succinate  mg oral tablet, extended release: 1 tab(s) orally once a day  mometasone 220 mcg/inh inhalation aerosol powder: 220 microgram(s) inhaled 2 times a day   montelukast 10 mg oral tablet: 1 tab(s) orally every 24 hours  simvastatin 40 mg oral tablet: 1 tab(s) orally once a day (at bedtime)  Tradjenta 5 mg oral tablet: 1 tab(s) orally once a day   pancreatitis.  Check ionized calcium.  Start Tums t.i.d..  IV calcium if indicated.    Resolved hospital problems/chronic medical problems   Hypo magnesemia, resolved  -Supplement and follow  Metabolic acidosis, resolved  Hyponatremia, resolved     Code Status Information      Code Status     Full resuscitation     Smoking status: non smoker    Nutrition status: appropriate  Body mass index is 26.44 kg/m². - Overweight BMI 25-29  DVT Prophylaxis: Lovenox prophylactic dosing (dose adjusted as per renal function)         DISCHARGE PLANNING     The patient's treatment plans were discussed with patient and family, RN,  and consultant(s).     Recommendations for Discharge   SW     PT     OT     SLP        Anticipated discharge destination: Home  Expected Discharge Date: TBD 3-5 days  Barriers to Discharge: Patient is not medically ready and needs to remain in the hospital today due to acute health issues as above        Fer Pratt MD  Hospitalist  5/10/2023  7:21 AM

## 2023-07-03 ENCOUNTER — INPATIENT (INPATIENT)
Facility: HOSPITAL | Age: 78
LOS: 4 days | Discharge: HOME HEALTH | End: 2023-07-08
Attending: INTERNAL MEDICINE | Admitting: INTERNAL MEDICINE
Payer: MEDICARE

## 2023-07-03 VITALS
OXYGEN SATURATION: 98 % | WEIGHT: 164.02 LBS | RESPIRATION RATE: 24 BRPM | HEIGHT: 64 IN | SYSTOLIC BLOOD PRESSURE: 143 MMHG | HEART RATE: 65 BPM | DIASTOLIC BLOOD PRESSURE: 81 MMHG | TEMPERATURE: 98 F

## 2023-07-03 DIAGNOSIS — K92.2 GASTROINTESTINAL HEMORRHAGE, UNSPECIFIED: Chronic | ICD-10-CM

## 2023-07-03 PROBLEM — N18.6 END STAGE RENAL DISEASE: Chronic | Status: ACTIVE | Noted: 2022-02-17

## 2023-07-03 PROBLEM — I50.9 HEART FAILURE, UNSPECIFIED: Chronic | Status: ACTIVE | Noted: 2022-02-16

## 2023-07-03 LAB
ALBUMIN SERPL ELPH-MCNC: 3.4 G/DL — SIGNIFICANT CHANGE UP (ref 3.3–5)
ALP SERPL-CCNC: 92 U/L — SIGNIFICANT CHANGE UP (ref 40–120)
ALT FLD-CCNC: 8 U/L — LOW (ref 12–78)
ANION GAP SERPL CALC-SCNC: 1 MMOL/L — LOW (ref 5–17)
AST SERPL-CCNC: 23 U/L — SIGNIFICANT CHANGE UP (ref 15–37)
BASOPHILS # BLD AUTO: 0.05 K/UL — SIGNIFICANT CHANGE UP (ref 0–0.2)
BASOPHILS NFR BLD AUTO: 0.5 % — SIGNIFICANT CHANGE UP (ref 0–2)
BILIRUB SERPL-MCNC: 1.1 MG/DL — SIGNIFICANT CHANGE UP (ref 0.2–1.2)
BUN SERPL-MCNC: 19 MG/DL — SIGNIFICANT CHANGE UP (ref 7–23)
CALCIUM SERPL-MCNC: 9.5 MG/DL — SIGNIFICANT CHANGE UP (ref 8.5–10.1)
CHLORIDE SERPL-SCNC: 102 MMOL/L — SIGNIFICANT CHANGE UP (ref 96–108)
CO2 SERPL-SCNC: 34 MMOL/L — HIGH (ref 22–31)
CREAT SERPL-MCNC: 3.34 MG/DL — HIGH (ref 0.5–1.3)
EGFR: 13 ML/MIN/1.73M2 — LOW
EOSINOPHIL # BLD AUTO: 0.17 K/UL — SIGNIFICANT CHANGE UP (ref 0–0.5)
EOSINOPHIL NFR BLD AUTO: 1.6 % — SIGNIFICANT CHANGE UP (ref 0–6)
GLUCOSE BLDC GLUCOMTR-MCNC: 137 MG/DL — HIGH (ref 70–99)
GLUCOSE SERPL-MCNC: 120 MG/DL — HIGH (ref 70–99)
HCT VFR BLD CALC: 30.5 % — LOW (ref 34.5–45)
HGB BLD-MCNC: 10.2 G/DL — LOW (ref 11.5–15.5)
IMM GRANULOCYTES NFR BLD AUTO: 0.7 % — SIGNIFICANT CHANGE UP (ref 0–0.9)
LYMPHOCYTES # BLD AUTO: 1.21 K/UL — SIGNIFICANT CHANGE UP (ref 1–3.3)
LYMPHOCYTES # BLD AUTO: 11.7 % — LOW (ref 13–44)
MAGNESIUM SERPL-MCNC: 2.3 MG/DL — SIGNIFICANT CHANGE UP (ref 1.6–2.6)
MCHC RBC-ENTMCNC: 28.9 PG — SIGNIFICANT CHANGE UP (ref 27–34)
MCHC RBC-ENTMCNC: 33.4 G/DL — SIGNIFICANT CHANGE UP (ref 32–36)
MCV RBC AUTO: 86.4 FL — SIGNIFICANT CHANGE UP (ref 80–100)
MONOCYTES # BLD AUTO: 0.94 K/UL — HIGH (ref 0–0.9)
MONOCYTES NFR BLD AUTO: 9.1 % — SIGNIFICANT CHANGE UP (ref 2–14)
NEUTROPHILS # BLD AUTO: 7.9 K/UL — HIGH (ref 1.8–7.4)
NEUTROPHILS NFR BLD AUTO: 76.4 % — SIGNIFICANT CHANGE UP (ref 43–77)
NRBC # BLD: 0 /100 WBCS — SIGNIFICANT CHANGE UP (ref 0–0)
NT-PROBNP SERPL-SCNC: HIGH PG/ML (ref 0–450)
PLATELET # BLD AUTO: 132 K/UL — LOW (ref 150–400)
POTASSIUM SERPL-MCNC: 3.7 MMOL/L — SIGNIFICANT CHANGE UP (ref 3.5–5.3)
POTASSIUM SERPL-SCNC: 3.7 MMOL/L — SIGNIFICANT CHANGE UP (ref 3.5–5.3)
PROT SERPL-MCNC: 7.8 GM/DL — SIGNIFICANT CHANGE UP (ref 6–8.3)
RAPID RVP RESULT: SIGNIFICANT CHANGE UP
RBC # BLD: 3.53 M/UL — LOW (ref 3.8–5.2)
RBC # FLD: 17.2 % — HIGH (ref 10.3–14.5)
SARS-COV-2 RNA SPEC QL NAA+PROBE: SIGNIFICANT CHANGE UP
SODIUM SERPL-SCNC: 137 MMOL/L — SIGNIFICANT CHANGE UP (ref 135–145)
TROPONIN I, HIGH SENSITIVITY RESULT: 76.9 NG/L — HIGH
WBC # BLD: 10.34 K/UL — SIGNIFICANT CHANGE UP (ref 3.8–10.5)
WBC # FLD AUTO: 10.34 K/UL — SIGNIFICANT CHANGE UP (ref 3.8–10.5)

## 2023-07-03 PROCEDURE — 93010 ELECTROCARDIOGRAM REPORT: CPT

## 2023-07-03 PROCEDURE — 71045 X-RAY EXAM CHEST 1 VIEW: CPT | Mod: 26

## 2023-07-03 PROCEDURE — 99285 EMERGENCY DEPT VISIT HI MDM: CPT | Mod: FS

## 2023-07-03 PROCEDURE — 71250 CT THORAX DX C-: CPT | Mod: 26,MA

## 2023-07-03 RX ORDER — DEXTROSE 50 % IN WATER 50 %
12.5 SYRINGE (ML) INTRAVENOUS ONCE
Refills: 0 | Status: DISCONTINUED | OUTPATIENT
Start: 2023-07-03 | End: 2023-07-08

## 2023-07-03 RX ORDER — DEXTROSE 50 % IN WATER 50 %
25 SYRINGE (ML) INTRAVENOUS ONCE
Refills: 0 | Status: DISCONTINUED | OUTPATIENT
Start: 2023-07-03 | End: 2023-07-08

## 2023-07-03 RX ORDER — INSULIN LISPRO 100/ML
VIAL (ML) SUBCUTANEOUS
Refills: 0 | Status: DISCONTINUED | OUTPATIENT
Start: 2023-07-03 | End: 2023-07-08

## 2023-07-03 RX ORDER — HYDRALAZINE HCL 50 MG
25 TABLET ORAL DAILY
Refills: 0 | Status: DISCONTINUED | OUTPATIENT
Start: 2023-07-03 | End: 2023-07-08

## 2023-07-03 RX ORDER — ONDANSETRON 8 MG/1
4 TABLET, FILM COATED ORAL EVERY 8 HOURS
Refills: 0 | Status: DISCONTINUED | OUTPATIENT
Start: 2023-07-03 | End: 2023-07-08

## 2023-07-03 RX ORDER — SODIUM CHLORIDE 9 MG/ML
1000 INJECTION, SOLUTION INTRAVENOUS
Refills: 0 | Status: DISCONTINUED | OUTPATIENT
Start: 2023-07-03 | End: 2023-07-08

## 2023-07-03 RX ORDER — GLUCAGON INJECTION, SOLUTION 0.5 MG/.1ML
1 INJECTION, SOLUTION SUBCUTANEOUS ONCE
Refills: 0 | Status: DISCONTINUED | OUTPATIENT
Start: 2023-07-03 | End: 2023-07-08

## 2023-07-03 RX ORDER — LANOLIN ALCOHOL/MO/W.PET/CERES
3 CREAM (GRAM) TOPICAL AT BEDTIME
Refills: 0 | Status: DISCONTINUED | OUTPATIENT
Start: 2023-07-03 | End: 2023-07-08

## 2023-07-03 RX ORDER — DEXTROSE 50 % IN WATER 50 %
15 SYRINGE (ML) INTRAVENOUS ONCE
Refills: 0 | Status: DISCONTINUED | OUTPATIENT
Start: 2023-07-03 | End: 2023-07-08

## 2023-07-03 RX ORDER — PIPERACILLIN AND TAZOBACTAM 4; .5 G/20ML; G/20ML
3.38 INJECTION, POWDER, LYOPHILIZED, FOR SOLUTION INTRAVENOUS ONCE
Refills: 0 | Status: COMPLETED | OUTPATIENT
Start: 2023-07-03 | End: 2023-07-03

## 2023-07-03 RX ORDER — VANCOMYCIN HCL 1 G
1000 VIAL (EA) INTRAVENOUS ONCE
Refills: 0 | Status: COMPLETED | OUTPATIENT
Start: 2023-07-03 | End: 2023-07-03

## 2023-07-03 RX ORDER — AMLODIPINE BESYLATE 2.5 MG/1
10 TABLET ORAL DAILY
Refills: 0 | Status: DISCONTINUED | OUTPATIENT
Start: 2023-07-03 | End: 2023-07-08

## 2023-07-03 RX ORDER — SIMVASTATIN 20 MG/1
20 TABLET, FILM COATED ORAL AT BEDTIME
Refills: 0 | Status: DISCONTINUED | OUTPATIENT
Start: 2023-07-03 | End: 2023-07-04

## 2023-07-03 RX ORDER — ACETAMINOPHEN 500 MG
650 TABLET ORAL EVERY 6 HOURS
Refills: 0 | Status: DISCONTINUED | OUTPATIENT
Start: 2023-07-03 | End: 2023-07-08

## 2023-07-03 RX ADMIN — Medication 25 MILLIGRAM(S): at 23:52

## 2023-07-03 RX ADMIN — Medication 250 MILLIGRAM(S): at 20:00

## 2023-07-03 RX ADMIN — PIPERACILLIN AND TAZOBACTAM 200 GRAM(S): 4; .5 INJECTION, POWDER, LYOPHILIZED, FOR SOLUTION INTRAVENOUS at 19:04

## 2023-07-03 RX ADMIN — Medication 3 MILLIGRAM(S): at 23:52

## 2023-07-03 RX ADMIN — Medication 200 MILLIGRAM(S): at 16:31

## 2023-07-03 NOTE — ED PROVIDER NOTE - CARE PLAN
Principal Discharge DX:	Left lower lobe pneumonia   1 Principal Discharge DX:	Left lower lobe pneumonia  Secondary Diagnosis:	Hypoxia

## 2023-07-03 NOTE — ED PROVIDER NOTE - OBJECTIVE STATEMENT
78F with PMH HTN, DM2, CHF, ESRD (on HD MWF) who presents to ED with cough x 5 days associated with bilateral rib pain and shortness of breath. Denies fever, chills, chest pain, shortness of breath, abdominal pain, N/V/D, dysuria, urinary frequency/urgency, extremity weakness/numbness/tingling, lightheadedness, dizziness, or headaches. 78F with PMH HTN, DM2, CHF, ESRD (on HD MWF) who presents to ED with persistent dry cough x 5 days associated with bilateral rib/chest pain and shortness of breath. Pt went to HD today and received full session, states that while at HD, she complained of shortness of breath and chest pain due to coughing, spO2 after HD was reportedly ~78%. Ambulance was called at that time and pt was placed on 4L NC and spO2 improved to 98%, pt does NOT use supplemental O2 at home. Pt lives alone at home, denies history of COPD/Asthma. No recent travel, no recent illness, no known ill contacts. Denies fever, chills, palpitations, abdominal pain, N/V/D, dysuria, urinary frequency/urgency, extremity weakness/numbness/tingling, LE edema, lightheadedness, dizziness, or headaches.

## 2023-07-03 NOTE — ED PROVIDER NOTE - PROGRESS NOTE DETAILS
Attending Melissa: spoke w/ nephro who will come eval pt. pending CT chest. will need admission for new oxygen requirement Attending Lanio: CT chest w/ L lower lobe pneumonia. abx ordered. spoke w/ hospitalist Dr. Byrne who accepted pt Attending Melissa: spoke w/ nephro who will come eval pt. pending CT chest. will need admission for new oxygen requirement. Attending Melissa: CT chest w/ L lower lobe pneumonia. abx ordered. spoke w/ hospitalist Dr. Byrne who accepted pt.

## 2023-07-03 NOTE — ED PROVIDER NOTE - RESPIRATORY CHEST EXAM
Chest wall symmetrical, no retractions, no use of accessory muscles, chest wall non-tender to palpation./normal

## 2023-07-03 NOTE — ED PROVIDER NOTE - CHIEF COMPLAINT
The patient is a 78y Female complaining of difficulty breathing.
normal/rales/no chest wall tenderness/no intercostal retractions/no rhonchi/respirations non-labored/no wheezes/airway patent

## 2023-07-03 NOTE — ED ADULT NURSE NOTE - BREATHING, MLM
July 24, 2019      Pop Henderson MD  [address]  [facility] [address]           Jordon Cory - Cardiovascular Surg  [address]  [facility] [address]  Phone: [phone]          Patient: Kaylee Romero   MR Number: [id number]   YOB: 1948   Date of Visit: 7/24/2019       Dear Dr. Pop Henderson:    Thank you for referring Kaylee Romero to me for evaluation. Attached you will find relevant portions of my assessment and plan of care.    If you have questions, please do not hesitate to call me. I look forward to following Kaylee Romero along with you.    Sincerely,    Veda Tubbs RN    Enclosure  CC:  No Recipients    If you would like to receive this communication electronically, please contact [email or web address] or [phone] to request more information on [employer] access.    For providers and/or their staff who would like to refer a patient to [facility], please contact us through our one-stop-shop provider referral line, [facility], at [phone].    If you feel you have received this communication in error or would no longer like to receive these types of communications, please e-mail [email or web address]          Spontaneous, unlabored and symmetrical

## 2023-07-03 NOTE — ED CLERICAL - NS ED CLERK UNITS
Daily Note     Today's date: 2019  Patient name: Monae Ramirez  : 1953  MRN: [id number]  Referring provider: Jorden Galvan MD  Dx:   Encounter Diagnosis     ICD-10-CM    1  Primary osteoarthritis, left shoulder M19 012                   Subjective: Pt reports feeling better  HEP going well  Has not been experiencing shooting pains  b      Objective: See treatment diary below  Precautions: cardiac stent, hypertension      Manual                                                                                   Exercise Diary             pulleys nv 5 min           Doorway pec stretch 30"x3 30"x3           Wall slides 10" x10 10" x10           TB rows GTB 5"x20 GTB 5"x20           Tb extension nv GTB 5"x20           Towel IR stretch nv 10"x 10           Doorway ER stretch nv 30"x3           TB ER nv RTB x20           TB IR  RTB x20                                                                                                                                                              Modalities                                                                Assessment: Tolerated treatment well  Patient demonstrated fatigue post treatment, exhibited good technique with therapeutic exercises and would benefit from continued PT   Updated HEP today  Plan: Continue per plan of care  TELE 247D/2C

## 2023-07-03 NOTE — ED PROVIDER NOTE - ATTENDING APP SHARED VISIT CONTRIBUTION OF CARE
Attending Melissa: I performed a history and physical exam of the patient and discussed their management with the JASPER. I have reviewed the JASPER note and agree with the documented findings and plan of care, except as noted. This was a shared visit with an JASPER. I reviewed and verified the documentation and independently performed my own history/exam/medical decision making. My medical decision making and observations are found above. Please refer to any progress notes for updates on clinical course.

## 2023-07-03 NOTE — ED ADULT TRIAGE NOTE - CHIEF COMPLAINT QUOTE
BIBEMS from nursing home c/o b/l chest pain, cough and difficulty breathing started yesterday. pt states went to dialysis today and states she felt worse. As per EMS, oxygen was stating at 78% and was given 4L of oxygen, went up to 97%. dialysis M-W-F

## 2023-07-03 NOTE — ED ADULT NURSE NOTE - OBJECTIVE STATEMENT
Pt is A&OX4, BIBEMS from nursing home c/o b/l chest pain, cough and difficulty breathing started yesterday. pt states went to dialysis today and states she felt worse. As per EMS, oxygen was stating at 78% and was given 4L of oxygen, went up to 97%. dialysis M-W-F. Left fistula and right anterior chest wall dialysis access noted.

## 2023-07-03 NOTE — ED ADULT NURSE NOTE - NSFALLUNIVINTERV_ED_ALL_ED
Bed/Stretcher in lowest position, wheels locked, appropriate side rails in place/Call bell, personal items and telephone in reach/Instruct patient to call for assistance before getting out of bed/chair/stretcher/Non-slip footwear applied when patient is off stretcher/[place] to call system/Physically safe environment - no spills, clutter or unnecessary equipment/Purposeful proactive rounding/Room/bathroom lighting operational, light cord in reach

## 2023-07-03 NOTE — CONSULT NOTE ADULT - SUBJECTIVE AND OBJECTIVE BOX
Patient chart reviewed, full consult to follow.       Suspected element of fluid overload  Discussed with Dr. Torres     Will arrange for HD tonight.    Thank you for the courtesy of this consultation. [facility], [facility]  NEPHROLOGY AND HYPERTENSION  [address]  [address]  [place] [address]  [phone]    MD PRAVEEN LYNNE MD YELENA ROSENBERG, MD BINNY KOSHY, MD CHRISTOPHER CAPUTO, MD EDWARD BOVER, MD      Information from chart:  "Patient is a 78y old  Female who presents with a chief complaint of increasing cough  Non productive; no   fever   Completed HD today        PAST MEDICAL & SURGICAL HISTORY:  HTN (hypertension)      Pacemaker      DVT (deep venous thrombosis)      Diverticulitis      CHF (congestive heart failure)      ESRD on dialysis      Intestinal bleeding        FAMILY HISTORY:  FH: HTN (hypertension)      Allergies    Eliquis (Other)    Intolerances      Home Medications:  amLODIPine 10 mg oral tablet: 1 tab(s) orally once a day (25 Feb 2022 13:30)  hydrALAZINE 25 mg oral tablet: 1 tab(s) orally 3 times a day (25 Feb 2022 13:30)  Metoprolol Succinate  mg oral tablet, extended release: 1 tab(s) orally once a day (25 Feb 2022 13:30)  simvastatin 40 mg oral tablet: 1 tab(s) orally once a day (at bedtime) (25 Feb 2022 13:30)  Tradjenta 5 mg oral tablet: 1 tab(s) orally once a day (25 Feb 2022 13:30)    MEDICATIONS  (STANDING):    MEDICATIONS  (PRN):    Vital Signs Last 24 Hrs  T(C): 36.9 (03 Jul 2023 14:32), Max: 36.9 (03 Jul 2023 14:32)  T(F): 98.4 (03 Jul 2023 14:32), Max: 98.4 (03 Jul 2023 14:32)  HR: 65 (03 Jul 2023 14:32) (65 - 65)  BP: 143/81 (03 Jul 2023 14:32) (143/81 - 143/81)  BP(mean): --  RR: 24 (03 Jul 2023 14:32) (24 - 24)  SpO2: 98% (03 Jul 2023 14:32) (98% - 98%)    Parameters below as of 03 Jul 2023 14:32  Patient On (Oxygen Delivery Method): nasal cannula  O2 Flow (L/min): 4      Daily Height in cm: 162.56 (03 Jul 2023 14:32)    Daily     CAPILLARY BLOOD GLUCOSE      POCT Blood Glucose.: 87 mg/dL (03 Jul 2023 15:52)    PHYSICAL EXAM:      T(C): 36.9 (07-03-23 @ 14:32), Max: 36.9 (07-03-23 @ 14:32)  HR: 65 (07-03-23 @ 14:32) (65 - 65)  BP: 143/81 (07-03-23 @ 14:32) (143/81 - 143/81)  RR: 24 (07-03-23 @ 14:32) (24 - 24)  SpO2: 98% (07-03-23 @ 14:32) (98% - 98%)  Wt(kg): --  Lungs clear  Heart S1S2  Abd soft NT ND  Extremities:   tr edema  LUE + avf + bruit and thrill               07-03    137  |  102  |  19  ----------------------------<  120<H>  3.7   |  34<H>  |  3.34<H>    Ca    9.5      03 Jul 2023 15:50  Mg     2.3     07-03    TPro  7.8  /  Alb  3.4  /  TBili  1.1  /  DBili  x   /  AST  23  /  ALT  8<L>  /  AlkPhos  92  07-03                          10.2   10.34 )-----------( 132      ( 03 Jul 2023 15:50 )             30.5     Creatinine Trend: 3.34<--  Urinalysis Basic - ( 03 Jul 2023 15:50 )    Color: x / Appearance: x / SG: x / pH: x  Gluc: 120 mg/dL / Ketone: x  / Bili: x / Urobili: x   Blood: x / Protein: x / Nitrite: x   Leuk Esterase: x / RBC: x / WBC x   Sq Epi: x / Non Sq Epi: x / Bacteria: x            Assessment   ESRD; cough; element of overload   R/O PNA;   Plan  HD, isolated UF  CT chest;   Will follow course    Erickson Ray MD

## 2023-07-03 NOTE — ED PROVIDER NOTE - PHYSICAL EXAMINATION
Gen: NAD, AOx3, able to make needs known, non-toxic  Head: NCAT  HEENT: EOMI, oral mucosa moist, normal conjunctiva  Lung: no resp distress, speaking in full sentences, diminished breath sounds at the bases b/l, hypoxic to 80s on room air improves to 90s on NC  CV: RRR, no murmurs  Abd: non distended, soft, nontender, no guarding  MSK: no visible deformities. LUE fistula. R chest tunneled HD catheter  Neuro: Appears non focal  Skin: Warm, well perfused  Psych: normal affect

## 2023-07-03 NOTE — ED PROVIDER NOTE - CLINICAL SUMMARY MEDICAL DECISION MAKING FREE TEXT BOX
78F with PMH HTN, DM2, CHF, ESRD (on HD MWF) who presents to ED with cough x 5 days associated with bilateral rib pain and shortness of breath. Patient currently afebrile, hemodynamically stable, spO2 100%. Based on history and physical, differentials include but are not limited to viral illness/COVID/Flu, pneumonia, CHF. Plan to assess patient for acute pathology as listed above with Labs, EKG, CXR, CT. Will administer medications for symptomatic relief, follow-up on results, and reassess. 78F with PMH HTN, DM2, CHF, ESRD (on HD MWF) who presents to ED with cough x 5 days associated with bilateral rib pain and shortness of breath. Patient currently afebrile, hemodynamically stable, spO2 100%. Based on history and physical, differentials include but are not limited to viral illness/COVID/Flu, pneumonia, CHF. Plan to assess patient for acute pathology as listed above with Labs, EKG, CXR, CT. Will administer medications for symptomatic relief, follow-up on results, and reassess.    Attending Melissa: 79 y/o F w/ PMH of CHD, ESRD on HD MWF, DM, HTN presenting w/ cough and chest pain. Agree w/ above documented by PA. Pt w/ 5 days of cough. Reports coughing so much causing pain in her chest and back. Was at HD today and staff there called EMS due to chest pain. EMS found pt hypoxic to 70s which improved on NC. Pt reports having full HD session today and had extra fluid taken off on Friday. No known sick contacts. Pt overall no acute distress. Lungs w/ diminished breath sounds at the bases, hypoxic on room air to 80s. HR regular. Abd nondistended/soft/nontender. Non focal neuro exam. Pt w/ cough, hypoxia, chest/back pain. Suspect pain from coughing. Doubtful ACS, but will obtain trop and EKG. Eval for PNA. Eval for CHF. Do not suspect PE at this time. Possible viral URI. Plan for labs, imaging, EKG, meds. Will require admission given new oxygen requirement. Will reassess the need for additional interventions as clinically warranted. 78F with PMH HTN, DM2, CHF, ESRD (on HD MWF) who presents to ED with cough x 5 days associated with bilateral rib pain and shortness of breath. Patient currently afebrile, hemodynamically stable, spO2 95% on 4L NC. Based on history and physical, differentials include but are not limited to viral illness/COVID/Flu, pneumonia, ACS, CHF. Plan to assess patient for acute pathology as listed above with Labs, EKG, CXR, CT. Will administer medications for symptomatic relief, follow-up on results, and reassess.    Attending Nello: 79 y/o F w/ PMH of CHD, ESRD on HD MWF, DM, HTN presenting w/ cough and chest pain. Agree w/ above documented by PA. Pt w/ 5 days of cough. Reports coughing so much causing pain in her chest and back. Was at HD today and staff there called EMS due to chest pain. EMS found pt hypoxic to 70s which improved on NC. Pt reports having full HD session today and had extra fluid taken off on Friday. No known sick contacts. Pt overall no acute distress. Lungs w/ diminished breath sounds at the bases, hypoxic on room air to 80s. HR regular. Abd nondistended/soft/nontender. Non focal neuro exam. Pt w/ cough, hypoxia, chest/back pain. Suspect pain from coughing. Doubtful ACS, but will obtain trop and EKG. Eval for PNA. Eval for CHF. Do not suspect PE at this time. Possible viral URI. Plan for labs, imaging, EKG, meds. Will require admission given new oxygen requirement. Will reassess the need for additional interventions as clinically warranted.

## 2023-07-04 LAB
A1C WITH ESTIMATED AVERAGE GLUCOSE RESULT: 5.7 % — HIGH (ref 4–5.6)
ANION GAP SERPL CALC-SCNC: 2 MMOL/L — LOW (ref 5–17)
BUN SERPL-MCNC: 33 MG/DL — HIGH (ref 7–23)
CALCIUM SERPL-MCNC: 9.5 MG/DL — SIGNIFICANT CHANGE UP (ref 8.5–10.1)
CHLORIDE SERPL-SCNC: 102 MMOL/L — SIGNIFICANT CHANGE UP (ref 96–108)
CO2 SERPL-SCNC: 34 MMOL/L — HIGH (ref 22–31)
CREAT SERPL-MCNC: 4.53 MG/DL — HIGH (ref 0.5–1.3)
EGFR: 9 ML/MIN/1.73M2 — LOW
ESTIMATED AVERAGE GLUCOSE: 117 MG/DL — HIGH (ref 68–114)
GLUCOSE BLDC GLUCOMTR-MCNC: 109 MG/DL — HIGH (ref 70–99)
GLUCOSE BLDC GLUCOMTR-MCNC: 136 MG/DL — HIGH (ref 70–99)
GLUCOSE BLDC GLUCOMTR-MCNC: 137 MG/DL — HIGH (ref 70–99)
GLUCOSE BLDC GLUCOMTR-MCNC: 172 MG/DL — HIGH (ref 70–99)
GLUCOSE SERPL-MCNC: 116 MG/DL — HIGH (ref 70–99)
HBV SURFACE AB SER-ACNC: 54.5 MIU/ML — SIGNIFICANT CHANGE UP
HBV SURFACE AG SER-ACNC: SIGNIFICANT CHANGE UP
HCT VFR BLD CALC: 28.2 % — LOW (ref 34.5–45)
HGB BLD-MCNC: 9.3 G/DL — LOW (ref 11.5–15.5)
MCHC RBC-ENTMCNC: 28.7 PG — SIGNIFICANT CHANGE UP (ref 27–34)
MCHC RBC-ENTMCNC: 33 G/DL — SIGNIFICANT CHANGE UP (ref 32–36)
MCV RBC AUTO: 87 FL — SIGNIFICANT CHANGE UP (ref 80–100)
NRBC # BLD: 0 /100 WBCS — SIGNIFICANT CHANGE UP (ref 0–0)
PLATELET # BLD AUTO: 142 K/UL — LOW (ref 150–400)
POTASSIUM SERPL-MCNC: 4 MMOL/L — SIGNIFICANT CHANGE UP (ref 3.5–5.3)
POTASSIUM SERPL-SCNC: 4 MMOL/L — SIGNIFICANT CHANGE UP (ref 3.5–5.3)
RBC # BLD: 3.24 M/UL — LOW (ref 3.8–5.2)
RBC # FLD: 17.2 % — HIGH (ref 10.3–14.5)
SODIUM SERPL-SCNC: 138 MMOL/L — SIGNIFICANT CHANGE UP (ref 135–145)
TROPONIN I, HIGH SENSITIVITY RESULT: 62.6 NG/L — HIGH
WBC # BLD: 9.91 K/UL — SIGNIFICANT CHANGE UP (ref 3.8–10.5)
WBC # FLD AUTO: 9.91 K/UL — SIGNIFICANT CHANGE UP (ref 3.8–10.5)

## 2023-07-04 PROCEDURE — 99232 SBSQ HOSP IP/OBS MODERATE 35: CPT

## 2023-07-04 RX ORDER — SIMVASTATIN 20 MG/1
20 TABLET, FILM COATED ORAL AT BEDTIME
Refills: 0 | Status: DISCONTINUED | OUTPATIENT
Start: 2023-07-04 | End: 2023-07-08

## 2023-07-04 RX ORDER — METOPROLOL TARTRATE 50 MG
100 TABLET ORAL DAILY
Refills: 0 | Status: DISCONTINUED | OUTPATIENT
Start: 2023-07-04 | End: 2023-07-08

## 2023-07-04 RX ORDER — HEPARIN SODIUM 5000 [USP'U]/ML
5000 INJECTION INTRAVENOUS; SUBCUTANEOUS EVERY 8 HOURS
Refills: 0 | Status: DISCONTINUED | OUTPATIENT
Start: 2023-07-04 | End: 2023-07-08

## 2023-07-04 RX ORDER — AZITHROMYCIN 500 MG/1
500 TABLET, FILM COATED ORAL DAILY
Refills: 0 | Status: DISCONTINUED | OUTPATIENT
Start: 2023-07-04 | End: 2023-07-05

## 2023-07-04 RX ORDER — CEFTRIAXONE 500 MG/1
1000 INJECTION, POWDER, FOR SOLUTION INTRAMUSCULAR; INTRAVENOUS EVERY 24 HOURS
Refills: 0 | Status: COMPLETED | OUTPATIENT
Start: 2023-07-04 | End: 2023-07-08

## 2023-07-04 RX ADMIN — HEPARIN SODIUM 5000 UNIT(S): 5000 INJECTION INTRAVENOUS; SUBCUTANEOUS at 13:31

## 2023-07-04 RX ADMIN — HEPARIN SODIUM 5000 UNIT(S): 5000 INJECTION INTRAVENOUS; SUBCUTANEOUS at 05:46

## 2023-07-04 RX ADMIN — Medication 100 MILLIGRAM(S): at 05:45

## 2023-07-04 RX ADMIN — CEFTRIAXONE 100 MILLIGRAM(S): 500 INJECTION, POWDER, FOR SOLUTION INTRAMUSCULAR; INTRAVENOUS at 02:07

## 2023-07-04 RX ADMIN — AMLODIPINE BESYLATE 10 MILLIGRAM(S): 2.5 TABLET ORAL at 05:47

## 2023-07-04 RX ADMIN — SIMVASTATIN 20 MILLIGRAM(S): 20 TABLET, FILM COATED ORAL at 21:53

## 2023-07-04 RX ADMIN — AZITHROMYCIN 500 MILLIGRAM(S): 500 TABLET, FILM COATED ORAL at 13:31

## 2023-07-04 RX ADMIN — Medication 100 MILLIGRAM(S): at 05:46

## 2023-07-04 RX ADMIN — HEPARIN SODIUM 5000 UNIT(S): 5000 INJECTION INTRAVENOUS; SUBCUTANEOUS at 21:53

## 2023-07-04 NOTE — H&P ADULT - NSHPLABSRESULTS_GEN_ALL_CORE
10.2   10.34 )-----------( 132      ( 03 Jul 2023 15:50 )             30.5     07-03    137  |  102  |  19  ----------------------------<  120<H>  3.7   |  34<H>  |  3.34<H>    Ca    9.5      03 Jul 2023 15:50  Mg     2.3     07-03    TPro  7.8  /  Alb  3.4  /  TBili  1.1  /  DBili  x   /  AST  23  /  ALT  8<L>  /  AlkPhos  92  07-03      Urinalysis Basic - ( 03 Jul 2023 15:50 )    Color: x / Appearance: x / SG: x / pH: x  Gluc: 120 mg/dL / Ketone: x  / Bili: x / Urobili: x   Blood: x / Protein: x / Nitrite: x   Leuk Esterase: x / RBC: x / WBC x   Sq Epi: x / Non Sq Epi: x / Bacteria: x      Xray Chest 1 View- PORTABLE-Urgent (Xray Chest 1 View- PORTABLE-Urgent .) (07.03.23 @ 16:40) >    Heart enlargement, left-sided pacemaker, large right jugular line remain.    There are some central congestive findings increased from February 23   this year.    IMPRESSION: Increasing CHF.    CT Chest No Cont (07.03.23 @ 17:55) >    IMPRESSION:    Left lower lobe pneumonia.. Recommend follow-up chest CT in 1-3 months to   determine resolution.    Small bilateral pleural effusions and septal thickening likely   representing superimposed edema.

## 2023-07-04 NOTE — H&P ADULT - NSHPPHYSICALEXAM_GEN_ALL_CORE
GENERAL: NAD, well-groomed, well-developed  HEAD:  Atraumatic, Normocephalic  EYES: conjunctiva and sclera clear  ENMT:  Moist mucous membranes, Good dentition, No lesions  NECK: Supple, No JVD, Normal thyroid  NERVOUS SYSTEM:  Alert & Oriented X3, Good concentration; Motor Strength 5/5 B/L upper and lower extremities; DTRs 2+ intact and symmetric  CHEST/LUNG: +cough, left lung base rales, No rhonchi, wheezing, or rubs  HEART: Regular rate and rhythm; No murmurs, rubs, or gallops  ABDOMEN: Soft, Nontender, Nondistended; Bowel sounds present  EXTREMITIES:  2+ Peripheral Pulses, No clubbing, cyanosis, or edema  SKIN: No rashes or lesions

## 2023-07-04 NOTE — H&P ADULT - NSHPREVIEWOFSYSTEMS_GEN_ALL_CORE
REVIEW OF SYSTEMS:    CONSTITUTIONAL: No fever, NO generalized weakness/Fatigue, No weight loss  EYES: No eye pain, visual disturbances, or discharge  ENMT:  No difficulty hearing, tinnitus, vertigo; No sinus or throat pain  NECK: No pain or stiffness  RESPIRATORY: positive shortness of breath,  cough, and wheezing, No sputum or hemoptysis   CARDIOVASCULAR: No chest pain, palpitations, or leg swelling  GASTROINTESTINAL: No abdominal pain. No nausea, vomiting, diarrhea or constipation. No melena or hematochezia.  GENITOURINARY: No dysuria, frequency, hematuria, or incontinence  SKIN: No itching, burning, rashes, or lesions   MUSCULOSKELETAL: No joint pain or swelling; No muscle, back, or extremity pain  HEME/LYMPH: No easy bruising, or bleeding gums  NEUROLOGICAL: No headaches, Dizziness, memory loss, loss of strength, numbness, or tremors  PSYCHIATRIC: No depression, anxiety, mood swings, or difficulty sleeping

## 2023-07-04 NOTE — H&P ADULT - ASSESSMENT
Left Lower Lobe gram negative bacterial PNA  acute respiratory failure w/ hypoxia POA   CT chest: Left lower lobe pneumonia.  will cont w/ IV abx, antitussives  Titrate oxygen off as tolerated     Elevated troponin   No EKG changes   most likely demand ischemia, will trend CE     HTN:   cont w/ Metoprolol, Norvasc and Hydralazine     DM:   f/u A1c  cont w/ FS monitoring w/ insulin s/s coverage     ESRD:   Nephrology consult appreciated   cont w/ HD as scheduled     DVTp: Heparin

## 2023-07-04 NOTE — PROGRESS NOTE ADULT - ASSESSMENT
79 yo female w/ pmhx of Diabetes, HTN, HF, ESRD (HD MWF) admitted to TELE for PNA/ acute hypoxemic respiratory failure    PULMNOLOGY   # left lower lobe PNA  - CT lung- shows left lower lobe PNA  - Rocephin day # 2   - PULM to be consulted   - cough medicine    CARDIOLOGY  # HTN  # HLD  - lipitor  - Metoprolol, Norvasc and Hydralazine     ENDOCRINOLOGY  # DM  - HA1c - pending  - RISS    NEPHROLOGY  # ESRD  - Nephrology consult appreciated   - dialyzed on 7/4    PLAN  - c/w TELE  - PT evaluation and treat  - turn every 2 hrs   - consult PULM     DVTp: Heparin      79 yo female w/ pmhx of Diabetes, HTN, HF, ESRD (HD MWF) admitted to TELE for PNA/ acute hypoxemic respiratory failure    PULMNOLOGY   # left lower lobe PNA  - CT lung- shows left lower lobe PNA  - Rocephin day # 2   - PULM to be consulted   - cough medicine    CARDIOLOGY  # HTN  # HLD  # troponiemia  # myocardial ischemia  - lipitor  - Metoprolol, Norvasc and Hydralazine   - troponin is elevated but nonspecific in setting of CKD/ESRD  - pt endorses no chest pain, doubtful of ACS    ENDOCRINOLOGY  # DM  - HA1c - 5.7  - RISS    NEPHROLOGY  # ESRD  - Nephrology consult appreciated   - dialyzed on 7/4    PLAN  - c/w TELE  - PT evaluation and treat  - turn every 2 hrs   - consult PULM     DVTp: Heparin      77 yo female w/ pmhx of Diabetes, HTN, HF, ESRD (HD MWF) admitted to TELE for PNA/ acute hypoxemic respiratory failure    PULMNOLOGY   # left lower lobe PNA  - CT lung- shows left lower lobe PNA  - Rocephin day # 2   - PULM to be consulted   - cough medicine    CARDIOLOGY  # HTN  # HLD  # troponiemia  # myocardial ischemia  - lipitor  - Metoprolol, Norvasc and Hydralazine   - troponin is elevated but nonspecific in setting of CKD/ESRD  - pt endorses no chest pain, doubtful of ACS    ENDOCRINOLOGY  # DM  - HA1c - 5.7  - RISS    NEPHROLOGY  # ESRD  - Nephrology consult appreciated   - dialyzed on 7/4    PLAN  - c/w TELE  - PT evaluation and treat  - trend troponin in the am  - turn every 2 hrs   - consult PULM     DVTp: Heparin

## 2023-07-04 NOTE — CONSULT NOTE ADULT - SUBJECTIVE AND OBJECTIVE BOX
Patient is a 78y old  Female who presents with a chief complaint of SOB, PNA (04 Jul 2023 18:42)    HPI:  77 y/o F with HTN, DM, CHF, ESRD (HD MWF), S/P PPM for arrhythmias, DVT, GI bleed.  Non smoker.   Presented  to ER w/ c/o cough and shortness of breath for the past 5 days. Cough is a dry but is associated with wheezing and Rib cage pain with each cough.  Symptoms got worst at dialysis center , so EMS were called who found her with SPo2 of 78%.   She denies Fever, Nausea/vomiting/ Diarrhea and dysuria. No CP, palpitation, dizziness, Leg edema or abdominal pain.    CT chest reports LLL pneumonia.    PAST MEDICAL & SURGICAL HISTORY:  HTN (hypertension)    Pacemaker    DVT (deep venous thrombosis)    Diverticulitis    CHF (congestive heart failure)    ESRD on dialysis    Intestinal bleeding    FAMILY HISTORY:  FH: HTN (hypertensio  SOCIAL HISTORY: BMI (kg/m2): 27.2 (07-03 @ 22:53), non smoker    Allergies  Eliquis (Other)    MEDICATIONS  (STANDING):  amLODIPine   Tablet 10 milliGRAM(s) Oral daily  azithromycin   Tablet 500 milliGRAM(s) Oral daily  cefTRIAXone   IVPB 1000 milliGRAM(s) IV Intermittent every 24 hours  dextrose 5%. 1000 milliLiter(s) (50 mL/Hr) IV Continuous <Continuous>  dextrose 5%. 1000 milliLiter(s) (100 mL/Hr) IV Continuous <Continuous>  dextrose 50% Injectable 25 Gram(s) IV Push once  dextrose 50% Injectable 25 Gram(s) IV Push once  dextrose 50% Injectable 12.5 Gram(s) IV Push once  glucagon  Injectable 1 milliGRAM(s) IntraMuscular once  heparin   Injectable 5000 Unit(s) SubCutaneous every 8 hours  hydrALAZINE 25 milliGRAM(s) Oral daily  insulin lispro (ADMELOG) corrective regimen sliding scale   SubCutaneous three times a day before meals  metoprolol succinate  milliGRAM(s) Oral daily  simvastatin 20 milliGRAM(s) Oral at bedtime    MEDICATIONS  (PRN):  acetaminophen     Tablet .. 650 milliGRAM(s) Oral every 6 hours PRN Temp greater or equal to 38C (100.4F), Mild Pain (1 - 3)  aluminum hydroxide/magnesium hydroxide/simethicone Suspension 30 milliLiter(s) Oral every 4 hours PRN Dyspepsia  dextrose Oral Gel 15 Gram(s) Oral once PRN Blood Glucose LESS THAN 70 milliGRAM(s)/deciliter  guaiFENesin Oral Liquid (Sugar-Free) 100 milliGRAM(s) Oral every 6 hours PRN Cough  melatonin 3 milliGRAM(s) Oral at bedtime PRN Insomnia  ondansetron Injectable 4 milliGRAM(s) IV Push every 8 hours PRN Nausea and/or Vomiting    REVIEW OF SYSTEMS:    Constitutional:            No fever, weight loss or fatigue  HEENT:                      No difficulty hearing, tinnitus, vertigo; No sinus or throat pain  Respiratory:               Cough and SOB  Cardiovascular:           chest pain,   Gastrointestinal:        No abdominal or epigastric pain. No N/V/diarrhea or hematemesis  Genitourinary:            No dysuria, frequency, hematuria or incontinence  SKIN:                             no rash  Musculoskeletal:        No joint pain or swelling  Extremities:                No swelling  Neurological:              No headaches  Psychiatric:                 No depression, anxiety    MACRA & MIPS:  Vaccines - Influenza: yes and Pneumovax: yes  Tobacco: no  Blood Pressure Screening / Control of:112/70  Current Medications Reviewed:    Vital Signs Last 24 Hrs  T(C): 37.2 (04 Jul 2023 16:21), Max: 37.2 (04 Jul 2023 16:21)  T(F): 98.9 (04 Jul 2023 16:21), Max: 98.9 (04 Jul 2023 16:21)  HR: 70 (04 Jul 2023 16:21) (56 - 70)  BP: 112/70 (04 Jul 2023 16:21) (102/56 - 152/73)  BP(mean): --  RR: 18 (04 Jul 2023 16:21) (18 - 25)  SpO2: 95% (04 Jul 2023 16:21) (93% - 98%)    Parameters below as of 04 Jul 2023 16:21  Patient On (Oxygen Delivery Method): nasal cannula    PHYSICAL EXAM:  GEN:         Awake, responsive and comfortable.  HEENT:    Normal.    RESP:       no wheezing  CVS:          Regular rate and rhythm.   ABD:         Soft, non-tender, non-distended;   SKIN:           Warm and dry.  EXTR:            No clubbing, cyanosis or edema  CNS:              Intact sensory and motor function.  PSYCH:        cooperative, no anxiety or depression    LABS:                        9.3    9.91  )-----------( 142      ( 04 Jul 2023 06:10 )             28.2     07-04    138  |  102  |  33<H>  ----------------------------<  116<H>  4.0   |  34<H>  |  4.53<H>    Ca    9.5      04 Jul 2023 06:10  Mg     2.3     07-03    TPro  7.8  /  Alb  3.4  /  TBili  1.1  /  DBili  x   /  AST  23  /  ALT  8<L>  /  AlkPhos  92  07-03    Urinalysis Basic - ( 04 Jul 2023 06:10 )    Color: x / Appearance: x / SG: x / pH: x  Gluc: 116 mg/dL / Ketone: x  / Bili: x / Urobili: x   Blood: x / Protein: x / Nitrite: x   Leuk Esterase: x / RBC: x / WBC x   Sq Epi: x / Non Sq Epi: x / Bacteria: x    EKG:  pacer rhythm    RADIOLOGY & ADDITIONAL STUDIES:  < from: CT Chest No Cont (07.03.23 @ 17:55) >  ACC: [id number] EXAM:  CT CHEST   ORDERED BY: RIMMA RENE     PROCEDURE DATE:  07/03/2023      INTERPRETATION:  INDICATION: Cough  TECHNIQUE: Unenhanced CT of the chest. Coronal, sagittal, and MIP images   were reconstructed and reviewed.  COMPARISON: 2/28/2022 chest CT.    FINDINGS:    AIRWAYS, LUNGS, PLEURA: Excessive anterior motion of the posterior   membrane of the trachea representing excessive dynamic airway collapse.   Patent central airways. Mosaic attenuation of the lungs. Left lower lobe   consolidation. Small bilateral pleural effusions and septal thickening   likely representing superimposed edema.    LYMPH NODES, MEDIASTINUM: Mildly enlarged mediastinal lymph nodes are   unchanged; for reference: 1.2 cm short axis right lower paratracheal   lymph nodes.    HEART, VESSELS: Cardiomegaly. Tip of the right IJ dialysis catheter   within right atrium. Left pacer in place. Small pericardial effusion,   unchanged. Thoracic aorta normal in diameter. Dilated main pulmonary   artery representing pulmonary hypertension. Multivessel coronary   calcifications. There is a stent within the left axillary vein.    VISUALIZED UPPER ABDOMEN: Bilateral hemorrhagic and nonhemorrhagic renal   cysts are grossly unchanged. IVC filter. Vascular calcifications.    CHEST WALL, BONES: No aggressive osseous lesion.    LOWER NECK: Within normal limits.    IMPRESSION:    Left lower lobe pneumonia.. Recommend follow-up chest CT in 1-3 months to   determine resolution.    Small bilateral pleural effusions and septal thickening likely   representing superimposed edema.    JUSTIN HOU MD; Attending Radiologist  This document has been electronically signed. Jul  3 2023  6:25PM    ASSESSMENT AND PLAN:  ·	SOB.  ·	Hypoxia.  ·	LLL pneumonia.  ·	ESRD.  ·	Anemia.  ·	HTN.  ·	DM.    SPO2 95% on room air.  Will get room air ABG.  Continue empiric  antibiotics.  Continue DVT prophylaxis.  Patient is a 78y old  Female who presents with a chief complaint of SOB, PNA (04 Jul 2023 18:42)    HPI:  79 y/o F with HTN, DM, CHF, ESRD (HD MWF), S/P PPM for arrhythmias, DVT, GI bleed.  Non smoker.   Presented  to ER w/ c/o cough and shortness of breath for the past 5 days. Cough is a dry but is associated with wheezing and Rib cage pain with each cough.  Symptoms got worst at dialysis center , so EMS were called who found her with SPo2 of 78%.   She denies Fever, Nausea/vomiting/ Diarrhea and dysuria. No CP, palpitation, dizziness, Leg edema or abdominal pain.    CT chest reports LLL pneumonia.    PAST MEDICAL & SURGICAL HISTORY:  HTN (hypertension)    Pacemaker    DVT (deep venous thrombosis)    Diverticulitis    CHF (congestive heart failure)    ESRD on dialysis    Intestinal bleeding    FAMILY HISTORY:  FH: HTN (hypertensio  SOCIAL HISTORY: BMI (kg/m2): 27.2 (07-03 @ 22:53), non smoker    Allergies  Eliquis (Other)    MEDICATIONS  (STANDING):  amLODIPine   Tablet 10 milliGRAM(s) Oral daily  azithromycin   Tablet 500 milliGRAM(s) Oral daily  cefTRIAXone   IVPB 1000 milliGRAM(s) IV Intermittent every 24 hours  dextrose 5%. 1000 milliLiter(s) (50 mL/Hr) IV Continuous <Continuous>  dextrose 5%. 1000 milliLiter(s) (100 mL/Hr) IV Continuous <Continuous>  dextrose 50% Injectable 25 Gram(s) IV Push once  dextrose 50% Injectable 25 Gram(s) IV Push once  dextrose 50% Injectable 12.5 Gram(s) IV Push once  glucagon  Injectable 1 milliGRAM(s) IntraMuscular once  heparin   Injectable 5000 Unit(s) SubCutaneous every 8 hours  hydrALAZINE 25 milliGRAM(s) Oral daily  insulin lispro (ADMELOG) corrective regimen sliding scale   SubCutaneous three times a day before meals  metoprolol succinate  milliGRAM(s) Oral daily  simvastatin 20 milliGRAM(s) Oral at bedtime    MEDICATIONS  (PRN):  acetaminophen     Tablet .. 650 milliGRAM(s) Oral every 6 hours PRN Temp greater or equal to 38C (100.4F), Mild Pain (1 - 3)  aluminum hydroxide/magnesium hydroxide/simethicone Suspension 30 milliLiter(s) Oral every 4 hours PRN Dyspepsia  dextrose Oral Gel 15 Gram(s) Oral once PRN Blood Glucose LESS THAN 70 milliGRAM(s)/deciliter  guaiFENesin Oral Liquid (Sugar-Free) 100 milliGRAM(s) Oral every 6 hours PRN Cough  melatonin 3 milliGRAM(s) Oral at bedtime PRN Insomnia  ondansetron Injectable 4 milliGRAM(s) IV Push every 8 hours PRN Nausea and/or Vomiting    REVIEW OF SYSTEMS:    Constitutional:            No fever, weight loss or fatigue  HEENT:                      No difficulty hearing, tinnitus, vertigo; No sinus or throat pain  Respiratory:               Cough and SOB  Cardiovascular:           chest pain,   Gastrointestinal:        No abdominal or epigastric pain. No N/V/diarrhea or hematemesis  Genitourinary:            No dysuria, frequency, hematuria or incontinence  SKIN:                             no rash  Musculoskeletal:        No joint pain or swelling  Extremities:                No swelling  Neurological:              No headaches  Psychiatric:                 No depression, anxiety    MACRA & MIPS:  Vaccines - Influenza: yes and Pneumovax: yes  Tobacco: no  Blood Pressure Screening / Control of:112/70  Current Medications Reviewed:    Vital Signs Last 24 Hrs  T(C): 37.2 (04 Jul 2023 16:21), Max: 37.2 (04 Jul 2023 16:21)  T(F): 98.9 (04 Jul 2023 16:21), Max: 98.9 (04 Jul 2023 16:21)  HR: 70 (04 Jul 2023 16:21) (56 - 70)  BP: 112/70 (04 Jul 2023 16:21) (102/56 - 152/73)  BP(mean): --  RR: 18 (04 Jul 2023 16:21) (18 - 25)  SpO2: 95% (04 Jul 2023 16:21) (93% - 98%)    Parameters below as of 04 Jul 2023 16:21  Patient On (Oxygen Delivery Method): nasal cannula    PHYSICAL EXAM:  GEN:         Awake, responsive and comfortable.  HEENT:    Normal.    RESP:       no wheezing  CVS:          Regular rate and rhythm.   ABD:         Soft, non-tender, non-distended;   SKIN:           Warm and dry.  EXTR:            No clubbing, cyanosis or edema  CNS:              Intact sensory and motor function.  PSYCH:        cooperative, no anxiety or depression    LABS:                        9.3    9.91  )-----------( 142      ( 04 Jul 2023 06:10 )             28.2     07-04    138  |  102  |  33<H>  ----------------------------<  116<H>  4.0   |  34<H>  |  4.53<H>    Ca    9.5      04 Jul 2023 06:10  Mg     2.3     07-03    TPro  7.8  /  Alb  3.4  /  TBili  1.1  /  DBili  x   /  AST  23  /  ALT  8<L>  /  AlkPhos  92  07-03    Urinalysis Basic - ( 04 Jul 2023 06:10 )    Color: x / Appearance: x / SG: x / pH: x  Gluc: 116 mg/dL / Ketone: x  / Bili: x / Urobili: x   Blood: x / Protein: x / Nitrite: x   Leuk Esterase: x / RBC: x / WBC x   Sq Epi: x / Non Sq Epi: x / Bacteria: x    EKG:  pacer rhythm    RADIOLOGY & ADDITIONAL STUDIES:  < from: CT Chest No Cont (07.03.23 @ 17:55) >  ACC: [id number] EXAM:  CT CHEST   ORDERED BY: RIMMA RENE     PROCEDURE DATE:  07/03/2023      INTERPRETATION:  INDICATION: Cough  TECHNIQUE: Unenhanced CT of the chest. Coronal, sagittal, and MIP images   were reconstructed and reviewed.  COMPARISON: 2/28/2022 chest CT.    FINDINGS:    AIRWAYS, LUNGS, PLEURA: Excessive anterior motion of the posterior   membrane of the trachea representing excessive dynamic airway collapse.   Patent central airways. Mosaic attenuation of the lungs. Left lower lobe   consolidation. Small bilateral pleural effusions and septal thickening   likely representing superimposed edema.    LYMPH NODES, MEDIASTINUM: Mildly enlarged mediastinal lymph nodes are   unchanged; for reference: 1.2 cm short axis right lower paratracheal   lymph nodes.    HEART, VESSELS: Cardiomegaly. Tip of the right IJ dialysis catheter   within right atrium. Left pacer in place. Small pericardial effusion,   unchanged. Thoracic aorta normal in diameter. Dilated main pulmonary   artery representing pulmonary hypertension. Multivessel coronary   calcifications. There is a stent within the left axillary vein.    VISUALIZED UPPER ABDOMEN: Bilateral hemorrhagic and nonhemorrhagic renal   cysts are grossly unchanged. IVC filter. Vascular calcifications.    CHEST WALL, BONES: No aggressive osseous lesion.    LOWER NECK: Within normal limits.    IMPRESSION:    Left lower lobe pneumonia.. Recommend follow-up chest CT in 1-3 months to   determine resolution.    Small bilateral pleural effusions and septal thickening likely   representing superimposed edema.    JUSTIN HOU MD; Attending Radiologist  This document has been electronically signed. Jul  3 2023  6:25PM    ASSESSMENT AND PLAN:  ·	SOB.  ·	Hypoxia.  ·	LLL pneumonia.  ·	ESRD.  ·	Anemia.  ·	HTN.  ·	DM.    SPO2 95% on room air.  Will get room air ABG.  Continue empiric  antibiotics.  Continue DVT prophylaxis.  56+ minutes spent

## 2023-07-04 NOTE — H&P ADULT - HISTORY OF PRESENT ILLNESS
77 y/o F w/ PMH of HTN, DM, CHF and ESRD (HD MWF) presents to ER w/ c/o cough and shortness of breath for the past 5 days. Cough is a dry but is associated with wheezing and Rib cage pain with each cough. She denies Fever, Nausea/vomiting/ Diarrhea and dysuria. No CP, palpitation, dizziness, Leg edema or abdominal pain.

## 2023-07-05 LAB
ALBUMIN SERPL ELPH-MCNC: 3 G/DL — LOW (ref 3.3–5)
ALP SERPL-CCNC: 78 U/L — SIGNIFICANT CHANGE UP (ref 40–120)
ALT FLD-CCNC: 7 U/L — LOW (ref 12–78)
ANION GAP SERPL CALC-SCNC: 7 MMOL/L — SIGNIFICANT CHANGE UP (ref 5–17)
AST SERPL-CCNC: 12 U/L — LOW (ref 15–37)
BASE EXCESS BLDA CALC-SCNC: 2.7 MMOL/L — SIGNIFICANT CHANGE UP (ref -2–3)
BILIRUB SERPL-MCNC: 0.5 MG/DL — SIGNIFICANT CHANGE UP (ref 0.2–1.2)
BLOOD GAS COMMENTS ARTERIAL: SIGNIFICANT CHANGE UP
BUN SERPL-MCNC: 51 MG/DL — HIGH (ref 7–23)
CALCIUM SERPL-MCNC: 9.7 MG/DL — SIGNIFICANT CHANGE UP (ref 8.5–10.1)
CHLORIDE SERPL-SCNC: 104 MMOL/L — SIGNIFICANT CHANGE UP (ref 96–108)
CO2 BLDA-SCNC: 29 MMOL/L — HIGH (ref 19–24)
CO2 SERPL-SCNC: 27 MMOL/L — SIGNIFICANT CHANGE UP (ref 22–31)
CREAT SERPL-MCNC: 6.94 MG/DL — HIGH (ref 0.5–1.3)
EGFR: 6 ML/MIN/1.73M2 — LOW
GAS PNL BLDA: SIGNIFICANT CHANGE UP
GLUCOSE BLDC GLUCOMTR-MCNC: 121 MG/DL — HIGH (ref 70–99)
GLUCOSE BLDC GLUCOMTR-MCNC: 147 MG/DL — HIGH (ref 70–99)
GLUCOSE BLDC GLUCOMTR-MCNC: 158 MG/DL — HIGH (ref 70–99)
GLUCOSE SERPL-MCNC: 101 MG/DL — HIGH (ref 70–99)
HCO3 BLDA-SCNC: 28 MMOL/L — SIGNIFICANT CHANGE UP (ref 21–28)
HCT VFR BLD CALC: 26.8 % — LOW (ref 34.5–45)
HGB BLD-MCNC: 8.7 G/DL — LOW (ref 11.5–15.5)
HOROWITZ INDEX BLDA+IHG-RTO: 21 — SIGNIFICANT CHANGE UP
MAGNESIUM SERPL-MCNC: 2.5 MG/DL — SIGNIFICANT CHANGE UP (ref 1.6–2.6)
MCHC RBC-ENTMCNC: 28.6 PG — SIGNIFICANT CHANGE UP (ref 27–34)
MCHC RBC-ENTMCNC: 32.5 G/DL — SIGNIFICANT CHANGE UP (ref 32–36)
MCV RBC AUTO: 88.2 FL — SIGNIFICANT CHANGE UP (ref 80–100)
NRBC # BLD: 0 /100 WBCS — SIGNIFICANT CHANGE UP (ref 0–0)
PCO2 BLDA: 44 MMHG — SIGNIFICANT CHANGE UP (ref 32–46)
PH BLDA: 7.41 — SIGNIFICANT CHANGE UP (ref 7.35–7.45)
PHOSPHATE SERPL-MCNC: 4.4 MG/DL — SIGNIFICANT CHANGE UP (ref 2.5–4.5)
PLATELET # BLD AUTO: 136 K/UL — LOW (ref 150–400)
PO2 BLDA: 63 MMHG — LOW (ref 83–108)
POTASSIUM SERPL-MCNC: 4 MMOL/L — SIGNIFICANT CHANGE UP (ref 3.5–5.3)
POTASSIUM SERPL-SCNC: 4 MMOL/L — SIGNIFICANT CHANGE UP (ref 3.5–5.3)
PROCALCITONIN SERPL-MCNC: 2.07 NG/ML — HIGH (ref 0.02–0.1)
PROT SERPL-MCNC: 7 GM/DL — SIGNIFICANT CHANGE UP (ref 6–8.3)
RBC # BLD: 3.04 M/UL — LOW (ref 3.8–5.2)
RBC # FLD: 17.2 % — HIGH (ref 10.3–14.5)
SAO2 % BLDA: 91.9 % — LOW (ref 94–98)
SODIUM SERPL-SCNC: 138 MMOL/L — SIGNIFICANT CHANGE UP (ref 135–145)
TROPONIN I, HIGH SENSITIVITY RESULT: 50.2 NG/L — SIGNIFICANT CHANGE UP
WBC # BLD: 7.59 K/UL — SIGNIFICANT CHANGE UP (ref 3.8–10.5)
WBC # FLD AUTO: 7.59 K/UL — SIGNIFICANT CHANGE UP (ref 3.8–10.5)

## 2023-07-05 PROCEDURE — 99223 1ST HOSP IP/OBS HIGH 75: CPT

## 2023-07-05 PROCEDURE — 99233 SBSQ HOSP IP/OBS HIGH 50: CPT

## 2023-07-05 RX ADMIN — Medication 100 MILLIGRAM(S): at 21:33

## 2023-07-05 RX ADMIN — AMLODIPINE BESYLATE 10 MILLIGRAM(S): 2.5 TABLET ORAL at 05:32

## 2023-07-05 RX ADMIN — Medication 100 MILLIGRAM(S): at 05:31

## 2023-07-05 RX ADMIN — AZITHROMYCIN 500 MILLIGRAM(S): 500 TABLET, FILM COATED ORAL at 11:49

## 2023-07-05 RX ADMIN — CEFTRIAXONE 100 MILLIGRAM(S): 500 INJECTION, POWDER, FOR SOLUTION INTRAMUSCULAR; INTRAVENOUS at 01:32

## 2023-07-05 RX ADMIN — Medication 100 MILLIGRAM(S): at 10:13

## 2023-07-05 RX ADMIN — HEPARIN SODIUM 5000 UNIT(S): 5000 INJECTION INTRAVENOUS; SUBCUTANEOUS at 05:31

## 2023-07-05 RX ADMIN — Medication 25 MILLIGRAM(S): at 05:32

## 2023-07-05 RX ADMIN — HEPARIN SODIUM 5000 UNIT(S): 5000 INJECTION INTRAVENOUS; SUBCUTANEOUS at 21:33

## 2023-07-05 RX ADMIN — Medication 2: at 16:25

## 2023-07-05 RX ADMIN — HEPARIN SODIUM 5000 UNIT(S): 5000 INJECTION INTRAVENOUS; SUBCUTANEOUS at 13:32

## 2023-07-05 RX ADMIN — SIMVASTATIN 20 MILLIGRAM(S): 20 TABLET, FILM COATED ORAL at 21:33

## 2023-07-05 NOTE — PHYSICAL THERAPY INITIAL EVALUATION ADULT - STRENGTHENING, PT EVAL
Patient will improve strength in B LE by 1 grade in 4 weeks to improve overall functional mobility including gait, transfers, bed mobility and decrease risk of falls.

## 2023-07-05 NOTE — PHYSICAL THERAPY INITIAL EVALUATION ADULT - ADL SKILLS, REHAB EVAL
It should be noted that I examined the patient's abdomen with her supine on the exam room table, as per custom  When she sat up, she became very dizzy  She asked to be referred to a new neurologist for evaluation of her dizziness  It sounds orthostatic to me  I recommended we center for a tilt test   She wants to see Neurology  She told me she would agree to a tilt test if they can find anything wrong with her  She tells me she already saw ENT and they ruled out any type of his tibial err etiology    I went ahead and put in a referral to see Dr Bill Johnson rollator/independent/needs device

## 2023-07-05 NOTE — PHYSICAL THERAPY INITIAL EVALUATION ADULT - PERTINENT HX OF CURRENT PROBLEM, REHAB EVAL
Patient admitted with cough and SOB x 5 days. Troponin level elevated likely due to myocardial ischemia in setting of CKD/ESRD. Patient found to have left lower lobe PNA and CHF.

## 2023-07-05 NOTE — CONSULT NOTE ADULT - ASSESSMENT
77 y/o F w/ PMH of HTN, DM, CHF and ESRD (HD MWF) presents to ER w/ c/o cough and shortness of breath for the past 5 days. Cough is a dry but is associated with wheezing and Rib cage pain with each cough.  Afebrile, normal wbc   CXR (I personally reviewed) congestive findings  CT chest (I personally reviewed) LLL pneumonia   RVP negative   on 1-2L nasal cannula   of note had problem with AVF so permacath was placed little over a week ago   will teat for bacterial pneumonia for now, could be related to fluid imbalance     Plan:  continue ceftriaxone   azithro and quinolone may not be best option since ekg already with prolonged qtc   ok to continue Doxycycline 100mg 2 times a day for now   legionella urine ag  mrsa pcr  strep pneumonia ag  wean off oxygen   pain control   continue hemodialysis per renal and may need extra sessions  ABG on room air     Discussed with Dr. Ayush Valdez, DO  Infectious Disease Attending  Reachable via Microsoft Teams or ID office: [phone]  After 5pm/weekends please call [phone] for all inquiries and new consults

## 2023-07-05 NOTE — PROGRESS NOTE ADULT - ASSESSMENT
79 yo female w/ pmhx of Diabetes, HTN, HF, ESRD (HD MWF) admitted to TELE for PNA/ acute hypoxemic respiratory failure    # left lower lobe PNA  # Acute resp failure with hypoxia   - CT lung- shows left lower lobe PNA  - Rocephin day # 3  - PULM called, debbi recs  - cough meds  - Repeat CT chest in 1-3 month  - Ambulatory O2 prior to discharge   - HD as below    # HTN  # HLD  # troponiemia  - lipitor  - Metoprolol, Norvasc and Hydralazine   - troponin is elevated but nonspecific in setting of CKD/ESRD  - pt endorses no chest pain, doubtful of ACS    # DM  - HA1c - 5.7  - RISS    # ESRD  - Nephrology consult appreciated   - HD per renal    DVTp: Heparin     Dispo: pending improvement in hypoxia

## 2023-07-05 NOTE — PHYSICAL THERAPY INITIAL EVALUATION ADULT - ADDITIONAL COMMENTS
Patient reports she lives in an apartment building, +ramp, no steps to enter once inside. Patient also owns a wheelchair, was independent and able to perform ADLs independently. Patient has someone to come in to do cleaning/laundry, children assist with grocery shopping.

## 2023-07-05 NOTE — PHARMACOTHERAPY INTERVENTION NOTE - COMMENTS
Recommended to change azithromycin to doxycycline 100 mg PO q12h since the QTc interval from 7/3 was 508 ms.    Kev Dickens, PharmD, [facility]  Clinical Pharmacy Specialist, Infectious Diseases  Tele-Antimicrobial Stewardship Program (Tele-ASP)  Tele-ASP Phone: [phone]

## 2023-07-05 NOTE — CONSULT NOTE ADULT - SUBJECTIVE AND OBJECTIVE BOX
[facility]  INFECTIOUS DISEASES   [address] [place] [address]  Tel: [phone]     Fax: [phone]  ======================================================  Haas MD Jonathan Garellek, DO Maribeth Avalos, JEANNE   ======================================================    STKITTS, CLAUDETTE  MRN-[id number]  Female  78y        Patient is a 78y old  Female who presents with a chief complaint of SOB, PNA (05 Jul 2023 21:43)      HPI:  79 y/o F w/ PMH of HTN, DM, CHF and ESRD (HD MWF) presents to ER w/ c/o cough and shortness of breath for the past 5 days. Cough is a dry but is associated with wheezing and Rib cage pain with each cough. She denies Fever, Nausea/vomiting/ Diarrhea and dysuria. No CP, palpitation, dizziness, Leg edema or abdominal pain.       (04 Jul 2023 00:57)    agree with above  ID consulted for workup and antibiotic management     PAST MEDICAL & SURGICAL HISTORY:  HTN (hypertension)      Pacemaker      DVT (deep venous thrombosis)      Diverticulitis      CHF (congestive heart failure)      ESRD on dialysis      Intestinal bleeding          Allergies  Eliquis (Other)        ANTIMICROBIALS:  cefTRIAXone   IVPB 1000 every 24 hours  doxycycline monohydrate Capsule 100 every 12 hours      MEDICATIONS  (STANDING):  azithromycin   Tablet   500 milliGRAM(s) Oral (07-05-23 @ 11:49)   500 milliGRAM(s) Oral (07-04-23 @ 13:31)    cefTRIAXone   IVPB   100 mL/Hr IV Intermittent (07-05-23 @ 01:32)   100 mL/Hr IV Intermittent (07-04-23 @ 02:07)    doxycycline monohydrate Capsule   100 milliGRAM(s) Oral (07-05-23 @ 21:33)    piperacillin/tazobactam IVPB...   200 mL/Hr IV Intermittent (07-03-23 @ 19:04)    vancomycin  IVPB.   250 mL/Hr IV Intermittent (07-03-23 @ 20:00)        OTHER MEDS: MEDICATIONS  (STANDING):  acetaminophen     Tablet .. 650 every 6 hours PRN  aluminum hydroxide/magnesium hydroxide/simethicone Suspension 30 every 4 hours PRN  amLODIPine   Tablet 10 daily  benzonatate 100 three times a day PRN  dextrose 50% Injectable 25 once  dextrose 50% Injectable 12.5 once  dextrose 50% Injectable 25 once  dextrose Oral Gel 15 once PRN  glucagon  Injectable 1 once  guaiFENesin Oral Liquid (Sugar-Free) 100 every 6 hours  heparin   Injectable 5000 every 8 hours  hydrALAZINE 25 daily  insulin lispro (ADMELOG) corrective regimen sliding scale  three times a day before meals  melatonin 3 at bedtime PRN  metoprolol succinate  daily  ondansetron Injectable 4 every 8 hours PRN  simvastatin 20 at bedtime      SOCIAL HISTORY:     Smoking Cigarettes [ ]Active [ ] Former [x ]Denies   ETOH [x ]denies [ ]Former [ ]Current Use denies   Drug Use [x]Never [ ] Former [ ] Active     FAMILY HISTORY:  FH: HTN (hypertension)        REVIEW OF SYSTEMS  [  ] ROS unobtainable because:    [ x ] All other systems negative except as noted below:	    Constitutional:  [ ] fever [ ] chills  [ ] weight loss  [ ] weakness  Skin:  [ ] rash [ ] phlebitis	  Eyes: [ ] icterus [ ] pain  [ ] discharge	  ENMT: [ ] sore throat  [ ] thrush [ ] ulcers [ ] exudates  Respiratory: [x ] dyspnea [ ] hemoptysis [x ] cough [ ] sputum	  Cardiovascular:  [ x] pleuritic chest pain [ ] palpitations [ ] edema	  Gastrointestinal:  [ ] nausea [ ] vomiting [ ] diarrhea [ ] constipation [ ] pain	  Genitourinary:  [ ] dysuria [ ] frequency [ ] hematuria [ ] discharge [ ] flank pain  [ ] incontinence  Musculoskeletal:  [ ] myalgias [ ] arthralgias [ ] arthritis  [ ] back pain  Neurological:  [ ] headache [ ] seizures  [ ] confusion/altered mental status  Psychiatric:  [ ] anxiety [ ] depression	  Hematology/Lymphatics:  [ ] lymphadenopathy  Endocrine:  [ ] adrenal [ ] thyroid  Allergic/Immunologic:	 [ ] transplant [ ] seasonal    Vital Signs Last 24 Hrs  T(F): 98.2 (07-05-23 @ 23:53), Max: 98.9 (07-04-23 @ 16:21)    Vital Signs Last 24 Hrs  HR: 60 (07-05-23 @ 23:53) (56 - 74)  BP: 130/56 (07-05-23 @ 23:53) (105/66 - 130/64)  RR: 18 (07-05-23 @ 23:53)  SpO2: 94% (07-05-23 @ 23:53) (91% - 97%)  Wt(kg): --    PHYSICAL EXAM:  Constitutional: non-toxic, no distress, on nasal cannula   HEAD/EYES: anicteric, no conjunctival injection  ENT:  supple, no thrush  Cardiovascular:   normal S1, S2, no murmur, no edema, + left chest PPM   Respiratory:  +rales left lower lung firled, no wheezes  GI:  soft, non-tender, normal bowel sounds  :  no doan, no CVA tenderness  Musculoskeletal:  no synovitis, normal ROM, LUE AV fistula with palpable thrill  Neurologic: awake and alert, normal strength, no focal findings  Skin:  no rash, no erythema, no phlebitis, right chest wall permacath c/d/i  Heme/Onc: no lymphadenopathy   Psychiatric:  awake, alert, appropriate mood          WBC Count: 7.59 K/uL (07-05 @ 07:20)  WBC Count: 9.91 K/uL (07-04 @ 06:10)  WBC Count: 10.34 K/uL (07-03 @ 15:50)      Auto Neutrophil %: 76.4 % (07-03-23 @ 15:50)  Auto Neutrophil #: 7.90 K/uL *H* (07-03-23 @ 15:50)                            8.7    7.59  )-----------( 136      ( 05 Jul 2023 07:20 )             26.8       07-05    138  |  104  |  51<H>  ----------------------------<  101<H>  4.0   |  27  |  6.94<H>    Ca    9.7      05 Jul 2023 07:20  Phos  4.4     07-05  Mg     2.5     07-05    TPro  7.0  /  Alb  3.0<L>  /  TBili  0.5  /  DBili  x   /  AST  12<L>  /  ALT  7<L>  /  AlkPhos  78  07-05      Creatinine Trend: 6.94<--, 4.53<--, 3.34<--    Urinalysis Basic - ( 05 Jul 2023 07:20 )    Color: x / Appearance: x / SG: x / pH: x  Gluc: 101 mg/dL / Ketone: x  / Bili: x / Urobili: x   Blood: x / Protein: x / Nitrite: x   Leuk Esterase: x / RBC: x / WBC x   Sq Epi: x / Non Sq Epi: x / Bacteria: x            MICROBIOLOGY:    SARS-CoV-2: NotDetec (03 Jul 2023 15:50)        v    Rapid RVP Result: NotDetec (07-03 @ 15:50)    Procalcitonin, Serum: 2.07 (07-05-23 @ 07:20)    SARS-CoV-2: NotDetec (07-03-23 @ 15:50)  Rapid RVP Result: NotDetec (07-03-23 @ 15:50)        RADIOLOGY:      < from: CT Chest No Cont (07.03.23 @ 17:55) >  IMPRESSION:    Left lower lobe pneumonia.. Recommend follow-up chest CT in 1-3 months to   determine resolution.    Small bilateral pleural effusions and septal thickening likely   representing superimposed edema.    < end of copied text >    ACC: [id number] EXAM:  XR CHEST PORTABLE URGENT 1V   ORDERED BY: RADHA GARCIA     PROCEDURE DATE:  07/03/2023          INTERPRETATION:  AP semierect chest on July 3, 2023 at 4:04 PM. Patient   has cough and is short of breath.    Heart enlargement, left-sided pacemaker, large right jugular line remain.    There are some central congestive findings increased from February 23   this year.    IMPRESSION: Increasing CHF.    SHELTON MADDEN MD; Attending Radiologist  This document has been electronically signed. Jul  3 2023  4:45PM      I have personally reviewed the above imaging

## 2023-07-05 NOTE — PHYSICAL THERAPY INITIAL EVALUATION ADULT - BALANCE TRAINING, PT EVAL
Patient will be normal in static and dynamic standing balance with rollator in 2-3 days to improve safety and decrease risk of falls.

## 2023-07-06 LAB
ANION GAP SERPL CALC-SCNC: 5 MMOL/L — SIGNIFICANT CHANGE UP (ref 5–17)
BUN SERPL-MCNC: 39 MG/DL — HIGH (ref 7–23)
CALCIUM SERPL-MCNC: 8.9 MG/DL — SIGNIFICANT CHANGE UP (ref 8.5–10.1)
CHLORIDE SERPL-SCNC: 103 MMOL/L — SIGNIFICANT CHANGE UP (ref 96–108)
CO2 SERPL-SCNC: 28 MMOL/L — SIGNIFICANT CHANGE UP (ref 22–31)
CREAT SERPL-MCNC: 5.67 MG/DL — HIGH (ref 0.5–1.3)
EGFR: 7 ML/MIN/1.73M2 — LOW
GLUCOSE BLDC GLUCOMTR-MCNC: 105 MG/DL — HIGH (ref 70–99)
GLUCOSE BLDC GLUCOMTR-MCNC: 122 MG/DL — HIGH (ref 70–99)
GLUCOSE BLDC GLUCOMTR-MCNC: 137 MG/DL — HIGH (ref 70–99)
GLUCOSE BLDC GLUCOMTR-MCNC: 225 MG/DL — HIGH (ref 70–99)
GLUCOSE SERPL-MCNC: 119 MG/DL — HIGH (ref 70–99)
HCT VFR BLD CALC: 28.3 % — LOW (ref 34.5–45)
HGB BLD-MCNC: 9.1 G/DL — LOW (ref 11.5–15.5)
LEGIONELLA AG UR QL: NEGATIVE — SIGNIFICANT CHANGE UP
MCHC RBC-ENTMCNC: 28.2 PG — SIGNIFICANT CHANGE UP (ref 27–34)
MCHC RBC-ENTMCNC: 32.2 G/DL — SIGNIFICANT CHANGE UP (ref 32–36)
MCV RBC AUTO: 87.6 FL — SIGNIFICANT CHANGE UP (ref 80–100)
MRSA PCR RESULT.: SIGNIFICANT CHANGE UP
NRBC # BLD: 0 /100 WBCS — SIGNIFICANT CHANGE UP (ref 0–0)
PLATELET # BLD AUTO: 159 K/UL — SIGNIFICANT CHANGE UP (ref 150–400)
POTASSIUM SERPL-MCNC: 4 MMOL/L — SIGNIFICANT CHANGE UP (ref 3.5–5.3)
POTASSIUM SERPL-SCNC: 4 MMOL/L — SIGNIFICANT CHANGE UP (ref 3.5–5.3)
RBC # BLD: 3.23 M/UL — LOW (ref 3.8–5.2)
RBC # FLD: 17.1 % — HIGH (ref 10.3–14.5)
S AUREUS DNA NOSE QL NAA+PROBE: SIGNIFICANT CHANGE UP
S PNEUM AG UR QL: NEGATIVE — SIGNIFICANT CHANGE UP
SODIUM SERPL-SCNC: 136 MMOL/L — SIGNIFICANT CHANGE UP (ref 135–145)
WBC # BLD: 6.92 K/UL — SIGNIFICANT CHANGE UP (ref 3.8–10.5)
WBC # FLD AUTO: 6.92 K/UL — SIGNIFICANT CHANGE UP (ref 3.8–10.5)

## 2023-07-06 PROCEDURE — 99232 SBSQ HOSP IP/OBS MODERATE 35: CPT

## 2023-07-06 PROCEDURE — 99232 SBSQ HOSP IP/OBS MODERATE 35: CPT | Mod: FS

## 2023-07-06 RX ORDER — CHLORHEXIDINE GLUCONATE 213 G/1000ML
1 SOLUTION TOPICAL
Refills: 0 | Status: DISCONTINUED | OUTPATIENT
Start: 2023-07-06 | End: 2023-07-06

## 2023-07-06 RX ORDER — CHLORHEXIDINE GLUCONATE 213 G/1000ML
1 SOLUTION TOPICAL DAILY
Refills: 0 | Status: DISCONTINUED | OUTPATIENT
Start: 2023-07-06 | End: 2023-07-08

## 2023-07-06 RX ORDER — SODIUM CHLORIDE 9 MG/ML
10 INJECTION INTRAMUSCULAR; INTRAVENOUS; SUBCUTANEOUS
Refills: 0 | Status: DISCONTINUED | OUTPATIENT
Start: 2023-07-06 | End: 2023-07-08

## 2023-07-06 RX ADMIN — Medication 25 MILLIGRAM(S): at 05:19

## 2023-07-06 RX ADMIN — HEPARIN SODIUM 5000 UNIT(S): 5000 INJECTION INTRAVENOUS; SUBCUTANEOUS at 05:19

## 2023-07-06 RX ADMIN — HEPARIN SODIUM 5000 UNIT(S): 5000 INJECTION INTRAVENOUS; SUBCUTANEOUS at 13:19

## 2023-07-06 RX ADMIN — Medication 100 MILLIGRAM(S): at 23:48

## 2023-07-06 RX ADMIN — CHLORHEXIDINE GLUCONATE 1 APPLICATION(S): 213 SOLUTION TOPICAL at 13:19

## 2023-07-06 RX ADMIN — Medication 100 MILLIGRAM(S): at 00:25

## 2023-07-06 RX ADMIN — AMLODIPINE BESYLATE 10 MILLIGRAM(S): 2.5 TABLET ORAL at 05:19

## 2023-07-06 RX ADMIN — SIMVASTATIN 20 MILLIGRAM(S): 20 TABLET, FILM COATED ORAL at 21:35

## 2023-07-06 RX ADMIN — Medication 100 MILLIGRAM(S): at 17:03

## 2023-07-06 RX ADMIN — Medication 100 MILLIGRAM(S): at 16:37

## 2023-07-06 RX ADMIN — Medication 100 MILLIGRAM(S): at 05:18

## 2023-07-06 RX ADMIN — Medication 4: at 11:20

## 2023-07-06 RX ADMIN — CEFTRIAXONE 100 MILLIGRAM(S): 500 INJECTION, POWDER, FOR SOLUTION INTRAMUSCULAR; INTRAVENOUS at 01:51

## 2023-07-06 RX ADMIN — HEPARIN SODIUM 5000 UNIT(S): 5000 INJECTION INTRAVENOUS; SUBCUTANEOUS at 21:35

## 2023-07-06 RX ADMIN — Medication 100 MILLIGRAM(S): at 11:20

## 2023-07-06 RX ADMIN — Medication 100 MILLIGRAM(S): at 05:19

## 2023-07-06 NOTE — PROGRESS NOTE ADULT - ASSESSMENT
77 y/o F w/ PMH of HTN, DM, CHF and ESRD (HD MWF) presents to ER w/ c/o cough and shortness of breath for the past 5 days. Cough is a dry but is associated with wheezing and Rib cage pain with each cough.  Afebrile, normal wbc   CXR (I personally reviewed) congestive findings  CT chest (I personally reviewed) LLL pneumonia   RVP negative   on 1-2L nasal cannula   of note had problem with AVF so permacath was placed little over a week ago   will teat for bacterial pneumonia for now, could be related to fluid imbalance     7/6: no fever, comfortable on RA during my exam, no wbc, legionella urine ag is negative, will stop Doxycycline, Ceftriaxone IV continued.     Plan:  continue ceftriaxone   stop Doxycycline 100mg 2 times a day  legionella urine ag - negative   mrsa pcr - not detected   strep pneumonia ag - pending   wean off oxygen as able  pain control   continue hemodialysis per renal and may need extra sessions  ABG on room air     Discussed with Dr. Valdez

## 2023-07-06 NOTE — PROGRESS NOTE ADULT - ASSESSMENT
77 yo female w/ pmhx of Diabetes, HTN, HF, ESRD (HD MWF) admitted to TELE for PNA/ acute hypoxemic respiratory failure    # left lower lobe PNA  # Acute resp failure with hypoxia   - CT lung- shows left lower lobe PNA  - Rocephin day # 4, c/w Doxycycline (dc Azithro due to qtc of 508)  - PULM called, debbi recs  - cough meds  - Repeat CT chest in 1-3 month  - Ambulatory O2 pending, may need home O2 as pt 90% on RA at rest currently    - HD as below    # HTN  # HLD  # troponiemia  - lipitor  - Metoprolol, Norvasc and Hydralazine   - troponin is elevated but nonspecific in setting of CKD/ESRD  - pt endorses no chest pain, doubtful of ACS    # DM  - HA1c - 5.7  - RISS    # ESRD  - Nephrology consult appreciated   - HD per renal    DVTp: Heparin     Dispo: pending weaning off O2 and amb O2, may need home O2. Tentative dc planned for after HD on 7/7    Code status: DNR/DNI - MOLST completed and left in chart

## 2023-07-07 LAB
ANION GAP SERPL CALC-SCNC: 7 MMOL/L — SIGNIFICANT CHANGE UP (ref 5–17)
BUN SERPL-MCNC: 78 MG/DL — HIGH (ref 7–23)
CALCIUM SERPL-MCNC: 9 MG/DL — SIGNIFICANT CHANGE UP (ref 8.5–10.1)
CHLORIDE SERPL-SCNC: 101 MMOL/L — SIGNIFICANT CHANGE UP (ref 96–108)
CO2 SERPL-SCNC: 26 MMOL/L — SIGNIFICANT CHANGE UP (ref 22–31)
CREAT SERPL-MCNC: 8.45 MG/DL — HIGH (ref 0.5–1.3)
EGFR: 4 ML/MIN/1.73M2 — LOW
GLUCOSE BLDC GLUCOMTR-MCNC: 102 MG/DL — HIGH (ref 70–99)
GLUCOSE BLDC GLUCOMTR-MCNC: 106 MG/DL — HIGH (ref 70–99)
GLUCOSE BLDC GLUCOMTR-MCNC: 124 MG/DL — HIGH (ref 70–99)
GLUCOSE BLDC GLUCOMTR-MCNC: 153 MG/DL — HIGH (ref 70–99)
GLUCOSE SERPL-MCNC: 110 MG/DL — HIGH (ref 70–99)
HCT VFR BLD CALC: 27.2 % — LOW (ref 34.5–45)
HGB BLD-MCNC: 8.8 G/DL — LOW (ref 11.5–15.5)
MCHC RBC-ENTMCNC: 28.6 PG — SIGNIFICANT CHANGE UP (ref 27–34)
MCHC RBC-ENTMCNC: 32.4 G/DL — SIGNIFICANT CHANGE UP (ref 32–36)
MCV RBC AUTO: 88.3 FL — SIGNIFICANT CHANGE UP (ref 80–100)
NRBC # BLD: 0 /100 WBCS — SIGNIFICANT CHANGE UP (ref 0–0)
PLATELET # BLD AUTO: 185 K/UL — SIGNIFICANT CHANGE UP (ref 150–400)
POTASSIUM SERPL-MCNC: 4.5 MMOL/L — SIGNIFICANT CHANGE UP (ref 3.5–5.3)
POTASSIUM SERPL-SCNC: 4.5 MMOL/L — SIGNIFICANT CHANGE UP (ref 3.5–5.3)
RBC # BLD: 3.08 M/UL — LOW (ref 3.8–5.2)
RBC # FLD: 17.2 % — HIGH (ref 10.3–14.5)
SODIUM SERPL-SCNC: 134 MMOL/L — LOW (ref 135–145)
WBC # BLD: 8.15 K/UL — SIGNIFICANT CHANGE UP (ref 3.8–10.5)
WBC # FLD AUTO: 8.15 K/UL — SIGNIFICANT CHANGE UP (ref 3.8–10.5)

## 2023-07-07 PROCEDURE — 99232 SBSQ HOSP IP/OBS MODERATE 35: CPT

## 2023-07-07 PROCEDURE — 99232 SBSQ HOSP IP/OBS MODERATE 35: CPT | Mod: FS

## 2023-07-07 PROCEDURE — 99231 SBSQ HOSP IP/OBS SF/LOW 25: CPT | Mod: FS

## 2023-07-07 RX ORDER — ERYTHROPOIETIN 10000 [IU]/ML
10000 INJECTION, SOLUTION INTRAVENOUS; SUBCUTANEOUS ONCE
Refills: 0 | Status: DISCONTINUED | OUTPATIENT
Start: 2023-07-07 | End: 2023-07-07

## 2023-07-07 RX ORDER — ERYTHROPOIETIN 10000 [IU]/ML
10000 INJECTION, SOLUTION INTRAVENOUS; SUBCUTANEOUS ONCE
Refills: 0 | Status: COMPLETED | OUTPATIENT
Start: 2023-07-07 | End: 2023-07-07

## 2023-07-07 RX ADMIN — HEPARIN SODIUM 5000 UNIT(S): 5000 INJECTION INTRAVENOUS; SUBCUTANEOUS at 13:11

## 2023-07-07 RX ADMIN — Medication 100 MILLIGRAM(S): at 13:03

## 2023-07-07 RX ADMIN — HEPARIN SODIUM 5000 UNIT(S): 5000 INJECTION INTRAVENOUS; SUBCUTANEOUS at 21:34

## 2023-07-07 RX ADMIN — AMLODIPINE BESYLATE 10 MILLIGRAM(S): 2.5 TABLET ORAL at 06:31

## 2023-07-07 RX ADMIN — ERYTHROPOIETIN 10000 UNIT(S): 10000 INJECTION, SOLUTION INTRAVENOUS; SUBCUTANEOUS at 10:55

## 2023-07-07 RX ADMIN — Medication 100 MILLIGRAM(S): at 21:48

## 2023-07-07 RX ADMIN — CHLORHEXIDINE GLUCONATE 1 APPLICATION(S): 213 SOLUTION TOPICAL at 13:03

## 2023-07-07 RX ADMIN — HEPARIN SODIUM 5000 UNIT(S): 5000 INJECTION INTRAVENOUS; SUBCUTANEOUS at 06:30

## 2023-07-07 RX ADMIN — CEFTRIAXONE 100 MILLIGRAM(S): 500 INJECTION, POWDER, FOR SOLUTION INTRAMUSCULAR; INTRAVENOUS at 01:43

## 2023-07-07 RX ADMIN — Medication 25 MILLIGRAM(S): at 06:31

## 2023-07-07 RX ADMIN — Medication 100 MILLIGRAM(S): at 06:31

## 2023-07-07 RX ADMIN — Medication 100 MILLIGRAM(S): at 06:30

## 2023-07-07 RX ADMIN — SIMVASTATIN 20 MILLIGRAM(S): 20 TABLET, FILM COATED ORAL at 21:34

## 2023-07-07 NOTE — PROGRESS NOTE ADULT - NS ATTEND AMEND GEN_ALL_CORE FT
patient improving   legionella negative, stopped Doxycycline 100mg 2 times a day   continue ceftriaxone but when ready for discharge no objection to changing to vantin to complete 5 days   continue hemodialysis per renal     Joao Valdez,   Infectious Disease Attending  Reachable via Microsoft Teams or ID office: [phone]  After 5pm/weekends please call [phone] for all inquiries and new consults
I have reviewed all pertinent clinical information and agree with the NP's note.  complete course of antibiotics   rest of care per medicine     Discussed with Dr. Kay Valdez, DO  Infectious Disease Attending  Reachable via Microsoft Teams or ID office: [phone]  After 5pm/weekends please call [phone] for all inquiries and new consults

## 2023-07-07 NOTE — PROGRESS NOTE ADULT - ASSESSMENT
77 yo female w/ pmhx of Diabetes, HTN, HF, ESRD (HD MWF) admitted to TELE for PNA/ acute hypoxemic respiratory failure    # left lower lobe PNA  # Acute resp failure with hypoxia   - CT lung- shows left lower lobe PNA  - Rocephin    - PULM called, debbi recs  - cough meds  - Repeat CT chest in 1-3 month  - Ambulatory O2 pending, still hypoxic at rest today   - HD as below    # HTN  # HLD  # troponiemia  - lipitor  - Metoprolol, Norvasc and Hydralazine   - troponin is elevated but nonspecific in setting of CKD/ESRD  - pt endorses no chest pain, doubtful of ACS    # DM  - HA1c - 5.7  - RISS    # ESRD  - Nephrology consult appreciated   - HD per renal    DVTp: Heparin     Dispo: pending weaning off O2 and amb O2, may need home O2.     Code status: DNR/DNI - MOLST completed and left in chart

## 2023-07-07 NOTE — CHART NOTE - NSCHARTNOTEFT_GEN_A_CORE
Patient  is being discharged home on Oxygen. Patient is in a chronic stable state with   Acute Respiratory Failure with Hypoxia.   Pulse Ox of 87% was done on room air at rest. Patient is currently on 2-3LPM/24 hours via nasal cannula. Patient is mobile within the home and needs portability.

## 2023-07-07 NOTE — PROGRESS NOTE ADULT - ASSESSMENT
79 y/o F w/ PMH of HTN, DM, CHF and ESRD (HD MWF) presents to ER w/ c/o cough and shortness of breath for the past 5 days. Cough is a dry but is associated with wheezing and Rib cage pain with each cough.  Afebrile, normal wbc   CXR (I personally reviewed) congestive findings  CT chest (I personally reviewed) LLL pneumonia   RVP negative   on 1-2L nasal cannula   of note had problem with AVF so permacath was placed little over a week ago   will teat for bacterial pneumonia for now, could be related to fluid imbalance     7/6: no fever, comfortable on RA during my exam, no wbc, legionella urine ag is negative, will stop Doxycycline, Ceftriaxone IV continued.   Attending addendum:  patient improving   legionella negative, stopped Doxycycline 100mg 2 times a day   continue ceftriaxone but when ready for discharge no objection to changing to vantin to complete 5 days   continue hemodialysis per renal     7/7: requires supplemental O2, no fevers, no leukocytosis, Legionella ag and strep pneumo ag - negative, today is day #4 of Ceftriaxone, needs one more day of abx.     Plan:  continue ceftriaxone - needs one more day of abx  Doxycycline stopped  legionella urine ag - negative   mrsa pcr - not detected   strep pneumonia ag - negative   wean off oxygen as able  pain control   continue hemodialysis per renal and may need extra sessions  ABG on room air     will discuss with Dr. Valdez  79 y/o F w/ PMH of HTN, DM, CHF and ESRD (HD MWF) presents to ER w/ c/o cough and shortness of breath for the past 5 days. Cough is a dry but is associated with wheezing and Rib cage pain with each cough.  Afebrile, normal wbc   CXR (I personally reviewed) congestive findings  CT chest (I personally reviewed) LLL pneumonia   RVP negative   on 1-2L nasal cannula   of note had problem with AVF so permacath was placed little over a week ago   will teat for bacterial pneumonia for now, could be related to fluid imbalance     7/6: no fever, comfortable on RA during my exam, no wbc, legionella urine ag is negative, will stop Doxycycline, Ceftriaxone IV continued.   Attending addendum:  patient improving   legionella negative, stopped Doxycycline 100mg 2 times a day   continue ceftriaxone but when ready for discharge no objection to changing to vantin to complete 5 days   continue hemodialysis per renal     7/7: requires supplemental O2, no fevers, no leukocytosis, Legionella ag and strep pneumo ag - negative, today is day #4 of Ceftriaxone, needs one more day of abx.     Plan:  continue ceftriaxone - needs one more day of abx  Doxycycline stopped  legionella urine ag - negative   mrsa pcr - not detected   strep pneumonia ag - negative   wean off oxygen as able  pain control   continue hemodialysis per renal and may need extra sessions    will discuss with Dr. Valdez

## 2023-07-08 VITALS
TEMPERATURE: 98 F | RESPIRATION RATE: 18 BRPM | OXYGEN SATURATION: 95 % | SYSTOLIC BLOOD PRESSURE: 113 MMHG | DIASTOLIC BLOOD PRESSURE: 65 MMHG | HEART RATE: 60 BPM

## 2023-07-08 LAB
GLUCOSE BLDC GLUCOMTR-MCNC: 111 MG/DL — HIGH (ref 70–99)
GLUCOSE BLDC GLUCOMTR-MCNC: 215 MG/DL — HIGH (ref 70–99)

## 2023-07-08 PROCEDURE — 99239 HOSP IP/OBS DSCHRG MGMT >30: CPT

## 2023-07-08 RX ADMIN — Medication 100 MILLIGRAM(S): at 05:30

## 2023-07-08 RX ADMIN — Medication 4: at 12:04

## 2023-07-08 RX ADMIN — CEFTRIAXONE 100 MILLIGRAM(S): 500 INJECTION, POWDER, FOR SOLUTION INTRAMUSCULAR; INTRAVENOUS at 01:34

## 2023-07-08 RX ADMIN — Medication 25 MILLIGRAM(S): at 05:30

## 2023-07-08 RX ADMIN — Medication 100 MILLIGRAM(S): at 05:29

## 2023-07-08 RX ADMIN — HEPARIN SODIUM 5000 UNIT(S): 5000 INJECTION INTRAVENOUS; SUBCUTANEOUS at 05:29

## 2023-07-08 RX ADMIN — CHLORHEXIDINE GLUCONATE 1 APPLICATION(S): 213 SOLUTION TOPICAL at 12:04

## 2023-07-08 RX ADMIN — AMLODIPINE BESYLATE 10 MILLIGRAM(S): 2.5 TABLET ORAL at 05:30

## 2023-07-08 RX ADMIN — Medication 100 MILLIGRAM(S): at 12:24

## 2023-07-08 NOTE — DISCHARGE NOTE PROVIDER - HOSPITAL COURSE
77 yo female w/ pmhx of Diabetes, HTN, HF, ESRD (HD MWF) admitted to TELE for PNA/ acute hypoxemic respiratory failure    # left lower lobe PNA  # Acute resp failure with hypoxia   - CT lung- shows left lower lobe PNA  - Rocephin  completed  - PULM called, debbi recs  - cough meds  - Repeat CT chest in 1-3 month  - o2 ordered for home   - HD as below    # HTN  # HLD  # troponiemia  - lipitor  - Metoprolol, Norvasc and Hydralazine   - troponin is elevated but nonspecific in setting of CKD/ESRD  - pt endorses no chest pain, doubtful of ACS    # DM  - HA1c - 5.7  - RISS    # ESRD  - Nephrology consult appreciated   - HD per renal       Code status: DNR/DNI - MOLST completed and left in chart        pt seen and examined 45 min spent on dc planning     Lab test review, Radiology Review, Vitals review, Consultant review and discussion, Physical examination, IDR, Assessment and plan; Plan discussion with patient and family

## 2023-07-08 NOTE — DISCHARGE NOTE PROVIDER - PROVIDER TOKENS
FREE:[LAST:[your primary care doctor],PHONE:[(   )    -],FAX:[(   )    -]] FREE:[LAST:[your primary care doctor],PHONE:[(   )    -],FAX:[(   )    -]],PROVIDER:[TOKEN:[[id number]]]

## 2023-07-08 NOTE — DISCHARGE NOTE PROVIDER - NSDCHHPTASSISTHOME_GEN_ALL_CORE
Took a step back…woke up in pain early this morning…I tried the stretches yesterday since I was feeling good…probably too soon?   Patient Needs Assistance to Leave Residence...

## 2023-07-08 NOTE — DISCHARGE NOTE PROVIDER - NSDCCPCAREPLAN_GEN_ALL_CORE_FT
PRINCIPAL DISCHARGE DIAGNOSIS  Diagnosis: Left lower lobe pneumonia  Assessment and Plan of Treatment: antibiotics completed  follow with your primary care doctor      SECONDARY DISCHARGE DIAGNOSES  Diagnosis: Hypoxia  Assessment and Plan of Treatment:

## 2023-07-08 NOTE — PROGRESS NOTE ADULT - SUBJECTIVE AND OBJECTIVE BOX
INTERVAL HPI:  77 y/o F with HTN, DM, CHF, ESRD (HD MWF), S/P PPM for arrhythmias, DVT, GI bleed.  Non smoker.   Presented  to ER w/ c/o cough and shortness of breath for the past 5 days. Cough is a dry but is associated with wheezing and Rib cage pain with each cough.  Symptoms got worst at dialysis center , so EMS were called who found her with SPo2 of 78%.   She denies Fever, Nausea/vomiting/ Diarrhea and dysuria. No CP, palpitation, dizziness, Leg edema or abdominal pain.    CT chest reports LLL pneumonia.    OVERNIGHT EVENTS:  Comfortable in bed. SPO2 97% on room air during ambulation.    Vital Signs Last 24 Hrs  T(C): 36.6 (08 Jul 2023 10:47), Max: 36.7 (08 Jul 2023 00:03)  T(F): 97.9 (08 Jul 2023 10:47), Max: 98.1 (08 Jul 2023 00:03)  HR: 60 (08 Jul 2023 10:47) (60 - 60)  BP: 113/65 (08 Jul 2023 10:47) (113/63 - 132/69)  BP(mean): --  RR: 18 (08 Jul 2023 10:47) (18 - 18)  SpO2: 95% (08 Jul 2023 10:47) (87% - 95%)    Parameters below as of 08 Jul 2023 04:32  Patient On (Oxygen Delivery Method): nasal cannula  O2 Flow (L/min):   PHYSICAL EXAM:  GEN:         Awake, responsive and comfortable.  HEENT:    Normal.    RESP:       no wheezing.  CVS:        Regular rate and rhythm.     MEDICATIONS  (STANDING):  amLODIPine   Tablet 10 milliGRAM(s) Oral daily  chlorhexidine 2% Cloths 1 Application(s) Topical daily  dextrose 5%. 1000 milliLiter(s) (100 mL/Hr) IV Continuous <Continuous>  dextrose 5%. 1000 milliLiter(s) (50 mL/Hr) IV Continuous <Continuous>  dextrose 50% Injectable 25 Gram(s) IV Push once  dextrose 50% Injectable 25 Gram(s) IV Push once  dextrose 50% Injectable 12.5 Gram(s) IV Push once  glucagon  Injectable 1 milliGRAM(s) IntraMuscular once  guaiFENesin Oral Liquid (Sugar-Free) 100 milliGRAM(s) Oral every 6 hours  heparin   Injectable 5000 Unit(s) SubCutaneous every 8 hours  hydrALAZINE 25 milliGRAM(s) Oral daily  insulin lispro (ADMELOG) corrective regimen sliding scale   SubCutaneous three times a day before meals  metoprolol succinate  milliGRAM(s) Oral daily  simvastatin 20 milliGRAM(s) Oral at bedtime    MEDICATIONS  (PRN):  acetaminophen     Tablet .. 650 milliGRAM(s) Oral every 6 hours PRN Temp greater or equal to 38C (100.4F), Mild Pain (1 - 3)  aluminum hydroxide/magnesium hydroxide/simethicone Suspension 30 milliLiter(s) Oral every 4 hours PRN Dyspepsia  benzonatate 100 milliGRAM(s) Oral three times a day PRN Cough  dextrose Oral Gel 15 Gram(s) Oral once PRN Blood Glucose LESS THAN 70 milliGRAM(s)/deciliter  melatonin 3 milliGRAM(s) Oral at bedtime PRN Insomnia  ondansetron Injectable 4 milliGRAM(s) IV Push every 8 hours PRN Nausea and/or Vomiting  sodium chloride 0.9% lock flush 10 milliLiter(s) IV Push every 1 hour PRN Pre/post blood products, medications, blood draw, and to maintain line patency    LABS:                        8.8    8.15  )-----------( 185      ( 07 Jul 2023 06:50 )             27.2     07-07    134<L>  |  101  |  78<H>  ----------------------------<  110<H>  4.5   |  26  |  8.45<H>    Ca    9.0      07 Jul 2023 06:50    07-05 @ 05:49  pH: --  pCO2: 44  pO2: 63  SaO2: 91.9    Urinalysis Basic - ( 07 Jul 2023 06:50 )    Color: x / Appearance: x / SG: x / pH: x  Gluc: 110 mg/dL / Ketone: x  / Bili: x / Urobili: x   Blood: x / Protein: x / Nitrite: x   Leuk Esterase: x / RBC: x / WBC x   Sq Epi: x / Non Sq Epi: x / Bacteria: x    ASSESSMENT AND PLAN:  SOB.  Hypoxia.  LLL pneumonia.  ESRD.  Anemia.  HTN.  DM.    SPO2 95% on room air.  Will get room air ABG.  Continue empiric  antibiotics.  Continue DVT prophylaxis.  56+ minutes spent    07/05/23:  Awake, responsive. Reports being SOB. Room air ABG noted and is normal.  Procalcitonin is elevated but significance not clear due to renal failure.  Continue antibiotics. ( Ceftriaxone+ Zithromax ).  For dialysis again  today.    07/06/23: Awake, responsive, comfortable and feels better. SOPO2 89% on room air at rest.  Continue antibiotics, Doxycycline added in place of Zithromax due to prolonged QT  as needed O2.    07/07/23:  Seen in dialysis, awake, responsive and comfortable, SPO2 97%.  Reports having bouts of coughing last ni, mostly dry. Continue cough suppressant as needed,  Afebrile, Doxycycline discontinued as legionella antigen negative. Continue Ceftriaxone.    07/08/23:  Clinically better. Room air SPO2 97% on room air during ambulation.  So home O2 arrangements has been made.  Complete antibiotics per ID.  Repeat chest imaging in 6-8 weeks.        
  INTERVAL HPI:  79 y/o F with HTN, DM, CHF, ESRD (HD MWF), S/P PPM for arrhythmias, DVT, GI bleed.  Non smoker.   Presented  to ER w/ c/o cough and shortness of breath for the past 5 days. Cough is a dry but is associated with wheezing and Rib cage pain with each cough.  Symptoms got worst at dialysis center , so EMS were called who found her with SPo2 of 78%.   She denies Fever, Nausea/vomiting/ Diarrhea and dysuria. No CP, palpitation, dizziness, Leg edema or abdominal pain.    CT chest reports LLL pneumonia.    OVERNIGHT EVENTS:  Seen in dialysis, awake, responsive and comfortable.  Reports having bouts of coughing last ni, mostly dry    Vital Signs Last 24 Hrs  T(C): 36.8 (07 Jul 2023 08:50), Max: 36.8 (07 Jul 2023 08:50)  T(F): 98.2 (07 Jul 2023 08:50), Max: 98.2 (07 Jul 2023 08:50)  HR: 61 (07 Jul 2023 08:50) (59 - 61)  BP: 100/52 (07 Jul 2023 08:50) (100/52 - 152/67)  BP(mean): --  RR: 18 (07 Jul 2023 08:50) (16 - 20)  SpO2: 97% (07 Jul 2023 08:50) (90% - 97%)    Parameters below as of 07 Jul 2023 08:50  Patient On (Oxygen Delivery Method): room air    PHYSICAL EXAM:  GEN:         Awake, responsive and comfortable.  HEENT:    Normal.    RESP:       no distress  CVS:        Regular rate and rhythm.   ABD:         Soft, non-tender, non-distended;     MEDICATIONS  (STANDING):  amLODIPine   Tablet 10 milliGRAM(s) Oral daily  cefTRIAXone   IVPB 1000 milliGRAM(s) IV Intermittent every 24 hours  chlorhexidine 2% Cloths 1 Application(s) Topical daily  dextrose 5%. 1000 milliLiter(s) (50 mL/Hr) IV Continuous <Continuous>  dextrose 5%. 1000 milliLiter(s) (100 mL/Hr) IV Continuous <Continuous>  dextrose 50% Injectable 25 Gram(s) IV Push once  dextrose 50% Injectable 12.5 Gram(s) IV Push once  dextrose 50% Injectable 25 Gram(s) IV Push once  glucagon  Injectable 1 milliGRAM(s) IntraMuscular once  guaiFENesin Oral Liquid (Sugar-Free) 100 milliGRAM(s) Oral every 6 hours  heparin   Injectable 5000 Unit(s) SubCutaneous every 8 hours  hydrALAZINE 25 milliGRAM(s) Oral daily  insulin lispro (ADMELOG) corrective regimen sliding scale   SubCutaneous three times a day before meals  metoprolol succinate  milliGRAM(s) Oral daily  simvastatin 20 milliGRAM(s) Oral at bedtime    MEDICATIONS  (PRN):  acetaminophen     Tablet .. 650 milliGRAM(s) Oral every 6 hours PRN Temp greater or equal to 38C (100.4F), Mild Pain (1 - 3)  aluminum hydroxide/magnesium hydroxide/simethicone Suspension 30 milliLiter(s) Oral every 4 hours PRN Dyspepsia  benzonatate 100 milliGRAM(s) Oral three times a day PRN Cough  dextrose Oral Gel 15 Gram(s) Oral once PRN Blood Glucose LESS THAN 70 milliGRAM(s)/deciliter  melatonin 3 milliGRAM(s) Oral at bedtime PRN Insomnia  ondansetron Injectable 4 milliGRAM(s) IV Push every 8 hours PRN Nausea and/or Vomiting  sodium chloride 0.9% lock flush 10 milliLiter(s) IV Push every 1 hour PRN Pre/post blood products, medications, blood draw, and to maintain line patency    LABS:                        8.8    8.15  )-----------( 185      ( 07 Jul 2023 06:50 )             27.2     07-07    134<L>  |  101  |  78<H>  ----------------------------<  110<H>  4.5   |  26  |  8.45<H>    Ca    9.0      07 Jul 2023 06:50    07-05 @ 05:49  pH: --  pCO2: 44  pO2: 63  SaO2: 91.9    Urinalysis Basic - ( 07 Jul 2023 06:50 )    Color: x / Appearance: x / SG: x / pH: x  Gluc: 110 mg/dL / Ketone: x  / Bili: x / Urobili: x   Blood: x / Protein: x / Nitrite: x   Leuk Esterase: x / RBC: x / WBC x   Sq Epi: x / Non Sq Epi: x / Bacteria: x    ASSESSMENT AND PLAN:  SOB.  Hypoxia.  LLL pneumonia.  ESRD.  Anemia.  HTN.  DM.    SPO2 95% on room air.  Will get room air ABG.  Continue empiric  antibiotics.  Continue DVT prophylaxis.  56+ minutes spent    07/05/23:  Awake, responsive. Reports being SOB. Room air ABG noted and is normal.  Procalcitonin is elevated but significance not clear due to renal failure.  Continue antibiotics. ( Ceftriaxone+ Zithromax ).  For dialysis again  today.    07/06/23: Awake, responsive, comfortable and feels better. SOPO2 89% on room air at rest.  Continue antibiotics, Doxycycline added in place of Zithromax due to prolonged QT  as needed O2.    07/07/23:  Seen in dialysis, awake, responsive and comfortable, SPO2 97%.  Reports having bouts of coughing last ni, mostly dry. Continue cough suppressant as needed,  Afebrile, Doxycycline discontinued as legionella antigen negative. Continue Ceftriaxone.      
[facility] NEPHROLOGY SERVICES, [facility]  NEPHROLOGY AND HYPERTENSION  [address]  [address]  [place] [address]  [phone]    MD PRAVEEN LYNNE MD YELENA ROSENBERG, MD BINNY KOSHY, MD CHRISTOPHER CAPUTO, MD EDWARD BOVER, MD          Patient events noted    MEDICATIONS  (STANDING):  amLODIPine   Tablet 10 milliGRAM(s) Oral daily  cefTRIAXone   IVPB 1000 milliGRAM(s) IV Intermittent every 24 hours  dextrose 5%. 1000 milliLiter(s) (50 mL/Hr) IV Continuous <Continuous>  dextrose 5%. 1000 milliLiter(s) (100 mL/Hr) IV Continuous <Continuous>  dextrose 50% Injectable 25 Gram(s) IV Push once  dextrose 50% Injectable 12.5 Gram(s) IV Push once  dextrose 50% Injectable 25 Gram(s) IV Push once  doxycycline monohydrate Capsule 100 milliGRAM(s) Oral every 12 hours  glucagon  Injectable 1 milliGRAM(s) IntraMuscular once  guaiFENesin Oral Liquid (Sugar-Free) 100 milliGRAM(s) Oral every 6 hours  heparin   Injectable 5000 Unit(s) SubCutaneous every 8 hours  hydrALAZINE 25 milliGRAM(s) Oral daily  insulin lispro (ADMELOG) corrective regimen sliding scale   SubCutaneous three times a day before meals  metoprolol succinate  milliGRAM(s) Oral daily  simvastatin 20 milliGRAM(s) Oral at bedtime    MEDICATIONS  (PRN):  acetaminophen     Tablet .. 650 milliGRAM(s) Oral every 6 hours PRN Temp greater or equal to 38C (100.4F), Mild Pain (1 - 3)  aluminum hydroxide/magnesium hydroxide/simethicone Suspension 30 milliLiter(s) Oral every 4 hours PRN Dyspepsia  benzonatate 100 milliGRAM(s) Oral three times a day PRN Cough  dextrose Oral Gel 15 Gram(s) Oral once PRN Blood Glucose LESS THAN 70 milliGRAM(s)/deciliter  melatonin 3 milliGRAM(s) Oral at bedtime PRN Insomnia  ondansetron Injectable 4 milliGRAM(s) IV Push every 8 hours PRN Nausea and/or Vomiting      07-04-23 @ 07:01  -  07-05-23 @ 07:00  --------------------------------------------------------  IN: 50 mL / OUT: 1500 mL / NET: -1450 mL    07-05-23 @ 07:01  -  07-05-23 @ 21:43  --------------------------------------------------------  IN: 0 mL / OUT: 2000 mL / NET: -2000 mL      PHYSICAL EXAM:      T(C): 36.8 (07-05-23 @ 20:17), Max: 36.8 (07-05-23 @ 17:10)  HR: 58 (07-05-23 @ 20:17) (56 - 74)  BP: 125/52 (07-05-23 @ 20:17) (105/66 - 130/64)  RR: 16 (07-05-23 @ 20:17) (16 - 18)  SpO2: 96% (07-05-23 @ 20:17) (91% - 97%)  Wt(kg): --  Lungs clear  Heart S1S2  Abd soft NT ND  Extremities:   tr edema                                    8.7    7.59  )-----------( 136      ( 05 Jul 2023 07:20 )             26.8     07-05    138  |  104  |  51<H>  ----------------------------<  101<H>  4.0   |  27  |  6.94<H>    Ca    9.7      05 Jul 2023 07:20  Phos  4.4     07-05  Mg     2.5     07-05    TPro  7.0  /  Alb  3.0<L>  /  TBili  0.5  /  DBili  x   /  AST  12<L>  /  ALT  7<L>  /  AlkPhos  78  07-05    ABG - ( 05 Jul 2023 05:49 )  pH, Arterial: 7.41  pH, Blood: x     /  pCO2: 44    /  pO2: 63    / HCO3: 28    / Base Excess: 2.7   /  SaO2: 91.9              LIVER FUNCTIONS - ( 05 Jul 2023 07:20 )  Alb: 3.0 g/dL / Pro: 7.0 gm/dL / ALK PHOS: 78 U/L / ALT: 7 U/L / AST: 12 U/L / GGT: x           Creatinine Trend: 6.94<--, 4.53<--, 3.34<--      Assessment   ESRD; PNA and fluid overload     Plan:  HD for today  UF as tolerated   Will follow     Erickson Ray MD
STKITTS, CLAUDETTE  MRN-[id number]    Follow Up:  PNA    Interval History: the pt was seen and examined earlier, no acute distress, pt complains of being tired. Pt is off supplement O2 on my arrival with O2 SAT 86-88%, placed on NC 2L, improved. Pt is afebrile, no leukocytosis.     PAST MEDICAL & SURGICAL HISTORY:  HTN (hypertension)      Pacemaker      DVT (deep venous thrombosis)      Diverticulitis      CHF (congestive heart failure)      ESRD on dialysis      Intestinal bleeding          ROS:    [ ] Unobtainable because:  [x ] All other systems negative    Constitutional: no fever, no chills  Head: no trauma  Eyes: no vision changes, no eye pain  ENT:  no sore throat, no rhinorrhea  Cardiovascular:  no chest pain, no palpitation  Respiratory:   SOB with cough  GI:  no abd pain, no vomiting, no diarrhea  urinary: no dysuria, no hematuria, no flank pain  musculoskeletal:  no joint pain, no joint swelling  skin:  no rash  neurology:  no headache, no seizure, no change in mental status  psych: no anxiety, no depression         Allergies  Eliquis (Other)        ANTIMICROBIALS:  cefTRIAXone   IVPB 1000 every 24 hours      OTHER MEDS:  acetaminophen     Tablet .. 650 milliGRAM(s) Oral every 6 hours PRN  aluminum hydroxide/magnesium hydroxide/simethicone Suspension 30 milliLiter(s) Oral every 4 hours PRN  amLODIPine   Tablet 10 milliGRAM(s) Oral daily  benzonatate 100 milliGRAM(s) Oral three times a day PRN  chlorhexidine 2% Cloths 1 Application(s) Topical daily  dextrose 5%. 1000 milliLiter(s) IV Continuous <Continuous>  dextrose 5%. 1000 milliLiter(s) IV Continuous <Continuous>  dextrose 50% Injectable 25 Gram(s) IV Push once  dextrose 50% Injectable 12.5 Gram(s) IV Push once  dextrose 50% Injectable 25 Gram(s) IV Push once  dextrose Oral Gel 15 Gram(s) Oral once PRN  glucagon  Injectable 1 milliGRAM(s) IntraMuscular once  guaiFENesin Oral Liquid (Sugar-Free) 100 milliGRAM(s) Oral every 6 hours  heparin   Injectable 5000 Unit(s) SubCutaneous every 8 hours  hydrALAZINE 25 milliGRAM(s) Oral daily  insulin lispro (ADMELOG) corrective regimen sliding scale   SubCutaneous three times a day before meals  melatonin 3 milliGRAM(s) Oral at bedtime PRN  metoprolol succinate  milliGRAM(s) Oral daily  ondansetron Injectable 4 milliGRAM(s) IV Push every 8 hours PRN  simvastatin 20 milliGRAM(s) Oral at bedtime  sodium chloride 0.9% lock flush 10 milliLiter(s) IV Push every 1 hour PRN      Vital Signs Last 24 Hrs  T(C): 36.4 (07 Jul 2023 12:29), Max: 36.8 (07 Jul 2023 08:50)  T(F): 97.5 (07 Jul 2023 12:29), Max: 98.2 (07 Jul 2023 08:50)  HR: 62 (07 Jul 2023 12:29) (59 - 62)  BP: 116/63 (07 Jul 2023 12:29) (100/52 - 152/67)  BP(mean): --  RR: 20 (07 Jul 2023 12:50) (16 - 20)  SpO2: 87% (07 Jul 2023 13:58) (87% - 98%)    Parameters below as of 07 Jul 2023 13:58  Patient On (Oxygen Delivery Method): room air        Physical Exam:  Constitutional: non-toxic, no distress, placed on NC, O2 SAT on RA 86-88%  HEAD/EYES: anicteric, no conjunctival injection  ENT:  supple, no thrush  Cardiovascular:   normal S1, S2, no murmur, no edema, + left chest PPM   Respiratory:  + mild rales left lower lung field, no wheezes  GI:  soft, non-tender, normal bowel sounds  :  no doan, no CVA tenderness  Musculoskeletal:  no synovitis, normal ROM, LUE AV fistula with palpable thrill  Neurologic: awake and alert, normal strength, no focal findings  Skin:  no rash, no erythema, no phlebitis, right chest wall permacath c/d/i  Heme/Onc: no lymphadenopathy   Psychiatric:  awake, alert, appropriate mood    WBC Count: 8.15 K/uL (07-07 @ 06:50)  WBC Count: 6.92 K/uL (07-06 @ 08:00)  WBC Count: 7.59 K/uL (07-05 @ 07:20)  WBC Count: 9.91 K/uL (07-04 @ 06:10)  WBC Count: 10.34 K/uL (07-03 @ 15:50)                            8.8    8.15  )-----------( 185      ( 07 Jul 2023 06:50 )             27.2       07-07    134<L>  |  101  |  78<H>  ----------------------------<  110<H>  4.5   |  26  |  8.45<H>    Ca    9.0      07 Jul 2023 06:50        Urinalysis Basic - ( 07 Jul 2023 06:50 )    Color: x / Appearance: x / SG: x / pH: x  Gluc: 110 mg/dL / Ketone: x  / Bili: x / Urobili: x   Blood: x / Protein: x / Nitrite: x   Leuk Esterase: x / RBC: x / WBC x   Sq Epi: x / Non Sq Epi: x / Bacteria: x        Creatinine Trend: 8.45<--, 5.67<--, 6.94<--, 4.53<--, 3.34<--      MICROBIOLOGY:  v      Rapid RVP Result: NotDetec (07-03 @ 15:50)        Procalcitonin, Serum: 2.07 (07-05-23 @ 07:20)    SARS-CoV-2: NotDetec (07-03-23 @ 15:50)  Rapid RVP Result: NotDetec (07-03-23 @ 15:50)    SARS-CoV-2: NotDetec (03 Jul 2023 15:50)    RADIOLOGY:    
  INTERVAL HPI:  79 y/o F with HTN, DM, CHF, ESRD (HD MWF), S/P PPM for arrhythmias, DVT, GI bleed.  Non smoker.   Presented  to ER w/ c/o cough and shortness of breath for the past 5 days. Cough is a dry but is associated with wheezing and Rib cage pain with each cough.  Symptoms got worst at dialysis center , so EMS were called who found her with SPo2 of 78%.   She denies Fever, Nausea/vomiting/ Diarrhea and dysuria. No CP, palpitation, dizziness, Leg edema or abdominal pain.    CT chest reports LLL pneumonia.    OVERNIGHT EVENTS:  Awake, responsive, comfortable and feels better.    Vital Signs Last 24 Hrs  T(C): 36.4 (06 Jul 2023 11:05), Max: 36.8 (05 Jul 2023 17:10)  T(F): 97.6 (06 Jul 2023 11:05), Max: 98.3 (05 Jul 2023 17:10)  HR: 68 (06 Jul 2023 11:05) (58 - 68)  BP: 105/64 (06 Jul 2023 11:05) (105/64 - 144/74)  BP(mean): 79 (05 Jul 2023 16:25) (79 - 79)  RR: 18 (06 Jul 2023 11:05) (16 - 18)  SpO2: 92% (06 Jul 2023 11:05) (91% - 97%)    Parameters below as of 06 Jul 2023 11:05  Patient On (Oxygen Delivery Method): room air    PHYSICAL EXAM:  GEN:         Awake, responsive and comfortable.  HEENT:    Normal.    RESP:      no wheezing  CVS:         Regular rate and rhythm.     MEDICATIONS  (STANDING):  amLODIPine   Tablet 10 milliGRAM(s) Oral daily  cefTRIAXone   IVPB 1000 milliGRAM(s) IV Intermittent every 24 hours  dextrose 5%. 1000 milliLiter(s) (100 mL/Hr) IV Continuous <Continuous>  dextrose 5%. 1000 milliLiter(s) (50 mL/Hr) IV Continuous <Continuous>  dextrose 50% Injectable 25 Gram(s) IV Push once  dextrose 50% Injectable 25 Gram(s) IV Push once  dextrose 50% Injectable 12.5 Gram(s) IV Push once  doxycycline monohydrate Capsule 100 milliGRAM(s) Oral every 12 hours  glucagon  Injectable 1 milliGRAM(s) IntraMuscular once  guaiFENesin Oral Liquid (Sugar-Free) 100 milliGRAM(s) Oral every 6 hours  heparin   Injectable 5000 Unit(s) SubCutaneous every 8 hours  hydrALAZINE 25 milliGRAM(s) Oral daily  insulin lispro (ADMELOG) corrective regimen sliding scale   SubCutaneous three times a day before meals  metoprolol succinate  milliGRAM(s) Oral daily  simvastatin 20 milliGRAM(s) Oral at bedtime    MEDICATIONS  (PRN):  acetaminophen     Tablet .. 650 milliGRAM(s) Oral every 6 hours PRN Temp greater or equal to 38C (100.4F), Mild Pain (1 - 3)  aluminum hydroxide/magnesium hydroxide/simethicone Suspension 30 milliLiter(s) Oral every 4 hours PRN Dyspepsia  benzonatate 100 milliGRAM(s) Oral three times a day PRN Cough  dextrose Oral Gel 15 Gram(s) Oral once PRN Blood Glucose LESS THAN 70 milliGRAM(s)/deciliter  melatonin 3 milliGRAM(s) Oral at bedtime PRN Insomnia  ondansetron Injectable 4 milliGRAM(s) IV Push every 8 hours PRN Nausea and/or Vomiting    LABS:                        9.1    6.92  )-----------( 159      ( 06 Jul 2023 08:00 )             28.3     07-06    136  |  103  |  39<H>  ----------------------------<  119<H>  4.0   |  28  |  5.67<H>    Ca    8.9      06 Jul 2023 08:00  Phos  4.4     07-05  Mg     2.5     07-05    TPro  7.0  /  Alb  3.0<L>  /  TBili  0.5  /  DBili  x   /  AST  12<L>  /  ALT  7<L>  /  AlkPhos  78  07-05    07-05 @ 05:49  pH: --  pCO2: 44  pO2: 63  SaO2: 91.9    Urinalysis Basic - ( 06 Jul 2023 08:00 )    Color: x / Appearance: x / SG: x / pH: x  Gluc: 119 mg/dL / Ketone: x  / Bili: x / Urobili: x   Blood: x / Protein: x / Nitrite: x   Leuk Esterase: x / RBC: x / WBC x   Sq Epi: x / Non Sq Epi: x / Bacteria: x    ASSESSMENT AND PLAN:  SOB.  Hypoxia.  LLL pneumonia.  ESRD.  Anemia.  HTN.  DM.    SPO2 95% on room air.  Will get room air ABG.  Continue empiric  antibiotics.  Continue DVT prophylaxis.  56+ minutes spent    07/05/23:  Awake, responsive. Reports being SOB. Room air ABG noted and is normal.  Procalcitonin is elevated but significance not clear due to renal failure.  Continue antibiotics. ( Ceftriaxone+ Zithromax ).  For dialysis again  today.    07/06/23: Awake, responsive, comfortable and feels better. SOPO2 89% on room air at rest.  Continue antibiotics, Doxycycline added in place of Zithromax due to prolonged QT  as needed O2.  
[facility] NEPHROLOGY SERVICES, [facility]  NEPHROLOGY AND HYPERTENSION  [address]  [address]  [place] [address]  [phone]    MD PRAVEEN LYNNE MD YELENA ROSENBERG, MD BINNY KOSHY, MD CHRISTOPHER CAPUTO, MD EDWARD BOVER, MD          Patient events noted    MEDICATIONS  (STANDING):  amLODIPine   Tablet 10 milliGRAM(s) Oral daily  cefTRIAXone   IVPB 1000 milliGRAM(s) IV Intermittent every 24 hours  chlorhexidine 2% Cloths 1 Application(s) Topical daily  dextrose 5%. 1000 milliLiter(s) (100 mL/Hr) IV Continuous <Continuous>  dextrose 5%. 1000 milliLiter(s) (50 mL/Hr) IV Continuous <Continuous>  dextrose 50% Injectable 25 Gram(s) IV Push once  dextrose 50% Injectable 25 Gram(s) IV Push once  dextrose 50% Injectable 12.5 Gram(s) IV Push once  glucagon  Injectable 1 milliGRAM(s) IntraMuscular once  guaiFENesin Oral Liquid (Sugar-Free) 100 milliGRAM(s) Oral every 6 hours  heparin   Injectable 5000 Unit(s) SubCutaneous every 8 hours  hydrALAZINE 25 milliGRAM(s) Oral daily  insulin lispro (ADMELOG) corrective regimen sliding scale   SubCutaneous three times a day before meals  metoprolol succinate  milliGRAM(s) Oral daily  simvastatin 20 milliGRAM(s) Oral at bedtime    MEDICATIONS  (PRN):  acetaminophen     Tablet .. 650 milliGRAM(s) Oral every 6 hours PRN Temp greater or equal to 38C (100.4F), Mild Pain (1 - 3)  aluminum hydroxide/magnesium hydroxide/simethicone Suspension 30 milliLiter(s) Oral every 4 hours PRN Dyspepsia  benzonatate 100 milliGRAM(s) Oral three times a day PRN Cough  dextrose Oral Gel 15 Gram(s) Oral once PRN Blood Glucose LESS THAN 70 milliGRAM(s)/deciliter  melatonin 3 milliGRAM(s) Oral at bedtime PRN Insomnia  ondansetron Injectable 4 milliGRAM(s) IV Push every 8 hours PRN Nausea and/or Vomiting  sodium chloride 0.9% lock flush 10 milliLiter(s) IV Push every 1 hour PRN Pre/post blood products, medications, blood draw, and to maintain line patency      07-07-23 @ 07:01  -  07-07-23 @ 19:21  --------------------------------------------------------  IN: 540 mL / OUT: 2000 mL / NET: -1460 mL      PHYSICAL EXAM:      T(C): 36.5 (07-07-23 @ 17:33), Max: 36.8 (07-07-23 @ 08:50)  HR: 60 (07-07-23 @ 17:33) (59 - 62)  BP: 113/63 (07-07-23 @ 17:33) (100/52 - 152/67)  RR: 18 (07-07-23 @ 17:33) (16 - 20)  SpO2: 95% (07-07-23 @ 17:33) (87% - 98%)  Wt(kg): --  Lungs clear  Heart S1S2  Abd soft NT ND  Extremities:   tr edema                                    8.8    8.15  )-----------( 185      ( 07 Jul 2023 06:50 )             27.2     07-07    134<L>  |  101  |  78<H>  ----------------------------<  110<H>  4.5   |  26  |  8.45<H>    Ca    9.0      07 Jul 2023 06:50          Creatinine Trend: 8.45<--, 5.67<--, 6.94<--, 4.53<--, 3.34<--      Assessment   ESRD, maintenance     Plan:  HD for today   UF as tolerated     Erickson Ray MD
Patient is a 78y old  Female who presents with a chief complaint of SOB, PNA (2023 11:24)    INTERVAL HPI/OVERNIGHT EVENTS:    MEDICATIONS  (STANDING):  amLODIPine   Tablet 10 milliGRAM(s) Oral daily  cefTRIAXone   IVPB 1000 milliGRAM(s) IV Intermittent every 24 hours  chlorhexidine 2% Cloths 1 Application(s) Topical daily  dextrose 5%. 1000 milliLiter(s) (100 mL/Hr) IV Continuous <Continuous>  dextrose 5%. 1000 milliLiter(s) (50 mL/Hr) IV Continuous <Continuous>  dextrose 50% Injectable 25 Gram(s) IV Push once  dextrose 50% Injectable 12.5 Gram(s) IV Push once  dextrose 50% Injectable 25 Gram(s) IV Push once  glucagon  Injectable 1 milliGRAM(s) IntraMuscular once  guaiFENesin Oral Liquid (Sugar-Free) 100 milliGRAM(s) Oral every 6 hours  heparin   Injectable 5000 Unit(s) SubCutaneous every 8 hours  hydrALAZINE 25 milliGRAM(s) Oral daily  insulin lispro (ADMELOG) corrective regimen sliding scale   SubCutaneous three times a day before meals  metoprolol succinate  milliGRAM(s) Oral daily  simvastatin 20 milliGRAM(s) Oral at bedtime    MEDICATIONS  (PRN):  acetaminophen     Tablet .. 650 milliGRAM(s) Oral every 6 hours PRN Temp greater or equal to 38C (100.4F), Mild Pain (1 - 3)  aluminum hydroxide/magnesium hydroxide/simethicone Suspension 30 milliLiter(s) Oral every 4 hours PRN Dyspepsia  benzonatate 100 milliGRAM(s) Oral three times a day PRN Cough  dextrose Oral Gel 15 Gram(s) Oral once PRN Blood Glucose LESS THAN 70 milliGRAM(s)/deciliter  melatonin 3 milliGRAM(s) Oral at bedtime PRN Insomnia  ondansetron Injectable 4 milliGRAM(s) IV Push every 8 hours PRN Nausea and/or Vomiting  sodium chloride 0.9% lock flush 10 milliLiter(s) IV Push every 1 hour PRN Pre/post blood products, medications, blood draw, and to maintain line patency    Allergies    Eliquis (Other)    Intolerances      REVIEW OF SYSTEMS:  All other systems reviewed and are negative    Vital Signs Last 24 Hrs  T(C): 36.4 (2023 12:29), Max: 36.8 (2023 08:50)  T(F): 97.5 (2023 12:29), Max: 98.2 (2023 08:50)  HR: 62 (2023 12:29) (59 - 62)  BP: 116/63 (2023 12:29) (100/52 - 152/67)  BP(mean): --  RR: 20 (2023 12:50) (16 - 20)  SpO2: 92% (2023 12:50) (90% - 98%)    Parameters below as of 2023 12:50  Patient On (Oxygen Delivery Method): nasal cannula  O2 Flow (L/min): 2    Daily     Daily Weight in k (2023 12:00)  I&O's Summary    2023 07:01  -  2023 13:59  --------------------------------------------------------  IN: 540 mL / OUT: 2000 mL / NET: -1460 mL      CAPILLARY BLOOD GLUCOSE      POCT Blood Glucose.: 106 mg/dL (2023 12:59)  POCT Blood Glucose.: 102 mg/dL (2023 07:34)  POCT Blood Glucose.: 122 mg/dL (2023 21:28)  POCT Blood Glucose.: 137 mg/dL (2023 16:27)    PHYSICAL EXAM:  GENERAL: NAD,    HEAD:  Atraumatic, Normocephalic  EYES: EOMI, PERRLA, conjunctiva and sclera clear  ENMT: No tonsillar erythema, exudates, or enlargement; Moist mucous membranes, Good dentition, No lesions  NECK: Supple, No JVD, Normal thyroid  NERVOUS SYSTEM:  Alert & Oriented X3, Good concentration; Motor Strength 5/5 B/L upper and lower extremities; DTRs 2+ intact and symmetric  CHEST/LUNG: Clear to percussion bilaterally; No rales, rhonchi, wheezing, or rubs  HEART: Regular rate and rhythm; No murmurs, rubs, or gallops  ABDOMEN: Soft, Nontender, Nondistended; Bowel sounds present  EXTREMITIES:  2+ Peripheral Pulses, No clubbing, cyanosis, or edema  LYMPH: No lymphadenopathy noted  SKIN: No rashes or lesions    Labs                          8.8    8.15  )-----------( 185      ( 2023 06:50 )             27.2         134<L>  |  101  |  78<H>  ----------------------------<  110<H>  4.5   |  26  |  8.45<H>    Ca    9.0      2023 06:50            Urinalysis Basic - ( 2023 06:50 )    Color: x / Appearance: x / SG: x / pH: x  Gluc: 110 mg/dL / Ketone: x  / Bili: x / Urobili: x   Blood: x / Protein: x / Nitrite: x   Leuk Esterase: x / RBC: x / WBC x   Sq Epi: x / Non Sq Epi: x / Bacteria: x                  DVT prophylaxis: > Lovenox 40mg SQ daily  > Heparin   > SCD's
  INTERVAL HPI:  79 y/o F with HTN, DM, CHF, ESRD (HD MWF), S/P PPM for arrhythmias, DVT, GI bleed.  Non smoker.   Presented  to ER w/ c/o cough and shortness of breath for the past 5 days. Cough is a dry but is associated with wheezing and Rib cage pain with each cough.  Symptoms got worst at dialysis center , so EMS were called who found her with SPo2 of 78%.   She denies Fever, Nausea/vomiting/ Diarrhea and dysuria. No CP, palpitation, dizziness, Leg edema or abdominal pain.    CT chest reports LLL pneumonia.    OVERNIGHT EVENTS:  Reports being sob.  SPO2 94% on nasal O2.    Vital Signs Last 24 Hrs  T(C): 36.7 (05 Jul 2023 16:25), Max: 36.7 (04 Jul 2023 23:48)  T(F): 98 (05 Jul 2023 16:25), Max: 98.1 (04 Jul 2023 23:48)  HR: 60 (05 Jul 2023 16:25) (56 - 74)  BP: 105/66 (05 Jul 2023 16:25) (105/66 - 130/64)  BP(mean): 79 (05 Jul 2023 16:25) (79 - 79)  RR: 18 (05 Jul 2023 16:25) (18 - 18)  SpO2: 94% (05 Jul 2023 16:25) (91% - 96%)    Parameters below as of 05 Jul 2023 16:25  Patient On (Oxygen Delivery Method): nasal cannula  O2 Flow (L/min): 1    PHYSICAL EXAM:  GEN:         Awake, responsive and comfortable.  HEENT:      Normal.    RESP:       no wheezing.  CVS:          Regular rate and rhythm.     MEDICATIONS  (STANDING):  amLODIPine   Tablet 10 milliGRAM(s) Oral daily  azithromycin   Tablet 500 milliGRAM(s) Oral daily  cefTRIAXone   IVPB 1000 milliGRAM(s) IV Intermittent every 24 hours  dextrose 5%. 1000 milliLiter(s) (50 mL/Hr) IV Continuous <Continuous>  dextrose 5%. 1000 milliLiter(s) (100 mL/Hr) IV Continuous <Continuous>  dextrose 50% Injectable 25 Gram(s) IV Push once  dextrose 50% Injectable 25 Gram(s) IV Push once  dextrose 50% Injectable 12.5 Gram(s) IV Push once  glucagon  Injectable 1 milliGRAM(s) IntraMuscular once  guaiFENesin Oral Liquid (Sugar-Free) 100 milliGRAM(s) Oral every 6 hours  heparin   Injectable 5000 Unit(s) SubCutaneous every 8 hours  hydrALAZINE 25 milliGRAM(s) Oral daily  insulin lispro (ADMELOG) corrective regimen sliding scale   SubCutaneous three times a day before meals  metoprolol succinate  milliGRAM(s) Oral daily  simvastatin 20 milliGRAM(s) Oral at bedtime    MEDICATIONS  (PRN):  acetaminophen     Tablet .. 650 milliGRAM(s) Oral every 6 hours PRN Temp greater or equal to 38C (100.4F), Mild Pain (1 - 3)  aluminum hydroxide/magnesium hydroxide/simethicone Suspension 30 milliLiter(s) Oral every 4 hours PRN Dyspepsia  benzonatate 100 milliGRAM(s) Oral three times a day PRN Cough  dextrose Oral Gel 15 Gram(s) Oral once PRN Blood Glucose LESS THAN 70 milliGRAM(s)/deciliter  melatonin 3 milliGRAM(s) Oral at bedtime PRN Insomnia  ondansetron Injectable 4 milliGRAM(s) IV Push every 8 hours PRN Nausea and/or Vomiting    LABS:                        8.7    7.59  )-----------( 136      ( 05 Jul 2023 07:20 )             26.8     07-05    138  |  104  |  51<H>  ----------------------------<  101<H>  4.0   |  27  |  6.94<H>    Ca    9.7      05 Jul 2023 07:20  Phos  4.4     07-05  Mg     2.5     07-05    TPro  7.0  /  Alb  3.0<L>  /  TBili  0.5  /  DBili  x   /  AST  12<L>  /  ALT  7<L>  /  AlkPhos  78  07-05    07-05 @ 05:49  pH: --  pCO2: 44  pO2: 63  SaO2: 91.9    Urinalysis Basic - ( 05 Jul 2023 07:20 )    Color: x / Appearance: x / SG: x / pH: x  Gluc: 101 mg/dL / Ketone: x  / Bili: x / Urobili: x   Blood: x / Protein: x / Nitrite: x   Leuk Esterase: x / RBC: x / WBC x   Sq Epi: x / Non Sq Epi: x / Bacteria: x    ASSESSMENT AND PLAN:  SOB.  Hypoxia.  LLL pneumonia.  ESRD.  Anemia.  HTN.  DM.    SPO2 95% on room air.  Will get room air ABG.  Continue empiric  antibiotics.  Continue DVT prophylaxis.  56+ minutes spent    07/05/23:  Awake, responsive. Reports being SOB. Room air ABG noted and is normal.  Procalcitonin is elevated but significance not clear due to renal failure.  Continue antibiotics. ( Ceftriaxone+ Zithromax ).  For dialysis again  today.        
[facility] NEPHROLOGY SERVICES, [facility]  NEPHROLOGY AND HYPERTENSION  [address]  [address]  [place] [address]  [phone]    MD PRAVEEN LYNNE MD YELENA ROSENBERG, MD BINNY KOSHY, MD CHRISTOPHER CAPUTO, MD EDWARD BOVER, MD          Patient events noted    MEDICATIONS  (STANDING):  amLODIPine   Tablet 10 milliGRAM(s) Oral daily  azithromycin   Tablet 500 milliGRAM(s) Oral daily  cefTRIAXone   IVPB 1000 milliGRAM(s) IV Intermittent every 24 hours  dextrose 5%. 1000 milliLiter(s) (50 mL/Hr) IV Continuous <Continuous>  dextrose 5%. 1000 milliLiter(s) (100 mL/Hr) IV Continuous <Continuous>  dextrose 50% Injectable 25 Gram(s) IV Push once  dextrose 50% Injectable 25 Gram(s) IV Push once  dextrose 50% Injectable 12.5 Gram(s) IV Push once  glucagon  Injectable 1 milliGRAM(s) IntraMuscular once  heparin   Injectable 5000 Unit(s) SubCutaneous every 8 hours  hydrALAZINE 25 milliGRAM(s) Oral daily  insulin lispro (ADMELOG) corrective regimen sliding scale   SubCutaneous three times a day before meals  metoprolol succinate  milliGRAM(s) Oral daily  simvastatin 20 milliGRAM(s) Oral at bedtime    MEDICATIONS  (PRN):  acetaminophen     Tablet .. 650 milliGRAM(s) Oral every 6 hours PRN Temp greater or equal to 38C (100.4F), Mild Pain (1 - 3)  aluminum hydroxide/magnesium hydroxide/simethicone Suspension 30 milliLiter(s) Oral every 4 hours PRN Dyspepsia  dextrose Oral Gel 15 Gram(s) Oral once PRN Blood Glucose LESS THAN 70 milliGRAM(s)/deciliter  guaiFENesin Oral Liquid (Sugar-Free) 100 milliGRAM(s) Oral every 6 hours PRN Cough  melatonin 3 milliGRAM(s) Oral at bedtime PRN Insomnia  ondansetron Injectable 4 milliGRAM(s) IV Push every 8 hours PRN Nausea and/or Vomiting      07-04-23 @ 07:01  -  07-04-23 @ 18:42  --------------------------------------------------------  IN: 0 mL / OUT: 1500 mL / NET: -1500 mL      PHYSICAL EXAM:      T(C): 37.2 (07-04-23 @ 16:21), Max: 37.2 (07-04-23 @ 16:21)  HR: 70 (07-04-23 @ 16:21) (56 - 70)  BP: 112/70 (07-04-23 @ 16:21) (102/56 - 152/73)  RR: 18 (07-04-23 @ 16:21) (18 - 25)  SpO2: 95% (07-04-23 @ 16:21) (93% - 98%)  Wt(kg): --  Lungs decreased BS at bases   Heart S1S2  Abd soft NT ND  Extremities:   tr edema                                    9.3    9.91  )-----------( 142      ( 04 Jul 2023 06:10 )             28.2     07-04    138  |  102  |  33<H>  ----------------------------<  116<H>  4.0   |  34<H>  |  4.53<H>    Ca    9.5      04 Jul 2023 06:10  Mg     2.3     07-03    TPro  7.8  /  Alb  3.4  /  TBili  1.1  /  DBili  x   /  AST  23  /  ALT  8<L>  /  AlkPhos  92  07-03      LIVER FUNCTIONS - ( 03 Jul 2023 15:50 )  Alb: 3.4 g/dL / Pro: 7.8 gm/dL / ALK PHOS: 92 U/L / ALT: 8 U/L / AST: 23 U/L / GGT: x           Creatinine Trend: 4.53<--, 3.34<--      Assessment   ESRD; fluid overload      Plan:  Isolated UF today  HD for tomorrow     Erickson Ray MD
Jose Peacock M.D.    Patient is a 78y old  Female who presents with a chief complaint of SOB, PNA (06 Jul 2023 11:31)      SUBJECTIVE / OVERNIGHT EVENTS: Still with coughing but overall better. Per RN, pt is satting 88-90% on RA at rest.     Patient denies chest pain, SOB, abd pain, N/V, fever, chills, dysuria or any other complaints. All remainder ROS negative.     MEDICATIONS  (STANDING):  amLODIPine   Tablet 10 milliGRAM(s) Oral daily  cefTRIAXone   IVPB 1000 milliGRAM(s) IV Intermittent every 24 hours  chlorhexidine 2% Cloths 1 Application(s) Topical daily  dextrose 5%. 1000 milliLiter(s) (50 mL/Hr) IV Continuous <Continuous>  dextrose 5%. 1000 milliLiter(s) (100 mL/Hr) IV Continuous <Continuous>  dextrose 50% Injectable 25 Gram(s) IV Push once  dextrose 50% Injectable 25 Gram(s) IV Push once  dextrose 50% Injectable 12.5 Gram(s) IV Push once  doxycycline monohydrate Capsule 100 milliGRAM(s) Oral every 12 hours  glucagon  Injectable 1 milliGRAM(s) IntraMuscular once  guaiFENesin Oral Liquid (Sugar-Free) 100 milliGRAM(s) Oral every 6 hours  heparin   Injectable 5000 Unit(s) SubCutaneous every 8 hours  hydrALAZINE 25 milliGRAM(s) Oral daily  insulin lispro (ADMELOG) corrective regimen sliding scale   SubCutaneous three times a day before meals  metoprolol succinate  milliGRAM(s) Oral daily  simvastatin 20 milliGRAM(s) Oral at bedtime    MEDICATIONS  (PRN):  acetaminophen     Tablet .. 650 milliGRAM(s) Oral every 6 hours PRN Temp greater or equal to 38C (100.4F), Mild Pain (1 - 3)  aluminum hydroxide/magnesium hydroxide/simethicone Suspension 30 milliLiter(s) Oral every 4 hours PRN Dyspepsia  benzonatate 100 milliGRAM(s) Oral three times a day PRN Cough  dextrose Oral Gel 15 Gram(s) Oral once PRN Blood Glucose LESS THAN 70 milliGRAM(s)/deciliter  melatonin 3 milliGRAM(s) Oral at bedtime PRN Insomnia  ondansetron Injectable 4 milliGRAM(s) IV Push every 8 hours PRN Nausea and/or Vomiting  sodium chloride 0.9% lock flush 10 milliLiter(s) IV Push every 1 hour PRN Pre/post blood products, medications, blood draw, and to maintain line patency      I&O's Summary    05 Jul 2023 07:01  -  06 Jul 2023 07:00  --------------------------------------------------------  IN: 50 mL / OUT: 2000 mL / NET: -1950 mL        PHYSICAL EXAM:  Vital Signs Last 24 Hrs  T(C): 36.4 (06 Jul 2023 11:05), Max: 36.8 (05 Jul 2023 17:10)  T(F): 97.6 (06 Jul 2023 11:05), Max: 98.3 (05 Jul 2023 17:10)  HR: 68 (06 Jul 2023 11:05) (58 - 68)  BP: 105/64 (06 Jul 2023 11:05) (105/64 - 144/74)  BP(mean): 79 (05 Jul 2023 16:25) (79 - 79)  RR: 20 (06 Jul 2023 13:30) (16 - 20)  SpO2: 90% (06 Jul 2023 13:30) (90% - 97%)    Parameters below as of 06 Jul 2023 13:30  Patient On (Oxygen Delivery Method): room air    CONSTITUTIONAL: NAD, well-groomed  RESPIRATORY: b/l crackles noted  CARDIOVASCULAR: Regular rate and rhythm; No lower extremity edema  ABDOMEN: Nontender to palpation, normoactive bowel sounds  PSYCH: A+O x3; affect appropriate  NEUROLOGY: CN 2-12 are intact and symmetric; no gross sensory deficits;   SKIN: No rashes; no palpable lesions    LABS:                        9.1    6.92  )-----------( 159      ( 06 Jul 2023 08:00 )             28.3     07-06    136  |  103  |  39<H>  ----------------------------<  119<H>  4.0   |  28  |  5.67<H>    Ca    8.9      06 Jul 2023 08:00  Phos  4.4     07-05  Mg     2.5     07-05    TPro  7.0  /  Alb  3.0<L>  /  TBili  0.5  /  DBili  x   /  AST  12<L>  /  ALT  7<L>  /  AlkPhos  78  07-05          Urinalysis Basic - ( 06 Jul 2023 08:00 )    Color: x / Appearance: x / SG: x / pH: x  Gluc: 119 mg/dL / Ketone: x  / Bili: x / Urobili: x   Blood: x / Protein: x / Nitrite: x   Leuk Esterase: x / RBC: x / WBC x   Sq Epi: x / Non Sq Epi: x / Bacteria: x        CAPILLARY BLOOD GLUCOSE      POCT Blood Glucose.: 225 mg/dL (06 Jul 2023 11:17)  POCT Blood Glucose.: 105 mg/dL (06 Jul 2023 07:41)  POCT Blood Glucose.: 158 mg/dL (05 Jul 2023 16:23)      RADIOLOGY & ADDITIONAL TESTS:  Results Reviewed:   Imaging Personally Reviewed:  Electrocardiogram Personally Reviewed:
STKITTS, CLAUDETTE  MRN-[id number]    Follow Up:  PNA    Interval History: the pt was seen and examined earlier, no acute distress, no new complaints, feeling better. Pt is afebrile, RA during my exam, no wbc.     PAST MEDICAL & SURGICAL HISTORY:  HTN (hypertension)      Pacemaker      DVT (deep venous thrombosis)      Diverticulitis      CHF (congestive heart failure)      ESRD on dialysis      Intestinal bleeding          ROS:    [ ] Unobtainable because:  [x ] All other systems negative    Constitutional: no fever, no chills  Head: no trauma  Eyes: no vision changes, no eye pain  ENT:  no sore throat, no rhinorrhea  Cardiovascular:  no chest pain, no palpitation  Respiratory:  no SOB, no cough  GI:  no abd pain, no vomiting, no diarrhea  urinary: no dysuria, no hematuria, no flank pain  musculoskeletal:  no joint pain, no joint swelling  skin:  no rash  neurology:  no headache, no seizure, no change in mental status  psych: no anxiety, no depression         Allergies  Eliquis (Other)        ANTIMICROBIALS:  cefTRIAXone   IVPB 1000 every 24 hours  doxycycline monohydrate Capsule 100 every 12 hours      OTHER MEDS:  acetaminophen     Tablet .. 650 milliGRAM(s) Oral every 6 hours PRN  aluminum hydroxide/magnesium hydroxide/simethicone Suspension 30 milliLiter(s) Oral every 4 hours PRN  amLODIPine   Tablet 10 milliGRAM(s) Oral daily  benzonatate 100 milliGRAM(s) Oral three times a day PRN  chlorhexidine 2% Cloths 1 Application(s) Topical daily  dextrose 5%. 1000 milliLiter(s) IV Continuous <Continuous>  dextrose 5%. 1000 milliLiter(s) IV Continuous <Continuous>  dextrose 50% Injectable 25 Gram(s) IV Push once  dextrose 50% Injectable 25 Gram(s) IV Push once  dextrose 50% Injectable 12.5 Gram(s) IV Push once  dextrose Oral Gel 15 Gram(s) Oral once PRN  glucagon  Injectable 1 milliGRAM(s) IntraMuscular once  guaiFENesin Oral Liquid (Sugar-Free) 100 milliGRAM(s) Oral every 6 hours  heparin   Injectable 5000 Unit(s) SubCutaneous every 8 hours  hydrALAZINE 25 milliGRAM(s) Oral daily  insulin lispro (ADMELOG) corrective regimen sliding scale   SubCutaneous three times a day before meals  melatonin 3 milliGRAM(s) Oral at bedtime PRN  metoprolol succinate  milliGRAM(s) Oral daily  ondansetron Injectable 4 milliGRAM(s) IV Push every 8 hours PRN  simvastatin 20 milliGRAM(s) Oral at bedtime  sodium chloride 0.9% lock flush 10 milliLiter(s) IV Push every 1 hour PRN      Vital Signs Last 24 Hrs  T(C): 36.6 (06 Jul 2023 16:07), Max: 36.8 (05 Jul 2023 17:10)  T(F): 97.8 (06 Jul 2023 16:07), Max: 98.3 (05 Jul 2023 17:10)  HR: 60 (06 Jul 2023 16:07) (58 - 68)  BP: 118/64 (06 Jul 2023 16:07) (105/64 - 144/74)  BP(mean): 79 (05 Jul 2023 16:25) (79 - 79)  RR: 18 (06 Jul 2023 16:07) (16 - 20)  SpO2: 90% (06 Jul 2023 16:07) (90% - 97%)    Parameters below as of 06 Jul 2023 16:07  Patient On (Oxygen Delivery Method): PT refused to put on nasal canula         Physical Exam:  Constitutional: non-toxic, no distress, on RA and comfortable  HEAD/EYES: anicteric, no conjunctival injection  ENT:  supple, no thrush  Cardiovascular:   normal S1, S2, no murmur, no edema, + left chest PPM   Respiratory:  + mild rales left lower lung field, no wheezes  GI:  soft, non-tender, normal bowel sounds  :  no doan, no CVA tenderness  Musculoskeletal:  no synovitis, normal ROM, LUE AV fistula with palpable thrill  Neurologic: awake and alert, normal strength, no focal findings  Skin:  no rash, no erythema, no phlebitis, right chest wall permacath c/d/i  Heme/Onc: no lymphadenopathy   Psychiatric:  awake, alert, appropriate mood    WBC Count: 6.92 K/uL (07-06 @ 08:00)  WBC Count: 7.59 K/uL (07-05 @ 07:20)  WBC Count: 9.91 K/uL (07-04 @ 06:10)  WBC Count: 10.34 K/uL (07-03 @ 15:50)                            9.1    6.92  )-----------( 159      ( 06 Jul 2023 08:00 )             28.3       07-06    136  |  103  |  39<H>  ----------------------------<  119<H>  4.0   |  28  |  5.67<H>    Ca    8.9      06 Jul 2023 08:00  Phos  4.4     07-05  Mg     2.5     07-05    TPro  7.0  /  Alb  3.0<L>  /  TBili  0.5  /  DBili  x   /  AST  12<L>  /  ALT  7<L>  /  AlkPhos  78  07-05      Urinalysis Basic - ( 06 Jul 2023 08:00 )    Color: x / Appearance: x / SG: x / pH: x  Gluc: 119 mg/dL / Ketone: x  / Bili: x / Urobili: x   Blood: x / Protein: x / Nitrite: x   Leuk Esterase: x / RBC: x / WBC x   Sq Epi: x / Non Sq Epi: x / Bacteria: x        Creatinine Trend: 5.67<--, 6.94<--, 4.53<--, 3.34<--      MICROBIOLOGY:  v    Rapid RVP Result: NotDetec (07-03 @ 15:50)    Procalcitonin, Serum: 2.07 (07-05-23 @ 07:20)    SARS-CoV-2: NotDetec (07-03-23 @ 15:50)  Rapid RVP Result: NotDetec (07-03-23 @ 15:50)    SARS-CoV-2: NotDetec (03 Jul 2023 15:50)    RADIOLOGY:    
Jose Peacock M.D.    Patient is a 78y old  Female who presents with a chief complaint of SOB, PNA (04 Jul 2023 19:49)      SUBJECTIVE / OVERNIGHT EVENTS: No event overnight. Reports worsening SOB with heavy cough, some chest discomfort with cough.     Patient denies abd pain, N/V, fever, chills, dysuria or any other complaints. All remainder ROS negative.     MEDICATIONS  (STANDING):  amLODIPine   Tablet 10 milliGRAM(s) Oral daily  azithromycin   Tablet 500 milliGRAM(s) Oral daily  cefTRIAXone   IVPB 1000 milliGRAM(s) IV Intermittent every 24 hours  dextrose 5%. 1000 milliLiter(s) (100 mL/Hr) IV Continuous <Continuous>  dextrose 5%. 1000 milliLiter(s) (50 mL/Hr) IV Continuous <Continuous>  dextrose 50% Injectable 25 Gram(s) IV Push once  dextrose 50% Injectable 25 Gram(s) IV Push once  dextrose 50% Injectable 12.5 Gram(s) IV Push once  glucagon  Injectable 1 milliGRAM(s) IntraMuscular once  guaiFENesin Oral Liquid (Sugar-Free) 100 milliGRAM(s) Oral every 6 hours  heparin   Injectable 5000 Unit(s) SubCutaneous every 8 hours  hydrALAZINE 25 milliGRAM(s) Oral daily  insulin lispro (ADMELOG) corrective regimen sliding scale   SubCutaneous three times a day before meals  metoprolol succinate  milliGRAM(s) Oral daily  simvastatin 20 milliGRAM(s) Oral at bedtime    MEDICATIONS  (PRN):  acetaminophen     Tablet .. 650 milliGRAM(s) Oral every 6 hours PRN Temp greater or equal to 38C (100.4F), Mild Pain (1 - 3)  aluminum hydroxide/magnesium hydroxide/simethicone Suspension 30 milliLiter(s) Oral every 4 hours PRN Dyspepsia  benzonatate 100 milliGRAM(s) Oral three times a day PRN Cough  dextrose Oral Gel 15 Gram(s) Oral once PRN Blood Glucose LESS THAN 70 milliGRAM(s)/deciliter  melatonin 3 milliGRAM(s) Oral at bedtime PRN Insomnia  ondansetron Injectable 4 milliGRAM(s) IV Push every 8 hours PRN Nausea and/or Vomiting      I&O's Summary    04 Jul 2023 07:01  -  05 Jul 2023 07:00  --------------------------------------------------------  IN: 50 mL / OUT: 1500 mL / NET: -1450 mL        PHYSICAL EXAM:  Vital Signs Last 24 Hrs  T(C): 36.7 (05 Jul 2023 10:27), Max: 37.2 (04 Jul 2023 16:21)  T(F): 98.1 (05 Jul 2023 10:27), Max: 98.9 (04 Jul 2023 16:21)  HR: 60 (05 Jul 2023 14:05) (56 - 74)  BP: 117/59 (05 Jul 2023 14:05) (112/70 - 130/64)  BP(mean): --  RR: 18 (05 Jul 2023 14:05) (18 - 18)  SpO2: 91% (05 Jul 2023 14:05) (91% - 96%)    Parameters below as of 05 Jul 2023 14:05  Patient On (Oxygen Delivery Method): nasal cannula  O2 Flow (L/min): 1      CONSTITUTIONAL: NAD, well-groomed  RESPIRATORY: b/l crackles noted  CARDIOVASCULAR: Regular rate and rhythm; No lower extremity edema  ABDOMEN: Nontender to palpation, normoactive bowel sounds  PSYCH: A+O x3; affect appropriate  NEUROLOGY: CN 2-12 are intact and symmetric; no gross sensory deficits;   SKIN: No rashes; no palpable lesions    LABS:                        8.7    7.59  )-----------( 136      ( 05 Jul 2023 07:20 )             26.8     07-05    138  |  104  |  51<H>  ----------------------------<  101<H>  4.0   |  27  |  6.94<H>    Ca    9.7      05 Jul 2023 07:20  Phos  4.4     07-05  Mg     2.5     07-05    TPro  7.0  /  Alb  3.0<L>  /  TBili  0.5  /  DBili  x   /  AST  12<L>  /  ALT  7<L>  /  AlkPhos  78  07-05          Urinalysis Basic - ( 05 Jul 2023 07:20 )    Color: x / Appearance: x / SG: x / pH: x  Gluc: 101 mg/dL / Ketone: x  / Bili: x / Urobili: x   Blood: x / Protein: x / Nitrite: x   Leuk Esterase: x / RBC: x / WBC x   Sq Epi: x / Non Sq Epi: x / Bacteria: x        CAPILLARY BLOOD GLUCOSE      POCT Blood Glucose.: 147 mg/dL (05 Jul 2023 11:18)  POCT Blood Glucose.: 121 mg/dL (05 Jul 2023 07:41)  POCT Blood Glucose.: 172 mg/dL (04 Jul 2023 21:07)  POCT Blood Glucose.: 136 mg/dL (04 Jul 2023 16:38)      RADIOLOGY & ADDITIONAL TESTS:  Results Reviewed:   Imaging Personally Reviewed:  Electrocardiogram Personally Reviewed:
Patient is a 78y old  Female who presents with a chief complaint of SOB, PNA (2023 00:57)    INTERVAL HPI/OVERNIGHT EVENTS: no acute events  SUBJECTIVE: feels better. still coughing    MEDICATIONS  (STANDING):  amLODIPine   Tablet 10 milliGRAM(s) Oral daily  azithromycin   Tablet 500 milliGRAM(s) Oral daily  cefTRIAXone   IVPB 1000 milliGRAM(s) IV Intermittent every 24 hours  dextrose 5%. 1000 milliLiter(s) (50 mL/Hr) IV Continuous <Continuous>  dextrose 5%. 1000 milliLiter(s) (100 mL/Hr) IV Continuous <Continuous>  dextrose 50% Injectable 25 Gram(s) IV Push once  dextrose 50% Injectable 25 Gram(s) IV Push once  dextrose 50% Injectable 12.5 Gram(s) IV Push once  glucagon  Injectable 1 milliGRAM(s) IntraMuscular once  heparin   Injectable 5000 Unit(s) SubCutaneous every 8 hours  hydrALAZINE 25 milliGRAM(s) Oral daily  insulin lispro (ADMELOG) corrective regimen sliding scale   SubCutaneous three times a day before meals  metoprolol succinate  milliGRAM(s) Oral daily  simvastatin 20 milliGRAM(s) Oral at bedtime    MEDICATIONS  (PRN):  acetaminophen     Tablet .. 650 milliGRAM(s) Oral every 6 hours PRN Temp greater or equal to 38C (100.4F), Mild Pain (1 - 3)  aluminum hydroxide/magnesium hydroxide/simethicone Suspension 30 milliLiter(s) Oral every 4 hours PRN Dyspepsia  dextrose Oral Gel 15 Gram(s) Oral once PRN Blood Glucose LESS THAN 70 milliGRAM(s)/deciliter  guaiFENesin Oral Liquid (Sugar-Free) 100 milliGRAM(s) Oral every 6 hours PRN Cough  melatonin 3 milliGRAM(s) Oral at bedtime PRN Insomnia  ondansetron Injectable 4 milliGRAM(s) IV Push every 8 hours PRN Nausea and/or Vomiting    Allergies    Eliquis (Other)    Intolerances      REVIEW OF SYSTEMS:  All other systems reviewed and are negative    Vital Signs Last 24 Hrs  T(C): 37.2 (2023 16:21), Max: 37.2 (2023 16:21)  T(F): 98.9 (2023 16:21), Max: 98.9 (2023 16:21)  HR: 70 (2023 16:21) (56 - 70)  BP: 112/70 (2023 16:21) (102/56 - 152/73)  BP(mean): --  RR: 18 (2023 16:21) (18 - 25)  SpO2: 95% (2023 16:21) (93% - 98%)    Parameters below as of 2023 16:21  Patient On (Oxygen Delivery Method): nasal cannula      Daily Height in cm: 162.56 (2023 22:53)    Daily Weight in k (2023 11:56)  I&O's Summary    2023 07:01  -  2023 16:32  --------------------------------------------------------  IN: 0 mL / OUT: 1500 mL / NET: -1500 mL      CAPILLARY BLOOD GLUCOSE      POCT Blood Glucose.: 137 mg/dL (2023 12:21)  POCT Blood Glucose.: 109 mg/dL (2023 07:56)  POCT Blood Glucose.: 137 mg/dL (2023 22:58)    PHYSICAL EXAM:  GENERAL: NAD, well-groomed, well-developed. frail. very pleasant  NERVOUS SYSTEM:  Alert & Oriented X4, good concentration  CHEST/LUNG: rhonchi. right sided permacath  HEART: Regular rate and rhythm; No murmurs, rubs, or gallops  ABDOMEN: Soft, Nontender, Nondistended; Bowel sounds present  EXTREMITIES:  no cyanosis, no clubbing, no edema    Labs                          9.3    9.91  )-----------( 142      ( 2023 06:10 )             28.2     07-04    138  |  102  |  33<H>  ----------------------------<  116<H>  4.0   |  34<H>  |  4.53<H>    Ca    9.5      2023 06:10  Mg     2.3     07-03    TPro  7.8  /  Alb  3.4  /  TBili  1.1  /  DBili  x   /  AST  23  /  ALT  8<L>  /  AlkPhos  92  07-03          Urinalysis Basic - ( 2023 06:10 )    Color: x / Appearance: x / SG: x / pH: x  Gluc: 116 mg/dL / Ketone: x  / Bili: x / Urobili: x   Blood: x / Protein: x / Nitrite: x   Leuk Esterase: x / RBC: x / WBC x   Sq Epi: x / Non Sq Epi: x / Bacteria: x

## 2023-07-08 NOTE — DISCHARGE NOTE NURSING/CASE MANAGEMENT/SOCIAL WORK - PATIENT PORTAL LINK FT
You can access the FollowMyHealth Patient Portal offered by [facility] by registering at the following website: [email or web address]. By joining [facility]’s FollowMyHealth portal, you will also be able to view your health information using other applications (apps) compatible with our system.

## 2023-07-08 NOTE — DISCHARGE NOTE PROVIDER - CARE PROVIDER_API CALL
your primary care doctor,   Phone: (   )    -  Fax: (   )    -  Follow Up Time:    your primary care doctor,   Phone: (   )    -  Fax: (   )    -  Follow Up Time:     Larry Cruz  Pulmonary Disease  [address], [address]  [place] [address]  Phone: [phone]  Fax: [phone]  Follow Up Time:

## 2023-07-08 NOTE — DISCHARGE NOTE PROVIDER - INSTRUCTIONS
Diet, Regular:   Consistent Carbohydrate {No Snacks}  DASH/TLC {Sodium & Cholesterol Restricted}  For patients receiving Renal Replacement - No Protein Restr, No Conc K, No Conc Phos, Low Sodium (RENAL) (07-04-23 @ 02:16) [Active]

## 2023-07-08 NOTE — PROGRESS NOTE ADULT - PROVIDER SPECIALTY LIST ADULT
Hospitalist
Internal Medicine
Nephrology
Pulmonology
Nephrology
Nephrology
Pulmonology
Hospitalist
Infectious Disease
Infectious Disease
Hospitalist

## 2023-07-08 NOTE — PROGRESS NOTE ADULT - REASON FOR ADMISSION
SOB, PNA

## 2023-07-13 DIAGNOSIS — J18.9 PNEUMONIA, UNSPECIFIED ORGANISM: ICD-10-CM

## 2023-07-13 DIAGNOSIS — I24.8 OTHER FORMS OF ACUTE ISCHEMIC HEART DISEASE: ICD-10-CM

## 2023-07-13 DIAGNOSIS — E78.5 HYPERLIPIDEMIA, UNSPECIFIED: ICD-10-CM

## 2023-07-13 DIAGNOSIS — N18.6 END STAGE RENAL DISEASE: ICD-10-CM

## 2023-07-13 DIAGNOSIS — Z79.01 LONG TERM (CURRENT) USE OF ANTICOAGULANTS: ICD-10-CM

## 2023-07-13 DIAGNOSIS — Z95.0 PRESENCE OF CARDIAC PACEMAKER: ICD-10-CM

## 2023-07-13 DIAGNOSIS — Z66 DO NOT RESUSCITATE: ICD-10-CM

## 2023-07-13 DIAGNOSIS — Z99.2 DEPENDENCE ON RENAL DIALYSIS: ICD-10-CM

## 2023-07-13 DIAGNOSIS — I13.2 HYPERTENSIVE HEART AND CHRONIC KIDNEY DISEASE WITH HEART FAILURE AND WITH STAGE 5 CHRONIC KIDNEY DISEASE, OR END STAGE RENAL DISEASE: ICD-10-CM

## 2023-07-13 DIAGNOSIS — E11.22 TYPE 2 DIABETES MELLITUS WITH DIABETIC CHRONIC KIDNEY DISEASE: ICD-10-CM

## 2023-07-13 DIAGNOSIS — J96.01 ACUTE RESPIRATORY FAILURE WITH HYPOXIA: ICD-10-CM

## 2023-07-25 ENCOUNTER — INPATIENT (INPATIENT)
Facility: HOSPITAL | Age: 78
LOS: 14 days | Discharge: HOME HEALTH | End: 2023-08-09
Attending: HOSPITALIST | Admitting: HOSPITALIST
Payer: MEDICARE

## 2023-07-25 VITALS
WEIGHT: 162.04 LBS | RESPIRATION RATE: 18 BRPM | DIASTOLIC BLOOD PRESSURE: 69 MMHG | OXYGEN SATURATION: 94 % | TEMPERATURE: 99 F | SYSTOLIC BLOOD PRESSURE: 145 MMHG | HEART RATE: 69 BPM | HEIGHT: 64 IN

## 2023-07-25 DIAGNOSIS — K92.2 GASTROINTESTINAL HEMORRHAGE, UNSPECIFIED: Chronic | ICD-10-CM

## 2023-07-25 LAB
ALBUMIN SERPL ELPH-MCNC: 3.3 G/DL — SIGNIFICANT CHANGE UP (ref 3.3–5)
ALP SERPL-CCNC: 80 U/L — SIGNIFICANT CHANGE UP (ref 40–120)
ALT FLD-CCNC: 7 U/L — LOW (ref 12–78)
ANION GAP SERPL CALC-SCNC: 8 MMOL/L — SIGNIFICANT CHANGE UP (ref 5–17)
AST SERPL-CCNC: 30 U/L — SIGNIFICANT CHANGE UP (ref 15–37)
BASOPHILS # BLD AUTO: 0.06 K/UL — SIGNIFICANT CHANGE UP (ref 0–0.2)
BASOPHILS NFR BLD AUTO: 1 % — SIGNIFICANT CHANGE UP (ref 0–2)
BILIRUB SERPL-MCNC: 0.7 MG/DL — SIGNIFICANT CHANGE UP (ref 0.2–1.2)
BUN SERPL-MCNC: 26 MG/DL — HIGH (ref 7–23)
CALCIUM SERPL-MCNC: 8.2 MG/DL — LOW (ref 8.5–10.1)
CHLORIDE SERPL-SCNC: 103 MMOL/L — SIGNIFICANT CHANGE UP (ref 96–108)
CO2 SERPL-SCNC: 27 MMOL/L — SIGNIFICANT CHANGE UP (ref 22–31)
CREAT SERPL-MCNC: 5.92 MG/DL — HIGH (ref 0.5–1.3)
EGFR: 7 ML/MIN/1.73M2 — LOW
EOSINOPHIL # BLD AUTO: 0.13 K/UL — SIGNIFICANT CHANGE UP (ref 0–0.5)
EOSINOPHIL NFR BLD AUTO: 2 % — SIGNIFICANT CHANGE UP (ref 0–6)
GLUCOSE SERPL-MCNC: 114 MG/DL — HIGH (ref 70–99)
HADV DNA SPEC QL NAA+PROBE: DETECTED
HCT VFR BLD CALC: 33.4 % — LOW (ref 34.5–45)
HGB BLD-MCNC: 10.8 G/DL — LOW (ref 11.5–15.5)
LG PLATELETS BLD QL AUTO: SLIGHT — SIGNIFICANT CHANGE UP
LYMPHOCYTES # BLD AUTO: 1.25 K/UL — SIGNIFICANT CHANGE UP (ref 1–3.3)
LYMPHOCYTES # BLD AUTO: 20 % — SIGNIFICANT CHANGE UP (ref 13–44)
MANUAL SMEAR VERIFICATION: SIGNIFICANT CHANGE UP
MCHC RBC-ENTMCNC: 28.7 PG — SIGNIFICANT CHANGE UP (ref 27–34)
MCHC RBC-ENTMCNC: 32.3 G/DL — SIGNIFICANT CHANGE UP (ref 32–36)
MCV RBC AUTO: 88.8 FL — SIGNIFICANT CHANGE UP (ref 80–100)
MONOCYTES # BLD AUTO: 1 K/UL — HIGH (ref 0–0.9)
MONOCYTES NFR BLD AUTO: 16 % — HIGH (ref 2–14)
NEUTROPHILS # BLD AUTO: 3.82 K/UL — SIGNIFICANT CHANGE UP (ref 1.8–7.4)
NEUTROPHILS NFR BLD AUTO: 61 % — SIGNIFICANT CHANGE UP (ref 43–77)
NRBC # BLD: 0 /100 — SIGNIFICANT CHANGE UP (ref 0–0)
NRBC # BLD: SIGNIFICANT CHANGE UP /100 WBCS (ref 0–0)
NT-PROBNP SERPL-SCNC: HIGH PG/ML (ref 0–450)
PLAT MORPH BLD: ABNORMAL
PLATELET # BLD AUTO: 97 K/UL — LOW (ref 150–400)
PLATELET CLUMP BLD QL SMEAR: ABNORMAL
PLATELET COUNT - ESTIMATE: ABNORMAL
POTASSIUM SERPL-MCNC: 4.3 MMOL/L — SIGNIFICANT CHANGE UP (ref 3.5–5.3)
POTASSIUM SERPL-SCNC: 4.3 MMOL/L — SIGNIFICANT CHANGE UP (ref 3.5–5.3)
PROT SERPL-MCNC: 7.3 GM/DL — SIGNIFICANT CHANGE UP (ref 6–8.3)
RAPID RVP RESULT: DETECTED
RBC # BLD: 3.76 M/UL — LOW (ref 3.8–5.2)
RBC # FLD: 17 % — HIGH (ref 10.3–14.5)
RBC BLD AUTO: NORMAL — SIGNIFICANT CHANGE UP
SARS-COV-2 RNA SPEC QL NAA+PROBE: DETECTED
SODIUM SERPL-SCNC: 138 MMOL/L — SIGNIFICANT CHANGE UP (ref 135–145)
TROPONIN I, HIGH SENSITIVITY RESULT: 69.8 NG/L — HIGH
TROPONIN I, HIGH SENSITIVITY RESULT: 71.6 NG/L — HIGH
WBC # BLD: 6.26 K/UL — SIGNIFICANT CHANGE UP (ref 3.8–10.5)
WBC # FLD AUTO: 6.26 K/UL — SIGNIFICANT CHANGE UP (ref 3.8–10.5)

## 2023-07-25 PROCEDURE — 99285 EMERGENCY DEPT VISIT HI MDM: CPT

## 2023-07-25 PROCEDURE — 71250 CT THORAX DX C-: CPT | Mod: 26,MA

## 2023-07-25 PROCEDURE — 93010 ELECTROCARDIOGRAM REPORT: CPT

## 2023-07-25 PROCEDURE — 71045 X-RAY EXAM CHEST 1 VIEW: CPT | Mod: 26

## 2023-07-25 NOTE — ED ADULT TRIAGE NOTE - CHIEF COMPLAINT QUOTE
h/o HD, last dialyzed friday, came here for a dry cough that has been ongoing, saw her Pulmonologist and was told to go the ED for evaluation. patient denies any chills or fever.

## 2023-07-25 NOTE — ED ADULT NURSE NOTE - OBJECTIVE STATEMENT
A&OX4. patient c/o non-productive cough 5 days. patient denies SOB/CP/FEVERS .patient d/c Dx with pneumonia last week. Dialysis MWF  denies leg swelling at this time

## 2023-07-25 NOTE — ED PROVIDER NOTE - PHYSICAL EXAMINATION
General appearance: Nontoxic appearing, conversant, afebrile    Eyes: anicteric sclerae, KEON, EOMI   HENT: Atraumatic; oropharynx clear, MMM and no ulcerations, no pharyngeal erythema or exudate   Neck: Trachea midline; Full range of motion, supple   Pulm: CTA bl, normal respiratory effort and no intercostal retractions, normal work of breathing   CV: RRR, No murmurs, rubs, or gallops   Abdomen: Soft, non-tender, non-distended; no guarding or rebound   Extremities: No peripheral edema, no gross deformities, FROM x4   Skin: Dry, normal temperature, turgor and texture; no rash   Psych: Appropriate affect, cooperative

## 2023-07-25 NOTE — ED PROVIDER NOTE - CARE PLAN
Principal Discharge DX:	2019 novel coronavirus disease (COVID-19)  Secondary Diagnosis:	Adenovirus infection   1

## 2023-07-25 NOTE — ED ADULT NURSE NOTE - ED STAT RN HANDOFF DETAILS
Report endorsed to neisha Henderson RN. Safety checks compld this shift/Safety rounds completed hourly.

## 2023-07-25 NOTE — ED PROVIDER NOTE - CLINICAL SUMMARY MEDICAL DECISION MAKING FREE TEXT BOX
79yo female with pmh esrd on hd m/w/f last full session yesterday, ppm, htn, hld, dm, presenting with cough.  Recently admitted here with pna and initially cough started improving but returned over past few days.  Nonproductive.  No worsening edema.  No fevers.  No cp, sob, abd pain, n/v.  Has not taken anything for it.  Exam cta b/l, trace edema, afebrile.  Will eval for pna, viral syndrome.  Lower suspicion clinically of volume overload/ pulm edema.  Plan for labs, exr, swab, reassess.

## 2023-07-25 NOTE — ED PROVIDER NOTE - PROGRESS NOTE DETAILS
pt signed out to me from dr Anderson, pt recently admitted for pna, c/o dry cough worsening over the last few days, pt sent in today by her pulmonologist for evaluation, repeat trop and swab pending pt seen and evaluated at the bedside, c/o dry harsh cough, pt states that "it feels like my ribs are going to break", pts swab came back positive for adenovirus and covid, pt placed on cardiac monitor, found to be 90% on RA, placed on 2L, ct chest ordered ct chest shows improvement of her prior infection and decreased bilateral pleural effusions and groundglass opacities, however, pts ra o2 sat 90% due viral infection, pt to be admitted for further treatment

## 2023-07-26 DIAGNOSIS — U07.1 COVID-19: ICD-10-CM

## 2023-07-26 DIAGNOSIS — B34.0 ADENOVIRUS INFECTION, UNSPECIFIED: ICD-10-CM

## 2023-07-26 DIAGNOSIS — R09.02 HYPOXEMIA: ICD-10-CM

## 2023-07-26 DIAGNOSIS — N18.6 END STAGE RENAL DISEASE: ICD-10-CM

## 2023-07-26 DIAGNOSIS — I50.9 HEART FAILURE, UNSPECIFIED: ICD-10-CM

## 2023-07-26 DIAGNOSIS — E11.9 TYPE 2 DIABETES MELLITUS WITHOUT COMPLICATIONS: ICD-10-CM

## 2023-07-26 DIAGNOSIS — I10 ESSENTIAL (PRIMARY) HYPERTENSION: ICD-10-CM

## 2023-07-26 LAB
ANION GAP SERPL CALC-SCNC: 6 MMOL/L — SIGNIFICANT CHANGE UP (ref 5–17)
BUN SERPL-MCNC: 34 MG/DL — HIGH (ref 7–23)
CALCIUM SERPL-MCNC: 8.5 MG/DL — SIGNIFICANT CHANGE UP (ref 8.5–10.1)
CHLORIDE SERPL-SCNC: 104 MMOL/L — SIGNIFICANT CHANGE UP (ref 96–108)
CO2 SERPL-SCNC: 30 MMOL/L — SIGNIFICANT CHANGE UP (ref 22–31)
CREAT SERPL-MCNC: 7.43 MG/DL — HIGH (ref 0.5–1.3)
CRP SERPL-MCNC: 17 MG/L — HIGH
EGFR: 5 ML/MIN/1.73M2 — LOW
ERYTHROCYTE [SEDIMENTATION RATE] IN BLOOD: 29 MM/HR — HIGH (ref 0–20)
GLUCOSE BLDC GLUCOMTR-MCNC: 181 MG/DL — HIGH (ref 70–99)
GLUCOSE BLDC GLUCOMTR-MCNC: 203 MG/DL — HIGH (ref 70–99)
GLUCOSE BLDC GLUCOMTR-MCNC: 86 MG/DL — SIGNIFICANT CHANGE UP (ref 70–99)
GLUCOSE BLDC GLUCOMTR-MCNC: 89 MG/DL — SIGNIFICANT CHANGE UP (ref 70–99)
GLUCOSE SERPL-MCNC: 111 MG/DL — HIGH (ref 70–99)
HCT VFR BLD CALC: 28.4 % — LOW (ref 34.5–45)
HGB BLD-MCNC: 9.6 G/DL — LOW (ref 11.5–15.5)
INR BLD: 0.94 RATIO — SIGNIFICANT CHANGE UP (ref 0.85–1.18)
MCHC RBC-ENTMCNC: 29.4 PG — SIGNIFICANT CHANGE UP (ref 27–34)
MCHC RBC-ENTMCNC: 33.8 G/DL — SIGNIFICANT CHANGE UP (ref 32–36)
MCV RBC AUTO: 87.1 FL — SIGNIFICANT CHANGE UP (ref 80–100)
NRBC # BLD: 0 /100 WBCS — SIGNIFICANT CHANGE UP (ref 0–0)
PLATELET # BLD AUTO: 95 K/UL — LOW (ref 150–400)
POTASSIUM SERPL-MCNC: 4.2 MMOL/L — SIGNIFICANT CHANGE UP (ref 3.5–5.3)
POTASSIUM SERPL-SCNC: 4.2 MMOL/L — SIGNIFICANT CHANGE UP (ref 3.5–5.3)
PROTHROM AB SERPL-ACNC: 11.2 SEC — SIGNIFICANT CHANGE UP (ref 9.5–13)
RBC # BLD: 3.26 M/UL — LOW (ref 3.8–5.2)
RBC # FLD: 16.8 % — HIGH (ref 10.3–14.5)
SODIUM SERPL-SCNC: 140 MMOL/L — SIGNIFICANT CHANGE UP (ref 135–145)
WBC # BLD: 5.71 K/UL — SIGNIFICANT CHANGE UP (ref 3.8–10.5)
WBC # FLD AUTO: 5.71 K/UL — SIGNIFICANT CHANGE UP (ref 3.8–10.5)

## 2023-07-26 PROCEDURE — 99223 1ST HOSP IP/OBS HIGH 75: CPT

## 2023-07-26 PROCEDURE — 99497 ADVNCD CARE PLAN 30 MIN: CPT | Mod: 25

## 2023-07-26 RX ORDER — DEXTROSE 50 % IN WATER 50 %
25 SYRINGE (ML) INTRAVENOUS ONCE
Refills: 0 | Status: DISCONTINUED | OUTPATIENT
Start: 2023-07-26 | End: 2023-08-09

## 2023-07-26 RX ORDER — ONDANSETRON 8 MG/1
4 TABLET, FILM COATED ORAL EVERY 8 HOURS
Refills: 0 | Status: DISCONTINUED | OUTPATIENT
Start: 2023-07-26 | End: 2023-08-09

## 2023-07-26 RX ORDER — DEXTROSE 50 % IN WATER 50 %
12.5 SYRINGE (ML) INTRAVENOUS ONCE
Refills: 0 | Status: DISCONTINUED | OUTPATIENT
Start: 2023-07-26 | End: 2023-08-09

## 2023-07-26 RX ORDER — METOPROLOL TARTRATE 50 MG
100 TABLET ORAL DAILY
Refills: 0 | Status: DISCONTINUED | OUTPATIENT
Start: 2023-07-26 | End: 2023-08-06

## 2023-07-26 RX ORDER — SODIUM CHLORIDE 9 MG/ML
1000 INJECTION, SOLUTION INTRAVENOUS
Refills: 0 | Status: DISCONTINUED | OUTPATIENT
Start: 2023-07-26 | End: 2023-08-09

## 2023-07-26 RX ORDER — DEXAMETHASONE 0.5 MG/5ML
6 ELIXIR ORAL DAILY
Refills: 0 | Status: DISCONTINUED | OUTPATIENT
Start: 2023-07-26 | End: 2023-07-27

## 2023-07-26 RX ORDER — ACETAMINOPHEN 500 MG
650 TABLET ORAL EVERY 6 HOURS
Refills: 0 | Status: DISCONTINUED | OUTPATIENT
Start: 2023-07-26 | End: 2023-08-09

## 2023-07-26 RX ORDER — INSULIN LISPRO 100/ML
VIAL (ML) SUBCUTANEOUS
Refills: 0 | Status: DISCONTINUED | OUTPATIENT
Start: 2023-07-26 | End: 2023-08-09

## 2023-07-26 RX ORDER — HYDRALAZINE HCL 50 MG
25 TABLET ORAL THREE TIMES A DAY
Refills: 0 | Status: DISCONTINUED | OUTPATIENT
Start: 2023-07-26 | End: 2023-08-05

## 2023-07-26 RX ORDER — GLUCAGON INJECTION, SOLUTION 0.5 MG/.1ML
1 INJECTION, SOLUTION SUBCUTANEOUS ONCE
Refills: 0 | Status: DISCONTINUED | OUTPATIENT
Start: 2023-07-26 | End: 2023-08-09

## 2023-07-26 RX ORDER — LINAGLIPTIN 5 MG/1
5 TABLET, FILM COATED ORAL DAILY
Refills: 0 | Status: DISCONTINUED | OUTPATIENT
Start: 2023-07-26 | End: 2023-08-09

## 2023-07-26 RX ORDER — DEXTROSE 50 % IN WATER 50 %
15 SYRINGE (ML) INTRAVENOUS ONCE
Refills: 0 | Status: DISCONTINUED | OUTPATIENT
Start: 2023-07-26 | End: 2023-08-09

## 2023-07-26 RX ORDER — AMLODIPINE BESYLATE 2.5 MG/1
10 TABLET ORAL DAILY
Refills: 0 | Status: DISCONTINUED | OUTPATIENT
Start: 2023-07-26 | End: 2023-08-05

## 2023-07-26 RX ORDER — LANOLIN ALCOHOL/MO/W.PET/CERES
3 CREAM (GRAM) TOPICAL AT BEDTIME
Refills: 0 | Status: DISCONTINUED | OUTPATIENT
Start: 2023-07-26 | End: 2023-08-09

## 2023-07-26 RX ORDER — SIMVASTATIN 20 MG/1
40 TABLET, FILM COATED ORAL AT BEDTIME
Refills: 0 | Status: DISCONTINUED | OUTPATIENT
Start: 2023-07-26 | End: 2023-08-09

## 2023-07-26 RX ORDER — HEPARIN SODIUM 5000 [USP'U]/ML
5000 INJECTION INTRAVENOUS; SUBCUTANEOUS EVERY 8 HOURS
Refills: 0 | Status: DISCONTINUED | OUTPATIENT
Start: 2023-07-26 | End: 2023-08-09

## 2023-07-26 RX ADMIN — SIMVASTATIN 40 MILLIGRAM(S): 20 TABLET, FILM COATED ORAL at 22:22

## 2023-07-26 RX ADMIN — Medication 100 MILLIGRAM(S): at 22:22

## 2023-07-26 RX ADMIN — Medication 25 MILLIGRAM(S): at 14:07

## 2023-07-26 RX ADMIN — Medication 600 MILLIGRAM(S): at 18:51

## 2023-07-26 RX ADMIN — Medication 100 MILLIGRAM(S): at 12:02

## 2023-07-26 RX ADMIN — HEPARIN SODIUM 5000 UNIT(S): 5000 INJECTION INTRAVENOUS; SUBCUTANEOUS at 22:23

## 2023-07-26 RX ADMIN — Medication 30 MILLILITER(S): at 14:07

## 2023-07-26 RX ADMIN — AMLODIPINE BESYLATE 10 MILLIGRAM(S): 2.5 TABLET ORAL at 18:51

## 2023-07-26 RX ADMIN — LINAGLIPTIN 5 MILLIGRAM(S): 5 TABLET, FILM COATED ORAL at 12:02

## 2023-07-26 RX ADMIN — HEPARIN SODIUM 5000 UNIT(S): 5000 INJECTION INTRAVENOUS; SUBCUTANEOUS at 14:07

## 2023-07-26 RX ADMIN — Medication 2: at 12:01

## 2023-07-26 RX ADMIN — Medication 6 MILLIGRAM(S): at 18:52

## 2023-07-26 RX ADMIN — Medication 25 MILLIGRAM(S): at 22:22

## 2023-07-26 NOTE — H&P ADULT - NSICDXPASTMEDICALHX_GEN_ALL_CORE_FT
PAST MEDICAL HISTORY:  CHF (congestive heart failure)     Diverticulitis     DVT (deep venous thrombosis)     ESRD on dialysis     HTN (hypertension)     Pacemaker     Type 2 diabetes mellitus     Type 2 diabetes mellitus

## 2023-07-26 NOTE — H&P ADULT - HISTORY OF PRESENT ILLNESS
This is a 78 year old female w/ PMHx of DM2, HTN, HLD, CHF, S/P pacemaker, and ESRD on HD MWF presents for evaluation after being sent by pulmonologist for for worsening dry cough.  States coughs so hard feels like ribs will break.  Of note patient was recently admitted 7/4-7/8/23 for LLL PNA

## 2023-07-26 NOTE — H&P ADULT - NSHPPHYSICALEXAM_GEN_ALL_CORE
PHYSICAL EXAM:  Vital Signs:  Vital Signs (24 Hrs):  T(C): 36.6 (07-26-23 @ 04:49), Max: 37.3 (07-25-23 @ 15:17)  HR: 60 (07-26-23 @ 05:07) (60 - 69)  BP: 140/42 (07-26-23 @ 04:49) (140/42 - 152/62)  RR: 19 (07-26-23 @ 04:49) (12 - 19)  SpO2: 99% (07-26-23 @ 05:07) (94% - 100%)  Wt(kg): --  Daily Height in cm: 162.56 (25 Jul 2023 15:17)      General: no acute distress and well developed/well nourished  HENT: atraumatic, normocephalic, oropharynx pink and moist, sinuses nontender, normal nasal mucosa/turbinates, thyroid not enlarged or tender, no radiating carotid murmur or bruit and no masses; no JVD  Eyes: pupil equally round and reactive to light (PERRLA) bilaterally, extra-ocular muscle intact (EOMI) bilaterally, lids/conjunctiva normal bilaterally and anicteric bilaterally  Neck: normal, supple, no adenopathy and thyroid normal in size, no nodules or tenderness  CV: normal s1, s2 , regular rhythm, no murmurs, no rubs and no gallops  Lungs: poor inspiratory effort no w/r/r  Abd: normal bowel sounds, no hepatosplenomegaly, non-tender, non-distended and no masses  Musculoskeletal: no clubbing, cyanosis and full range of motion of joints: right upper extremity, left upper extremity, right lower extremity and left lower extremity;  2+ Peripheral Pulses, No clubbing, cyanosis, or edema  Neuro: alert, awake & oriented times three (AA&O x 3), cranial Nerves II-XII intact and normal strength  Psych: normal judgment and insight, normal mood/affect and non-anxious  Vascular: 2+ radial and dorsalis pedis pulses B/L equal and symmetric  Skin: no rash, warm and dry

## 2023-07-26 NOTE — H&P ADULT - NSHPREVIEWOFSYSTEMS_GEN_ALL_CORE
She denies any fever, chills, sore throat, rhinorrhea, chest pain, SOB, abdominal pain, nausea, vomiting, dysuria, diarrhea, weakness, or lethargy.

## 2023-07-26 NOTE — GOALS OF CARE CONVERSATION - ADVANCED CARE PLANNING - CONVERSATION DETAILS
Spoke to patient regarding advanced directive    Pt states she has paperwork from prior hospitalization that indicate her wishes. States her son also has copies that he sent during her most recent hospitalization    Pt reports she has had a decline with her health and that it's been "miserable". She knows that dialysis will keep her alive and without it she will die but states "it's horrible, the number of sticks on pokes I have to go through because this thing in my arm doesn't work right and I just bleed and bleed every time they needle me. That's why I have to go to the catheter for dialysis"    Pt states she's lived her life and if it's her time to go she wants to go and has no hesitation in doing so. Does not want heroic measures or life sustaining measures in the event of cardiac or respiratory arrest. I don't want any pain or suffering. Just let me go when it's time.

## 2023-07-26 NOTE — CONSULT NOTE ADULT - SUBJECTIVE AND OBJECTIVE BOX
Patient is a 78y old  Female who presents with a chief complaint of cough (26 Jul 2023 17:40)      HPI:  This is a 78 year old female w/ PMHx of DM2, HTN, HLD, CHF, S/P pacemaker, and ESRD on HD MWF presents for evaluation after being sent by pulmonologist for for worsening dry cough.  States coughs so hard feels like ribs will break.  Of note patient was recently admitted 7/4-7/8/23 for LLL PNA (26 Jul 2023 06:34)    ----------------  Discharged 2 weeks ago for PNA s/p course of ceftriaxone  presents for worsening cough, SOB, generalized weakness, and chest pain with coughing  Tested + COVID and adenovirus on viral swab in ED    Currently on 2L NC  Attempted to wean off O2 with O2 desaturation to 85% on RA at rest      Allergies  Eliquis (Other)    MEDICATIONS  (STANDING):  amLODIPine   Tablet 10 milliGRAM(s) Oral daily  dexAMETHasone  Injectable 6 milliGRAM(s) IV Push daily  dextrose 5%. 1000 milliLiter(s) (100 mL/Hr) IV Continuous <Continuous>  dextrose 5%. 1000 milliLiter(s) (50 mL/Hr) IV Continuous <Continuous>  dextrose 50% Injectable 12.5 Gram(s) IV Push once  dextrose 50% Injectable 25 Gram(s) IV Push once  dextrose 50% Injectable 25 Gram(s) IV Push once  glucagon  Injectable 1 milliGRAM(s) IntraMuscular once  guaiFENesin  milliGRAM(s) Oral every 12 hours  heparin   Injectable 5000 Unit(s) SubCutaneous every 8 hours  hydrALAZINE 25 milliGRAM(s) Oral three times a day  insulin lispro (ADMELOG) corrective regimen sliding scale   SubCutaneous three times a day before meals  linagliptin 5 milliGRAM(s) Oral daily  metoprolol succinate  milliGRAM(s) Oral daily  simvastatin 40 milliGRAM(s) Oral at bedtime    MEDICATIONS  (PRN):  acetaminophen     Tablet .. 650 milliGRAM(s) Oral every 6 hours PRN Temp greater or equal to 38C (100.4F), Mild Pain (1 - 3)  aluminum hydroxide/magnesium hydroxide/simethicone Suspension 30 milliLiter(s) Oral every 4 hours PRN Dyspepsia  benzonatate 100 milliGRAM(s) Oral every 8 hours PRN for cough  dextrose Oral Gel 15 Gram(s) Oral once PRN Blood Glucose LESS THAN 70 milliGRAM(s)/deciliter  melatonin 3 milliGRAM(s) Oral at bedtime PRN Insomnia  ondansetron Injectable 4 milliGRAM(s) IV Push every 8 hours PRN Nausea and/or Vomiting        Drug Dosing Weight  Height (cm): 162.6 (25 Jul 2023 15:17)  Weight (kg): 73.5 (25 Jul 2023 15:17)  BMI (kg/m2): 27.8 (25 Jul 2023 15:17)  BSA (m2): 1.79 (25 Jul 2023 15:17)    PAST MEDICAL & SURGICAL HISTORY:  HTN (hypertension)      Pacemaker      DVT (deep venous thrombosis)      Diverticulitis      CHF (congestive heart failure)      ESRD on dialysis      Type 2 diabetes mellitus        Intestinal bleeding          FAMILY HISTORY:  FH: HTN (hypertension)        SOCIAL HISTORY:  never smoker  no ETOH  retired     ADVANCE DIRECTIVES:  DNR/DNI    REVIEW OF SYSTEMS:  CONSTITUTIONAL: No fever, weight loss, + chills, + fatigue, + generalized weakness  EYES: No eye pain, visual disturbances, or discharge  ENMT:  No difficulty hearing, tinnitus, vertigo; No sinus or throat pain  NECK: No pain or stiffness  BREASTS: No pain, masses, or nipple discharge  RESPIRATORY: +cough, no wheezing, no hemoptysis; + shortness of breath  CARDIOVASCULAR: + chest pain with cough, no palpitations  GASTROINTESTINAL: No abdominal or epigastric pain. No nausea, vomiting, or hematemesis; No diarrhea or constipation. No melena or hematochezia.  NEUROLOGICAL: No headaches, memory loss, loss of strength, numbness, or tremors  SKIN: No itching, burning, rashes, or lesions   LYMPH NODES: No enlarged glands  ENDOCRINE: No heat or cold intolerance  MUSCULOSKELETAL: No joint pain or swelling; No muscle, back, or extremity pain  PSYCHIATRIC: reports feeling depressed and "miserable" and frustrated with her medical conditions and being hospitalized again  HEME/LYMPH: No easy bruising, or bleeding gums  ALLERGY AND IMMUNOLOGIC: No hives or eczema          Vital Signs Last 24 Hrs  T(C): 36.9 (26 Jul 2023 22:18), Max: 37.2 (26 Jul 2023 16:33)  T(F): 98.5 (26 Jul 2023 22:18), Max: 98.9 (26 Jul 2023 16:33)  HR: 60 (26 Jul 2023 22:18) (59 - 61)  BP: 144/72 (26 Jul 2023 22:18) (135/55 - 154/65)  BP(mean): 96 (26 Jul 2023 22:18) (95 - 96)  RR: 18 (26 Jul 2023 22:18) (12 - 22)  SpO2: 96% (26 Jul 2023 22:18) (85% - 100%)    Patient On (Oxygen Delivery Method): nasal cannula  O2 Flow (L/min): 2        PHYSICAL EXAM:  GENERAL: elderly female, NAD, well-groomed  HEAD:  Atraumatic, Normocephalic  EYES: EOMI, conjunctiva and sclera clear  ENMT: No tonsillar erythema, exudates, Moist mucous membranes, No lesions  NECK: Supple, No JVD,  NERVOUS SYSTEM:  Alert & Oriented X3, Good concentration; Motor Strength 5/5 B/L upper and lower extremities  CHEST/LUNG: Clear to auscultation bilaterally; No rales, rhonchi, wheezing, R chest wall permacath  HEART: Regular rate and rhythm  ABDOMEN: Soft, Nontender, Nondistended; Bowel sounds present  EXTREMITIES:  2+ Peripheral Pulses, No clubbing, cyanosis, or edema, LUE AVF + thrill/bruit  LYMPH: No lymphadenopathy noted  SKIN: No rashes or lesions    LABS:                          9.6    5.71  )-----------( 95       ( 26 Jul 2023 08:52 )             28.4       07-26    140  |  104  |  34<H>  ----------------------------<  111<H>  4.2   |  30  |  7.43<H>    Ca    8.5      26 Jul 2023 08:52    TPro  7.3  /  Alb  3.3  /  TBili  0.7  /  DBili  x   /  AST  30  /  ALT  7<L>  /  AlkPhos  80  07-25    CAPILLARY BLOOD GLUCOSE  POCT Blood Glucose.: 181 mg/dL     PT/INR - ( 26 Jul 2023 08:52 )   PT: 11.2 sec;   INR: 0.94 ratio           Urinalysis Basic - ( 26 Jul 2023 08:52 )    Color: x / Appearance: x / SG: x / pH: x  Gluc: 111 mg/dL / Ketone: x  / Bili: x / Urobili: x   Blood: x / Protein: x / Nitrite: x   Leuk Esterase: x / RBC: x / WBC x   Sq Epi: x / Non Sq Epi: x / Bacteria: x      Respiratory Viral Panel with COVID-19 by PROSPER (07.25.23 @ 17:55)    Rapid RVP Result: Detected   SARS-CoV-2: Detected   Adenovirus (RapRVP): Detected        ECHO, US:  < from: TTE Echo Complete w/o Contrast w/ Doppler (02.18.22 @ 18:45) >   1. Left ventricular ejection fraction, by visual estimation, is 60 to   65%.   2. Technically fair study.   3. Normal global left ventricular systolic function.   4. Normal left ventricular internal cavity size.   5. Spectral Doppler shows impaired relaxation pattern of left   ventricular myocardial filling (Grade I diastolic dysfunction).   6. Normal right ventricular size and function.   7. Normal left atrial size.   8. Normal right atrial size.   9. Large pleural effusion in the left lateral region.  10. Small pericardial effusion.  11. Mild mitral annular calcification.  12. Mild mitral valve regurgitation.  13. Mild thickening and calcification of the anterior and posterior   mitral valve leaflets.  14. Mild-moderate tricuspid regurgitation.  15. Sclerotic aortic valve with normal opening.  16. Estimated pulmonary artery systolic pressure is 48.6 mmHg assuming a   right atrial pressure of 10 mmHg, which is consistent with mildpulmonary   hypertension.          RADIOLOGY:  CXR < from: Xray Chest 1 View- PORTABLE-Urgent (Xray Chest 1 View- PORTABLE-Urgent .) (07.25.23 @ 17:43) >  Heart/Vascular: The mediastinum, hilum and aorta are within normal limits   for projection. Cardiomegaly. Left chest wall dual lead pacemaker.  Pulmonary: Midline trachea. There is no focal infiltrate, congestion or   effusion.  Bones: There is no fracture.  Lines and catheter: Right central line unchanged in position.    Impression:    Mild atelectasis left base    Cardiomegaly stable.    ---------------------------  < from: CT Chest No Cont (07.25.23 @ 22:46) >  COMPARISON: 7/3/2023.    FINDINGS: Evaluation of the thoracic organs/vasculature is limited   without intravenous contrast. Artifact from the patient's arms and   respiratory motion degrades images.    LUNGS AND AIRWAYS: Patent central airways. Decreased compressive   atelectasis of the lungs and left lower lobe consolidation since prior   study. Persistent mosaic attenuation of the lungs. Mild interlobular   septal thickening and mild groundglass opacities, decreased since since   prior study.  PLEURA: Decreased bilateral pleural effusion since prior study.  HEART: Stable heart size. Persistent small pericardial effusion.  VESSELS: Atherosclerosis. Normal caliber of the thoracic aorta. Bovine   arch. Stable main pulmonary artery enlargement, suggesting pulmonary   arterial hypertension.  MEDIASTINUM AND PA: Subcentimeter lymph nodes.  CHEST WALL AND LOWER NECK: Again noted, heterogeneous thyroid gland with   indeterminate lesions. Left-sided pacemaker. Right jugular approach   double-lumen dialysis catheter terminating at the right atrium.  UPPER ABDOMEN: Stable left adrenal thickening. Bilateral renal cysts   (simple and hemorrhagic/proteinaceous) again noted. Bilateral perinephric   stranding. IVC filter.  BONES: Diffuse chalky sclerosis, reflecting renal osteodystrophy.   S-shaped curvature and degenerative changes of the spine.    IMPRESSION:    Decreased bilateral pleural effusion with atelectasis/consolidation since   7/3/2023. Decreased extent of interlobular septal thickening and   groundglass opacities.

## 2023-07-26 NOTE — PATIENT PROFILE ADULT - FALL HARM RISK - HARM RISK INTERVENTIONS

## 2023-07-26 NOTE — CONSULT NOTE ADULT - ASSESSMENT
78F PMH DM2, HTN, HLD, diastolic CHF, S/P pacemaker, ESRD on HD MWF (issues cannulating L AVF with prior issues including bleeding, now getting HD via R chest wall permacath) recent admission 7/4-7/8 for LLL PNA treated with antibx ceftriaxone and discharged home presents for evaluation after being sent by her pulmonologist for worsening dry cough and persistent pleuritic chest pain with coughing. Tested + COVID and adenovirus. Hypoxic on RA.    DX: acute hypoxic respiratory failure, COVID-19, adenovirus infection    - pt seen this morning  - on 2L NC  - 85% on RA  - maintain O2 sat > 90%  - start dexamethasone 6mg x10 day course  - ? initiating remdesivir for COVID: she is already ESRD, doubt remdesivir will worsen her renal function as she is dialysis dependent at baseline  - supportive care for adenovirus  - hycodan for cough  - CT chest with improved LLL infiltrate and GGO  - renal eval for maintenance HD, cont fluid removal as tolerates during HD  - DVT ppx  - OOB to chair  - GOC addressed with patient. states her wishes are for DNR/DNI. "I'm ready to go whenever it's time. I've had a good life and the last few years have been miserable with my health deteriorating and this dialysis"  - had MOLST on prior admit

## 2023-07-26 NOTE — H&P ADULT - ASSESSMENT
ASSESSMENT:  This is a 78 year old female w/ PMHx of DM2, HTN, HLD, CHF, S/P pacemaker, and ESRD on HD MWF presents for evaluation after being sent by pulmonologist for for worsening dry cough.     PLAN:  1.Adenovirus and COVID-19 w/ recent hx of PNA, and ESRD on HD MWF  -afebrile, no leukocytosis  -EKG as above  -will f/u official report of CXRAY   -CT chest w/o contrast as above  -LFTs within normal limits  -initial troponin 71.6 at 18:55, 2nd trop 69.8 at 19:51  -proBNP 37010  -adenovirus positive  -rapid RVP positive  -SARS-CoV2 positive  -as per ED, pharmacy was consulted, patient is not a candidate for Paxlovid  -will consult infectious disease in AM  -will continue prn tessalon perrles  -will start mucinex 600mg bid  -will start dexamethasone 6mg daily  -will place in strict isolation  -will f/u ESR  -will f/u CRP  -will f/u procalcitonin  -would consider pulmonology consult  -will consult nephrology  -will start prn nasal cannula    2.Thrombocytopenia  -will monitor for now  -platelet count 97    3.DM2  -will place on ISS  -will continue tradjenta 5mg po daily  -will f/u HBA1c    4.HTN  -will continue norvasc 10mg po daily, hydralazine 25mg TID, and metoprolol ER 100mg QD    5.HLD  -will continue simvastatin 40mg QD    6.DVT PPx  -will start heparin SQ q8 hours

## 2023-07-26 NOTE — H&P ADULT - NSHPLABSRESULTS_GEN_ALL_CORE
labs personally reviewed and pertinent [facility] documents/labs/diagnostics reviewed                         10.8   6.26  )-----------( 97       ( 25 Jul 2023 17:55 )             33.4       07-25    138  |  103  |  26<H>  ----------------------------<  114<H>  4.3   |  27  |  5.92<H>    Ca    8.2<L>      25 Jul 2023 18:55    TPro  7.3  /  Alb  3.3  /  TBili  0.7  /  DBili  x   /  AST  30  /  ALT  7<L>  /  AlkPhos  80  07-25    Urinalysis Basic - ( 25 Jul 2023 18:55 )    Color: x / Appearance: x / SG: x / pH: x  Gluc: 114 mg/dL / Ketone: x  / Bili: x / Urobili: x   Blood: x / Protein: x / Nitrite: x   Leuk Esterase: x / RBC: x / WBC x   Sq Epi: x / Non Sq Epi: x / Bacteria: x      EKG: read by me - paced - NSR at 60 bpm, prolonged VT 0.236; TWI in V3 and V6      RADIOLOGY:  7/25/23 CT chest w/o contrast -  IMPRESSION:  Decreased bilateral pleural effusion with atelectasis/consolidation since   7/3/2023. Decreased extent of interlobular septal thickening and   groundglass opacities.  Additional findings as described.

## 2023-07-26 NOTE — CONSULT NOTE ADULT - SUBJECTIVE AND OBJECTIVE BOX
Patient chart reviewed, full consult to follow.   Will arrange for HD tomorrow        Thank you for the courtesy of this consultation. [facility] NEPHROLOGY SERVICES, [facility]  NEPHROLOGY AND HYPERTENSION  [address]  [address]  [place] [address]  [phone]    MD PRAVEEN LYNNE MD YELENA ROSENBERG, MD BINNY KOSHY, MD CHRISTOPHER CAPUTO, MD EDWARD BOVER, MD      Information from chart:  "Patient is a 78y old  Female who presents with a chief complaint of cough (26 Jul 2023 17:40)    HPI:  This is a 78 year old female w/ PMHx of DM2, HTN, HLD, CHF, S/P pacemaker, and ESRD on HD MWF presents for evaluation after being sent by pulmonologist for for worsening dry cough.  States coughs so hard feels like ribs will break.  Of note patient was recently admitted 7/4-7/8/23 for LLL PNA (26 Jul 2023 06:34)   "  Patient in no distress    PAST MEDICAL & SURGICAL HISTORY:  HTN (hypertension)      Pacemaker      DVT (deep venous thrombosis)      Diverticulitis      CHF (congestive heart failure)      ESRD on dialysis      Type 2 diabetes mellitus      Type 2 diabetes mellitus      Intestinal bleeding        FAMILY HISTORY:  FH: HTN (hypertension)      Allergies    Eliquis (Other)    Intolerances      Home Medications:  amLODIPine 10 mg oral tablet: 1 tab(s) orally once a day (26 Jul 2023 06:57)  hydrALAZINE 25 mg oral tablet: 1 tab(s) orally 3 times a day (26 Jul 2023 06:57)  Metoprolol Succinate  mg oral tablet, extended release: 1 tab(s) orally once a day (26 Jul 2023 06:57)  simvastatin 40 mg oral tablet: 1 tab(s) orally once a day (at bedtime) (26 Jul 2023 06:57)  Tradjenta 5 mg oral tablet: 1 tab(s) orally once a day (26 Jul 2023 06:57)    MEDICATIONS  (STANDING):  amLODIPine   Tablet 10 milliGRAM(s) Oral daily  dexAMETHasone  Injectable 6 milliGRAM(s) IV Push daily  dextrose 5%. 1000 milliLiter(s) (100 mL/Hr) IV Continuous <Continuous>  dextrose 5%. 1000 milliLiter(s) (50 mL/Hr) IV Continuous <Continuous>  dextrose 50% Injectable 12.5 Gram(s) IV Push once  dextrose 50% Injectable 25 Gram(s) IV Push once  dextrose 50% Injectable 25 Gram(s) IV Push once  glucagon  Injectable 1 milliGRAM(s) IntraMuscular once  guaiFENesin  milliGRAM(s) Oral every 12 hours  heparin   Injectable 5000 Unit(s) SubCutaneous every 8 hours  hydrALAZINE 25 milliGRAM(s) Oral three times a day  insulin lispro (ADMELOG) corrective regimen sliding scale   SubCutaneous three times a day before meals  linagliptin 5 milliGRAM(s) Oral daily  metoprolol succinate  milliGRAM(s) Oral daily  simvastatin 40 milliGRAM(s) Oral at bedtime    MEDICATIONS  (PRN):  acetaminophen     Tablet .. 650 milliGRAM(s) Oral every 6 hours PRN Temp greater or equal to 38C (100.4F), Mild Pain (1 - 3)  aluminum hydroxide/magnesium hydroxide/simethicone Suspension 30 milliLiter(s) Oral every 4 hours PRN Dyspepsia  benzonatate 100 milliGRAM(s) Oral every 8 hours PRN for cough  dextrose Oral Gel 15 Gram(s) Oral once PRN Blood Glucose LESS THAN 70 milliGRAM(s)/deciliter  melatonin 3 milliGRAM(s) Oral at bedtime PRN Insomnia  ondansetron Injectable 4 milliGRAM(s) IV Push every 8 hours PRN Nausea and/or Vomiting    Vital Signs Last 24 Hrs  T(C): 36.9 (26 Jul 2023 22:18), Max: 37.2 (26 Jul 2023 16:33)  T(F): 98.5 (26 Jul 2023 22:18), Max: 98.9 (26 Jul 2023 16:33)  HR: 60 (26 Jul 2023 22:18) (59 - 61)  BP: 144/72 (26 Jul 2023 22:18) (135/55 - 154/65)  BP(mean): 96 (26 Jul 2023 22:18) (95 - 96)  RR: 18 (26 Jul 2023 22:18) (12 - 22)  SpO2: 96% (26 Jul 2023 22:18) (85% - 100%)    Parameters below as of 26 Jul 2023 22:18  Patient On (Oxygen Delivery Method): nasal cannula  O2 Flow (L/min): 2      Daily     Daily     CAPILLARY BLOOD GLUCOSE      POCT Blood Glucose.: 181 mg/dL (26 Jul 2023 21:24)  POCT Blood Glucose.: 89 mg/dL (26 Jul 2023 16:45)  POCT Blood Glucose.: 203 mg/dL (26 Jul 2023 11:17)  POCT Blood Glucose.: 86 mg/dL (26 Jul 2023 08:16)    PHYSICAL EXAM:      T(C): 36.9 (07-26-23 @ 22:18), Max: 37.2 (07-26-23 @ 16:33)  HR: 60 (07-26-23 @ 22:18) (59 - 61)  BP: 144/72 (07-26-23 @ 22:18) (135/55 - 154/65)  RR: 18 (07-26-23 @ 22:18) (12 - 22)  SpO2: 96% (07-26-23 @ 22:18) (85% - 100%)  Wt(kg): --  Lungs clear  Heart S1S2  Abd soft NT ND  Extremities:   tr edema              07-26    140  |  104  |  34<H>  ----------------------------<  111<H>  4.2   |  30  |  7.43<H>    Ca    8.5      26 Jul 2023 08:52    TPro  7.3  /  Alb  3.3  /  TBili  0.7  /  DBili  x   /  AST  30  /  ALT  7<L>  /  AlkPhos  80  07-25                          9.6    5.71  )-----------( 95       ( 26 Jul 2023 08:52 )             28.4     Creatinine Trend: 7.43<--, 5.92<--, 8.45<--, 5.67<--, 6.94<--, 4.53<--  Urinalysis Basic - ( 26 Jul 2023 08:52 )    Color: x / Appearance: x / SG: x / pH: x  Gluc: 111 mg/dL / Ketone: x  / Bili: x / Urobili: x   Blood: x / Protein: x / Nitrite: x   Leuk Esterase: x / RBC: x / WBC x   Sq Epi: x / Non Sq Epi: x / Bacteria: x            Assessment   ESRD, maintenance   COVID +     Plan  Will arrange for HD tomorrow      Erickson Ray MD            Thank you for the courtesy of this consultation.

## 2023-07-26 NOTE — CONSULT NOTE ADULT - TIME BILLING
review of chart, records, blood work, imaging, medications, direct patient care, d/w ED nurse and staff

## 2023-07-27 LAB
GLUCOSE BLDC GLUCOMTR-MCNC: 141 MG/DL — HIGH (ref 70–99)
GLUCOSE BLDC GLUCOMTR-MCNC: 195 MG/DL — HIGH (ref 70–99)
GLUCOSE BLDC GLUCOMTR-MCNC: 209 MG/DL — HIGH (ref 70–99)

## 2023-07-27 PROCEDURE — 99232 SBSQ HOSP IP/OBS MODERATE 35: CPT

## 2023-07-27 PROCEDURE — 99233 SBSQ HOSP IP/OBS HIGH 50: CPT

## 2023-07-27 RX ORDER — SODIUM CHLORIDE 9 MG/ML
10 INJECTION INTRAMUSCULAR; INTRAVENOUS; SUBCUTANEOUS
Refills: 0 | Status: DISCONTINUED | OUTPATIENT
Start: 2023-07-27 | End: 2023-08-09

## 2023-07-27 RX ORDER — CHLORHEXIDINE GLUCONATE 213 G/1000ML
1 SOLUTION TOPICAL
Refills: 0 | Status: DISCONTINUED | OUTPATIENT
Start: 2023-07-28 | End: 2023-08-09

## 2023-07-27 RX ORDER — DEXAMETHASONE 0.5 MG/5ML
6 ELIXIR ORAL DAILY
Refills: 0 | Status: DISCONTINUED | OUTPATIENT
Start: 2023-07-27 | End: 2023-08-01

## 2023-07-27 RX ADMIN — Medication 600 MILLIGRAM(S): at 17:20

## 2023-07-27 RX ADMIN — Medication 650 MILLIGRAM(S): at 04:22

## 2023-07-27 RX ADMIN — Medication 100 MILLIGRAM(S): at 05:34

## 2023-07-27 RX ADMIN — Medication 25 MILLIGRAM(S): at 21:58

## 2023-07-27 RX ADMIN — Medication 100 MILLIGRAM(S): at 03:20

## 2023-07-27 RX ADMIN — Medication 6 MILLIGRAM(S): at 05:33

## 2023-07-27 RX ADMIN — Medication 6 MILLIGRAM(S): at 17:20

## 2023-07-27 RX ADMIN — SIMVASTATIN 40 MILLIGRAM(S): 20 TABLET, FILM COATED ORAL at 21:58

## 2023-07-27 RX ADMIN — Medication 1: at 11:42

## 2023-07-27 RX ADMIN — HEPARIN SODIUM 5000 UNIT(S): 5000 INJECTION INTRAVENOUS; SUBCUTANEOUS at 21:58

## 2023-07-27 RX ADMIN — Medication 650 MILLIGRAM(S): at 03:22

## 2023-07-27 RX ADMIN — Medication 25 MILLIGRAM(S): at 14:59

## 2023-07-27 RX ADMIN — AMLODIPINE BESYLATE 10 MILLIGRAM(S): 2.5 TABLET ORAL at 05:34

## 2023-07-27 RX ADMIN — LINAGLIPTIN 5 MILLIGRAM(S): 5 TABLET, FILM COATED ORAL at 14:57

## 2023-07-27 RX ADMIN — Medication 2: at 17:20

## 2023-07-27 RX ADMIN — Medication 25 MILLIGRAM(S): at 05:33

## 2023-07-27 RX ADMIN — HEPARIN SODIUM 5000 UNIT(S): 5000 INJECTION INTRAVENOUS; SUBCUTANEOUS at 15:00

## 2023-07-27 RX ADMIN — HEPARIN SODIUM 5000 UNIT(S): 5000 INJECTION INTRAVENOUS; SUBCUTANEOUS at 05:33

## 2023-07-27 RX ADMIN — Medication 600 MILLIGRAM(S): at 05:34

## 2023-07-27 NOTE — PROGRESS NOTE ADULT - ASSESSMENT
78F PMH DM2, HTN, HLD, diastolic CHF, S/P pacemaker, ESRD on HD MWF (issues cannulating L AVF with prior issues including bleeding, now getting HD via R chest wall permacath) recent admission 7/4-7/8 for LLL PNA treated with antibx ceftriaxone and discharged home presents for evaluation after being sent by her pulmonologist for worsening dry cough and persistent pleuritic chest pain with coughing. Tested + COVID and adenovirus. Hypoxic on RA to the 80s. Now improved with steroids     DX: acute hypoxic respiratory failure, COVID-19, adenovirus infection    - pt feels weak but overall feels better   - cont dexamethasone 6mg x10 day course  - no renal contraindications for remdesivir  as she is dialysis dependent. initiate remdesivir for COVID  - supportive care for adenovirus  - add hycodan for cough  - CT chest with improved LLL infiltrate and GGO compared to prior hospitalization's imaging  - cont maintenance dialysis with fluid removal as tolerates during HD  - DVT ppx  - OOB to chair    plan discussed with Dr Foster

## 2023-07-27 NOTE — PROGRESS NOTE ADULT - ASSESSMENT
A/P    1. COVID-19, adenovirus, acute hypoxic respiratory failure:  - Wean patient off supplemental O2 as tolerated  - Tessalon for cough  - Isolation, supportive care  - Decadron 6mg PO Daily X 10 days per pulmonology recs    2. Debility, weakness:  - PT eval    3. ESRD:  - On IHD        Mckenzie Barrios MD

## 2023-07-27 NOTE — PROGRESS NOTE ADULT - SUBJECTIVE AND OBJECTIVE BOX
Patient is a 78y old  Female who presents with a chief complaint of cough (26 Jul 2023 18:00)       INTERVAL HPI/OVERNIGHT EVENTS: No acute events overnight, still coughing, afebrile.   MEDICATIONS  (STANDING):  amLODIPine   Tablet 10 milliGRAM(s) Oral daily  dextrose 5%. 1000 milliLiter(s) (50 mL/Hr) IV Continuous <Continuous>  dextrose 5%. 1000 milliLiter(s) (100 mL/Hr) IV Continuous <Continuous>  dextrose 50% Injectable 25 Gram(s) IV Push once  dextrose 50% Injectable 12.5 Gram(s) IV Push once  dextrose 50% Injectable 25 Gram(s) IV Push once  glucagon  Injectable 1 milliGRAM(s) IntraMuscular once  guaiFENesin  milliGRAM(s) Oral every 12 hours  heparin   Injectable 5000 Unit(s) SubCutaneous every 8 hours  hydrALAZINE 25 milliGRAM(s) Oral three times a day  hydrocodone/homatropine Syrup 10 milliLiter(s) Oral every 8 hours  insulin lispro (ADMELOG) corrective regimen sliding scale   SubCutaneous three times a day before meals  linagliptin 5 milliGRAM(s) Oral daily  metoprolol succinate  milliGRAM(s) Oral daily  simvastatin 40 milliGRAM(s) Oral at bedtime    MEDICATIONS  (PRN):  acetaminophen     Tablet .. 650 milliGRAM(s) Oral every 6 hours PRN Temp greater or equal to 38C (100.4F), Mild Pain (1 - 3)  aluminum hydroxide/magnesium hydroxide/simethicone Suspension 30 milliLiter(s) Oral every 4 hours PRN Dyspepsia  benzonatate 100 milliGRAM(s) Oral every 8 hours PRN for cough  dextrose Oral Gel 15 Gram(s) Oral once PRN Blood Glucose LESS THAN 70 milliGRAM(s)/deciliter  melatonin 3 milliGRAM(s) Oral at bedtime PRN Insomnia  ondansetron Injectable 4 milliGRAM(s) IV Push every 8 hours PRN Nausea and/or Vomiting  sodium chloride 0.9% lock flush 10 milliLiter(s) IV Push every 1 hour PRN Pre/post blood products, medications, blood draw, and to maintain line patency      Allergies    Eliquis (Other)    Intolerances        REVIEW OF SYSTEMS:    RESPIRATORY: +ve for cough  CARDIOVASCULAR: No chest pain, palpitations, dizziness, or leg swelling  GASTROINTESTINAL: No abdominal or epigastric pain. No nausea, vomiting, or hematemesis; No diarrhea or constipation. No melena or hematochezia.  GENITOURINARY: No dysuria, frequency, hematuria, or incontinence  NEUROLOGICAL: No headaches, memory loss, loss of strength, numbness, or tremors  SKIN: No itching, burning, rashes, or lesions   LYMPH NODES: No enlarged glands      Vital Signs Last 24 Hrs  T(C): 36.7 (27 Jul 2023 13:05), Max: 37.2 (26 Jul 2023 16:33)  T(F): 98 (27 Jul 2023 13:05), Max: 98.9 (26 Jul 2023 16:33)  HR: 60 (27 Jul 2023 13:05) (59 - 61)  BP: 114/54 (27 Jul 2023 13:05) (111/66 - 154/65)  BP(mean): 96 (26 Jul 2023 22:18) (95 - 96)  RR: 18 (27 Jul 2023 13:05) (16 - 20)  SpO2: 97% (27 Jul 2023 13:05) (94% - 99%)    Parameters below as of 27 Jul 2023 13:05  Patient On (Oxygen Delivery Method): nasal cannula  O2 Flow (L/min): 2      PHYSICAL EXAM:    HEAD:  Atraumatic, Normocephalic  EYES: EOMI, PERRLA, conjunctiva and sclera clear  ENMT: No tonsillar erythema, exudates, or enlargement; Moist mucous membranes, Good dentition, No lesions  NECK: Supple, No JVD, Normal thyroid  NERVOUS SYSTEM:  Alert & Oriented X3, Good concentration; Motor Strength 5/5 B/L upper and lower extremities; DTRs 2+ intact and symmetric      LABS:                        9.6    5.71  )-----------( 95       ( 26 Jul 2023 08:52 )             28.4     07-26    140  |  104  |  34<H>  ----------------------------<  111<H>  4.2   |  30  |  7.43<H>    Ca    8.5      26 Jul 2023 08:52    TPro  7.3  /  Alb  3.3  /  TBili  0.7  /  DBili  x   /  AST  30  /  ALT  7<L>  /  AlkPhos  80  07-25    PT/INR - ( 26 Jul 2023 08:52 )   PT: 11.2 sec;   INR: 0.94 ratio           Urinalysis Basic - ( 26 Jul 2023 08:52 )    Color: x / Appearance: x / SG: x / pH: x  Gluc: 111 mg/dL / Ketone: x  / Bili: x / Urobili: x   Blood: x / Protein: x / Nitrite: x   Leuk Esterase: x / RBC: x / WBC x   Sq Epi: x / Non Sq Epi: x / Bacteria: x

## 2023-07-27 NOTE — PROGRESS NOTE ADULT - SUBJECTIVE AND OBJECTIVE BOX
CHIEF COMPLAINT: Cough     Interval Events: Patient seen and examined at bedside. Currently on 2 L NC with SpO2 97 %, yesterday on room air desat to 83%. C/O continued productive cough leading to increased CP and episodes of incontinence. Denies SOB, ABD pain, N/V/D, dysuria.     REVIEW OF SYSTEMS:  All negative except for interval events.   a    OBJECTIVE:  Vital Signs Last 24 Hrs  T(C): 36.7 (27 Jul 2023 16:23), Max: 36.9 (26 Jul 2023 22:18)  T(F): 98 (27 Jul 2023 16:23), Max: 98.5 (26 Jul 2023 22:18)  HR: 60 (27 Jul 2023 16:23) (59 - 61)  BP: 103/56 (27 Jul 2023 16:23) (103/56 - 154/65)  BP(mean): 96 (26 Jul 2023 22:18) (95 - 96)  ABP: --  ABP(mean): --  RR: 18 (27 Jul 2023 16:23) (16 - 18)  SpO2: 96% (27 Jul 2023 16:23) (94% - 99%)    O2 Parameters below as of 27 Jul 2023 16:23  Patient On (Oxygen Delivery Method): nasal cannula  O2 Flow (L/min): 2            07-26 @ 07:01 - 07-27 @ 07:00  --------------------------------------------------------  IN: 225 mL / OUT: 0 mL / NET: 225 mL    07-27 @ 07:01 - 07-27 @ 18:04  --------------------------------------------------------  IN: 240 mL / OUT: 2000 mL / NET: -1760 mL      CAPILLARY BLOOD GLUCOSE      POCT Blood Glucose.: 209 mg/dL (27 Jul 2023 16:54)      PHYSICAL EXAM:  GENERAL: elderly female, NAD, well-groomed  HEAD:  Atraumatic, Normocephalic  EYES: PERRL, conjunctiva and sclera clear  ENMT: No tonsillar erythema, exudates, Moist mucous membranes, No lesions  NECK: Supple, No JVD,  NERVOUS SYSTEM:  Alert & Oriented X3, Good concentration; non focal  CHEST/LUNG: CTA B/L; No rales, rhonchi, wheezing, R chest wall permacath  HEART: Regular rate and rhythm  ABDOMEN: Soft, Nontender, Nondistended; Bowel sounds present  EXTREMITIES:  2+ Peripheral Pulses, No clubbing, cyanosis, or edema, LUE AVF + thrill/bruit  SKIN: No rashes or lesions    HOSPITAL MEDICATIONS:  heparin   Injectable 5000 Unit(s) SubCutaneous every 8 hours      amLODIPine   Tablet 10 milliGRAM(s) Oral daily  hydrALAZINE 25 milliGRAM(s) Oral three times a day  metoprolol succinate  milliGRAM(s) Oral daily    dexAMETHasone     Tablet 6 milliGRAM(s) Oral daily  dextrose 50% Injectable 25 Gram(s) IV Push once  dextrose 50% Injectable 12.5 Gram(s) IV Push once  dextrose 50% Injectable 25 Gram(s) IV Push once  dextrose Oral Gel 15 Gram(s) Oral once PRN  glucagon  Injectable 1 milliGRAM(s) IntraMuscular once  insulin lispro (ADMELOG) corrective regimen sliding scale   SubCutaneous three times a day before meals  linagliptin 5 milliGRAM(s) Oral daily  simvastatin 40 milliGRAM(s) Oral at bedtime    benzonatate 100 milliGRAM(s) Oral every 8 hours PRN  guaiFENesin  milliGRAM(s) Oral every 12 hours  hydrocodone/homatropine Syrup 5 milliLiter(s) Oral every 8 hours    acetaminophen     Tablet .. 650 milliGRAM(s) Oral every 6 hours PRN  melatonin 3 milliGRAM(s) Oral at bedtime PRN  ondansetron Injectable 4 milliGRAM(s) IV Push every 8 hours PRN    aluminum hydroxide/magnesium hydroxide/simethicone Suspension 30 milliLiter(s) Oral every 4 hours PRN        dextrose 5%. 1000 milliLiter(s) IV Continuous <Continuous>  dextrose 5%. 1000 milliLiter(s) IV Continuous <Continuous>  sodium chloride 0.9% lock flush 10 milliLiter(s) IV Push every 1 hour PRN            LABS:                        9.6    5.71  )-----------( 95       ( 26 Jul 2023 08:52 )             28.4     Hgb Trend: 9.6<--, 10.8<--  07-26    140  |  104  |  34<H>  ----------------------------<  111<H>  4.2   |  30  |  7.43<H>    Ca    8.5      26 Jul 2023 08:52    TPro  7.3  /  Alb  3.3  /  TBili  0.7  /  DBili  x   /  AST  30  /  ALT  7<L>  /  AlkPhos  80  07-25    Creatinine Trend: 7.43<--, 5.92<--, 8.45<--, 5.67<--, 6.94<--, 4.53<--  PT/INR - ( 26 Jul 2023 08:52 )   PT: 11.2 sec;   INR: 0.94 ratio           Urinalysis Basic - ( 26 Jul 2023 08:52 )    Color: x / Appearance: x / SG: x / pH: x  Gluc: 111 mg/dL / Ketone: x  / Bili: x / Urobili: x   Blood: x / Protein: x / Nitrite: x   Leuk Esterase: x / RBC: x / WBC x   Sq Epi: x / Non Sq Epi: x / Bacteria: x    Respiratory Viral Panel with COVID-19 by PROSPER (07.25.23 @ 17:55)    Rapid RVP Result: Detected   Adenovirus (RapRVP): Detected                MICROBIOLOGY:     Culture - Urine (02.17.22 @ 14:39)    Specimen Source: Clean Catch Clean Catch (Midstream)   Culture Results:   No growth    Culture - Blood (02.17.22 @ 01:48)    Specimen Source: .Blood Blood-Peripheral   Culture Results:   No Growth Final    Culture - Blood (02.17.22 @ 01:27)    Specimen Source: .Blood Blood-Peripheral   Culture Results:   No Growth Final        RADIOLOGY:  [X] Reviewed

## 2023-07-28 LAB
ALBUMIN SERPL ELPH-MCNC: 3.1 G/DL — LOW (ref 3.3–5)
ALP SERPL-CCNC: 80 U/L — SIGNIFICANT CHANGE UP (ref 40–120)
ALT FLD-CCNC: 9 U/L — LOW (ref 12–78)
AST SERPL-CCNC: 23 U/L — SIGNIFICANT CHANGE UP (ref 15–37)
BILIRUB DIRECT SERPL-MCNC: 0.1 MG/DL — SIGNIFICANT CHANGE UP (ref 0–0.3)
BILIRUB INDIRECT FLD-MCNC: 0.4 MG/DL — SIGNIFICANT CHANGE UP (ref 0.2–1)
BILIRUB SERPL-MCNC: 0.5 MG/DL — SIGNIFICANT CHANGE UP (ref 0.2–1.2)
CREAT SERPL-MCNC: 6.95 MG/DL — HIGH (ref 0.5–1.3)
EGFR: 6 ML/MIN/1.73M2 — LOW
GLUCOSE BLDC GLUCOMTR-MCNC: 186 MG/DL — HIGH (ref 70–99)
GLUCOSE BLDC GLUCOMTR-MCNC: 193 MG/DL — HIGH (ref 70–99)
GLUCOSE BLDC GLUCOMTR-MCNC: 231 MG/DL — HIGH (ref 70–99)
INR BLD: 0.84 RATIO — LOW (ref 0.85–1.18)
PROT SERPL-MCNC: 7.6 GM/DL — SIGNIFICANT CHANGE UP (ref 6–8.3)
PROTHROM AB SERPL-ACNC: 10.1 SEC — SIGNIFICANT CHANGE UP (ref 9.5–13)

## 2023-07-28 PROCEDURE — 99232 SBSQ HOSP IP/OBS MODERATE 35: CPT

## 2023-07-28 PROCEDURE — 99233 SBSQ HOSP IP/OBS HIGH 50: CPT

## 2023-07-28 PROCEDURE — 99223 1ST HOSP IP/OBS HIGH 75: CPT

## 2023-07-28 PROCEDURE — 93306 TTE W/DOPPLER COMPLETE: CPT | Mod: 26

## 2023-07-28 RX ORDER — REMDESIVIR 5 MG/ML
INJECTION INTRAVENOUS
Refills: 0 | Status: COMPLETED | OUTPATIENT
Start: 2023-07-28 | End: 2023-08-01

## 2023-07-28 RX ORDER — REMDESIVIR 5 MG/ML
100 INJECTION INTRAVENOUS EVERY 24 HOURS
Refills: 0 | Status: COMPLETED | OUTPATIENT
Start: 2023-07-29 | End: 2023-08-01

## 2023-07-28 RX ORDER — REMDESIVIR 5 MG/ML
200 INJECTION INTRAVENOUS EVERY 24 HOURS
Refills: 0 | Status: COMPLETED | OUTPATIENT
Start: 2023-07-28 | End: 2023-07-28

## 2023-07-28 RX ADMIN — HEPARIN SODIUM 5000 UNIT(S): 5000 INJECTION INTRAVENOUS; SUBCUTANEOUS at 14:37

## 2023-07-28 RX ADMIN — Medication 25 MILLIGRAM(S): at 14:37

## 2023-07-28 RX ADMIN — Medication 6 MILLIGRAM(S): at 05:25

## 2023-07-28 RX ADMIN — LINAGLIPTIN 5 MILLIGRAM(S): 5 TABLET, FILM COATED ORAL at 12:22

## 2023-07-28 RX ADMIN — REMDESIVIR 200 MILLIGRAM(S): 5 INJECTION INTRAVENOUS at 16:24

## 2023-07-28 RX ADMIN — Medication 25 MILLIGRAM(S): at 21:36

## 2023-07-28 RX ADMIN — Medication 100 MILLIGRAM(S): at 05:25

## 2023-07-28 RX ADMIN — CHLORHEXIDINE GLUCONATE 1 APPLICATION(S): 213 SOLUTION TOPICAL at 05:30

## 2023-07-28 RX ADMIN — Medication 600 MILLIGRAM(S): at 18:27

## 2023-07-28 RX ADMIN — Medication 3 MILLIGRAM(S): at 20:23

## 2023-07-28 RX ADMIN — Medication 25 MILLIGRAM(S): at 05:25

## 2023-07-28 RX ADMIN — Medication 1 TABLET(S): at 16:23

## 2023-07-28 RX ADMIN — SIMVASTATIN 40 MILLIGRAM(S): 20 TABLET, FILM COATED ORAL at 21:36

## 2023-07-28 RX ADMIN — Medication 1: at 16:30

## 2023-07-28 RX ADMIN — Medication 2: at 12:23

## 2023-07-28 RX ADMIN — Medication 600 MILLIGRAM(S): at 05:25

## 2023-07-28 RX ADMIN — Medication 1: at 08:20

## 2023-07-28 RX ADMIN — HEPARIN SODIUM 5000 UNIT(S): 5000 INJECTION INTRAVENOUS; SUBCUTANEOUS at 05:24

## 2023-07-28 RX ADMIN — ONDANSETRON 4 MILLIGRAM(S): 8 TABLET, FILM COATED ORAL at 20:23

## 2023-07-28 RX ADMIN — AMLODIPINE BESYLATE 10 MILLIGRAM(S): 2.5 TABLET ORAL at 05:25

## 2023-07-28 RX ADMIN — HEPARIN SODIUM 5000 UNIT(S): 5000 INJECTION INTRAVENOUS; SUBCUTANEOUS at 21:36

## 2023-07-28 NOTE — DIETITIAN INITIAL EVALUATION ADULT - ORAL INTAKE PTA/DIET HISTORY
Pt is on COVID-19 isolation, reports wt. loss since 2021 after initiation of HD.  Per pt., she was eating PTA, ate a good breakfast, had a light snack for lunch and a dinner meal.  Pt lived alone, her children set up shopping for her, she did the cooking.  Diet PTA: not very restricted as her sugars has been good & aware of dialysis restriction.  Pt c/o feeling weak, but is eating.

## 2023-07-28 NOTE — DIETITIAN INITIAL EVALUATION ADULT - OTHER INFO
Pt was on Tradjenta for diabetes PTA, was taken off insulin by an Endocrinologist ~ 1 year ago.  Pt self monitored blood glucose 1 x day.  Pt educated on adequate protein-energy intake, increased need for dialysis explained, pt is willing to try Nepro 1 x day.

## 2023-07-28 NOTE — PROGRESS NOTE ADULT - ASSESSMENT
A/P    1. COVID-19, adenovirus, acute hypoxic respiratory failure:  - Wean patient off supplemental O2 as tolerated  - Tessalon for cough  - Isolation, supportive care  - Decadron 6mg PO Daily X 10 days per pulmonology recs    2. Debility, weakness:  - PT eval    3. ESRD:  - On IHD    Dispo: per PT eval (pending).     Mckenzie Barrios MD

## 2023-07-28 NOTE — DIETITIAN INITIAL EVALUATION ADULT - NS FNS WEIGHT CHANGE REASON
As per pt., she was 242 LBS in 2021 prior to initiation of dialysis.  04/21/21, wt. 93.7 kg/205.6 LBS noted per RD note./unintentional

## 2023-07-28 NOTE — PROGRESS NOTE ADULT - SUBJECTIVE AND OBJECTIVE BOX
INTERVAL HPI/OVERNIGHT EVENTS: no acute events overnight    SUBJECTIVE: Patient seen and examined at bedside. Pt endorses feeling weaker but currently denies SOB, cough, CP, abd pain, n/v  Pt satting 97% on 2LNC and 91% on RA.   ROS: All negative except as listed above.    VITAL SIGNS:  ICU Vital Signs Last 24 Hrs  T(C): 36.4 (28 Jul 2023 11:03), Max: 36.9 (27 Jul 2023 14:44)  T(F): 97.6 (28 Jul 2023 11:03), Max: 98.4 (27 Jul 2023 14:44)  HR: 82 (28 Jul 2023 11:03) (60 - 82)  BP: 137/75 (28 Jul 2023 11:03) (103/56 - 155/70)  BP(mean): --  ABP: --  ABP(mean): --  RR: 18 (28 Jul 2023 11:03) (18 - 18)  SpO2: 94% (28 Jul 2023 11:03) (94% - 97%)    O2 Parameters below as of 28 Jul 2023 09:06  Patient On (Oxygen Delivery Method): room air      07-27 @ 07:01  -  07-28 @ 07:00  --------------------------------------------------------  IN: 240 mL / OUT: 2000 mL / NET: -1760 mL      CAPILLARY BLOOD GLUCOSE      POCT Blood Glucose.: 231 mg/dL (28 Jul 2023 12:20)      ECG: reviewed.    PHYSICAL EXAM:    GENERAL: NAD, lying in bed comfortably  HEAD:  Atraumatic, normocephalic  EYES: EOMI, PERRLA, conjunctiva and sclera clear  NECK: Supple, trachea midline, no JVD  HEART: NSR  LUNGS: Unlabored respirations.  mild rhonci L>R  ABDOMEN: Soft, nontender, nondistended, +BS  EXTREMITIES: 2+ peripheral pulses bilaterally, cap refill<2 secs. No clubbing, cyanosis, or edema  NERVOUS SYSTEM:  A&Ox3, following commands, moving all extremities, no focal deficits       MEDICATIONS:  MEDICATIONS  (STANDING):  amLODIPine   Tablet 10 milliGRAM(s) Oral daily  chlorhexidine 2% Cloths 1 Application(s) Topical <User Schedule>  dexAMETHasone     Tablet 6 milliGRAM(s) Oral daily  dextrose 5%. 1000 milliLiter(s) (100 mL/Hr) IV Continuous <Continuous>  dextrose 5%. 1000 milliLiter(s) (50 mL/Hr) IV Continuous <Continuous>  dextrose 50% Injectable 12.5 Gram(s) IV Push once  dextrose 50% Injectable 25 Gram(s) IV Push once  dextrose 50% Injectable 25 Gram(s) IV Push once  glucagon  Injectable 1 milliGRAM(s) IntraMuscular once  guaiFENesin  milliGRAM(s) Oral every 12 hours  heparin   Injectable 5000 Unit(s) SubCutaneous every 8 hours  hydrALAZINE 25 milliGRAM(s) Oral three times a day  hydrocodone/homatropine Syrup 5 milliLiter(s) Oral every 8 hours  insulin lispro (ADMELOG) corrective regimen sliding scale   SubCutaneous three times a day before meals  linagliptin 5 milliGRAM(s) Oral daily  metoprolol succinate  milliGRAM(s) Oral daily  remdesivir  IVPB   IV Intermittent   simvastatin 40 milliGRAM(s) Oral at bedtime    MEDICATIONS  (PRN):  acetaminophen     Tablet .. 650 milliGRAM(s) Oral every 6 hours PRN Temp greater or equal to 38C (100.4F), Mild Pain (1 - 3)  aluminum hydroxide/magnesium hydroxide/simethicone Suspension 30 milliLiter(s) Oral every 4 hours PRN Dyspepsia  benzonatate 100 milliGRAM(s) Oral every 8 hours PRN for cough  dextrose Oral Gel 15 Gram(s) Oral once PRN Blood Glucose LESS THAN 70 milliGRAM(s)/deciliter  melatonin 3 milliGRAM(s) Oral at bedtime PRN Insomnia  ondansetron Injectable 4 milliGRAM(s) IV Push every 8 hours PRN Nausea and/or Vomiting  sodium chloride 0.9% lock flush 10 milliLiter(s) IV Push every 1 hour PRN Pre/post blood products, medications, blood draw, and to maintain line patency      ALLERGIES:  Allergies    Eliquis (Other)    Intolerances        LABS:              ABG:      vBG:    Micro:        RADIOLOGY & ADDITIONAL TESTS: Reviewed.   INTERVAL HPI/OVERNIGHT EVENTS: no acute events overnight    SUBJECTIVE: Patient seen and examined at bedside. Pt endorses feeling weaker but currently denies SOB, cough, CP, abd pain, n/v  Pt satting 97% on 2LNC and 91% on RA.   ROS: All negative except as listed above.    VITAL SIGNS:  ICU Vital Signs Last 24 Hrs  T(C): 36.4 (28 Jul 2023 11:03), Max: 36.9 (27 Jul 2023 14:44)  T(F): 97.6 (28 Jul 2023 11:03), Max: 98.4 (27 Jul 2023 14:44)  HR: 82 (28 Jul 2023 11:03) (60 - 82)  BP: 137/75 (28 Jul 2023 11:03) (103/56 - 155/70)  BP(mean): --  ABP: --  ABP(mean): --  RR: 18 (28 Jul 2023 11:03) (18 - 18)  SpO2: 94% (28 Jul 2023 11:03) (94% - 97%)    O2 Parameters below as of 28 Jul 2023 09:06  Patient On (Oxygen Delivery Method): room air      07-27 @ 07:01  -  07-28 @ 07:00  --------------------------------------------------------  IN: 240 mL / OUT: 2000 mL / NET: -1760 mL      CAPILLARY BLOOD GLUCOSE      POCT Blood Glucose.: 231 mg/dL (28 Jul 2023 12:20)      ECG: reviewed.    PHYSICAL EXAM:    GENERAL: NAD, lying in bed comfortably  HEAD:  Atraumatic, normocephalic  EYES: EOMI, PERRLA, conjunctiva and sclera clear  NECK: Supple, trachea midline, no JVD  HEART: NSR  LUNGS: Unlabored respirations.  mild rhonci L>R  ABDOMEN: Soft, nontender, nondistended, +BS  EXTREMITIES: 2+ peripheral pulses bilaterally, cap refill<2 secs. No clubbing, cyanosis, or edema  NERVOUS SYSTEM:  A&Ox3, following commands, moving all extremities, no focal deficits       MEDICATIONS:  MEDICATIONS  (STANDING):  amLODIPine   Tablet 10 milliGRAM(s) Oral daily  chlorhexidine 2% Cloths 1 Application(s) Topical <User Schedule>  dexAMETHasone     Tablet 6 milliGRAM(s) Oral daily  dextrose 5%. 1000 milliLiter(s) (100 mL/Hr) IV Continuous <Continuous>  dextrose 5%. 1000 milliLiter(s) (50 mL/Hr) IV Continuous <Continuous>  dextrose 50% Injectable 12.5 Gram(s) IV Push once  dextrose 50% Injectable 25 Gram(s) IV Push once  dextrose 50% Injectable 25 Gram(s) IV Push once  glucagon  Injectable 1 milliGRAM(s) IntraMuscular once  guaiFENesin  milliGRAM(s) Oral every 12 hours  heparin   Injectable 5000 Unit(s) SubCutaneous every 8 hours  hydrALAZINE 25 milliGRAM(s) Oral three times a day  hydrocodone/homatropine Syrup 5 milliLiter(s) Oral every 8 hours  insulin lispro (ADMELOG) corrective regimen sliding scale   SubCutaneous three times a day before meals  linagliptin 5 milliGRAM(s) Oral daily  metoprolol succinate  milliGRAM(s) Oral daily  remdesivir  IVPB   IV Intermittent   simvastatin 40 milliGRAM(s) Oral at bedtime    MEDICATIONS  (PRN):  acetaminophen     Tablet .. 650 milliGRAM(s) Oral every 6 hours PRN Temp greater or equal to 38C (100.4F), Mild Pain (1 - 3)  aluminum hydroxide/magnesium hydroxide/simethicone Suspension 30 milliLiter(s) Oral every 4 hours PRN Dyspepsia  benzonatate 100 milliGRAM(s) Oral every 8 hours PRN for cough  dextrose Oral Gel 15 Gram(s) Oral once PRN Blood Glucose LESS THAN 70 milliGRAM(s)/deciliter  melatonin 3 milliGRAM(s) Oral at bedtime PRN Insomnia  ondansetron Injectable 4 milliGRAM(s) IV Push every 8 hours PRN Nausea and/or Vomiting  sodium chloride 0.9% lock flush 10 milliLiter(s) IV Push every 1 hour PRN Pre/post blood products, medications, blood draw, and to maintain line patency      ALLERGIES:  Allergies    Eliquis (Other)    Intolerances        LABS:    LABS:  cret    07-28    x   |  x   |  x   ----------------------------<  x   x    |  x   |  6.95<H>      TPro  7.6  /  Alb  3.1<L>  /  TBili  0.5  /  DBili  0.1  /  AST  23  /  ALT  9<L>  /  AlkPhos  80  07-28    PT/INR - ( 28 Jul 2023 15:55 )   PT: 10.1 sec;   INR: 0.84 ratio      Micro:  COVID +   RADIOLOGY & ADDITIONAL TESTS: Reviewed.   INTERVAL HPI/OVERNIGHT EVENTS: no acute events overnight    SUBJECTIVE: Patient seen and examined at bedside. Pt endorses feeling weaker but currently denies SOB, cough, CP, abd pain, n/v  Pt satting 97% on 2LNC and 91% on RA.   ROS: All negative except as listed above.    VITAL SIGNS:  Vital Signs Last 24 Hrs  T(C): 36.4 (28 Jul 2023 11:03), Max: 36.9 (27 Jul 2023 14:44)  T(F): 97.6 (28 Jul 2023 11:03), Max: 98.4 (27 Jul 2023 14:44)  HR: 82 (28 Jul 2023 11:03) (60 - 82)  BP: 137/75 (28 Jul 2023 11:03) (103/56 - 155/70)  RR: 18 (28 Jul 2023 11:03) (18 - 18)  SpO2: 94% (28 Jul 2023 11:03) (94% - 97%)    O2 Parameters below as of 28 Jul 2023 09:06  Patient On (Oxygen Delivery Method): room air      07-27 @ 07:01  -  07-28 @ 07:00  --------------------------------------------------------  IN: 240 mL / OUT: 2000 mL / NET: -1760 mL      CAPILLARY BLOOD GLUCOSE  POCT Blood Glucose.: 231 mg/dL (28 Jul 2023 12:20)      ECG: reviewed.    PHYSICAL EXAM:  GENERAL: NAD, lying in bed comfortably  HEAD:  Atraumatic, normocephalic  EYES: EOMI, PERRLA, conjunctiva and sclera clear  NECK: Supple, trachea midline, no JVD  HEART: NSR  LUNGS: Unlabored respirations.  mild rhonci L>R  ABDOMEN: Soft, nontender, nondistended, +BS  EXTREMITIES: 2+ peripheral pulses bilaterally, cap refill<2 secs. No clubbing, cyanosis, or edema  NERVOUS SYSTEM:  A&Ox3, following commands, moving all extremities, no focal deficits       MEDICATIONS:  MEDICATIONS  (STANDING):  amLODIPine   Tablet 10 milliGRAM(s) Oral daily  chlorhexidine 2% Cloths 1 Application(s) Topical <User Schedule>  dexAMETHasone     Tablet 6 milliGRAM(s) Oral daily  dextrose 5%. 1000 milliLiter(s) (100 mL/Hr) IV Continuous <Continuous>  dextrose 5%. 1000 milliLiter(s) (50 mL/Hr) IV Continuous <Continuous>  dextrose 50% Injectable 12.5 Gram(s) IV Push once  dextrose 50% Injectable 25 Gram(s) IV Push once  dextrose 50% Injectable 25 Gram(s) IV Push once  glucagon  Injectable 1 milliGRAM(s) IntraMuscular once  guaiFENesin  milliGRAM(s) Oral every 12 hours  heparin   Injectable 5000 Unit(s) SubCutaneous every 8 hours  hydrALAZINE 25 milliGRAM(s) Oral three times a day  hydrocodone/homatropine Syrup 5 milliLiter(s) Oral every 8 hours  insulin lispro (ADMELOG) corrective regimen sliding scale   SubCutaneous three times a day before meals  linagliptin 5 milliGRAM(s) Oral daily  metoprolol succinate  milliGRAM(s) Oral daily  remdesivir  IVPB   IV Intermittent   simvastatin 40 milliGRAM(s) Oral at bedtime    MEDICATIONS  (PRN):  acetaminophen     Tablet .. 650 milliGRAM(s) Oral every 6 hours PRN Temp greater or equal to 38C (100.4F), Mild Pain (1 - 3)  aluminum hydroxide/magnesium hydroxide/simethicone Suspension 30 milliLiter(s) Oral every 4 hours PRN Dyspepsia  benzonatate 100 milliGRAM(s) Oral every 8 hours PRN for cough  dextrose Oral Gel 15 Gram(s) Oral once PRN Blood Glucose LESS THAN 70 milliGRAM(s)/deciliter  melatonin 3 milliGRAM(s) Oral at bedtime PRN Insomnia  ondansetron Injectable 4 milliGRAM(s) IV Push every 8 hours PRN Nausea and/or Vomiting  sodium chloride 0.9% lock flush 10 milliLiter(s) IV Push every 1 hour PRN Pre/post blood products, medications, blood draw, and to maintain line patency      ALLERGIES:  Allergies    Eliquis (Other)    Intolerances        LABS:    LABS:  cret    07-28    x   |  x   |  x   ----------------------------<  x   x    |  x   |  6.95<H>      TPro  7.6  /  Alb  3.1<L>  /  TBili  0.5  /  DBili  0.1  /  AST  23  /  ALT  9<L>  /  AlkPhos  80  07-28    PT/INR - ( 28 Jul 2023 15:55 )   PT: 10.1 sec;   INR: 0.84 ratio      Micro:  COVID +   RADIOLOGY & ADDITIONAL TESTS: Reviewed.

## 2023-07-28 NOTE — PROGRESS NOTE ADULT - SUBJECTIVE AND OBJECTIVE BOX
[facility] NEPHROLOGY SERVICES, [facility]  NEPHROLOGY AND HYPERTENSION  [address]  [address]  [place] [address]  [phone]    MD PRAVEEN LYNNE MD YELENA ROSENBERG, MD BINNY KOSHY, MD CHRISTOPHER CAPUTO, MD EDWARD BOVER, MD          Patient events noted    MEDICATIONS  (STANDING):  amLODIPine   Tablet 10 milliGRAM(s) Oral daily  chlorhexidine 2% Cloths 1 Application(s) Topical <User Schedule>  dexAMETHasone     Tablet 6 milliGRAM(s) Oral daily  dextrose 5%. 1000 milliLiter(s) (50 mL/Hr) IV Continuous <Continuous>  dextrose 5%. 1000 milliLiter(s) (100 mL/Hr) IV Continuous <Continuous>  dextrose 50% Injectable 25 Gram(s) IV Push once  dextrose 50% Injectable 12.5 Gram(s) IV Push once  dextrose 50% Injectable 25 Gram(s) IV Push once  glucagon  Injectable 1 milliGRAM(s) IntraMuscular once  guaiFENesin  milliGRAM(s) Oral every 12 hours  heparin   Injectable 5000 Unit(s) SubCutaneous every 8 hours  hydrALAZINE 25 milliGRAM(s) Oral three times a day  hydrocodone/homatropine Syrup 5 milliLiter(s) Oral every 8 hours  insulin lispro (ADMELOG) corrective regimen sliding scale   SubCutaneous three times a day before meals  linagliptin 5 milliGRAM(s) Oral daily  metoprolol succinate  milliGRAM(s) Oral daily  Nephro-joseph 1 Tablet(s) Oral daily  remdesivir  IVPB   IV Intermittent   simvastatin 40 milliGRAM(s) Oral at bedtime    MEDICATIONS  (PRN):  acetaminophen     Tablet .. 650 milliGRAM(s) Oral every 6 hours PRN Temp greater or equal to 38C (100.4F), Mild Pain (1 - 3)  aluminum hydroxide/magnesium hydroxide/simethicone Suspension 30 milliLiter(s) Oral every 4 hours PRN Dyspepsia  benzonatate 100 milliGRAM(s) Oral every 8 hours PRN for cough  dextrose Oral Gel 15 Gram(s) Oral once PRN Blood Glucose LESS THAN 70 milliGRAM(s)/deciliter  melatonin 3 milliGRAM(s) Oral at bedtime PRN Insomnia  ondansetron Injectable 4 milliGRAM(s) IV Push every 8 hours PRN Nausea and/or Vomiting  sodium chloride 0.9% lock flush 10 milliLiter(s) IV Push every 1 hour PRN Pre/post blood products, medications, blood draw, and to maintain line patency      07-27-23 @ 07:01  -  07-28-23 @ 07:00  --------------------------------------------------------  IN: 240 mL / OUT: 2000 mL / NET: -1760 mL      PHYSICAL EXAM:      T(C): 36.8 (07-28-23 @ 16:58), Max: 36.8 (07-27-23 @ 23:45)  HR: 62 (07-28-23 @ 16:58) (60 - 82)  BP: 110/58 (07-28-23 @ 16:58) (110/58 - 155/70)  RR: 17 (07-28-23 @ 16:58) (17 - 18)  SpO2: 98% (07-28-23 @ 16:58) (94% - 98%)  Wt(kg): --  Lungs clear  Heart S1S2  Abd soft NT ND  Extremities:   tr edema                07-28    x   |  x   |  x   ----------------------------<  x   x    |  x   |  6.95<H>      TPro  7.6  /  Alb  3.1<L>  /  TBili  0.5  /  DBili  0.1  /  AST  23  /  ALT  9<L>  /  AlkPhos  80  07-28      LIVER FUNCTIONS - ( 28 Jul 2023 15:55 )  Alb: 3.1 g/dL / Pro: 7.6 gm/dL / ALK PHOS: 80 U/L / ALT: 9 U/L / AST: 23 U/L / GGT: x           Creatinine Trend: 6.95<--, 7.43<--, 5.92<--, 8.45<--, 5.67<--, 6.94<--      Assessment   ESRD; COVID + URI sx and lung opacities     Plan:  HD for tomorrow and Monday  Supportive care    Erickson Ray MD

## 2023-07-28 NOTE — CONSULT NOTE ADULT - SUBJECTIVE AND OBJECTIVE BOX
[facility]  INFECTIOUS DISEASES   [address] [place] [address]  Tel: [phone]     Fax: [phone]  ======================================================  Haas MD Jonathan Garellek, DO Maribeth Avalos, JEANNE   ======================================================    STKITTS, CLAUDETTE  MRN-[id number]  Female  78y ([birth year])        Patient is a 78y old  Female who presents with a chief complaint of 2019 NOVEL CORONA VIRUS DISEASE (COVID-19)     (28 Jul 2023 13:57)      HPI:  This is a 78 year old female w/ PMHx of DM2, HTN, HLD, CHF, S/P pacemaker, and ESRD on HD MWF presents for evaluation after being sent by pulmonologist for for worsening dry cough.  States coughs so hard feels like ribs will break.  Of note patient was recently admitted 7/4-7/8/23 for LLL PNA (26 Jul 2023 06:34)      ID consulted for workup and antibiotic management     PAST MEDICAL & SURGICAL HISTORY:  HTN (hypertension)      Pacemaker      DVT (deep venous thrombosis)      Diverticulitis      CHF (congestive heart failure)      ESRD on dialysis      Type 2 diabetes mellitus      Type 2 diabetes mellitus      Intestinal bleeding          Allergies  Eliquis (Other)        ANTIMICROBIALS:  remdesivir  IVPB        MEDICATIONS  (STANDING):    remdesivir  IVPB   200 mL/Hr IV Intermittent (07-28-23 @ 16:24)        OTHER MEDS: MEDICATIONS  (STANDING):  acetaminophen     Tablet .. 650 every 6 hours PRN  aluminum hydroxide/magnesium hydroxide/simethicone Suspension 30 every 4 hours PRN  amLODIPine   Tablet 10 daily  benzonatate 100 every 8 hours PRN  dexAMETHasone     Tablet 6 daily  dextrose 50% Injectable 12.5 once  dextrose 50% Injectable 25 once  dextrose 50% Injectable 25 once  dextrose Oral Gel 15 once PRN  glucagon  Injectable 1 once  guaiFENesin  every 12 hours  heparin   Injectable 5000 every 8 hours  hydrALAZINE 25 three times a day  hydrocodone/homatropine Syrup 5 every 8 hours  insulin lispro (ADMELOG) corrective regimen sliding scale  three times a day before meals  linagliptin 5 daily  melatonin 3 at bedtime PRN  metoprolol succinate  daily  ondansetron Injectable 4 every 8 hours PRN  simvastatin 40 at bedtime      SOCIAL HISTORY:     Smoking Cigarettes [ ]Active [ ] Former [ ]Denies   ETOH [ ]denies [ ]Former [ ]Current Use denies   Drug Use [ ]Never [ ] Former [ ] Active     FAMILY HISTORY:  FH: HTN (hypertension)        REVIEW OF SYSTEMS  [  ] ROS unobtainable because:    [ x ] All other systems negative except as noted below:	    Constitutional:  [ ] fever [ ] chills  [ ] weight loss  [ ] weakness  Skin:  [ ] rash [ ] phlebitis	  Eyes: [ ] icterus [ ] pain  [ ] discharge	  ENMT: [ ] sore throat  [ ] thrush [ ] ulcers [ ] exudates  Respiratory: [ ] dyspnea [ ] hemoptysis [ ] cough [ ] sputum	  Cardiovascular:  [ ] chest pain [ ] palpitations [ ] edema	  Gastrointestinal:  [ ] nausea [ ] vomiting [ ] diarrhea [ ] constipation [ ] pain	  Genitourinary:  [ ] dysuria [ ] frequency [ ] hematuria [ ] discharge [ ] flank pain  [ ] incontinence  Musculoskeletal:  [ ] myalgias [ ] arthralgias [ ] arthritis  [ ] back pain  Neurological:  [ ] headache [ ] seizures  [ ] confusion/altered mental status  Psychiatric:  [ ] anxiety [ ] depression	  Hematology/Lymphatics:  [ ] lymphadenopathy  Endocrine:  [ ] adrenal [ ] thyroid  Allergic/Immunologic:	 [ ] transplant [ ] seasonal    Vital Signs Last 24 Hrs  T(F): 97.6 (07-28-23 @ 11:03), Max: 99.2 (07-25-23 @ 15:17)    Vital Signs Last 24 Hrs  HR: 82 (07-28-23 @ 11:03) (60 - 82)  BP: 137/75 (07-28-23 @ 11:03) (112/62 - 155/70)  RR: 18 (07-28-23 @ 11:03)  SpO2: 94% (07-28-23 @ 11:03) (94% - 97%)  Wt(kg): --    PHYSICAL EXAM:  Constitutional: non-toxic, no distress, nasal cannula   HEAD/EYES: anicteric, no conjunctival injection  ENT:  supple, no thrush  Cardiovascular:   normal S1, S2, no murmur, no edema, + right hemodialysis catheter c/d/i, left chest PPM   Respiratory:  bilateral rhonchi   GI:  soft, non-tender, normal bowel sounds  :  no doan, no CVA tenderness  Musculoskeletal:  no synovitis, normal ROM  Neurologic: awake and alert, normal strength, no focal findings  Skin: several areas of cheloids, LUE AV fistula with palpable thrill   Heme/Onc: no lymphadenopathy   Psychiatric:  awake, alert, appropriate mood      WBC Count: 5.71 K/uL (07-26 @ 08:52)  WBC Count: 6.26 K/uL (07-25 @ 17:55)      Auto Neutrophil %: 61.0 % (07-25-23 @ 17:55)  Auto Neutrophil #: 3.82 K/uL (07-25-23 @ 17:55)        07-28    x   |  x   |  x   ----------------------------<  x   x    |  x   |  6.95<H>      TPro  7.6  /  Alb  3.1<L>  /  TBili  0.5  /  DBili  0.1  /  AST  23  /  ALT  9<L>  /  AlkPhos  80  07-28      Creatinine Trend: 6.95<--, 7.43<--, 5.92<--, 8.45<--, 5.67<--, 6.94<--      MICROBIOLOGY:    SARS-CoV-2: Detected (25 Jul 2023 17:55)  SARS-CoV-2: NotDetec (03 Jul 2023 15:50)      Rapid RVP Result: Detected (07-25 @ 17:55)  Adenovirus (RapRVP): Detected (07.25.23 @ 17:55)      C-Reactive Protein, Serum: 17 (07-26)        RADIOLOGY:  ACC: [id number] EXAM:  XR CHEST PORTABLE URGENT 1V   ORDERED BY: JOSEPH ARMANDO     PROCEDURE DATE:  07/25/2023          INTERPRETATION:  INDICATION: Cough    Portable chest 5:03 PM    COMPARISON: 7/3/2023    FINDINGS:  Heart/Vascular: The mediastinum, hilum and aorta are within normal limits   for projection. Cardiomegaly. Left chest wall dual lead pacemaker.  Pulmonary: Midline trachea. There is no focal infiltrate, congestion or   effusion.  Bones: There is no fracture.  Lines and catheter: Right central line unchanged in position.    Impression:    Mild atelectasis left base    Cardiomegaly stable.    Patient had CT scan.        RAFAEL SMITH DO; Attending Radiologist  This document has been electronically signed. Jul 26 2023  8:05AM      I have personally reviewed the above imaging

## 2023-07-28 NOTE — PROGRESS NOTE ADULT - SUBJECTIVE AND OBJECTIVE BOX
Patient is a 78y old  Female who presents with a chief complaint of cough (27 Jul 2023 18:50)      INTERVAL HPI/OVERNIGHT EVENTS: No acute events overnight, feels very weak and still has a cough.     MEDICATIONS  (STANDING):  amLODIPine   Tablet 10 milliGRAM(s) Oral daily  chlorhexidine 2% Cloths 1 Application(s) Topical <User Schedule>  dexAMETHasone     Tablet 6 milliGRAM(s) Oral daily  dextrose 5%. 1000 milliLiter(s) (50 mL/Hr) IV Continuous <Continuous>  dextrose 5%. 1000 milliLiter(s) (100 mL/Hr) IV Continuous <Continuous>  dextrose 50% Injectable 25 Gram(s) IV Push once  dextrose 50% Injectable 12.5 Gram(s) IV Push once  dextrose 50% Injectable 25 Gram(s) IV Push once  glucagon  Injectable 1 milliGRAM(s) IntraMuscular once  guaiFENesin  milliGRAM(s) Oral every 12 hours  heparin   Injectable 5000 Unit(s) SubCutaneous every 8 hours  hydrALAZINE 25 milliGRAM(s) Oral three times a day  hydrocodone/homatropine Syrup 5 milliLiter(s) Oral every 8 hours  insulin lispro (ADMELOG) corrective regimen sliding scale   SubCutaneous three times a day before meals  linagliptin 5 milliGRAM(s) Oral daily  metoprolol succinate  milliGRAM(s) Oral daily  simvastatin 40 milliGRAM(s) Oral at bedtime    MEDICATIONS  (PRN):  acetaminophen     Tablet .. 650 milliGRAM(s) Oral every 6 hours PRN Temp greater or equal to 38C (100.4F), Mild Pain (1 - 3)  aluminum hydroxide/magnesium hydroxide/simethicone Suspension 30 milliLiter(s) Oral every 4 hours PRN Dyspepsia  benzonatate 100 milliGRAM(s) Oral every 8 hours PRN for cough  dextrose Oral Gel 15 Gram(s) Oral once PRN Blood Glucose LESS THAN 70 milliGRAM(s)/deciliter  melatonin 3 milliGRAM(s) Oral at bedtime PRN Insomnia  ondansetron Injectable 4 milliGRAM(s) IV Push every 8 hours PRN Nausea and/or Vomiting  sodium chloride 0.9% lock flush 10 milliLiter(s) IV Push every 1 hour PRN Pre/post blood products, medications, blood draw, and to maintain line patency      Allergies    Eliquis (Other)    Intolerances        REVIEW OF SYSTEMS:  CONSTITUTIONAL: +ve for fatigue  EYES: No eye pain, visual disturbances, or discharge  ENMT:  No difficulty hearing, tinnitus, vertigo; No sinus or throat pain  NECK: No pain or stiffness      Vital Signs Last 24 Hrs  T(C): 36.4 (28 Jul 2023 11:03), Max: 36.9 (27 Jul 2023 14:44)  T(F): 97.6 (28 Jul 2023 11:03), Max: 98.4 (27 Jul 2023 14:44)  HR: 82 (28 Jul 2023 11:03) (60 - 82)  BP: 137/75 (28 Jul 2023 11:03) (103/56 - 155/70)  BP(mean): --  RR: 18 (28 Jul 2023 11:03) (18 - 18)  SpO2: 94% (28 Jul 2023 11:03) (94% - 97%)    Parameters below as of 28 Jul 2023 09:06  Patient On (Oxygen Delivery Method): room air        PHYSICAL EXAM:  GENERAL: NAD  HEAD:  Atraumatic, Normocephalic  EYES: EOMI, PERRLA, conjunctiva and sclera clear  ENMT: No tonsillar erythema, exudates, or enlargement; Moist mucous membranes, Good dentition, No lesions  NECK: Supple, No JVD, Normal thyroid      LABS:              CAPILLARY BLOOD GLUCOSE      POCT Blood Glucose.: 186 mg/dL (28 Jul 2023 08:07)  POCT Blood Glucose.: 209 mg/dL (27 Jul 2023 16:54)

## 2023-07-28 NOTE — DIETITIAN INITIAL EVALUATION ADULT - PERTINENT LABORATORY DATA
07-26 Na140 mmol/L Glu 111 mg/dL<H> K+ 4.2 mmol/L Cr  7.43 mg/dL<H> BUN 34 mg/dL<H> 07-25 Alb 3.3 g/dL07-25 ALT 7 U/L<L> AST 30 U/L Alkaline Phosphatase 80 U/L  POCT Blood Glucose.: 231 mg/dL (07-28-23 @ 12:20)  A1C with Estimated Average Glucose Result: 5.7 % (07-04-23 @ 06:10)

## 2023-07-28 NOTE — PHYSICAL THERAPY INITIAL EVALUATION ADULT - ADDITIONAL COMMENTS
As per PT wilson on 7/5/23 and reviewed with patient: Patient reports she lives in an apartment building, +ramp, no steps to enter once inside. Patient also owns a wheelchair, was independent and able to perform ADLs independently. Patient has someone to come in to do cleaning/laundry, children assist with grocery shopping.

## 2023-07-28 NOTE — PROGRESS NOTE ADULT - ASSESSMENT
78F PMH DM2, HTN, HLD, diastolic CHF, S/P pacemaker, ESRD on HD MWF (issues cannulating L AVF with prior issues including bleeding, now getting HD via R chest wall permacath) recent admission 7/4-7/8 for LLL PNA treated with antibx ceftriaxone and discharged home presents for evaluation after being sent by her pulmonologist for worsening dry cough and persistent pleuritic chest pain with coughing. Tested + COVID and adenovirus. Hypoxic on RA. Now improved with steroids     DX: acute hypoxic respiratory failure, COVID-19, adenovirus infection      - pt satting 91% on RA and reportedly dessats with ambulation. maintain on 1LNC and titrate for sats>92%  - cont dexamethasone 6mg x10 day course  -  initiating remdesivir for COVID, cleared by ID today   - supportive care for adenovirus  - hycodan for cough  - CT chest with improved LLL infiltrate and GGO  - renal eval for maintenance HD, cont fluid removal as tolerates during HD  - DVT ppx  - OOB to chair 78F PMH DM2, HTN, HLD, diastolic CHF, S/P pacemaker, ESRD on HD MWF (issues cannulating L AVF with prior issues including bleeding, now getting HD via R chest wall permacath) recent admission 7/4-7/8 for LLL PNA treated with antibx ceftriaxone and discharged home presents for evaluation after being sent by her pulmonologist for worsening dry cough and persistent pleuritic chest pain with coughing. Tested + COVID and adenovirus. Hypoxic on RA. Now improved with steroids     DX: acute hypoxic respiratory failure, COVID-19, adenovirus infection    -pt feels weak but overall feels better with improvement in oxygenation  - pt satting 91% on RA and reportedly dessats with ambulation. maintain on 1LNC and titrate for sats>92%  - cont dexamethasone 6mg x10 day course  -  initiating remdesivir for COVID, cleared by ID today for 5 day course. f/u ID recs   - supportive care for adenovirus  - hycodan for cough  - CT chest with improved LLL infiltrate and GGO  - renal eval for maintenance HD, cont fluid removal as tolerates during HD  - DVT ppx  - OOB to chair    plan discussed with Dr Foster

## 2023-07-28 NOTE — PHYSICAL THERAPY INITIAL EVALUATION ADULT - PRECAUTIONS/LIMITATIONS, REHAB EVAL
nasal cannula 2L/min, airborne/contact precautions/cardiac precautions/fall precautions/isolation precautions/oxygen therapy device and L/min

## 2023-07-28 NOTE — CONSULT NOTE ADULT - ASSESSMENT
78 year old female w/ PMHx of DM2, HTN, HLD, CHF, S/P pacemaker, and ESRD on HD MWF presents for evaluation after being sent by pulmonologist for for worsening dry cough.  She was hypoxic here and found to have covid and adenovirus   She reports generalized weakness much worse then when she was discharged   Denies sick contacts but was in the hospital, [facility], hemodialysis center so many potential exposures   CT IMPRESSION: Decreased bilateral pleural effusion with atelectasis/consolidation since 7/3/2023. Decreased extent of interlobular septal thickening and groundglass opacities.      Plan:  #COVID  Monitor clinically.  Monitor Oxygenation  O2 supplementation.  Remdesivir - up to 5 days depending on clinical course.  Monitor CBC, CMP daily  Ferritin, CRP, and D dimer q 48 hrs.  IV Dexamethasone 6mg q24hrs x10 days if hypoxia.  Anticoagulation per protocol.  Treatment options are limited-this as well as limitations of data discussed with pt.  Monitor for any bacterial superinfection/complications.  This tx plan was formulated utilizing my clinical judgement, currently available local/regional anecdotal information, organizational treatment recommendations with COVID-19 specific considerations given rapidly changing information available.     #Acute Respiratory Failure  wean oxygen as tolerated  chest PT  routine PT  OOB to chair if able   incentive spirometer   decadron 6mg q24hrs for 10 days  Inhalers-MDI and avoid nebulizers except in negative pressure room     #ESRD  continue hemodialysis   ensure achieving dry weight   dose all medications renally when appropriate     Discussed with Dr. Foster and pharmacy     Joao Valdez, DO  Infectious Disease Attending  Reachable via Microsoft Teams or ID office: [phone]  After 5pm/weekends please call [phone] for all inquiries and new consults

## 2023-07-28 NOTE — DIETITIAN INITIAL EVALUATION ADULT - DIET TYPE
1200ml/consistent carbohydrate (evening snack)/renal replacement pts:no protein restr,no conc K & phos, low sodium

## 2023-07-28 NOTE — DIETITIAN INITIAL EVALUATION ADULT - PERTINENT MEDS FT
MEDICATIONS  (STANDING):  amLODIPine   Tablet 10 milliGRAM(s) Oral daily  chlorhexidine 2% Cloths 1 Application(s) Topical <User Schedule>  dexAMETHasone     Tablet 6 milliGRAM(s) Oral daily  dextrose 5%. 1000 milliLiter(s) (100 mL/Hr) IV Continuous <Continuous>  dextrose 5%. 1000 milliLiter(s) (50 mL/Hr) IV Continuous <Continuous>  dextrose 50% Injectable 12.5 Gram(s) IV Push once  dextrose 50% Injectable 25 Gram(s) IV Push once  dextrose 50% Injectable 25 Gram(s) IV Push once  glucagon  Injectable 1 milliGRAM(s) IntraMuscular once  guaiFENesin  milliGRAM(s) Oral every 12 hours  heparin   Injectable 5000 Unit(s) SubCutaneous every 8 hours  hydrALAZINE 25 milliGRAM(s) Oral three times a day  hydrocodone/homatropine Syrup 5 milliLiter(s) Oral every 8 hours  insulin lispro (ADMELOG) corrective regimen sliding scale   SubCutaneous three times a day before meals  linagliptin 5 milliGRAM(s) Oral daily  metoprolol succinate  milliGRAM(s) Oral daily  remdesivir  IVPB   IV Intermittent   simvastatin 40 milliGRAM(s) Oral at bedtime    MEDICATIONS  (PRN):  acetaminophen     Tablet .. 650 milliGRAM(s) Oral every 6 hours PRN Temp greater or equal to 38C (100.4F), Mild Pain (1 - 3)  aluminum hydroxide/magnesium hydroxide/simethicone Suspension 30 milliLiter(s) Oral every 4 hours PRN Dyspepsia  benzonatate 100 milliGRAM(s) Oral every 8 hours PRN for cough  dextrose Oral Gel 15 Gram(s) Oral once PRN Blood Glucose LESS THAN 70 milliGRAM(s)/deciliter  melatonin 3 milliGRAM(s) Oral at bedtime PRN Insomnia  ondansetron Injectable 4 milliGRAM(s) IV Push every 8 hours PRN Nausea and/or Vomiting  sodium chloride 0.9% lock flush 10 milliLiter(s) IV Push every 1 hour PRN Pre/post blood products, medications, blood draw, and to maintain line patency

## 2023-07-29 LAB
ALBUMIN SERPL ELPH-MCNC: 3.3 G/DL — SIGNIFICANT CHANGE UP (ref 3.3–5)
ALBUMIN SERPL ELPH-MCNC: 3.4 G/DL — SIGNIFICANT CHANGE UP (ref 3.3–5)
ALP SERPL-CCNC: 84 U/L — SIGNIFICANT CHANGE UP (ref 40–120)
ALP SERPL-CCNC: 86 U/L — SIGNIFICANT CHANGE UP (ref 40–120)
ALT FLD-CCNC: 11 U/L — LOW (ref 12–78)
ALT FLD-CCNC: 12 U/L — SIGNIFICANT CHANGE UP (ref 12–78)
ANION GAP SERPL CALC-SCNC: 13 MMOL/L — SIGNIFICANT CHANGE UP (ref 5–17)
AST SERPL-CCNC: 26 U/L — SIGNIFICANT CHANGE UP (ref 15–37)
AST SERPL-CCNC: 27 U/L — SIGNIFICANT CHANGE UP (ref 15–37)
BILIRUB DIRECT SERPL-MCNC: 0.1 MG/DL — SIGNIFICANT CHANGE UP (ref 0–0.3)
BILIRUB INDIRECT FLD-MCNC: 0.4 MG/DL — SIGNIFICANT CHANGE UP (ref 0.2–1)
BILIRUB SERPL-MCNC: 0.5 MG/DL — SIGNIFICANT CHANGE UP (ref 0.2–1.2)
BILIRUB SERPL-MCNC: 0.5 MG/DL — SIGNIFICANT CHANGE UP (ref 0.2–1.2)
BUN SERPL-MCNC: 86 MG/DL — HIGH (ref 7–23)
CALCIUM SERPL-MCNC: 7.7 MG/DL — LOW (ref 8.5–10.1)
CHLORIDE SERPL-SCNC: 99 MMOL/L — SIGNIFICANT CHANGE UP (ref 96–108)
CO2 SERPL-SCNC: 23 MMOL/L — SIGNIFICANT CHANGE UP (ref 22–31)
CREAT SERPL-MCNC: 7.89 MG/DL — HIGH (ref 0.5–1.3)
CREAT SERPL-MCNC: 7.9 MG/DL — HIGH (ref 0.5–1.3)
EGFR: 5 ML/MIN/1.73M2 — LOW
EGFR: 5 ML/MIN/1.73M2 — LOW
GLUCOSE BLDC GLUCOMTR-MCNC: 166 MG/DL — HIGH (ref 70–99)
GLUCOSE BLDC GLUCOMTR-MCNC: 168 MG/DL — HIGH (ref 70–99)
GLUCOSE BLDC GLUCOMTR-MCNC: 202 MG/DL — HIGH (ref 70–99)
GLUCOSE BLDC GLUCOMTR-MCNC: 209 MG/DL — HIGH (ref 70–99)
GLUCOSE SERPL-MCNC: 165 MG/DL — HIGH (ref 70–99)
HCT VFR BLD CALC: 33.2 % — LOW (ref 34.5–45)
HGB BLD-MCNC: 10.9 G/DL — LOW (ref 11.5–15.5)
INR BLD: 0.8 RATIO — LOW (ref 0.85–1.18)
MCHC RBC-ENTMCNC: 28.8 PG — SIGNIFICANT CHANGE UP (ref 27–34)
MCHC RBC-ENTMCNC: 32.8 G/DL — SIGNIFICANT CHANGE UP (ref 32–36)
MCV RBC AUTO: 87.6 FL — SIGNIFICANT CHANGE UP (ref 80–100)
NRBC # BLD: 0 /100 WBCS — SIGNIFICANT CHANGE UP (ref 0–0)
PLATELET # BLD AUTO: 161 K/UL — SIGNIFICANT CHANGE UP (ref 150–400)
POTASSIUM SERPL-MCNC: 4.7 MMOL/L — SIGNIFICANT CHANGE UP (ref 3.5–5.3)
POTASSIUM SERPL-SCNC: 4.7 MMOL/L — SIGNIFICANT CHANGE UP (ref 3.5–5.3)
PROT SERPL-MCNC: 7.5 GM/DL — SIGNIFICANT CHANGE UP (ref 6–8.3)
PROT SERPL-MCNC: 7.5 GM/DL — SIGNIFICANT CHANGE UP (ref 6–8.3)
PROTHROM AB SERPL-ACNC: 9.7 SEC — SIGNIFICANT CHANGE UP (ref 9.5–13)
RBC # BLD: 3.79 M/UL — LOW (ref 3.8–5.2)
RBC # FLD: 16.4 % — HIGH (ref 10.3–14.5)
SODIUM SERPL-SCNC: 135 MMOL/L — SIGNIFICANT CHANGE UP (ref 135–145)
WBC # BLD: 8.11 K/UL — SIGNIFICANT CHANGE UP (ref 3.8–10.5)
WBC # FLD AUTO: 8.11 K/UL — SIGNIFICANT CHANGE UP (ref 3.8–10.5)

## 2023-07-29 PROCEDURE — 99232 SBSQ HOSP IP/OBS MODERATE 35: CPT

## 2023-07-29 RX ADMIN — SIMVASTATIN 40 MILLIGRAM(S): 20 TABLET, FILM COATED ORAL at 21:27

## 2023-07-29 RX ADMIN — Medication 6 MILLIGRAM(S): at 07:56

## 2023-07-29 RX ADMIN — Medication 2: at 12:24

## 2023-07-29 RX ADMIN — Medication 1: at 08:03

## 2023-07-29 RX ADMIN — Medication 600 MILLIGRAM(S): at 16:51

## 2023-07-29 RX ADMIN — Medication 100 MILLIGRAM(S): at 07:57

## 2023-07-29 RX ADMIN — HEPARIN SODIUM 5000 UNIT(S): 5000 INJECTION INTRAVENOUS; SUBCUTANEOUS at 21:27

## 2023-07-29 RX ADMIN — LINAGLIPTIN 5 MILLIGRAM(S): 5 TABLET, FILM COATED ORAL at 12:25

## 2023-07-29 RX ADMIN — Medication 600 MILLIGRAM(S): at 07:56

## 2023-07-29 RX ADMIN — AMLODIPINE BESYLATE 10 MILLIGRAM(S): 2.5 TABLET ORAL at 07:56

## 2023-07-29 RX ADMIN — HEPARIN SODIUM 5000 UNIT(S): 5000 INJECTION INTRAVENOUS; SUBCUTANEOUS at 05:52

## 2023-07-29 RX ADMIN — HEPARIN SODIUM 5000 UNIT(S): 5000 INJECTION INTRAVENOUS; SUBCUTANEOUS at 13:09

## 2023-07-29 RX ADMIN — REMDESIVIR 200 MILLIGRAM(S): 5 INJECTION INTRAVENOUS at 16:51

## 2023-07-29 RX ADMIN — Medication 25 MILLIGRAM(S): at 07:56

## 2023-07-29 RX ADMIN — Medication 2: at 16:52

## 2023-07-29 RX ADMIN — Medication 25 MILLIGRAM(S): at 21:32

## 2023-07-29 RX ADMIN — CHLORHEXIDINE GLUCONATE 1 APPLICATION(S): 213 SOLUTION TOPICAL at 06:01

## 2023-07-29 RX ADMIN — Medication 1 TABLET(S): at 13:05

## 2023-07-29 NOTE — PROGRESS NOTE ADULT - ASSESSMENT
A/P    1. COVID-19, adenovirus, acute hypoxic respiratory failure:  - Wean patient off supplemental O2 as tolerated  - Tessalon for cough  - Isolation, supportive care  - Decadron 6mg PO Daily X 10 days per pulmonology recs    2. Debility, weakness:  - PT eval    3. ESRD:  - On IHD    Dispo: per PT eval (pending).

## 2023-07-29 NOTE — PROGRESS NOTE ADULT - SUBJECTIVE AND OBJECTIVE BOX
Patient is a 78y old  Female who presents with a chief complaint of cough (28 Jul 2023 17:00)      SUBJECTIVE / OVERNIGHT EVENTS:  Patient denies chest pain, SOB, palpitations, abdominal pain, nausea, vomiting, chills, and cough. Afebrile. Tolerating po. Coughing throughout the night    MEDICATIONS  (STANDING):  amLODIPine   Tablet 10 milliGRAM(s) Oral daily  chlorhexidine 2% Cloths 1 Application(s) Topical <User Schedule>  dexAMETHasone     Tablet 6 milliGRAM(s) Oral daily  dextrose 5%. 1000 milliLiter(s) (50 mL/Hr) IV Continuous <Continuous>  dextrose 5%. 1000 milliLiter(s) (100 mL/Hr) IV Continuous <Continuous>  dextrose 50% Injectable 25 Gram(s) IV Push once  dextrose 50% Injectable 12.5 Gram(s) IV Push once  dextrose 50% Injectable 25 Gram(s) IV Push once  glucagon  Injectable 1 milliGRAM(s) IntraMuscular once  guaiFENesin  milliGRAM(s) Oral every 12 hours  heparin   Injectable 5000 Unit(s) SubCutaneous every 8 hours  hydrALAZINE 25 milliGRAM(s) Oral three times a day  insulin lispro (ADMELOG) corrective regimen sliding scale   SubCutaneous three times a day before meals  linagliptin 5 milliGRAM(s) Oral daily  metoprolol succinate  milliGRAM(s) Oral daily  Nephro-joseph 1 Tablet(s) Oral daily  remdesivir  IVPB   IV Intermittent   remdesivir  IVPB 100 milliGRAM(s) IV Intermittent every 24 hours  simvastatin 40 milliGRAM(s) Oral at bedtime    MEDICATIONS  (PRN):  acetaminophen     Tablet .. 650 milliGRAM(s) Oral every 6 hours PRN Temp greater or equal to 38C (100.4F), Mild Pain (1 - 3)  aluminum hydroxide/magnesium hydroxide/simethicone Suspension 30 milliLiter(s) Oral every 4 hours PRN Dyspepsia  benzonatate 100 milliGRAM(s) Oral every 8 hours PRN for cough  dextrose Oral Gel 15 Gram(s) Oral once PRN Blood Glucose LESS THAN 70 milliGRAM(s)/deciliter  melatonin 3 milliGRAM(s) Oral at bedtime PRN Insomnia  ondansetron Injectable 4 milliGRAM(s) IV Push every 8 hours PRN Nausea and/or Vomiting  sodium chloride 0.9% lock flush 10 milliLiter(s) IV Push every 1 hour PRN Pre/post blood products, medications, blood draw, and to maintain line patency      T(F): 97.7 (07-29 @ 14:03), Max: 98.2 (07-28 @ 16:58)  HR: 60 (07-29 @ 14:03) (60 - 64)  BP: 103/53 (07-29 @ 14:03) (99/69 - 120/60)  RR: 17 (07-29 @ 14:03) (16 - 18)  SpO2: 97% (07-29 @ 14:03) (92% - 98%)  CAPILLARY BLOOD GLUCOSE      POCT Blood Glucose.: 202 mg/dL (29 Jul 2023 11:31)  POCT Blood Glucose.: 168 mg/dL (29 Jul 2023 07:33)  POCT Blood Glucose.: 193 mg/dL (28 Jul 2023 16:28)    I&O's Summary    29 Jul 2023 07:01  -  29 Jul 2023 14:40  --------------------------------------------------------  IN: 0 mL / OUT: 2000 mL / NET: -2000 mL        PHYSICAL EXAM:  GEN: Appears in no acute distress. NC in place. Getting HD.  MOUTH: moist mucous membranes, no exudates or lesions   PULM: Clear to auscultation bilaterally, no wheezes, rales, rhonchi  CV: RRR, S1S2, no murmurs, rubs or gallops  Abdominal: Soft, nontender to palpation, non-distended, normoactive bowel sounds  Extremities: WWP, No edema, + peripheral pulses  NEURO: AAOx3, moving all four extremities spontaneously  Skin: No rashes, lesions, hematomas, ecchymosis      LABS:  Labs personally reviewed.                        10.9   8.11  )-----------( 161      ( 29 Jul 2023 08:15 )             33.2     Hgb Trend: 10.9<--, 9.6<--, 10.8<--  07-29    135  |  99  |  86<H>  ----------------------------<  165<H>  4.7   |  23  |  7.90<H>    Ca    7.7<L>      29 Jul 2023 08:15    TPro  7.5  /  Alb  3.3  /  TBili  0.5  /  DBili  0.1  /  AST  26  /  ALT  11<L>  /  AlkPhos  86  07-29    Creatinine Trend: 7.90<--, 6.95<--, 7.43<--, 5.92<--, 8.45<--, 5.67<--  PT/INR - ( 29 Jul 2023 08:15 )   PT: 9.7 sec;   INR: 0.80 ratio           Urinalysis Basic - ( 29 Jul 2023 08:15 )    Color: x / Appearance: x / SG: x / pH: x  Gluc: 165 mg/dL / Ketone: x  / Bili: x / Urobili: x   Blood: x / Protein: x / Nitrite: x   Leuk Esterase: x / RBC: x / WBC x   Sq Epi: x / Non Sq Epi: x / Bacteria: x        RADIOLOGY & ADDITIONAL TESTS:  Imaging Personally Reviewed.    Consultants:      Joao Rolle M.D.  Hospitalist [place]  Contact via Teams

## 2023-07-29 NOTE — PROGRESS NOTE ADULT - SUBJECTIVE AND OBJECTIVE BOX
[facility] NEPHROLOGY SERVICES, [facility]  NEPHROLOGY AND HYPERTENSION  [address]  [address]  [place] [address]  [phone]    MD PRAVEEN LYNNE MD YELENA ROSENBERG, MD BINNY KOSHY, MD CHRISTOPHER CAPUTO, MD EDWARD BOVER, MD          Patient events noted    MEDICATIONS  (STANDING):  amLODIPine   Tablet 10 milliGRAM(s) Oral daily  chlorhexidine 2% Cloths 1 Application(s) Topical <User Schedule>  dexAMETHasone     Tablet 6 milliGRAM(s) Oral daily  dextrose 5%. 1000 milliLiter(s) (100 mL/Hr) IV Continuous <Continuous>  dextrose 5%. 1000 milliLiter(s) (50 mL/Hr) IV Continuous <Continuous>  dextrose 50% Injectable 12.5 Gram(s) IV Push once  dextrose 50% Injectable 25 Gram(s) IV Push once  dextrose 50% Injectable 25 Gram(s) IV Push once  glucagon  Injectable 1 milliGRAM(s) IntraMuscular once  guaiFENesin  milliGRAM(s) Oral every 12 hours  heparin   Injectable 5000 Unit(s) SubCutaneous every 8 hours  hydrALAZINE 25 milliGRAM(s) Oral three times a day  insulin lispro (ADMELOG) corrective regimen sliding scale   SubCutaneous three times a day before meals  linagliptin 5 milliGRAM(s) Oral daily  metoprolol succinate  milliGRAM(s) Oral daily  Nephro-joseph 1 Tablet(s) Oral daily  remdesivir  IVPB 100 milliGRAM(s) IV Intermittent every 24 hours  remdesivir  IVPB   IV Intermittent   simvastatin 40 milliGRAM(s) Oral at bedtime    MEDICATIONS  (PRN):  acetaminophen     Tablet .. 650 milliGRAM(s) Oral every 6 hours PRN Temp greater or equal to 38C (100.4F), Mild Pain (1 - 3)  aluminum hydroxide/magnesium hydroxide/simethicone Suspension 30 milliLiter(s) Oral every 4 hours PRN Dyspepsia  benzonatate 100 milliGRAM(s) Oral every 8 hours PRN for cough  dextrose Oral Gel 15 Gram(s) Oral once PRN Blood Glucose LESS THAN 70 milliGRAM(s)/deciliter  melatonin 3 milliGRAM(s) Oral at bedtime PRN Insomnia  ondansetron Injectable 4 milliGRAM(s) IV Push every 8 hours PRN Nausea and/or Vomiting  sodium chloride 0.9% lock flush 10 milliLiter(s) IV Push every 1 hour PRN Pre/post blood products, medications, blood draw, and to maintain line patency      07-29-23 @ 07:01  -  07-29-23 @ 19:13  --------------------------------------------------------  IN: 220 mL / OUT: 2000 mL / NET: -1780 mL      PHYSICAL EXAM:      T(C): 36.5 (07-29-23 @ 16:05), Max: 36.6 (07-28-23 @ 23:51)  HR: 60 (07-29-23 @ 16:05) (60 - 64)  BP: 113/69 (07-29-23 @ 16:05) (99/69 - 120/60)  RR: 18 (07-29-23 @ 16:05) (16 - 18)  SpO2: 93% (07-29-23 @ 16:05) (92% - 97%)  Wt(kg): --  Lungs clear  Heart S1S2  Abd soft NT ND  Extremities:   tr edema                                    10.9   8.11  )-----------( 161      ( 29 Jul 2023 08:15 )             33.2     07-29    135  |  99  |  86<H>  ----------------------------<  165<H>  4.7   |  23  |  7.90<H>    Ca    7.7<L>      29 Jul 2023 08:15    TPro  7.5  /  Alb  3.3  /  TBili  0.5  /  DBili  0.1  /  AST  26  /  ALT  11<L>  /  AlkPhos  86  07-29      LIVER FUNCTIONS - ( 29 Jul 2023 08:15 )  Alb: 3.3 g/dL / Pro: 7.5 gm/dL / ALK PHOS: 86 U/L / ALT: 11 U/L / AST: 26 U/L / GGT: x           Creatinine Trend: 7.90<--, 6.95<--, 7.43<--, 5.92<--, 8.45<--, 5.67<--      Assessment   ESRD; COVID +       Plan:  HD for today  U as tolerated     Erickson Ray MD

## 2023-07-30 LAB
ALBUMIN SERPL ELPH-MCNC: 2.9 G/DL — LOW (ref 3.3–5)
ALP SERPL-CCNC: 78 U/L — SIGNIFICANT CHANGE UP (ref 40–120)
ALT FLD-CCNC: 15 U/L — SIGNIFICANT CHANGE UP (ref 12–78)
ANION GAP SERPL CALC-SCNC: 9 MMOL/L — SIGNIFICANT CHANGE UP (ref 5–17)
AST SERPL-CCNC: 29 U/L — SIGNIFICANT CHANGE UP (ref 15–37)
BILIRUB DIRECT SERPL-MCNC: 0.1 MG/DL — SIGNIFICANT CHANGE UP (ref 0–0.3)
BILIRUB INDIRECT FLD-MCNC: 0.4 MG/DL — SIGNIFICANT CHANGE UP (ref 0.2–1)
BILIRUB SERPL-MCNC: 0.5 MG/DL — SIGNIFICANT CHANGE UP (ref 0.2–1.2)
BUN SERPL-MCNC: 64 MG/DL — HIGH (ref 7–23)
CALCIUM SERPL-MCNC: 7.9 MG/DL — LOW (ref 8.5–10.1)
CHLORIDE SERPL-SCNC: 100 MMOL/L — SIGNIFICANT CHANGE UP (ref 96–108)
CO2 SERPL-SCNC: 25 MMOL/L — SIGNIFICANT CHANGE UP (ref 22–31)
CREAT SERPL-MCNC: 5.93 MG/DL — HIGH (ref 0.5–1.3)
EGFR: 7 ML/MIN/1.73M2 — LOW
GLUCOSE BLDC GLUCOMTR-MCNC: 139 MG/DL — HIGH (ref 70–99)
GLUCOSE BLDC GLUCOMTR-MCNC: 253 MG/DL — HIGH (ref 70–99)
GLUCOSE BLDC GLUCOMTR-MCNC: 288 MG/DL — HIGH (ref 70–99)
GLUCOSE BLDC GLUCOMTR-MCNC: 383 MG/DL — HIGH (ref 70–99)
GLUCOSE SERPL-MCNC: 128 MG/DL — HIGH (ref 70–99)
INR BLD: 0.9 RATIO — SIGNIFICANT CHANGE UP (ref 0.85–1.18)
PHOSPHATE SERPL-MCNC: 4.7 MG/DL — HIGH (ref 2.5–4.5)
POTASSIUM SERPL-MCNC: 4.5 MMOL/L — SIGNIFICANT CHANGE UP (ref 3.5–5.3)
POTASSIUM SERPL-SCNC: 4.5 MMOL/L — SIGNIFICANT CHANGE UP (ref 3.5–5.3)
PROT SERPL-MCNC: 6.9 GM/DL — SIGNIFICANT CHANGE UP (ref 6–8.3)
PROTHROM AB SERPL-ACNC: 10.7 SEC — SIGNIFICANT CHANGE UP (ref 9.5–13)
SODIUM SERPL-SCNC: 134 MMOL/L — LOW (ref 135–145)

## 2023-07-30 PROCEDURE — 99232 SBSQ HOSP IP/OBS MODERATE 35: CPT

## 2023-07-30 RX ADMIN — Medication 3: at 17:22

## 2023-07-30 RX ADMIN — Medication 1 TABLET(S): at 12:22

## 2023-07-30 RX ADMIN — HEPARIN SODIUM 5000 UNIT(S): 5000 INJECTION INTRAVENOUS; SUBCUTANEOUS at 17:23

## 2023-07-30 RX ADMIN — Medication 25 MILLIGRAM(S): at 17:24

## 2023-07-30 RX ADMIN — CHLORHEXIDINE GLUCONATE 1 APPLICATION(S): 213 SOLUTION TOPICAL at 05:12

## 2023-07-30 RX ADMIN — Medication 6 MILLIGRAM(S): at 05:08

## 2023-07-30 RX ADMIN — Medication 25 MILLIGRAM(S): at 05:08

## 2023-07-30 RX ADMIN — Medication 600 MILLIGRAM(S): at 05:08

## 2023-07-30 RX ADMIN — SIMVASTATIN 40 MILLIGRAM(S): 20 TABLET, FILM COATED ORAL at 21:26

## 2023-07-30 RX ADMIN — HEPARIN SODIUM 5000 UNIT(S): 5000 INJECTION INTRAVENOUS; SUBCUTANEOUS at 21:25

## 2023-07-30 RX ADMIN — Medication 600 MILLIGRAM(S): at 17:24

## 2023-07-30 RX ADMIN — REMDESIVIR 200 MILLIGRAM(S): 5 INJECTION INTRAVENOUS at 17:45

## 2023-07-30 RX ADMIN — Medication 25 MILLIGRAM(S): at 21:26

## 2023-07-30 RX ADMIN — Medication 100 MILLIGRAM(S): at 05:08

## 2023-07-30 RX ADMIN — Medication 5: at 12:21

## 2023-07-30 RX ADMIN — Medication 100 MILLIGRAM(S): at 09:26

## 2023-07-30 RX ADMIN — HEPARIN SODIUM 5000 UNIT(S): 5000 INJECTION INTRAVENOUS; SUBCUTANEOUS at 05:08

## 2023-07-30 RX ADMIN — AMLODIPINE BESYLATE 10 MILLIGRAM(S): 2.5 TABLET ORAL at 05:08

## 2023-07-30 RX ADMIN — LINAGLIPTIN 5 MILLIGRAM(S): 5 TABLET, FILM COATED ORAL at 12:22

## 2023-07-30 NOTE — PROGRESS NOTE ADULT - SUBJECTIVE AND OBJECTIVE BOX
Patient is a 78y old  Female who presents with a chief complaint of cough (29 Jul 2023 19:12)      SUBJECTIVE / OVERNIGHT EVENTS: Overnight with coughing. Slight abdominal pain with coughing. Tolerating PO.  Patient denies chest pain, SOB, palpitations, abdominal pain, nausea, vomiting, chills, N/V.    MEDICATIONS  (STANDING):  amLODIPine   Tablet 10 milliGRAM(s) Oral daily  chlorhexidine 2% Cloths 1 Application(s) Topical <User Schedule>  dexAMETHasone     Tablet 6 milliGRAM(s) Oral daily  dextrose 5%. 1000 milliLiter(s) (100 mL/Hr) IV Continuous <Continuous>  dextrose 5%. 1000 milliLiter(s) (50 mL/Hr) IV Continuous <Continuous>  dextrose 50% Injectable 12.5 Gram(s) IV Push once  dextrose 50% Injectable 25 Gram(s) IV Push once  dextrose 50% Injectable 25 Gram(s) IV Push once  glucagon  Injectable 1 milliGRAM(s) IntraMuscular once  guaiFENesin  milliGRAM(s) Oral every 12 hours  heparin   Injectable 5000 Unit(s) SubCutaneous every 8 hours  hydrALAZINE 25 milliGRAM(s) Oral three times a day  insulin lispro (ADMELOG) corrective regimen sliding scale   SubCutaneous three times a day before meals  linagliptin 5 milliGRAM(s) Oral daily  metoprolol succinate  milliGRAM(s) Oral daily  Nephro-joseph 1 Tablet(s) Oral daily  remdesivir  IVPB   IV Intermittent   remdesivir  IVPB 100 milliGRAM(s) IV Intermittent every 24 hours  simvastatin 40 milliGRAM(s) Oral at bedtime    MEDICATIONS  (PRN):  acetaminophen     Tablet .. 650 milliGRAM(s) Oral every 6 hours PRN Temp greater or equal to 38C (100.4F), Mild Pain (1 - 3)  aluminum hydroxide/magnesium hydroxide/simethicone Suspension 30 milliLiter(s) Oral every 4 hours PRN Dyspepsia  benzonatate 100 milliGRAM(s) Oral every 8 hours PRN for cough  dextrose Oral Gel 15 Gram(s) Oral once PRN Blood Glucose LESS THAN 70 milliGRAM(s)/deciliter  hydrocodone/homatropine Syrup 5 milliLiter(s) Oral every 6 hours PRN Cough  melatonin 3 milliGRAM(s) Oral at bedtime PRN Insomnia  ondansetron Injectable 4 milliGRAM(s) IV Push every 8 hours PRN Nausea and/or Vomiting  sodium chloride 0.9% lock flush 10 milliLiter(s) IV Push every 1 hour PRN Pre/post blood products, medications, blood draw, and to maintain line patency      T(F): 98 (07-30 @ 11:55), Max: 98 (07-30 @ 11:55)  HR: 59 (07-30 @ 11:55) (59 - 62)  BP: 128/57 (07-30 @ 11:55) (103/53 - 128/68)  RR: 18 (07-30 @ 11:55) (17 - 18)  SpO2: 93% (07-30 @ 11:55) (93% - 97%)  CAPILLARY BLOOD GLUCOSE      POCT Blood Glucose.: 383 mg/dL (30 Jul 2023 12:09)  POCT Blood Glucose.: 139 mg/dL (30 Jul 2023 08:03)  POCT Blood Glucose.: 166 mg/dL (29 Jul 2023 21:27)  POCT Blood Glucose.: 209 mg/dL (29 Jul 2023 16:01)    I&O's Summary    29 Jul 2023 07:01  -  30 Jul 2023 07:00  --------------------------------------------------------  IN: 220 mL / OUT: 2000 mL / NET: -1780 mL        PHYSICAL EXAM:  GEN: Appears in no acute distress  HEENT: PERRLA, EOMI and accommodate, neck supple, no lymphadenopathy, no JVD  MOUTH: moist mucous membranes, no exudates or lesions   PULM: slight rhonchi lower lungs b/l  CV: RRR, S1S2, no murmurs, rubs or gallops  Abdominal: Soft, nontender to palpation, non-distended, normoactive bowel sounds  Extremities: WWP, No edema, + peripheral pulses  NEURO: AAOx3, moving all four extremities spontaneously  Skin: No rashes, lesions, hematomas, ecchymosis      LABS:  Labs personally reviewed.                        10.9   8.11  )-----------( 161      ( 29 Jul 2023 08:15 )             33.2     Hgb Trend: 10.9<--, 9.6<--, 10.8<--  07-30    134<L>  |  100  |  64<H>  ----------------------------<  128<H>  4.5   |  25  |  5.93<H>    Ca    7.9<L>      30 Jul 2023 07:50  Phos  4.7     07-30    TPro  6.9  /  Alb  2.9<L>  /  TBili  0.5  /  DBili  0.1  /  AST  29  /  ALT  15  /  AlkPhos  78  07-30    Creatinine Trend: 5.93<--, 7.90<--, 6.95<--, 7.43<--, 5.92<--, 8.45<--  PT/INR - ( 30 Jul 2023 07:50 )   PT: 10.7 sec;   INR: 0.90 ratio           Urinalysis Basic - ( 30 Jul 2023 07:50 )    Color: x / Appearance: x / SG: x / pH: x  Gluc: 128 mg/dL / Ketone: x  / Bili: x / Urobili: x   Blood: x / Protein: x / Nitrite: x   Leuk Esterase: x / RBC: x / WBC x   Sq Epi: x / Non Sq Epi: x / Bacteria: x        RADIOLOGY & ADDITIONAL TESTS:  Imaging Personally Reviewed.    Consultants:      Joao Rolle M.D.  [place] [facility] [place]  Contact via Teams

## 2023-07-30 NOTE — PROGRESS NOTE ADULT - ASSESSMENT
A/P    1. COVID-19, adenovirus, acute hypoxic respiratory failure:  - Wean patient off supplemental O2 as tolerated  - Tessalon for cough, will reorder hycoden given no improvement with tessalon  - Isolation, supportive care  - Decadron 6mg PO Daily X 10 days per pulmonology recs    2. Debility, weakness:  - PT eval    3. ESRD:  - On IHD    Dispo: [facility] per PT

## 2023-07-31 LAB
ALBUMIN SERPL ELPH-MCNC: 2.9 G/DL — LOW (ref 3.3–5)
ALP SERPL-CCNC: 75 U/L — SIGNIFICANT CHANGE UP (ref 40–120)
ALT FLD-CCNC: 18 U/L — SIGNIFICANT CHANGE UP (ref 12–78)
ANION GAP SERPL CALC-SCNC: 12 MMOL/L — SIGNIFICANT CHANGE UP (ref 5–17)
AST SERPL-CCNC: 30 U/L — SIGNIFICANT CHANGE UP (ref 15–37)
BILIRUB DIRECT SERPL-MCNC: 0.1 MG/DL — SIGNIFICANT CHANGE UP (ref 0–0.3)
BILIRUB INDIRECT FLD-MCNC: 0.4 MG/DL — SIGNIFICANT CHANGE UP (ref 0.2–1)
BILIRUB SERPL-MCNC: 0.5 MG/DL — SIGNIFICANT CHANGE UP (ref 0.2–1.2)
BUN SERPL-MCNC: 105 MG/DL — HIGH (ref 7–23)
CA-I BLD-SCNC: 4.2 MG/DL — LOW (ref 4.5–5.6)
CALCIUM SERPL-MCNC: 7.7 MG/DL — LOW (ref 8.5–10.1)
CHLORIDE SERPL-SCNC: 100 MMOL/L — SIGNIFICANT CHANGE UP (ref 96–108)
CO2 SERPL-SCNC: 23 MMOL/L — SIGNIFICANT CHANGE UP (ref 22–31)
CREAT SERPL-MCNC: 7.37 MG/DL — HIGH (ref 0.5–1.3)
CREAT SERPL-MCNC: 7.41 MG/DL — HIGH (ref 0.5–1.3)
EGFR: 5 ML/MIN/1.73M2 — LOW
EGFR: 5 ML/MIN/1.73M2 — LOW
GLUCOSE BLDC GLUCOMTR-MCNC: 146 MG/DL — HIGH (ref 70–99)
GLUCOSE BLDC GLUCOMTR-MCNC: 156 MG/DL — HIGH (ref 70–99)
GLUCOSE BLDC GLUCOMTR-MCNC: 263 MG/DL — HIGH (ref 70–99)
GLUCOSE BLDC GLUCOMTR-MCNC: 380 MG/DL — HIGH (ref 70–99)
GLUCOSE SERPL-MCNC: 385 MG/DL — HIGH (ref 70–99)
HCT VFR BLD CALC: 30.2 % — LOW (ref 34.5–45)
HGB BLD-MCNC: 10.5 G/DL — LOW (ref 11.5–15.5)
INR BLD: 0.91 RATIO — SIGNIFICANT CHANGE UP (ref 0.85–1.18)
MCHC RBC-ENTMCNC: 29.1 PG — SIGNIFICANT CHANGE UP (ref 27–34)
MCHC RBC-ENTMCNC: 34.8 G/DL — SIGNIFICANT CHANGE UP (ref 32–36)
MCV RBC AUTO: 83.7 FL — SIGNIFICANT CHANGE UP (ref 80–100)
NRBC # BLD: 0 /100 WBCS — SIGNIFICANT CHANGE UP (ref 0–0)
PHOSPHATE SERPL-MCNC: 5.4 MG/DL — HIGH (ref 2.5–4.5)
PLATELET # BLD AUTO: 181 K/UL — SIGNIFICANT CHANGE UP (ref 150–400)
POTASSIUM SERPL-MCNC: 4.4 MMOL/L — SIGNIFICANT CHANGE UP (ref 3.5–5.3)
POTASSIUM SERPL-SCNC: 4.4 MMOL/L — SIGNIFICANT CHANGE UP (ref 3.5–5.3)
PROT SERPL-MCNC: 6.5 GM/DL — SIGNIFICANT CHANGE UP (ref 6–8.3)
PROTHROM AB SERPL-ACNC: 11 SEC — SIGNIFICANT CHANGE UP (ref 9.5–13)
RBC # BLD: 3.61 M/UL — LOW (ref 3.8–5.2)
RBC # FLD: 16.5 % — HIGH (ref 10.3–14.5)
SODIUM SERPL-SCNC: 135 MMOL/L — SIGNIFICANT CHANGE UP (ref 135–145)
WBC # BLD: 4.63 K/UL — SIGNIFICANT CHANGE UP (ref 3.8–10.5)
WBC # FLD AUTO: 4.63 K/UL — SIGNIFICANT CHANGE UP (ref 3.8–10.5)

## 2023-07-31 PROCEDURE — 99232 SBSQ HOSP IP/OBS MODERATE 35: CPT

## 2023-07-31 PROCEDURE — 99232 SBSQ HOSP IP/OBS MODERATE 35: CPT | Mod: FS

## 2023-07-31 RX ORDER — INSULIN GLARGINE 100 [IU]/ML
4 INJECTION, SOLUTION SUBCUTANEOUS AT BEDTIME
Refills: 0 | Status: DISCONTINUED | OUTPATIENT
Start: 2023-07-31 | End: 2023-08-01

## 2023-07-31 RX ADMIN — Medication 600 MILLIGRAM(S): at 05:22

## 2023-07-31 RX ADMIN — HEPARIN SODIUM 5000 UNIT(S): 5000 INJECTION INTRAVENOUS; SUBCUTANEOUS at 05:23

## 2023-07-31 RX ADMIN — Medication 25 MILLIGRAM(S): at 16:13

## 2023-07-31 RX ADMIN — Medication 25 MILLIGRAM(S): at 05:22

## 2023-07-31 RX ADMIN — Medication 100 MILLIGRAM(S): at 11:15

## 2023-07-31 RX ADMIN — Medication 600 MILLIGRAM(S): at 17:03

## 2023-07-31 RX ADMIN — SIMVASTATIN 40 MILLIGRAM(S): 20 TABLET, FILM COATED ORAL at 21:23

## 2023-07-31 RX ADMIN — HEPARIN SODIUM 5000 UNIT(S): 5000 INJECTION INTRAVENOUS; SUBCUTANEOUS at 16:13

## 2023-07-31 RX ADMIN — HEPARIN SODIUM 5000 UNIT(S): 5000 INJECTION INTRAVENOUS; SUBCUTANEOUS at 21:21

## 2023-07-31 RX ADMIN — Medication 100 MILLIGRAM(S): at 05:22

## 2023-07-31 RX ADMIN — Medication 6 MILLIGRAM(S): at 05:22

## 2023-07-31 RX ADMIN — INSULIN GLARGINE 4 UNIT(S): 100 INJECTION, SOLUTION SUBCUTANEOUS at 21:23

## 2023-07-31 RX ADMIN — Medication 1 TABLET(S): at 11:14

## 2023-07-31 RX ADMIN — Medication 25 MILLIGRAM(S): at 21:23

## 2023-07-31 RX ADMIN — CHLORHEXIDINE GLUCONATE 1 APPLICATION(S): 213 SOLUTION TOPICAL at 05:28

## 2023-07-31 RX ADMIN — AMLODIPINE BESYLATE 10 MILLIGRAM(S): 2.5 TABLET ORAL at 05:23

## 2023-07-31 RX ADMIN — Medication 5: at 11:16

## 2023-07-31 RX ADMIN — REMDESIVIR 200 MILLIGRAM(S): 5 INJECTION INTRAVENOUS at 16:56

## 2023-07-31 RX ADMIN — Medication 1: at 17:03

## 2023-07-31 RX ADMIN — LINAGLIPTIN 5 MILLIGRAM(S): 5 TABLET, FILM COATED ORAL at 11:14

## 2023-07-31 NOTE — PROGRESS NOTE ADULT - SUBJECTIVE AND OBJECTIVE BOX
24 hr events:  no acute events  still with cough  HD today  afebrile  on RA      ## ROS:  no fever, no chills  no HA, no dizziness  no visual changes, no auditory changes  no sore throat, no sinus congestion  no SOB, + cough  + chest pain with cough, no palpitations  no abdominal pain, no N/V/D  no myalgias, no arthralgias  no swelling  no rashes, no pruritis  still complaining of feeling tired      ## Labs:  CBC:                        10.5   4.63  )-----------( 181      ( 31 Jul 2023 13:00 )             30.2     Chem:  07-31    135  |  100  |  105<H>  ----------------------------<  385<H>  4.4   |  23  |  7.41<H>    Ca    7.7<L>      31 Jul 2023 13:00  Phos  5.4     07-31    TPro  6.5  /  Alb  2.9<L>  /  TBili  0.5  /  DBili  0.1  /  AST  30  /  ALT  18  /  AlkPhos  75  07-31    Coags:  PT/INR - ( 31 Jul 2023 13:00 )   PT: 11.0 sec;   INR: 0.91 ratio      Respiratory Viral Panel with COVID-19 by PROSPER (07.25.23 @ 17:55)    Rapid RVP Result: Detected   SARS-CoV-2: Detected   Adenovirus (RapRVP): Detected      ## Medications:  remdesivir  IVPB 100 milliGRAM(s) IV Intermittent every 24 hours  remdesivir  IVPB   IV Intermittent     amLODIPine   Tablet 10 milliGRAM(s) Oral daily  hydrALAZINE 25 milliGRAM(s) Oral three times a day  metoprolol succinate  milliGRAM(s) Oral daily    benzonatate 100 milliGRAM(s) Oral every 8 hours PRN  guaiFENesin  milliGRAM(s) Oral every 12 hours  hydrocodone/homatropine Syrup 5 milliLiter(s) Oral every 6 hours PRN    dexAMETHasone     Tablet 6 milliGRAM(s) Oral daily  dextrose 50% Injectable 25 Gram(s) IV Push once  dextrose 50% Injectable 12.5 Gram(s) IV Push once  dextrose 50% Injectable 25 Gram(s) IV Push once  dextrose Oral Gel 15 Gram(s) Oral once PRN  glucagon  Injectable 1 milliGRAM(s) IntraMuscular once  insulin glargine Injectable (LANTUS) 4 Unit(s) SubCutaneous at bedtime  insulin lispro (ADMELOG) corrective regimen sliding scale   SubCutaneous three times a day before meals  linagliptin 5 milliGRAM(s) Oral daily  simvastatin 40 milliGRAM(s) Oral at bedtime    heparin   Injectable 5000 Unit(s) SubCutaneous every 8 hours    aluminum hydroxide/magnesium hydroxide/simethicone Suspension 30 milliLiter(s) Oral every 4 hours PRN    acetaminophen     Tablet .. 650 milliGRAM(s) Oral every 6 hours PRN  melatonin 3 milliGRAM(s) Oral at bedtime PRN  ondansetron Injectable 4 milliGRAM(s) IV Push every 8 hours PRN      ## Vitals:  T(C): 36.4 (07-31-23 @ 16:58), Max: 36.6 (07-30-23 @ 23:34)  HR: 63 (07-31-23 @ 16:58) (60 - 67)  BP: 138/75 (07-31-23 @ 16:58) (127/78 - 180/80)  BP(mean): 96 (07-31-23 @ 16:58) (96 - 96)  RR: 17 (07-31-23 @ 16:58) (16 - 20)  SpO2: 97% (07-31-23 @ 16:58) (95% - 100%)        07-31 @ 07:01  -  07-31 @ 21:08  --------------------------------------------------------  IN: 0 mL / OUT: 2000 mL / NET: -2000 mL          ## P/E:  Gen: lying comfortably in bed in no apparent distress, intermittently coughing  HEENT: NC/AT,  EOMI, sclera white  Resp: CTA B/L , no wheeze, no rhonchi, R chest wall permacath  CVS: RRR  Abd: soft NT/ND +BS  Ext: no c/c/e, L arm AVF  Neuro: A&Ox3      CODE STATUS: [ ] full code  [x ] DNR  [x ] DNI  [ ] MOLST  Goals of care discussion: [x ] yes

## 2023-07-31 NOTE — PROGRESS NOTE ADULT - SUBJECTIVE AND OBJECTIVE BOX
Jose Peacock M.D.    Patient is a 78y old  Female who presents with a chief complaint of cough (31 Jul 2023 11:38)      SUBJECTIVE / OVERNIGHT EVENTS: No event overnight. Still with cough and nausea.     Patient denies chest pain, abd pain, fever, chills, dysuria or any other complaints. All remainder ROS negative.     MEDICATIONS  (STANDING):  amLODIPine   Tablet 10 milliGRAM(s) Oral daily  chlorhexidine 2% Cloths 1 Application(s) Topical <User Schedule>  dexAMETHasone     Tablet 6 milliGRAM(s) Oral daily  dextrose 5%. 1000 milliLiter(s) (50 mL/Hr) IV Continuous <Continuous>  dextrose 5%. 1000 milliLiter(s) (100 mL/Hr) IV Continuous <Continuous>  dextrose 50% Injectable 25 Gram(s) IV Push once  dextrose 50% Injectable 12.5 Gram(s) IV Push once  dextrose 50% Injectable 25 Gram(s) IV Push once  glucagon  Injectable 1 milliGRAM(s) IntraMuscular once  guaiFENesin  milliGRAM(s) Oral every 12 hours  heparin   Injectable 5000 Unit(s) SubCutaneous every 8 hours  hydrALAZINE 25 milliGRAM(s) Oral three times a day  insulin glargine Injectable (LANTUS) 4 Unit(s) SubCutaneous at bedtime  insulin lispro (ADMELOG) corrective regimen sliding scale   SubCutaneous three times a day before meals  linagliptin 5 milliGRAM(s) Oral daily  metoprolol succinate  milliGRAM(s) Oral daily  Nephro-joseph 1 Tablet(s) Oral daily  remdesivir  IVPB 100 milliGRAM(s) IV Intermittent every 24 hours  remdesivir  IVPB   IV Intermittent   simvastatin 40 milliGRAM(s) Oral at bedtime    MEDICATIONS  (PRN):  acetaminophen     Tablet .. 650 milliGRAM(s) Oral every 6 hours PRN Temp greater or equal to 38C (100.4F), Mild Pain (1 - 3)  aluminum hydroxide/magnesium hydroxide/simethicone Suspension 30 milliLiter(s) Oral every 4 hours PRN Dyspepsia  benzonatate 100 milliGRAM(s) Oral every 8 hours PRN for cough  dextrose Oral Gel 15 Gram(s) Oral once PRN Blood Glucose LESS THAN 70 milliGRAM(s)/deciliter  hydrocodone/homatropine Syrup 5 milliLiter(s) Oral every 6 hours PRN Cough  melatonin 3 milliGRAM(s) Oral at bedtime PRN Insomnia  ondansetron Injectable 4 milliGRAM(s) IV Push every 8 hours PRN Nausea and/or Vomiting  sodium chloride 0.9% lock flush 10 milliLiter(s) IV Push every 1 hour PRN Pre/post blood products, medications, blood draw, and to maintain line patency      I&O's Summary    30 Jul 2023 07:01  -  31 Jul 2023 07:00  --------------------------------------------------------  IN: 420 mL / OUT: 0 mL / NET: 420 mL        PHYSICAL EXAM:  Vital Signs Last 24 Hrs  T(C): 36.3 (31 Jul 2023 12:00), Max: 36.6 (30 Jul 2023 16:41)  T(F): 97.4 (31 Jul 2023 12:00), Max: 97.8 (30 Jul 2023 16:41)  HR: 60 (31 Jul 2023 12:00) (59 - 67)  BP: 127/78 (31 Jul 2023 12:00) (127/78 - 180/80)  BP(mean): --  RR: 16 (31 Jul 2023 12:00) (16 - 18)  SpO2: 100% (31 Jul 2023 12:00) (95% - 100%)    Parameters below as of 31 Jul 2023 12:00  Patient On (Oxygen Delivery Method): room air        CONSTITUTIONAL: NAD, well-groomed  RESPIRATORY: Normal respiratory effort; lungs are clear to auscultation bilaterally  CARDIOVASCULAR: Regular rate and rhythm; No lower extremity edema  ABDOMEN: Nontender to palpation, normoactive bowel sounds  PSYCH: A+O x3; affect appropriate  NEUROLOGY: CN 2-12 are intact and symmetric; no gross sensory deficits;   SKIN: No rashes; no palpable lesions    LABS:                        10.5   4.63  )-----------( 181      ( 31 Jul 2023 13:00 )             30.2     07-30    134<L>  |  100  |  64<H>  ----------------------------<  128<H>  4.5   |  25  |  5.93<H>    Ca    7.9<L>      30 Jul 2023 07:50  Phos  4.7     07-30    TPro  6.9  /  Alb  2.9<L>  /  TBili  0.5  /  DBili  0.1  /  AST  29  /  ALT  15  /  AlkPhos  78  07-30    PT/INR - ( 30 Jul 2023 07:50 )   PT: 10.7 sec;   INR: 0.90 ratio               Urinalysis Basic - ( 30 Jul 2023 07:50 )    Color: x / Appearance: x / SG: x / pH: x  Gluc: 128 mg/dL / Ketone: x  / Bili: x / Urobili: x   Blood: x / Protein: x / Nitrite: x   Leuk Esterase: x / RBC: x / WBC x   Sq Epi: x / Non Sq Epi: x / Bacteria: x        CAPILLARY BLOOD GLUCOSE      POCT Blood Glucose.: 380 mg/dL (31 Jul 2023 10:45)  POCT Blood Glucose.: 146 mg/dL (31 Jul 2023 07:38)  POCT Blood Glucose.: 288 mg/dL (30 Jul 2023 21:00)  POCT Blood Glucose.: 253 mg/dL (30 Jul 2023 16:28)      RADIOLOGY & ADDITIONAL TESTS:  Results Reviewed:   Imaging Personally Reviewed:  Electrocardiogram Personally Reviewed:

## 2023-07-31 NOTE — PROGRESS NOTE ADULT - NS ATTEND AMEND GEN_ALL_CORE FT
I have reviewed all pertinent clinical information and agree with the NP's note.   covid   ARF  ESRD    complete RDV tomorrow   no need for steroids if maintaining saturation well on room air   no objection to discharge if continued improvement and can remain on room air     Will sign off. Please call with questions.     Joao Valdez, DO  Infectious Disease Attending  Reachable via Microsoft Teams or ID office: [phone]  After 5pm/weekends please call [phone] for all inquiries and new consult(s)
pt seen and examined with NP    complaining of generalized body weakness  still with persistent cough with chest pain during cough  feeling "miserable"  SOB improved  on 2L NC    78F PMH DM2, HTN, HLD, diastolic CHF, S/P pacemaker, ESRD on HD MWF (issues cannulating L AVF with prior issues including bleeding, now getting HD via R chest wall permacath) recent admission 7/4-7/8 for LLL PNA treated with antibx ceftriaxone and discharged home presents for evaluation after being sent by her pulmonologist for worsening dry cough and persistent pleuritic chest pain with coughing. Tested + COVID and adenovirus. Hypoxic on RA to the 80s, placed on nasal cannula supplementation.    DX: acute hypoxic respiratory failure, COVID-19, adenovirus infection      - maintain O2 sat > 90%  - wean down FIO2 as tolerates  - cont dexamethasone 6mg x10 day course  - ok to initiate remdesivir for COVID: she is already ESRD, doubt remdesivir will worsen her renal function as she is dialysis dependent at baseline.   - supportive care for adenovirus  - hycodan for cough  - CT chest with improved LLL infiltrate and GGO  - HD per nephrology  - DVT ppx  - OOB to chair  - had JAMES on prior admit  - pt wishes to remain DNR/DNI  - PT
pt seen and examined with PA    no acute events  weaned from 2L to RA however O2 desat to upper 80s with exertion  RA sat at rest low 90s (90-92%)  reports still feeling weak "no energy"  cough reported to have improved with hycodan    78F PMH DM2, HTN, HLD, diastolic CHF, S/P pacemaker, ESRD on HD MWF (issues cannulating L AVF with prior issues including bleeding, now getting HD via R chest wall permacath) recent admission 7/4-7/8 for LLL PNA treated with antibx ceftriaxone and discharged home presents for evaluation after being sent by her pulmonologist for worsening dry cough and persistent pleuritic chest pain with coughing. Tested + COVID and adenovirus. Hypoxic on RA.    DX: acute hypoxic respiratory failure, COVID-19, adenovirus infection    - cont 10 day course of dexamethasone  - remdesivir approved by ID and to be initiated x5 days  - monitor LFTs on remdesivir  - as she is ESRD already dialysis dependent minimal concern for worsening kidney function  - cont HD per nephrology  - maintain O2 sat > 90%  - monitor off O2 supplementation while at rest  - O2 with ambulation for now and will cont to wean off as tolerates  - physical therapy  - DNR/DNI (pt with prior MOLST and verbally confirms her wishes)  - d/w ID

## 2023-07-31 NOTE — PROGRESS NOTE ADULT - SUBJECTIVE AND OBJECTIVE BOX
[facility] NEPHROLOGY SERVICES, [facility]  NEPHROLOGY AND HYPERTENSION  [address]  [address]  [place] [address]  [phone]    MD PRAVEEN LYNNE MD YELENA ROSENBERG, MD BINNY KOSHY, MD CHRISTOPHER CAPUTO, MD EDWARD BOVER, MD          Patient events noted  No distress   MEDICATIONS  (STANDING):  amLODIPine   Tablet 10 milliGRAM(s) Oral daily  chlorhexidine 2% Cloths 1 Application(s) Topical <User Schedule>  dexAMETHasone     Tablet 6 milliGRAM(s) Oral daily  dextrose 5%. 1000 milliLiter(s) (100 mL/Hr) IV Continuous <Continuous>  dextrose 5%. 1000 milliLiter(s) (50 mL/Hr) IV Continuous <Continuous>  dextrose 50% Injectable 12.5 Gram(s) IV Push once  dextrose 50% Injectable 25 Gram(s) IV Push once  dextrose 50% Injectable 25 Gram(s) IV Push once  glucagon  Injectable 1 milliGRAM(s) IntraMuscular once  guaiFENesin  milliGRAM(s) Oral every 12 hours  heparin   Injectable 5000 Unit(s) SubCutaneous every 8 hours  hydrALAZINE 25 milliGRAM(s) Oral three times a day  insulin glargine Injectable (LANTUS) 4 Unit(s) SubCutaneous at bedtime  insulin lispro (ADMELOG) corrective regimen sliding scale   SubCutaneous three times a day before meals  linagliptin 5 milliGRAM(s) Oral daily  metoprolol succinate  milliGRAM(s) Oral daily  Nephro-joseph 1 Tablet(s) Oral daily  remdesivir  IVPB   IV Intermittent   remdesivir  IVPB 100 milliGRAM(s) IV Intermittent every 24 hours  simvastatin 40 milliGRAM(s) Oral at bedtime    MEDICATIONS  (PRN):  acetaminophen     Tablet .. 650 milliGRAM(s) Oral every 6 hours PRN Temp greater or equal to 38C (100.4F), Mild Pain (1 - 3)  aluminum hydroxide/magnesium hydroxide/simethicone Suspension 30 milliLiter(s) Oral every 4 hours PRN Dyspepsia  benzonatate 100 milliGRAM(s) Oral every 8 hours PRN for cough  dextrose Oral Gel 15 Gram(s) Oral once PRN Blood Glucose LESS THAN 70 milliGRAM(s)/deciliter  hydrocodone/homatropine Syrup 5 milliLiter(s) Oral every 6 hours PRN Cough  melatonin 3 milliGRAM(s) Oral at bedtime PRN Insomnia  ondansetron Injectable 4 milliGRAM(s) IV Push every 8 hours PRN Nausea and/or Vomiting  sodium chloride 0.9% lock flush 10 milliLiter(s) IV Push every 1 hour PRN Pre/post blood products, medications, blood draw, and to maintain line patency      07-30-23 @ 07:01  -  07-31-23 @ 07:00  --------------------------------------------------------  IN: 420 mL / OUT: 0 mL / NET: 420 mL    07-31-23 @ 07:01  -  07-31-23 @ 21:26  --------------------------------------------------------  IN: 0 mL / OUT: 2000 mL / NET: -2000 mL      PHYSICAL EXAM:      T(C): 36.4 (07-31-23 @ 16:58), Max: 36.6 (07-30-23 @ 23:34)  HR: 63 (07-31-23 @ 16:58) (60 - 67)  BP: 138/75 (07-31-23 @ 16:58) (127/78 - 180/80)  RR: 17 (07-31-23 @ 16:58) (16 - 20)  SpO2: 97% (07-31-23 @ 16:58) (95% - 100%)  Wt(kg): --  Lungs clear  Heart S1S2  Abd soft NT ND  Extremities:   tr edema                                    10.5   4.63  )-----------( 181      ( 31 Jul 2023 13:00 )             30.2     07-31    135  |  100  |  105<H>  ----------------------------<  385<H>  4.4   |  23  |  7.41<H>    Ca    7.7<L>      31 Jul 2023 13:00  Phos  5.4     07-31    TPro  6.5  /  Alb  2.9<L>  /  TBili  0.5  /  DBili  0.1  /  AST  30  /  ALT  18  /  AlkPhos  75  07-31      LIVER FUNCTIONS - ( 31 Jul 2023 13:00 )  Alb: 2.9 g/dL / Pro: 6.5 gm/dL / ALK PHOS: 75 U/L / ALT: 18 U/L / AST: 30 U/L / GGT: x           Creatinine Trend: 7.41<--, 5.93<--, 7.90<--, 6.95<--, 7.43<--, 5.92<--      Assessment   ESRD maintenance    Plan:  HD for today  UF as tolerated  Supportive care    Erickson Ray MD

## 2023-07-31 NOTE — PROGRESS NOTE ADULT - ASSESSMENT
78 year old female w/ PMHx of DM2, HTN, HLD, CHF, S/P pacemaker, and ESRD on HD MWF presents for evaluation after being sent by pulmonologist for for worsening dry cough.  She was hypoxic here and found to have covid and adenovirus   She reports generalized weakness much worse then when she was discharged   Denies sick contacts but was in the hospital, [facility], hemodialysis center so many potential exposures   CT IMPRESSION: Decreased bilateral pleural effusion with atelectasis/consolidation since 7/3/2023. Decreased extent of interlobular septal thickening and groundglass opacities.    7/31: improving, no fevers, on RA now and comfortable, no new cbc, no culture data, today is day #4 of remdesivir, needs one more day.       Plan:  #COVID  Monitor clinically.  Monitor Oxygenation  O2 supplementation.  Remdesivir - up to 5 days depending on clinical course (day #4)  Monitor CBC, CMP daily  Ferritin, CRP, and D dimer q 48 hrs.  IV Dexamethasone 6mg q24hrs x10 days if hypoxia.  Anticoagulation per protocol.  Treatment options are limited-this as well as limitations of data discussed with pt.  Monitor for any bacterial superinfection/complications.  This tx plan was formulated utilizing my clinical judgement, currently available local/regional anecdotal information, organizational treatment recommendations with COVID-19 specific considerations given rapidly changing information available.     #Acute Respiratory Failure  wean oxygen as tolerated - off supplemental O2 during my exam  chest PT  routine PT  OOB to chair if able   incentive spirometer   decadron 6mg q24hrs for 10 days  Inhalers-MDI and avoid nebulizers except in negative pressure room     #ESRD  continue hemodialysis   ensure achieving dry weight   dose all medications renally when appropriate     will discuss with attending        78 year old female w/ PMHx of DM2, HTN, HLD, CHF, S/P pacemaker, and ESRD on HD MWF presents for evaluation after being sent by pulmonologist for for worsening dry cough.  She was hypoxic here and found to have covid and adenovirus   She reports generalized weakness much worse then when she was discharged   Denies sick contacts but was in the hospital, [facility], hemodialysis center so many potential exposures   CT IMPRESSION: Decreased bilateral pleural effusion with atelectasis/consolidation since 7/3/2023. Decreased extent of interlobular septal thickening and groundglass opacities.    7/31: improving, no fevers, on RA now and comfortable, no new cbc, no culture data, today is day #4 of remdesivir, needs one more day.       Plan:  #COVID  Monitor clinically.  Monitor Oxygenation  O2 supplementation.  Remdesivir - up to 5 days depending on clinical course (day #4)  Monitor CBC, CMP daily  Ferritin, CRP, and D dimer q 48 hrs.  IV Dexamethasone 6mg q24hrs x10 days if hypoxia.  Anticoagulation per protocol.  Treatment options are limited-this as well as limitations of data discussed with pt.  Monitor for any bacterial superinfection/complications.  This tx plan was formulated utilizing my clinical judgement, currently available local/regional anecdotal information, organizational treatment recommendations with COVID-19 specific considerations given rapidly changing information available.     #Acute Respiratory Failure  wean oxygen as tolerated - off supplemental O2 during my exam  chest PT  routine PT  OOB to chair if able   incentive spirometer   decadron 6mg q24hrs for 10 days  Inhalers-MDI and avoid nebulizers except in negative pressure room     #ESRD  continue hemodialysis   ensure achieving dry weight   dose all medications renally when appropriate     discussed with attending

## 2023-07-31 NOTE — PROGRESS NOTE ADULT - ASSESSMENT
A/P    1. COVID-19, adenovirus, acute hypoxic respiratory failure  - Remdesivir x 5 days last dose 8/1  - Wean patient off supplemental O2 as tolerated  - Tessalon for cough, will reorder hycoden given no improvement with tessalon  - Isolation, supportive care  - Decadron 6mg PO Daily X 10 days per pulmonology recs  - Add 5u Lantus while on Decadron     2. Debility, weakness:  - PT eval    3. ESRD:  - On IHD    Dispo: SERGEI per PT after completion of Remdesivir

## 2023-07-31 NOTE — PROGRESS NOTE ADULT - ASSESSMENT
78F PMH DM2, HTN, HLD, diastolic CHF, S/P pacemaker, ESRD on HD MWF (issues cannulating L AVF with prior issues including bleeding, now getting HD via R chest wall permacath) recent admission 7/4-7/8 for LLL PNA treated with antibx ceftriaxone and discharged home presents for evaluation after being sent by her pulmonologist for worsening dry cough and persistent pleuritic chest pain with coughing. Tested + COVID and adenovirus. Hypoxic on RA.    DX: acute hypoxic respiratory failure, COVID-19, adenovirus infection    - cont 10 day course of dexamethasone (day #6)  - remdesivir approved by ID x5 days (day #4)  - monitor LFTs on remdesivir  - as she is ESRD already dialysis dependent minimal concern for worsening kidney function on remdeisivir  - s/p HD today, HD per nephrology, fluid removal with HD as tolerates  - maintain O2 sat > 90%  - on RA  - hypoxia improved  - physical therapy  - DNR/DNI (pt with prior MOLST and verbally confirms her wishes)   78F PMH DM2, HTN, HLD, diastolic CHF, S/P pacemaker, ESRD on HD MWF (issues cannulating L AVF with prior issues including bleeding, now getting HD via R chest wall permacath) recent admission 7/4-7/8 for LLL PNA treated with antibx ceftriaxone and discharged home presents for evaluation after being sent by her pulmonologist for worsening dry cough and persistent pleuritic chest pain with coughing. Tested + COVID and adenovirus. Hypoxic on RA.    DX: acute hypoxic respiratory failure, COVID-19, adenovirus infection    - cont 10 day course of dexamethasone (day #6)  - remdesivir approved by ID x5 days (day #4)  - monitor LFTs on remdesivir  - as she is ESRD already dialysis dependent minimal concern for worsening kidney function on remdeisivir  - s/p HD today, HD per nephrology, fluid removal with HD as tolerates  - maintain O2 sat > 90%  - on RA  - hypoxia improved  - hycodan as needed for cough  - physical therapy  - DNR/DNI (pt with prior MOLST and verbally confirms her wishes)

## 2023-07-31 NOTE — PROGRESS NOTE ADULT - SUBJECTIVE AND OBJECTIVE BOX
STKITTS, CLAUDETTE  MRN-[id number]    Follow Up:  COVID 19, Adenovirus     Interval History: The pt was seen and examined earlier, no acute distress, awake and alert, answers questions appropriately, reports that her cough has improved significnataly. Pt is afebrile, on RA and comfortable, no new cbc.     PAST MEDICAL & SURGICAL HISTORY:  HTN (hypertension)      Pacemaker      DVT (deep venous thrombosis)      Diverticulitis      CHF (congestive heart failure)      ESRD on dialysis      Type 2 diabetes mellitus      Type 2 diabetes mellitus      Intestinal bleeding          ROS:    [ ] Unobtainable because:  [x ] All other systems negative    Constitutional: no fever, no chills  Head: no trauma  Eyes: no vision changes, no eye pain  ENT:  no sore throat, no rhinorrhea  Cardiovascular:  no chest pain, no palpitation  Respiratory:  no SOB, + cough  GI:  no abd pain, no vomiting, no diarrhea  urinary: no dysuria, no hematuria, no flank pain  musculoskeletal:  no joint pain, no joint swelling  skin:  no rash  neurology:  no headache, no seizure, no change in mental status  psych: no anxiety, no depression         Allergies  Eliquis (Other)        ANTIMICROBIALS:  remdesivir  IVPB 100 every 24 hours  remdesivir  IVPB        OTHER MEDS:  acetaminophen     Tablet .. 650 milliGRAM(s) Oral every 6 hours PRN  aluminum hydroxide/magnesium hydroxide/simethicone Suspension 30 milliLiter(s) Oral every 4 hours PRN  amLODIPine   Tablet 10 milliGRAM(s) Oral daily  benzonatate 100 milliGRAM(s) Oral every 8 hours PRN  chlorhexidine 2% Cloths 1 Application(s) Topical <User Schedule>  dexAMETHasone     Tablet 6 milliGRAM(s) Oral daily  dextrose 5%. 1000 milliLiter(s) IV Continuous <Continuous>  dextrose 5%. 1000 milliLiter(s) IV Continuous <Continuous>  dextrose 50% Injectable 12.5 Gram(s) IV Push once  dextrose 50% Injectable 25 Gram(s) IV Push once  dextrose 50% Injectable 25 Gram(s) IV Push once  dextrose Oral Gel 15 Gram(s) Oral once PRN  glucagon  Injectable 1 milliGRAM(s) IntraMuscular once  guaiFENesin  milliGRAM(s) Oral every 12 hours  heparin   Injectable 5000 Unit(s) SubCutaneous every 8 hours  hydrALAZINE 25 milliGRAM(s) Oral three times a day  hydrocodone/homatropine Syrup 5 milliLiter(s) Oral every 6 hours PRN  insulin glargine Injectable (LANTUS) 4 Unit(s) SubCutaneous at bedtime  insulin lispro (ADMELOG) corrective regimen sliding scale   SubCutaneous three times a day before meals  linagliptin 5 milliGRAM(s) Oral daily  melatonin 3 milliGRAM(s) Oral at bedtime PRN  metoprolol succinate  milliGRAM(s) Oral daily  Nephro-joseph 1 Tablet(s) Oral daily  ondansetron Injectable 4 milliGRAM(s) IV Push every 8 hours PRN  simvastatin 40 milliGRAM(s) Oral at bedtime  sodium chloride 0.9% lock flush 10 milliLiter(s) IV Push every 1 hour PRN      Vital Signs Last 24 Hrs  T(C): 36.3 (31 Jul 2023 11:06), Max: 36.7 (30 Jul 2023 11:55)  T(F): 97.4 (31 Jul 2023 11:06), Max: 98 (30 Jul 2023 11:55)  HR: 63 (31 Jul 2023 11:06) (59 - 67)  BP: 140/65 (31 Jul 2023 11:06) (128/57 - 180/80)  BP(mean): --  RR: 18 (31 Jul 2023 11:06) (18 - 18)  SpO2: 95% (31 Jul 2023 11:06) (93% - 98%)        Physical Exam:  Constitutional: non-toxic, no distress, RA  HEAD/EYES: anicteric, no conjunctival injection  ENT:  supple, no thrush  Cardiovascular:   normal S1, S2, no murmur, no edema, + right hemodialysis catheter c/d/i, left chest PPM   Respiratory:  diminished breath sounds bilaterally, no wheezes, no rhonchi   GI:  soft, non-tender, normal bowel sounds  :  no doan, no CVA tenderness  Musculoskeletal:  no synovitis, normal ROM  Neurologic: awake and alert, normal strength, no focal findings  Skin: several areas of cheloids, LUE AV fistula with palpable thrill   Heme/Onc: no lymphadenopathy   Psychiatric:  awake, alert, appropriate mood    WBC Count: 8.11 K/uL (07-29 @ 08:15)  WBC Count: 5.71 K/uL (07-26 @ 08:52)  WBC Count: 6.26 K/uL (07-25 @ 17:55)          07-30    134<L>  |  100  |  64<H>  ----------------------------<  128<H>  4.5   |  25  |  5.93<H>    Ca    7.9<L>      30 Jul 2023 07:50  Phos  4.7     07-30    TPro  6.9  /  Alb  2.9<L>  /  TBili  0.5  /  DBili  0.1  /  AST  29  /  ALT  15  /  AlkPhos  78  07-30      Urinalysis Basic - ( 30 Jul 2023 07:50 )    Color: x / Appearance: x / SG: x / pH: x  Gluc: 128 mg/dL / Ketone: x  / Bili: x / Urobili: x   Blood: x / Protein: x / Nitrite: x   Leuk Esterase: x / RBC: x / WBC x   Sq Epi: x / Non Sq Epi: x / Bacteria: x        Creatinine Trend: 5.93<--, 7.90<--, 6.95<--, 7.43<--, 5.92<--, 8.45<--      MICROBIOLOGY:  v      Rapid RVP Result: Detected (07-25 @ 17:55)    C-Reactive Protein, Serum: 17 (07-26)      SARS-CoV-2: Detected (25 Jul 2023 17:55)  SARS-CoV-2: NotDetec (03 Jul 2023 15:50)    RADIOLOGY:     STKITTS, CLAUDETTE  MRN-[id number]    Follow Up:  COVID 19, Adenovirus     Interval History: The pt was seen and examined earlier, no acute distress, awake and alert, answers questions appropriately, reports that her cough has improved significantly Pt is afebrile, on RA and comfortable, no new cbc.     PAST MEDICAL & SURGICAL HISTORY:  HTN (hypertension)      Pacemaker      DVT (deep venous thrombosis)      Diverticulitis      CHF (congestive heart failure)      ESRD on dialysis      Type 2 diabetes mellitus      Type 2 diabetes mellitus      Intestinal bleeding          ROS:    [ ] Unobtainable because:  [x ] All other systems negative    Constitutional: no fever, no chills  Head: no trauma  Eyes: no vision changes, no eye pain  ENT:  no sore throat, no rhinorrhea  Cardiovascular:  no chest pain, no palpitation  Respiratory:  no SOB, + cough  GI:  no abd pain, no vomiting, no diarrhea  urinary: no dysuria, no hematuria, no flank pain  musculoskeletal:  no joint pain, no joint swelling  skin:  no rash  neurology:  no headache, no seizure, no change in mental status  psych: no anxiety, no depression         Allergies  Eliquis (Other)        ANTIMICROBIALS:  remdesivir  IVPB 100 every 24 hours  remdesivir  IVPB        OTHER MEDS:  acetaminophen     Tablet .. 650 milliGRAM(s) Oral every 6 hours PRN  aluminum hydroxide/magnesium hydroxide/simethicone Suspension 30 milliLiter(s) Oral every 4 hours PRN  amLODIPine   Tablet 10 milliGRAM(s) Oral daily  benzonatate 100 milliGRAM(s) Oral every 8 hours PRN  chlorhexidine 2% Cloths 1 Application(s) Topical <User Schedule>  dexAMETHasone     Tablet 6 milliGRAM(s) Oral daily  dextrose 5%. 1000 milliLiter(s) IV Continuous <Continuous>  dextrose 5%. 1000 milliLiter(s) IV Continuous <Continuous>  dextrose 50% Injectable 12.5 Gram(s) IV Push once  dextrose 50% Injectable 25 Gram(s) IV Push once  dextrose 50% Injectable 25 Gram(s) IV Push once  dextrose Oral Gel 15 Gram(s) Oral once PRN  glucagon  Injectable 1 milliGRAM(s) IntraMuscular once  guaiFENesin  milliGRAM(s) Oral every 12 hours  heparin   Injectable 5000 Unit(s) SubCutaneous every 8 hours  hydrALAZINE 25 milliGRAM(s) Oral three times a day  hydrocodone/homatropine Syrup 5 milliLiter(s) Oral every 6 hours PRN  insulin glargine Injectable (LANTUS) 4 Unit(s) SubCutaneous at bedtime  insulin lispro (ADMELOG) corrective regimen sliding scale   SubCutaneous three times a day before meals  linagliptin 5 milliGRAM(s) Oral daily  melatonin 3 milliGRAM(s) Oral at bedtime PRN  metoprolol succinate  milliGRAM(s) Oral daily  Nephro-joseph 1 Tablet(s) Oral daily  ondansetron Injectable 4 milliGRAM(s) IV Push every 8 hours PRN  simvastatin 40 milliGRAM(s) Oral at bedtime  sodium chloride 0.9% lock flush 10 milliLiter(s) IV Push every 1 hour PRN      Vital Signs Last 24 Hrs  T(C): 36.3 (31 Jul 2023 11:06), Max: 36.7 (30 Jul 2023 11:55)  T(F): 97.4 (31 Jul 2023 11:06), Max: 98 (30 Jul 2023 11:55)  HR: 63 (31 Jul 2023 11:06) (59 - 67)  BP: 140/65 (31 Jul 2023 11:06) (128/57 - 180/80)  BP(mean): --  RR: 18 (31 Jul 2023 11:06) (18 - 18)  SpO2: 95% (31 Jul 2023 11:06) (93% - 98%)        Physical Exam:  Constitutional: non-toxic, no distress, room air   HEAD/EYES: anicteric, no conjunctival injection  ENT:  supple, no thrush  Cardiovascular:   normal S1, S2, no murmur, no edema, + right hemodialysis catheter c/d/i, left chest PPM   Respiratory:  diminished breath sounds bilaterally, no wheezes, no rhonchi   GI:  soft, non-tender, normal bowel sounds  :  no doan, no CVA tenderness  Musculoskeletal:  no synovitis, normal ROM  Neurologic: awake and alert, normal strength, no focal findings  Skin: several areas of cheloids, LUE AV fistula with palpable thrill   Heme/Onc: no lymphadenopathy   Psychiatric:  awake, alert, appropriate mood    WBC Count: 8.11 K/uL (07-29 @ 08:15)  WBC Count: 5.71 K/uL (07-26 @ 08:52)  WBC Count: 6.26 K/uL (07-25 @ 17:55)          07-30    134<L>  |  100  |  64<H>  ----------------------------<  128<H>  4.5   |  25  |  5.93<H>    Ca    7.9<L>      30 Jul 2023 07:50  Phos  4.7     07-30    TPro  6.9  /  Alb  2.9<L>  /  TBili  0.5  /  DBili  0.1  /  AST  29  /  ALT  15  /  AlkPhos  78  07-30      Urinalysis Basic - ( 30 Jul 2023 07:50 )    Color: x / Appearance: x / SG: x / pH: x  Gluc: 128 mg/dL / Ketone: x  / Bili: x / Urobili: x   Blood: x / Protein: x / Nitrite: x   Leuk Esterase: x / RBC: x / WBC x   Sq Epi: x / Non Sq Epi: x / Bacteria: x        Creatinine Trend: 5.93<--, 7.90<--, 6.95<--, 7.43<--, 5.92<--, 8.45<--      MICROBIOLOGY:  v      Rapid RVP Result: Detected (07-25 @ 17:55)    C-Reactive Protein, Serum: 17 (07-26)      SARS-CoV-2: Detected (25 Jul 2023 17:55)  SARS-CoV-2: NotDetec (03 Jul 2023 15:50)    RADIOLOGY:

## 2023-08-01 LAB
ALBUMIN SERPL ELPH-MCNC: 3 G/DL — LOW (ref 3.3–5)
ALP SERPL-CCNC: 69 U/L — SIGNIFICANT CHANGE UP (ref 40–120)
ALT FLD-CCNC: 19 U/L — SIGNIFICANT CHANGE UP (ref 12–78)
ANION GAP SERPL CALC-SCNC: 9 MMOL/L — SIGNIFICANT CHANGE UP (ref 5–17)
AST SERPL-CCNC: 34 U/L — SIGNIFICANT CHANGE UP (ref 15–37)
BILIRUB SERPL-MCNC: 0.4 MG/DL — SIGNIFICANT CHANGE UP (ref 0.2–1.2)
BUN SERPL-MCNC: 58 MG/DL — HIGH (ref 7–23)
CALCIUM SERPL-MCNC: 8.1 MG/DL — LOW (ref 8.5–10.1)
CHLORIDE SERPL-SCNC: 102 MMOL/L — SIGNIFICANT CHANGE UP (ref 96–108)
CO2 SERPL-SCNC: 28 MMOL/L — SIGNIFICANT CHANGE UP (ref 22–31)
CREAT SERPL-MCNC: 5.33 MG/DL — HIGH (ref 0.5–1.3)
EGFR: 8 ML/MIN/1.73M2 — LOW
GLUCOSE BLDC GLUCOMTR-MCNC: 133 MG/DL — HIGH (ref 70–99)
GLUCOSE BLDC GLUCOMTR-MCNC: 252 MG/DL — HIGH (ref 70–99)
GLUCOSE BLDC GLUCOMTR-MCNC: 324 MG/DL — HIGH (ref 70–99)
GLUCOSE BLDC GLUCOMTR-MCNC: 330 MG/DL — HIGH (ref 70–99)
GLUCOSE SERPL-MCNC: 131 MG/DL — HIGH (ref 70–99)
HCT VFR BLD CALC: 31.5 % — LOW (ref 34.5–45)
HGB BLD-MCNC: 10.9 G/DL — LOW (ref 11.5–15.5)
INR BLD: 0.99 RATIO — SIGNIFICANT CHANGE UP (ref 0.85–1.18)
MCHC RBC-ENTMCNC: 29 PG — SIGNIFICANT CHANGE UP (ref 27–34)
MCHC RBC-ENTMCNC: 34.6 G/DL — SIGNIFICANT CHANGE UP (ref 32–36)
MCV RBC AUTO: 83.8 FL — SIGNIFICANT CHANGE UP (ref 80–100)
NRBC # BLD: 0 /100 WBCS — SIGNIFICANT CHANGE UP (ref 0–0)
PLATELET # BLD AUTO: 188 K/UL — SIGNIFICANT CHANGE UP (ref 150–400)
POTASSIUM SERPL-MCNC: 4.2 MMOL/L — SIGNIFICANT CHANGE UP (ref 3.5–5.3)
POTASSIUM SERPL-SCNC: 4.2 MMOL/L — SIGNIFICANT CHANGE UP (ref 3.5–5.3)
PROT SERPL-MCNC: 6.8 GM/DL — SIGNIFICANT CHANGE UP (ref 6–8.3)
PROTHROM AB SERPL-ACNC: 11.9 SEC — SIGNIFICANT CHANGE UP (ref 9.5–13)
RBC # BLD: 3.76 M/UL — LOW (ref 3.8–5.2)
RBC # FLD: 16.6 % — HIGH (ref 10.3–14.5)
SODIUM SERPL-SCNC: 139 MMOL/L — SIGNIFICANT CHANGE UP (ref 135–145)
WBC # BLD: 5.2 K/UL — SIGNIFICANT CHANGE UP (ref 3.8–10.5)
WBC # FLD AUTO: 5.2 K/UL — SIGNIFICANT CHANGE UP (ref 3.8–10.5)

## 2023-08-01 PROCEDURE — 99239 HOSP IP/OBS DSCHRG MGMT >30: CPT

## 2023-08-01 PROCEDURE — 99232 SBSQ HOSP IP/OBS MODERATE 35: CPT

## 2023-08-01 RX ORDER — HYDROCODONE BITARTRATE AND HOMATROPINE METHYLBROMIDE 5; 1.5 MG/5ML; MG/5ML
5 SOLUTION ORAL
Qty: 0 | Refills: 0 | DISCHARGE
Start: 2023-08-01

## 2023-08-01 RX ORDER — INSULIN LISPRO 100/ML
1 VIAL (ML) SUBCUTANEOUS
Qty: 0 | Refills: 0 | DISCHARGE
Start: 2023-08-01

## 2023-08-01 RX ADMIN — REMDESIVIR 200 MILLIGRAM(S): 5 INJECTION INTRAVENOUS at 17:05

## 2023-08-01 RX ADMIN — Medication 100 MILLIGRAM(S): at 05:37

## 2023-08-01 RX ADMIN — AMLODIPINE BESYLATE 10 MILLIGRAM(S): 2.5 TABLET ORAL at 05:37

## 2023-08-01 RX ADMIN — Medication 600 MILLIGRAM(S): at 05:37

## 2023-08-01 RX ADMIN — SIMVASTATIN 40 MILLIGRAM(S): 20 TABLET, FILM COATED ORAL at 22:00

## 2023-08-01 RX ADMIN — Medication 1 TABLET(S): at 11:27

## 2023-08-01 RX ADMIN — CHLORHEXIDINE GLUCONATE 1 APPLICATION(S): 213 SOLUTION TOPICAL at 05:46

## 2023-08-01 RX ADMIN — Medication 25 MILLIGRAM(S): at 05:37

## 2023-08-01 RX ADMIN — Medication 100 MILLIGRAM(S): at 11:27

## 2023-08-01 RX ADMIN — Medication 4: at 17:05

## 2023-08-01 RX ADMIN — LINAGLIPTIN 5 MILLIGRAM(S): 5 TABLET, FILM COATED ORAL at 11:27

## 2023-08-01 RX ADMIN — Medication 25 MILLIGRAM(S): at 13:15

## 2023-08-01 RX ADMIN — HEPARIN SODIUM 5000 UNIT(S): 5000 INJECTION INTRAVENOUS; SUBCUTANEOUS at 22:00

## 2023-08-01 RX ADMIN — Medication 600 MILLIGRAM(S): at 17:05

## 2023-08-01 RX ADMIN — Medication 4: at 11:26

## 2023-08-01 RX ADMIN — Medication 100 MILLIGRAM(S): at 20:30

## 2023-08-01 RX ADMIN — HEPARIN SODIUM 5000 UNIT(S): 5000 INJECTION INTRAVENOUS; SUBCUTANEOUS at 05:46

## 2023-08-01 RX ADMIN — Medication 6 MILLIGRAM(S): at 05:37

## 2023-08-01 RX ADMIN — Medication 25 MILLIGRAM(S): at 22:00

## 2023-08-01 RX ADMIN — HEPARIN SODIUM 5000 UNIT(S): 5000 INJECTION INTRAVENOUS; SUBCUTANEOUS at 13:14

## 2023-08-01 NOTE — DISCHARGE NOTE PROVIDER - DETAILS OF MALNUTRITION DIAGNOSIS/DIAGNOSES
This patient has been assessed with a concern for Malnutrition and was treated during this hospitalization for the following Nutrition diagnosis/diagnoses:     -  08/04/2023: Moderate protein-calorie malnutrition

## 2023-08-01 NOTE — DISCHARGE NOTE PROVIDER - HOSPITAL COURSE
This is a 78 year old female w/ PMHx of DM2, HTN, HLD, CHF, S/P pacemaker, and ESRD on HD MWF presents for evaluation after being sent by pulmonologist for for worsening dry cough. Found to be COVID-19 and adenovirus, acute hypoxic respiratory failure. s/p Remdesivir x 5 days and wean patient off supplemental O2. On Tessalon and hycoden started PRN for cough. on Decadron 6mg PO Daily X 7 days and stopped due to not hypoxic.   Had some hyperglycemia while on decadron on sliding scale and A1C of 5.7.   Stable for discharge. This is a 78 year old female w/ PMHx of DM2, HTN, HLD, CHF, S/P pacemaker, and ESRD on HD MWF presents for evaluation after being sent by pulmonologist for for worsening dry cough. Found to be COVID-19 and adenovirus, acute hypoxic respiratory failure. s/p Remdesivir x 5 days and wean patient off supplemental O2. On Tessalon and hycoden started PRN for cough. on Decadron 6mg PO Daily X 7 days and stopped due to not hypoxic.   Had some hyperglycemia while on decadron on sliding scale and A1C of 5.7.   Stable for discharge.     pt refused SERGEI.

## 2023-08-01 NOTE — DISCHARGE NOTE PROVIDER - ATTENDING DISCHARGE PHYSICAL EXAMINATION:
VITALS:   T(C): 36.7 (08-01-23 @ 11:19), Max: 36.7 (08-01-23 @ 11:19)  HR: 62 (08-01-23 @ 11:19) (59 - 75)  BP: 121/67 (08-01-23 @ 11:19) (121/67 - 151/79)  RR: 18 (08-01-23 @ 11:19) (17 - 20)  SpO2: 97% (08-01-23 @ 11:19) (96% - 100%)    CONSTITUTIONAL: NAD, well-groomed  RESPIRATORY: Normal respiratory effort; lungs are clear to auscultation bilaterally  CARDIOVASCULAR: Regular rate and rhythm; No lower extremity edema  ABDOMEN: Nontender to palpation, normoactive bowel sounds  PSYCH: A+O x3; affect appropriate  NEUROLOGY: CN 2-12 are intact and symmetric; no gross sensory deficits;   SKIN: No rashes; no palpable lesions

## 2023-08-01 NOTE — PROGRESS NOTE ADULT - SUBJECTIVE AND OBJECTIVE BOX
24 hr events:  pt seen earlier in the day  on room air with O2 sat % at rest  intermittent cough  s/p HD yesterday    ## ROS:  no fever, no chills  no HA, no dizziness  no visual changes, no auditory changes  no sore throat, no sinus congestion  no SOB, + intermittent cough  chest pain with cough, no palpitations  no abdominal pain, no N/V/D  no myalgias, no arthralgias  no swelling  no rashes, no pruritis      ## Labs:  CBC:                        10.9   5.20  )-----------( 188      ( 01 Aug 2023 07:56 )             31.5     Chem:  08-01    139  |  102  |  58<H>  ----------------------------<  131<H>  4.2   |  28  |  5.33<H>    Ca    8.1<L>      01 Aug 2023 07:56  Phos  5.4     07-31    TPro  6.8  /  Alb  3.0<L>  /  TBili  0.4  /  DBili  x   /  AST  34  /  ALT  19  /  AlkPhos  69  08-01    Coags:  PT/INR - ( 01 Aug 2023 07:56 )   PT: 11.9 sec;   INR: 0.99 ratio      Respiratory Viral Panel with COVID-19 by PROSPER (07.25.23 @ 17:55)    Rapid RVP Result: Detected   SARS-CoV-2: Detected   Adenovirus (RapRVP): Detected        ## Imaging:  CXR < from: Xray Chest 1 View- PORTABLE-Urgent (Xray Chest 1 View- PORTABLE-Urgent .) (07.25.23 @ 17:43) >  Heart/Vascular: The mediastinum, hilum and aorta are within normal limits   for projection. Cardiomegaly. Left chest wall dual lead pacemaker.  Pulmonary: Midline trachea. There is no focal infiltrate, congestion or   effusion.  Bones: There is no fracture.  Lines and catheter: Right central line unchanged in position.    Impression:    Mild atelectasis left base    Cardiomegaly stable.  ------------------------  < from: CT Chest No Cont (07.25.23 @ 22:46) >  Decreased bilateral pleural effusion with atelectasis/consolidation since   7/3/2023. Decreased extent of interlobular septal thickening and   groundglass opacities.        ## Medications:  amLODIPine   Tablet 10 milliGRAM(s) Oral daily  hydrALAZINE 25 milliGRAM(s) Oral three times a day  metoprolol succinate  milliGRAM(s) Oral daily    benzonatate 100 milliGRAM(s) Oral every 8 hours  guaiFENesin  milliGRAM(s) Oral every 12 hours  hydrocodone/homatropine Syrup 5 milliLiter(s) Oral every 8 hours    dextrose 50% Injectable 25 Gram(s) IV Push once  dextrose 50% Injectable 12.5 Gram(s) IV Push once  dextrose 50% Injectable 25 Gram(s) IV Push once  dextrose Oral Gel 15 Gram(s) Oral once PRN  glucagon  Injectable 1 milliGRAM(s) IntraMuscular once  insulin lispro (ADMELOG) corrective regimen sliding scale   SubCutaneous three times a day before meals  linagliptin 5 milliGRAM(s) Oral daily  simvastatin 40 milliGRAM(s) Oral at bedtime    heparin   Injectable 5000 Unit(s) SubCutaneous every 8 hours    aluminum hydroxide/magnesium hydroxide/simethicone Suspension 30 milliLiter(s) Oral every 4 hours PRN    acetaminophen     Tablet .. 650 milliGRAM(s) Oral every 6 hours PRN  melatonin 3 milliGRAM(s) Oral at bedtime PRN  ondansetron Injectable 4 milliGRAM(s) IV Push every 8 hours PRN      ## Vitals:  T(C): 36.5 (08-01-23 @ 16:01), Max: 36.7 (08-01-23 @ 11:19)  HR: 60 (08-01-23 @ 16:01) (59 - 62)  BP: 112/67 (08-01-23 @ 16:01) (112/67 - 151/79)  BP(mean): 82 (08-01-23 @ 16:01) (82 - 82)  RR: 19 (08-01-23 @ 16:01) (18 - 19)  SpO2: 98% (08-01-23 @ 16:01) (96% - 99%)        07-31 @ 07:01  -  08-01 @ 07:00  --------------------------------------------------------  IN: 0 mL / OUT: 2000 mL / NET: -2000 mL          ## P/E:  Gen: lying comfortably in bed in no apparent distress  HEENT: NC/AT, EOMI  Resp: CTA B/L, no wheeze, no rhonchi, R chest wall permacath  CVS: RRR  Abd: soft NT/ND +BS  Ext: no c/c/e, L arm AVF  Neuro: A&Ox3      CODE STATUS: [ ] full code  [x ] DNR  [x ] DNI  [x ] MOLST  Goals of care discussion: [x ] yes

## 2023-08-01 NOTE — PROGRESS NOTE ADULT - ASSESSMENT
A/P    1. COVID-19, adenovirus, acute hypoxic respiratory failure  - Remdesivir x 5 days last dose 8/1  - Wean patient off supplemental O2 as tolerated  - Tessalon for cough, will reorder hycoden given no improvement with tessalon  - Isolation, supportive care  - Decadron 6mg PO Daily X 7 days, dc as no longer hypoxic  - ISS    2. Debility, weakness:  - PT eval - SERGEI    3. ESRD:  - On IHD    Dispo: SERGEI pending

## 2023-08-01 NOTE — DISCHARGE NOTE PROVIDER - NSDCCPCAREPLAN_GEN_ALL_CORE_FT
PRINCIPAL DISCHARGE DIAGNOSIS  Diagnosis: 2019 novel coronavirus disease (COVID-19)  Assessment and Plan of Treatment:       SECONDARY DISCHARGE DIAGNOSES  Diagnosis: Adenovirus infection  Assessment and Plan of Treatment:      PRINCIPAL DISCHARGE DIAGNOSIS  Diagnosis: 2019 novel coronavirus disease (COVID-19)  Assessment and Plan of Treatment:       SECONDARY DISCHARGE DIAGNOSES  Diagnosis: Hypoxia  Assessment and Plan of Treatment:     Diagnosis: ESRD on dialysis  Assessment and Plan of Treatment:     Diagnosis: CHF (congestive heart failure)  Assessment and Plan of Treatment:     Diagnosis: HTN (hypertension)  Assessment and Plan of Treatment:     Diagnosis: Adenovirus infection  Assessment and Plan of Treatment:

## 2023-08-01 NOTE — DISCHARGE NOTE PROVIDER - NSDCMRMEDTOKEN_GEN_ALL_CORE_FT
amLODIPine 10 mg oral tablet: 1 tab(s) orally once a day  benzonatate 100 mg oral capsule: 1 cap(s) orally every 8 hours as needed for  cough  guaiFENesin 600 mg oral tablet, extended release: 1 tab(s) orally every 12 hours  hydrALAZINE 25 mg oral tablet: 1 tab(s) orally 3 times a day  hydrocodone-homatropine 5 mg-1.5 mg/5 mL oral syrup: 5 milliliter(s) orally every 8 hours as needed for  cough  insulin lispro 100 units/mL injectable solution: 1 unit(s) injectable 3 times a day sliding scale  Metoprolol Succinate  mg oral tablet, extended release: 1 tab(s) orally once a day  multivitamin: orally once a day  simvastatin 40 mg oral tablet: 1 tab(s) orally once a day (at bedtime)  Tradjenta 5 mg oral tablet: 1 tab(s) orally once a day   amLODIPine 10 mg oral tablet: 1 tab(s) orally once a day  benzonatate 100 mg oral capsule: 1 cap(s) orally every 8 hours as needed for  cough  guaiFENesin 600 mg oral tablet, extended release: 1 tab(s) orally every 12 hours as needed for  cough  hydrALAZINE 25 mg oral tablet: 1 tab(s) orally 3 times a day  hydrocodone-homatropine 5 mg-1.5 mg/5 mL oral syrup: 5 milliliter(s) orally every 8 hours as needed for  cough  Metoprolol Succinate  mg oral tablet, extended release: 1 tab(s) orally once a day  simvastatin 40 mg oral tablet: 1 tab(s) orally once a day (at bedtime)  Tradjenta 5 mg oral tablet: 1 tab(s) orally once a day   benzonatate 100 mg oral capsule: 1 cap(s) orally every 8 hours as needed for  cough  hydrocodone-homatropine 5 mg-1.5 mg/5 mL oral syrup: 5 milliliter(s) orally every 8 hours as needed for  cough  Nephro-Lacey oral tablet: 1 tab(s) orally once a day  simvastatin 40 mg oral tablet: 1 tab(s) orally once a day (at bedtime)

## 2023-08-01 NOTE — PROGRESS NOTE ADULT - SUBJECTIVE AND OBJECTIVE BOX
Jose Peacock M.D.    Patient is a 78y old  Female who presents with a chief complaint of cough (01 Aug 2023 12:48)      SUBJECTIVE / OVERNIGHT EVENTS: No event overnight. Still with cough.     Patient denies chest pain, abd pain, N/V, fever, chills, dysuria or any other complaints. All remainder ROS negative.     MEDICATIONS  (STANDING):  amLODIPine   Tablet 10 milliGRAM(s) Oral daily  benzonatate 100 milliGRAM(s) Oral every 8 hours  chlorhexidine 2% Cloths 1 Application(s) Topical <User Schedule>  dextrose 5%. 1000 milliLiter(s) (100 mL/Hr) IV Continuous <Continuous>  dextrose 5%. 1000 milliLiter(s) (50 mL/Hr) IV Continuous <Continuous>  dextrose 50% Injectable 12.5 Gram(s) IV Push once  dextrose 50% Injectable 25 Gram(s) IV Push once  dextrose 50% Injectable 25 Gram(s) IV Push once  glucagon  Injectable 1 milliGRAM(s) IntraMuscular once  guaiFENesin  milliGRAM(s) Oral every 12 hours  heparin   Injectable 5000 Unit(s) SubCutaneous every 8 hours  hydrALAZINE 25 milliGRAM(s) Oral three times a day  hydrocodone/homatropine Syrup 5 milliLiter(s) Oral every 8 hours  insulin lispro (ADMELOG) corrective regimen sliding scale   SubCutaneous three times a day before meals  linagliptin 5 milliGRAM(s) Oral daily  metoprolol succinate  milliGRAM(s) Oral daily  Nephro-joseph 1 Tablet(s) Oral daily  remdesivir  IVPB   IV Intermittent   remdesivir  IVPB 100 milliGRAM(s) IV Intermittent every 24 hours  simvastatin 40 milliGRAM(s) Oral at bedtime    MEDICATIONS  (PRN):  acetaminophen     Tablet .. 650 milliGRAM(s) Oral every 6 hours PRN Temp greater or equal to 38C (100.4F), Mild Pain (1 - 3)  aluminum hydroxide/magnesium hydroxide/simethicone Suspension 30 milliLiter(s) Oral every 4 hours PRN Dyspepsia  dextrose Oral Gel 15 Gram(s) Oral once PRN Blood Glucose LESS THAN 70 milliGRAM(s)/deciliter  melatonin 3 milliGRAM(s) Oral at bedtime PRN Insomnia  ondansetron Injectable 4 milliGRAM(s) IV Push every 8 hours PRN Nausea and/or Vomiting  sodium chloride 0.9% lock flush 10 milliLiter(s) IV Push every 1 hour PRN Pre/post blood products, medications, blood draw, and to maintain line patency      I&O's Summary    31 Jul 2023 07:01  -  01 Aug 2023 07:00  --------------------------------------------------------  IN: 0 mL / OUT: 2000 mL / NET: -2000 mL        PHYSICAL EXAM:  Vital Signs Last 24 Hrs  T(C): 36.7 (01 Aug 2023 11:19), Max: 36.7 (01 Aug 2023 11:19)  T(F): 98 (01 Aug 2023 11:19), Max: 98 (01 Aug 2023 11:19)  HR: 62 (01 Aug 2023 11:19) (59 - 75)  BP: 121/67 (01 Aug 2023 11:19) (121/67 - 151/79)  BP(mean): 96 (31 Jul 2023 16:58) (96 - 96)  RR: 18 (01 Aug 2023 11:19) (17 - 20)  SpO2: 97% (01 Aug 2023 11:19) (96% - 100%)    Parameters below as of 01 Aug 2023 05:19  Patient On (Oxygen Delivery Method): room air      CONSTITUTIONAL: NAD, well-groomed  RESPIRATORY: Normal respiratory effort; lungs are clear to auscultation bilaterally  CARDIOVASCULAR: Regular rate and rhythm; No lower extremity edema  ABDOMEN: Nontender to palpation, normoactive bowel sounds  PSYCH: A+O x3; affect appropriate  NEUROLOGY: CN 2-12 are intact and symmetric; no gross sensory deficits;   SKIN: No rashes; no palpable lesions    LABS:                        10.9   5.20  )-----------( 188      ( 01 Aug 2023 07:56 )             31.5     08-01    139  |  102  |  58<H>  ----------------------------<  131<H>  4.2   |  28  |  5.33<H>    Ca    8.1<L>      01 Aug 2023 07:56  Phos  5.4     07-31    TPro  6.8  /  Alb  3.0<L>  /  TBili  0.4  /  DBili  x   /  AST  34  /  ALT  19  /  AlkPhos  69  08-01    PT/INR - ( 01 Aug 2023 07:56 )   PT: 11.9 sec;   INR: 0.99 ratio           Urinalysis Basic - ( 01 Aug 2023 07:56 )    Color: x / Appearance: x / SG: x / pH: x  Gluc: 131 mg/dL / Ketone: x  / Bili: x / Urobili: x   Blood: x / Protein: x / Nitrite: x   Leuk Esterase: x / RBC: x / WBC x   Sq Epi: x / Non Sq Epi: x / Bacteria: x        CAPILLARY BLOOD GLUCOSE      POCT Blood Glucose.: 330 mg/dL (01 Aug 2023 11:10)  POCT Blood Glucose.: 133 mg/dL (01 Aug 2023 07:47)  POCT Blood Glucose.: 263 mg/dL (31 Jul 2023 21:20)  POCT Blood Glucose.: 156 mg/dL (31 Jul 2023 16:33)      RADIOLOGY & ADDITIONAL TESTS:  Results Reviewed:   Imaging Personally Reviewed:  Electrocardiogram Personally Reviewed:

## 2023-08-01 NOTE — PROGRESS NOTE ADULT - ASSESSMENT
78F PMH DM2, HTN, HLD, diastolic CHF, S/P pacemaker, ESRD on HD MWF (issues cannulating L AVF with prior issues including bleeding, now getting HD via R chest wall permacath) recent admission 7/4-7/8 for LLL PNA treated with antibx ceftriaxone and discharged home presents for evaluation after being sent by her pulmonologist for worsening dry cough and persistent pleuritic chest pain with coughing. Tested + COVID and adenovirus. Hypoxic on RA.    DX: acute hypoxic respiratory failure, COVID-19, adenovirus infection    - cont 10 day course of dexamethasone (day #7)  - remdesivir approved by ID x5 days (day #5)  - LFTs stable on remdesivir  - as she is ESRD already dialysis dependent minimal concern for worsening kidney function on remdeisivir  - s/p HD yesterday with 2L fluid removal  - maintenance HD per nephrology  - maintain O2 sat > 90%  - weaned off nasal cannula and doing well on RA  - hypoxia improved  - hycodan as needed for cough  - physical therapy  - DNR/DNI (pt with prior MOLST and verbally confirms her wishes)  - d/c planning  78F PMH DM2, HTN, HLD, diastolic CHF, S/P pacemaker, ESRD on HD MWF (issues cannulating L AVF with prior issues including bleeding, now getting HD via R chest wall permacath) recent admission 7/4-7/8 for LLL PNA treated with antibx ceftriaxone and discharged home presents for evaluation after being sent by her pulmonologist for worsening dry cough and persistent pleuritic chest pain with coughing. Tested + COVID and adenovirus. Hypoxic on RA.    DX: acute hypoxic respiratory failure, COVID-19, adenovirus infection      - supportive care for adenovirus infection  - cont 10 day course of dexamethasone (day #7)  - remdesivir approved by ID x5 days (day #5)  - LFTs stable on remdesivir  - as she is ESRD already dialysis dependent minimal concern for worsening kidney function on remdesivir  - s/p HD yesterday with 2L fluid removal  - maintenance HD per nephrology  - maintain O2 sat > 90%  - weaned off nasal cannula and doing well on RA  - hypoxia improved  - hycodan as needed for cough  - physical therapy  - DNR/DNI (pt with prior MOLST and verbally confirms her wishes)  - d/c planning

## 2023-08-02 LAB
ALBUMIN SERPL ELPH-MCNC: 2.8 G/DL — LOW (ref 3.3–5)
ALP SERPL-CCNC: 67 U/L — SIGNIFICANT CHANGE UP (ref 40–120)
ALT FLD-CCNC: 19 U/L — SIGNIFICANT CHANGE UP (ref 12–78)
AST SERPL-CCNC: 26 U/L — SIGNIFICANT CHANGE UP (ref 15–37)
BILIRUB DIRECT SERPL-MCNC: 0.1 MG/DL — SIGNIFICANT CHANGE UP (ref 0–0.3)
BILIRUB INDIRECT FLD-MCNC: 0.4 MG/DL — SIGNIFICANT CHANGE UP (ref 0.2–1)
BILIRUB SERPL-MCNC: 0.5 MG/DL — SIGNIFICANT CHANGE UP (ref 0.2–1.2)
CREAT SERPL-MCNC: 7.49 MG/DL — HIGH (ref 0.5–1.3)
EGFR: 5 ML/MIN/1.73M2 — LOW
GLUCOSE BLDC GLUCOMTR-MCNC: 101 MG/DL — HIGH (ref 70–99)
GLUCOSE BLDC GLUCOMTR-MCNC: 130 MG/DL — HIGH (ref 70–99)
GLUCOSE BLDC GLUCOMTR-MCNC: 179 MG/DL — HIGH (ref 70–99)
GLUCOSE BLDC GLUCOMTR-MCNC: 256 MG/DL — HIGH (ref 70–99)
INR BLD: 0.97 RATIO — SIGNIFICANT CHANGE UP (ref 0.85–1.18)
PROT SERPL-MCNC: 6.3 GM/DL — SIGNIFICANT CHANGE UP (ref 6–8.3)
PROTHROM AB SERPL-ACNC: 11.6 SEC — SIGNIFICANT CHANGE UP (ref 9.5–13)

## 2023-08-02 PROCEDURE — 99232 SBSQ HOSP IP/OBS MODERATE 35: CPT

## 2023-08-02 PROCEDURE — 99233 SBSQ HOSP IP/OBS HIGH 50: CPT

## 2023-08-02 RX ORDER — INSULIN LISPRO 100/ML
2 VIAL (ML) SUBCUTANEOUS
Refills: 0 | Status: DISCONTINUED | OUTPATIENT
Start: 2023-08-02 | End: 2023-08-09

## 2023-08-02 RX ORDER — INSULIN GLARGINE 100 [IU]/ML
3 INJECTION, SOLUTION SUBCUTANEOUS AT BEDTIME
Refills: 0 | Status: DISCONTINUED | OUTPATIENT
Start: 2023-08-02 | End: 2023-08-09

## 2023-08-02 RX ORDER — INSULIN LISPRO 100/ML
VIAL (ML) SUBCUTANEOUS AT BEDTIME
Refills: 0 | Status: DISCONTINUED | OUTPATIENT
Start: 2023-08-02 | End: 2023-08-09

## 2023-08-02 RX ADMIN — SIMVASTATIN 40 MILLIGRAM(S): 20 TABLET, FILM COATED ORAL at 23:21

## 2023-08-02 RX ADMIN — Medication 3: at 12:10

## 2023-08-02 RX ADMIN — HEPARIN SODIUM 5000 UNIT(S): 5000 INJECTION INTRAVENOUS; SUBCUTANEOUS at 23:22

## 2023-08-02 RX ADMIN — Medication 100 MILLIGRAM(S): at 23:22

## 2023-08-02 RX ADMIN — CHLORHEXIDINE GLUCONATE 1 APPLICATION(S): 213 SOLUTION TOPICAL at 05:36

## 2023-08-02 RX ADMIN — AMLODIPINE BESYLATE 10 MILLIGRAM(S): 2.5 TABLET ORAL at 05:53

## 2023-08-02 RX ADMIN — Medication 1: at 08:49

## 2023-08-02 RX ADMIN — HEPARIN SODIUM 5000 UNIT(S): 5000 INJECTION INTRAVENOUS; SUBCUTANEOUS at 05:33

## 2023-08-02 RX ADMIN — Medication 600 MILLIGRAM(S): at 05:53

## 2023-08-02 RX ADMIN — Medication 100 MILLIGRAM(S): at 08:54

## 2023-08-02 RX ADMIN — Medication 25 MILLIGRAM(S): at 05:53

## 2023-08-02 RX ADMIN — INSULIN GLARGINE 3 UNIT(S): 100 INJECTION, SOLUTION SUBCUTANEOUS at 23:22

## 2023-08-02 RX ADMIN — Medication 100 MILLIGRAM(S): at 05:34

## 2023-08-02 RX ADMIN — Medication 25 MILLIGRAM(S): at 23:21

## 2023-08-02 NOTE — PROGRESS NOTE ADULT - SUBJECTIVE AND OBJECTIVE BOX
[facility] NEPHROLOGY SERVICES, [facility]  NEPHROLOGY AND HYPERTENSION  [address]  [address]  [place] [address]  [phone]    MD PRAVEEN LYNNE MD YELENA ROSENBERG, MD BINNY KOSHY, MD CHRISTOPHER CAPUTO, MD EDWARD BOVER, MD          Patient events noted  No distress      MEDICATIONS  (STANDING):  amLODIPine   Tablet 10 milliGRAM(s) Oral daily  benzonatate 100 milliGRAM(s) Oral every 8 hours  chlorhexidine 2% Cloths 1 Application(s) Topical <User Schedule>  dextrose 5%. 1000 milliLiter(s) (100 mL/Hr) IV Continuous <Continuous>  dextrose 5%. 1000 milliLiter(s) (50 mL/Hr) IV Continuous <Continuous>  dextrose 50% Injectable 12.5 Gram(s) IV Push once  dextrose 50% Injectable 25 Gram(s) IV Push once  dextrose 50% Injectable 25 Gram(s) IV Push once  glucagon  Injectable 1 milliGRAM(s) IntraMuscular once  guaiFENesin  milliGRAM(s) Oral every 12 hours  heparin   Injectable 5000 Unit(s) SubCutaneous every 8 hours  hydrALAZINE 25 milliGRAM(s) Oral three times a day  hydrocodone/homatropine Syrup 5 milliLiter(s) Oral every 8 hours  insulin glargine Injectable (LANTUS) 3 Unit(s) SubCutaneous at bedtime  insulin lispro (ADMELOG) corrective regimen sliding scale   SubCutaneous three times a day before meals  insulin lispro Injectable (ADMELOG) 2 Unit(s) SubCutaneous three times a day before meals  linagliptin 5 milliGRAM(s) Oral daily  metoprolol succinate  milliGRAM(s) Oral daily  Nephro-joseph 1 Tablet(s) Oral daily  simvastatin 40 milliGRAM(s) Oral at bedtime    MEDICATIONS  (PRN):  acetaminophen     Tablet .. 650 milliGRAM(s) Oral every 6 hours PRN Temp greater or equal to 38C (100.4F), Mild Pain (1 - 3)  aluminum hydroxide/magnesium hydroxide/simethicone Suspension 30 milliLiter(s) Oral every 4 hours PRN Dyspepsia  dextrose Oral Gel 15 Gram(s) Oral once PRN Blood Glucose LESS THAN 70 milliGRAM(s)/deciliter  melatonin 3 milliGRAM(s) Oral at bedtime PRN Insomnia  ondansetron Injectable 4 milliGRAM(s) IV Push every 8 hours PRN Nausea and/or Vomiting  sodium chloride 0.9% lock flush 10 milliLiter(s) IV Push every 1 hour PRN Pre/post blood products, medications, blood draw, and to maintain line patency      08-02-23 @ 07:01  -  08-02-23 @ 15:51  --------------------------------------------------------  IN: 0 mL / OUT: 2000 mL / NET: -2000 mL      PHYSICAL EXAM:      T(C): 36.8 (08-02-23 @ 15:30), Max: 36.9 (08-02-23 @ 11:25)  HR: 62 (08-02-23 @ 15:30) (60 - 62)  BP: 112/61 (08-02-23 @ 15:30) (112/61 - 153/70)  RR: 18 (08-02-23 @ 15:30) (18 - 19)  SpO2: 98% (08-02-23 @ 15:30) (94% - 99%)  Wt(kg): --  Lungs clear  Heart S1S2  Abd soft NT ND  Extremities:   tr edema                                    10.9   5.20  )-----------( 188      ( 01 Aug 2023 07:56 )             31.5     08-02    x   |  x   |  x   ----------------------------<  x   x    |  x   |  7.49<H>    Ca    8.1<L>      01 Aug 2023 07:56    TPro  6.3  /  Alb  2.8<L>  /  TBili  0.5  /  DBili  0.1  /  AST  26  /  ALT  19  /  AlkPhos  67  08-02      LIVER FUNCTIONS - ( 02 Aug 2023 11:30 )  Alb: 2.8 g/dL / Pro: 6.3 gm/dL / ALK PHOS: 67 U/L / ALT: 19 U/L / AST: 26 U/L / GGT: x           Creatinine Trend: 7.49<--, 5.33<--, 7.41<--, 5.93<--, 7.90<--, 6.95<--      Assessment   ESRD; PNA; COVID  Stable     Plan:  Maintenance HD  Will follow course    Erickson Ray MD

## 2023-08-02 NOTE — PROGRESS NOTE ADULT - TIME BILLING
The necessity of the time spent during the encounter on this date of service was due to:   - Ordering, reviewing, and interpreting labs, testing, and imaging.  - Independently obtaining a review of systems and performing a physical exam  - Reviewing prior hospitalization and where necessary, outpatient records.  - Reviewing consultant recommendations/communicating with consultants  - Counselling and educating patient and family regarding interpretation of aforementioned items and plan of care.    Time-based billing (NON-critical care). Total minutes spent: 52
review of chart, blood work, vitals, medication, imaging, titration of O2, direct patient care, d/w ID

## 2023-08-02 NOTE — PROGRESS NOTE ADULT - SUBJECTIVE AND OBJECTIVE BOX
HPI:  This is a 78 year old female w/ PMHx of DM2, HTN, HLD, CHF, S/P pacemaker, and ESRD on HD MWF presents for evaluation after being sent by pulmonologist for for worsening dry cough.  States coughs so hard feels like ribs will break.  Of note patient was recently admitted 7/4-7/8/23 for LLL PNA (26 Jul 2023 06:34)      Patient is a 78y old  Female who presents with a chief complaint of cough (01 Aug 2023 12:48)      SUBJECTIVE / OVERNIGHT EVENTS: No event overnight. Still with cough.     Patient denies chest pain, abd pain, N/V, fever, chills, dysuria or any other complaints. All remainder ROS negative.     MEDICATIONS  (STANDING):  amLODIPine   Tablet 10 milliGRAM(s) Oral daily  benzonatate 100 milliGRAM(s) Oral every 8 hours  chlorhexidine 2% Cloths 1 Application(s) Topical <User Schedule>  dextrose 5%. 1000 milliLiter(s) (100 mL/Hr) IV Continuous <Continuous>  dextrose 5%. 1000 milliLiter(s) (50 mL/Hr) IV Continuous <Continuous>  dextrose 50% Injectable 12.5 Gram(s) IV Push once  dextrose 50% Injectable 25 Gram(s) IV Push once  dextrose 50% Injectable 25 Gram(s) IV Push once  glucagon  Injectable 1 milliGRAM(s) IntraMuscular once  guaiFENesin  milliGRAM(s) Oral every 12 hours  heparin   Injectable 5000 Unit(s) SubCutaneous every 8 hours  hydrALAZINE 25 milliGRAM(s) Oral three times a day  hydrocodone/homatropine Syrup 5 milliLiter(s) Oral every 8 hours  insulin lispro (ADMELOG) corrective regimen sliding scale   SubCutaneous three times a day before meals  linagliptin 5 milliGRAM(s) Oral daily  metoprolol succinate  milliGRAM(s) Oral daily  Nephro-joseph 1 Tablet(s) Oral daily  remdesivir  IVPB   IV Intermittent   remdesivir  IVPB 100 milliGRAM(s) IV Intermittent every 24 hours  simvastatin 40 milliGRAM(s) Oral at bedtime    MEDICATIONS  (PRN):  acetaminophen     Tablet .. 650 milliGRAM(s) Oral every 6 hours PRN Temp greater or equal to 38C (100.4F), Mild Pain (1 - 3)  aluminum hydroxide/magnesium hydroxide/simethicone Suspension 30 milliLiter(s) Oral every 4 hours PRN Dyspepsia  dextrose Oral Gel 15 Gram(s) Oral once PRN Blood Glucose LESS THAN 70 milliGRAM(s)/deciliter  melatonin 3 milliGRAM(s) Oral at bedtime PRN Insomnia  ondansetron Injectable 4 milliGRAM(s) IV Push every 8 hours PRN Nausea and/or Vomiting  sodium chloride 0.9% lock flush 10 milliLiter(s) IV Push every 1 hour PRN Pre/post blood products, medications, blood draw, and to maintain line patency    Vital Signs Last 24 Hrs  T(C): 37 (02 Aug 2023 16:05), Max: 37 (02 Aug 2023 16:05)  T(F): 98.6 (02 Aug 2023 16:05), Max: 98.6 (02 Aug 2023 16:05)  HR: 78 (02 Aug 2023 16:05) (60 - 78)  BP: 122/68 (02 Aug 2023 16:05) (112/61 - 153/70)    RR: 19 (02 Aug 2023 16:05) (18 - 19)  SpO2: 100% (02 Aug 2023 16:05) (94% - 100%)    Parameters below as of 02 Aug 2023 16:05  Patient On (Oxygen Delivery Method): room air          CONSTITUTIONAL: NAD,   RESPIRATORY: Normal respiratory effort; lungs are clear to auscultation bilaterally  CARDIOVASCULAR: Regular rate and rhythm; No lower extremity edema  ABDOMEN: Nontender to palpation, normoactive bowel sounds  PSYCH: A+O x3; affect appropriate  NEUROLOGY: CN 2-12 are intact and symmetric; no gross sensory deficits;   SKIN: No rashes; no palpable lesions    LABS:                        10.9   5.20  )-----------( 188      ( 01 Aug 2023 07:56 )             31.5     08-01    139  |  102  |  58<H>  ----------------------------<  131<H>  4.2   |  28  |  5.33<H>    Ca    8.1<L>      01 Aug 2023 07:56  Phos  5.4     07-31    TPro  6.8  /  Alb  3.0<L>  /  TBili  0.4  /  DBili  x   /  AST  34  /  ALT  19  /  AlkPhos  69  08-01    PT/INR - ( 01 Aug 2023 07:56 )   PT: 11.9 sec;   INR: 0.99 ratio           Urinalysis Basic - ( 01 Aug 2023 07:56 )    Color: x / Appearance: x / SG: x / pH: x  Gluc: 131 mg/dL / Ketone: x  / Bili: x / Urobili: x   Blood: x / Protein: x / Nitrite: x   Leuk Esterase: x / RBC: x / WBC x   Sq Epi: x / Non Sq Epi: x / Bacteria: x        CAPILLARY BLOOD GLUCOSE      POCT Blood Glucose.: 330 mg/dL (01 Aug 2023 11:10)  POCT Blood Glucose.: 133 mg/dL (01 Aug 2023 07:47)  POCT Blood Glucose.: 263 mg/dL (31 Jul 2023 21:20)  POCT Blood Glucose.: 156 mg/dL (31 Jul 2023 16:33)      RADIOLOGY & ADDITIONAL TESTS:  Results Reviewed:   Imaging Personally Reviewed:  Electrocardiogram Personally Reviewed:

## 2023-08-02 NOTE — DISCHARGE NOTE NURSING/CASE MANAGEMENT/SOCIAL WORK - PATIENT PORTAL LINK FT
You can access the FollowMyHealth Patient Portal offered by [facility] by registering at the following website: [email or web address]. By joining [employer]’s FollowMyHealth portal, you will also be able to view your health information using other applications (apps) compatible with our system.

## 2023-08-02 NOTE — PHARMACOTHERAPY INTERVENTION NOTE - COMMENTS
Since the patient's BG has been elevated and received Admelog 8 units sliding scale yesterday, recommended initiating Lantus 3 units at bed time, Admelog 2 units TID with meal, and sliding scale at bed time.

## 2023-08-02 NOTE — PROGRESS NOTE ADULT - SUBJECTIVE AND OBJECTIVE BOX
24 hr events:  no acute events  on RA  intermittent cough (improved)  HD today with 2L fluid removal  complaining of the food  pt refusing d/c to rehab, wants to go home      ## ROS:  no fever, no chills  no HA, mild dizziness during HD  no visual changes, no auditory changes  no sore throat, no sinus congestion  no SOB, + cough  no chest pain, no palpitations  no abdominal pain, no N/V/D  no myalgias, no arthralgias  no swelling  no rashes, no pruritis    ## Labs:  CBC:                        10.9   5.20  )-----------( 188      ( 01 Aug 2023 07:56 )             31.5     Chem:  08-02    x   |  x   |  x   ----------------------------<  x   x    |  x   |  7.49<H>    Ca    8.1<L>      01 Aug 2023 07:56    TPro  6.3  /  Alb  2.8<L>  /  TBili  0.5  /  DBili  0.1  /  AST  26  /  ALT  19  /  AlkPhos  67  08-02    Coags:  PT/INR - ( 02 Aug 2023 11:30 )   PT: 11.6 sec;   INR: 0.97 ratio         Respiratory Viral Panel with COVID-19 by PROSPER (07.25.23 @ 17:55)    Rapid RVP Result: Detected   SARS-CoV-2: Detected   Adenovirus (RapRVP): Detected        ## Medications:  amLODIPine   Tablet 10 milliGRAM(s) Oral daily  hydrALAZINE 25 milliGRAM(s) Oral three times a day  metoprolol succinate  milliGRAM(s) Oral daily    benzonatate 100 milliGRAM(s) Oral every 8 hours  guaiFENesin  milliGRAM(s) Oral every 12 hours  hydrocodone/homatropine Syrup 5 milliLiter(s) Oral every 8 hours    dextrose 50% Injectable 12.5 Gram(s) IV Push once  dextrose 50% Injectable 25 Gram(s) IV Push once  dextrose 50% Injectable 25 Gram(s) IV Push once  dextrose Oral Gel 15 Gram(s) Oral once PRN  glucagon  Injectable 1 milliGRAM(s) IntraMuscular once  insulin glargine Injectable (LANTUS) 3 Unit(s) SubCutaneous at bedtime  insulin lispro (ADMELOG) corrective regimen sliding scale   SubCutaneous at bedtime  insulin lispro (ADMELOG) corrective regimen sliding scale   SubCutaneous three times a day before meals  insulin lispro Injectable (ADMELOG) 2 Unit(s) SubCutaneous three times a day before meals  linagliptin 5 milliGRAM(s) Oral daily  simvastatin 40 milliGRAM(s) Oral at bedtime    heparin   Injectable 5000 Unit(s) SubCutaneous every 8 hours    aluminum hydroxide/magnesium hydroxide/simethicone Suspension 30 milliLiter(s) Oral every 4 hours PRN    acetaminophen     Tablet .. 650 milliGRAM(s) Oral every 6 hours PRN  melatonin 3 milliGRAM(s) Oral at bedtime PRN  ondansetron Injectable 4 milliGRAM(s) IV Push every 8 hours PRN      ## Vitals:  T(C): 37 (08-02-23 @ 16:05), Max: 37 (08-02-23 @ 16:05)  HR: 61 (08-02-23 @ 20:03) (60 - 78)  BP: 126/64 (08-02-23 @ 20:03) (112/61 - 153/70)  RR: 19 (08-02-23 @ 16:05) (18 - 19)  SpO2: 97% (08-02-23 @ 20:03) (94% - 100%)        08-02 @ 07:01  -  08-02 @ 21:06  --------------------------------------------------------  IN: 0 mL / OUT: 2000 mL / NET: -2000 mL          ## P/E:  Gen: elderly female, lying comfortably in bed in no apparent distress  HEENT: NC/AT, EOMI, moist mucus membrane, sclera white  Resp: CTA B/L , no wheeze, no rhonchi  CVS: RRR, R chest wall permacath (site clean)  Abd: soft NT/ND +BS  Ext: no c/c/e, L arm AVF  Neuro: A&Ox3          CODE STATUS: [ ] full code  [x ] DNR  [x ] DNI  [ ] MOLST  Goals of care discussion: [x ] yes

## 2023-08-02 NOTE — PROGRESS NOTE ADULT - ASSESSMENT
78F PMH DM2, HTN, HLD, diastolic CHF, S/P pacemaker, ESRD on HD MWF (issues cannulating L AVF with prior issues including bleeding, now getting HD via R chest wall permacath) recent admission 7/4-7/8 for LLL PNA treated with antibx ceftriaxone and discharged home presents for evaluation after being sent by her pulmonologist for worsening dry cough and persistent pleuritic chest pain with coughing. Tested + COVID and adenovirus. Hypoxic on RA.    DX: acute hypoxic respiratory failure, COVID-19, adenovirus infection      - supportive care for adenovirus infection  - cont 10 day course of dexamethasone (day #8)  - s/p 5 day course of remdesivir  - s/p HD today with 2L fluid removal, tolerated HD  - maintenance HD per nephrology, next session for Fri  - maintain O2 sat > 90%  - weaned off nasal cannula and doing well on RA  - hypoxia resolved  - hycodan as needed for cough  - physical therapy  - DNR/DNI (pt with prior MOLST and verbally confirms her wishes)  - d/c planning , pr refusing rehab, wants to go home   - d/w hospitalist and

## 2023-08-02 NOTE — PROGRESS NOTE ADULT - ASSESSMENT
A/P    1. COVID-19, adenovirus, acute hypoxic respiratory failure  - Remdesivir x 5 days last dose 8/1  - Wean patient off supplemental O2 as tolerated  - Tessalon for cough, will reorder hycoden given no improvement with tessalon  - Isolation, supportive care  - Decadron 6mg PO Daily X 7 days, dc as no longer hypoxic  - ISS    2. Debility, weakness:  - PT eval - [facility]    3. ESRD:  - On IHD    Dispo: [facility] perfered  but patient is a admanet about going home

## 2023-08-03 LAB
ALBUMIN SERPL ELPH-MCNC: 3 G/DL — LOW (ref 3.3–5)
ALP SERPL-CCNC: 69 U/L — SIGNIFICANT CHANGE UP (ref 40–120)
ALT FLD-CCNC: 26 U/L — SIGNIFICANT CHANGE UP (ref 12–78)
ANION GAP SERPL CALC-SCNC: 7 MMOL/L — SIGNIFICANT CHANGE UP (ref 5–17)
AST SERPL-CCNC: 33 U/L — SIGNIFICANT CHANGE UP (ref 15–37)
BILIRUB DIRECT SERPL-MCNC: 0.1 MG/DL — SIGNIFICANT CHANGE UP (ref 0–0.3)
BILIRUB INDIRECT FLD-MCNC: 0.4 MG/DL — SIGNIFICANT CHANGE UP (ref 0.2–1)
BILIRUB SERPL-MCNC: 0.5 MG/DL — SIGNIFICANT CHANGE UP (ref 0.2–1.2)
BUN SERPL-MCNC: 54 MG/DL — HIGH (ref 7–23)
CALCIUM SERPL-MCNC: 7.8 MG/DL — LOW (ref 8.5–10.1)
CHLORIDE SERPL-SCNC: 104 MMOL/L — SIGNIFICANT CHANGE UP (ref 96–108)
CO2 SERPL-SCNC: 28 MMOL/L — SIGNIFICANT CHANGE UP (ref 22–31)
CREAT SERPL-MCNC: 5.63 MG/DL — HIGH (ref 0.5–1.3)
EGFR: 7 ML/MIN/1.73M2 — LOW
GLUCOSE BLDC GLUCOMTR-MCNC: 105 MG/DL — HIGH (ref 70–99)
GLUCOSE BLDC GLUCOMTR-MCNC: 125 MG/DL — HIGH (ref 70–99)
GLUCOSE BLDC GLUCOMTR-MCNC: 139 MG/DL — HIGH (ref 70–99)
GLUCOSE BLDC GLUCOMTR-MCNC: 225 MG/DL — HIGH (ref 70–99)
GLUCOSE SERPL-MCNC: 125 MG/DL — HIGH (ref 70–99)
HCT VFR BLD CALC: 32.4 % — LOW (ref 34.5–45)
HGB BLD-MCNC: 10.7 G/DL — LOW (ref 11.5–15.5)
INR BLD: 0.97 RATIO — SIGNIFICANT CHANGE UP (ref 0.85–1.18)
MAGNESIUM SERPL-MCNC: 2.2 MG/DL — SIGNIFICANT CHANGE UP (ref 1.6–2.6)
MCHC RBC-ENTMCNC: 28.6 PG — SIGNIFICANT CHANGE UP (ref 27–34)
MCHC RBC-ENTMCNC: 33 G/DL — SIGNIFICANT CHANGE UP (ref 32–36)
MCV RBC AUTO: 86.6 FL — SIGNIFICANT CHANGE UP (ref 80–100)
NRBC # BLD: 0 /100 WBCS — SIGNIFICANT CHANGE UP (ref 0–0)
PHOSPHATE SERPL-MCNC: 5 MG/DL — HIGH (ref 2.5–4.5)
PLATELET # BLD AUTO: 190 K/UL — SIGNIFICANT CHANGE UP (ref 150–400)
POTASSIUM SERPL-MCNC: 4.6 MMOL/L — SIGNIFICANT CHANGE UP (ref 3.5–5.3)
POTASSIUM SERPL-SCNC: 4.6 MMOL/L — SIGNIFICANT CHANGE UP (ref 3.5–5.3)
PROT SERPL-MCNC: 6.8 GM/DL — SIGNIFICANT CHANGE UP (ref 6–8.3)
PROTHROM AB SERPL-ACNC: 11.6 SEC — SIGNIFICANT CHANGE UP (ref 9.5–13)
RBC # BLD: 3.74 M/UL — LOW (ref 3.8–5.2)
RBC # FLD: 16.9 % — HIGH (ref 10.3–14.5)
SODIUM SERPL-SCNC: 139 MMOL/L — SIGNIFICANT CHANGE UP (ref 135–145)
TROPONIN I, HIGH SENSITIVITY RESULT: 34.5 NG/L — SIGNIFICANT CHANGE UP
WBC # BLD: 7.74 K/UL — SIGNIFICANT CHANGE UP (ref 3.8–10.5)
WBC # FLD AUTO: 7.74 K/UL — SIGNIFICANT CHANGE UP (ref 3.8–10.5)

## 2023-08-03 PROCEDURE — 99232 SBSQ HOSP IP/OBS MODERATE 35: CPT

## 2023-08-03 PROCEDURE — 93010 ELECTROCARDIOGRAM REPORT: CPT

## 2023-08-03 RX ORDER — METOCLOPRAMIDE HCL 10 MG
10 TABLET ORAL ONCE
Refills: 0 | Status: DISCONTINUED | OUTPATIENT
Start: 2023-08-03 | End: 2023-08-09

## 2023-08-03 RX ADMIN — Medication 2 UNIT(S): at 12:13

## 2023-08-03 RX ADMIN — ONDANSETRON 4 MILLIGRAM(S): 8 TABLET, FILM COATED ORAL at 17:10

## 2023-08-03 RX ADMIN — AMLODIPINE BESYLATE 10 MILLIGRAM(S): 2.5 TABLET ORAL at 06:29

## 2023-08-03 RX ADMIN — HEPARIN SODIUM 5000 UNIT(S): 5000 INJECTION INTRAVENOUS; SUBCUTANEOUS at 13:11

## 2023-08-03 RX ADMIN — Medication 2: at 16:44

## 2023-08-03 RX ADMIN — Medication 2 UNIT(S): at 08:41

## 2023-08-03 RX ADMIN — Medication 1 TABLET(S): at 12:13

## 2023-08-03 RX ADMIN — LINAGLIPTIN 5 MILLIGRAM(S): 5 TABLET, FILM COATED ORAL at 12:12

## 2023-08-03 RX ADMIN — Medication 2 UNIT(S): at 16:45

## 2023-08-03 RX ADMIN — Medication 30 MILLILITER(S): at 17:10

## 2023-08-03 RX ADMIN — Medication 600 MILLIGRAM(S): at 06:29

## 2023-08-03 RX ADMIN — Medication 25 MILLIGRAM(S): at 13:09

## 2023-08-03 RX ADMIN — Medication 100 MILLIGRAM(S): at 06:29

## 2023-08-03 RX ADMIN — Medication 25 MILLIGRAM(S): at 06:29

## 2023-08-03 RX ADMIN — Medication 100 MILLIGRAM(S): at 13:09

## 2023-08-03 NOTE — PROGRESS NOTE ADULT - SUBJECTIVE AND OBJECTIVE BOX
HPI:  This is a 78 year old female w/ PMHx of DM2, HTN, HLD, CHF, S/P pacemaker, and ESRD on HD MWF presents for evaluation after being sent by pulmonologist for for worsening dry cough.  States coughs so hard feels like ribs will break.  Of note patient was recently admitted 7/4-7/8/23 for LLL PNA (26 Jul 2023 06:34)    Patient is a 78y old  Female who presents with a chief complaint of cough (02 Aug 2023 21:06)      INTERVAL HPI/OVERNIGHT EVENTS: complains of loose stools the food and epigastric pain     MEDICATIONS  (STANDING):  amLODIPine   Tablet 10 milliGRAM(s) Oral daily  benzonatate 100 milliGRAM(s) Oral every 8 hours  chlorhexidine 2% Cloths 1 Application(s) Topical <User Schedule>  dextrose 5%. 1000 milliLiter(s) (50 mL/Hr) IV Continuous <Continuous>  dextrose 5%. 1000 milliLiter(s) (100 mL/Hr) IV Continuous <Continuous>  dextrose 50% Injectable 25 Gram(s) IV Push once  dextrose 50% Injectable 12.5 Gram(s) IV Push once  dextrose 50% Injectable 25 Gram(s) IV Push once  glucagon  Injectable 1 milliGRAM(s) IntraMuscular once  guaiFENesin  milliGRAM(s) Oral every 12 hours  heparin   Injectable 5000 Unit(s) SubCutaneous every 8 hours  hydrALAZINE 25 milliGRAM(s) Oral three times a day  hydrocodone/homatropine Syrup 5 milliLiter(s) Oral every 8 hours  insulin glargine Injectable (LANTUS) 3 Unit(s) SubCutaneous at bedtime  insulin lispro (ADMELOG) corrective regimen sliding scale   SubCutaneous at bedtime  insulin lispro (ADMELOG) corrective regimen sliding scale   SubCutaneous three times a day before meals  insulin lispro Injectable (ADMELOG) 2 Unit(s) SubCutaneous three times a day before meals  linagliptin 5 milliGRAM(s) Oral daily  metoclopramide Injectable 10 milliGRAM(s) IV Push once  metoprolol succinate  milliGRAM(s) Oral daily  Nephro-joseph 1 Tablet(s) Oral daily  simvastatin 40 milliGRAM(s) Oral at bedtime    MEDICATIONS  (PRN):  acetaminophen     Tablet .. 650 milliGRAM(s) Oral every 6 hours PRN Temp greater or equal to 38C (100.4F), Mild Pain (1 - 3)  aluminum hydroxide/magnesium hydroxide/simethicone Suspension 30 milliLiter(s) Oral every 4 hours PRN Dyspepsia  dextrose Oral Gel 15 Gram(s) Oral once PRN Blood Glucose LESS THAN 70 milliGRAM(s)/deciliter  melatonin 3 milliGRAM(s) Oral at bedtime PRN Insomnia  ondansetron Injectable 4 milliGRAM(s) IV Push every 8 hours PRN Nausea and/or Vomiting  sodium chloride 0.9% lock flush 10 milliLiter(s) IV Push every 1 hour PRN Pre/post blood products, medications, blood draw, and to maintain line patency      Allergies    Eliquis (Other)    Intolerances        REVIEW OF SYSTEMS:  CONSTITUTIONAL: No fever, weight loss, or fatigue  EYES: No eye pain, visual disturbances, or discharge  ENMT:  No difficulty hearing, tinnitus, vertigo; No sinus or throat pain  NECK: No pain or stiffness  BREASTS: No pain, masses, or nipple discharge  RESPIRATORY: No cough, wheezing, chills or hemoptysis; No shortness of breath  CARDIOVASCULAR: No chest pain, palpitations, dizziness, or leg swelling  GASTROINTESTINAL: No abdominal or epigastric pain. No nausea, vomiting, or hematemesis; No diarrhea or constipation. No melena or hematochezia.  GENITOURINARY: No dysuria, frequency, hematuria, or incontinence  NEUROLOGICAL: No headaches, memory loss, loss of strength, numbness, or tremors  SKIN: No itching, burning, rashes, or lesions   LYMPH NODES: No enlarged glands  ENDOCRINE: No heat or cold intolerance; No hair loss  MUSCULOSKELETAL: No joint pain or swelling; No muscle, back, or extremity pain  PSYCHIATRIC: No depression, anxiety, mood swings, or difficulty sleeping  HEME/LYMPH: No easy bruising, or bleeding gums  ALLERGY AND IMMUNOLOGIC: No hives or eczema    Vital Signs Last 24 Hrs  T(C): 36.7 (03 Aug 2023 17:36), Max: 36.9 (02 Aug 2023 23:42)  T(F): 98 (03 Aug 2023 17:36), Max: 98.5 (02 Aug 2023 23:42)  HR: 65 (03 Aug 2023 17:36) (60 - 65)  BP: 116/65 (03 Aug 2023 17:36) (102/57 - 126/64)  RR: 18 (03 Aug 2023 17:36) (18 - 19)  SpO2: 96% (03 Aug 2023 17:36) (93% - 97%)    Parameters below as of 03 Aug 2023 11:17  Patient On (Oxygen Delivery Method): room air        PHYSICAL EXAM:  GENERAL: NAD, well-groomed, well-developed  HEAD:  Atraumatic, Normocephalic  EYES: EOMI, PERRLA, conjunctiva and sclera clear  ENMT: No tonsillar erythema, exudates, or enlargement; Moist mucous membranes, Good dentition, No lesions  NECK: Supple, No JVD, Normal thyroid  NERVOUS SYSTEM:  Alert & Oriented X3, Good concentration; Motor Strength 5/5 B/L upper and lower extremities; DTRs 2+ intact and symmetric  CHEST/LUNG: Clear to ascultation  bilaterally; No rales, rhonchi, wheezing, or rubs  HEART: Regular rate and rhythm; No murmurs, rubs, or gallops  ABDOMEN: Soft, Nontender, Nondistended; Bowel sounds present  EXTREMITIES:  2+ Peripheral Pulses, No clubbing, cyanosis, or edema  LYMPH: No lymphadenopathy noted  SKIN: No rashes or lesions    LABS:                        10.7   7.74  )-----------( 190      ( 03 Aug 2023 08:18 )             32.4     08-03    139  |  104  |  54<H>  ----------------------------<  125<H>  4.6   |  28  |  5.63<H>    Ca    7.8<L>      03 Aug 2023 08:18  Phos  5.0     08-03  Mg     2.2     08-03    TPro  6.8  /  Alb  3.0<L>  /  TBili  0.5  /  DBili  0.1  /  AST  33  /  ALT  26  /  AlkPhos  69  08-03    PT/INR - ( 03 Aug 2023 08:18 )   PT: 11.6 sec;   INR: 0.97 ratio           Urinalysis Basic - ( 03 Aug 2023 08:18 )    Color: x / Appearance: x / SG: x / pH: x  Gluc: 125 mg/dL / Ketone: x  / Bili: x / Urobili: x   Blood: x / Protein: x / Nitrite: x   Leuk Esterase: x / RBC: x / WBC x   Sq Epi: x / Non Sq Epi: x / Bacteria: x      CAPILLARY BLOOD GLUCOSE      POCT Blood Glucose.: 225 mg/dL (03 Aug 2023 15:53)  POCT Blood Glucose.: 139 mg/dL (03 Aug 2023 12:11)  POCT Blood Glucose.: 105 mg/dL (03 Aug 2023 08:39)  POCT Blood Glucose.: 130 mg/dL (02 Aug 2023 23:19)      RADIOLOGY & ADDITIONAL TESTS:    Imaging Personally Reviewed:  [X ] YES  [ ] NO    Consultant(s) Notes Reviewed:  [ X] YES  [ ] NO    Care Discussed with Consultants/Other Providers [X ] YES  [ ] NO

## 2023-08-03 NOTE — PROGRESS NOTE ADULT - SUBJECTIVE AND OBJECTIVE BOX
24 hr events:  on RA  intermittent cough but decreased and improved  s/p HD yesterday with 2L removal  reports she is having diarrhea today and had emesis - feels it's due to "taking too many pills on an empty stomach" because she "isn't eating enough" with what is served to her for her food tray      ## ROS:  no fever, no chills  no HA, no dizziness  no visual changes, no auditory changes  no sore throat, no sinus congestion  no SOB, decreased cough  no chest pain, no palpitations  no abdominal pain, + bloating + emesis + diarrhea  no myalgias, no arthralgias  no swelling  no rashes, no pruritis      ## Labs:  CBC:                        10.7   7.74  )-----------( 190      ( 03 Aug 2023 08:18 )             32.4     Chem:  08-03    139  |  104  |  54<H>  ----------------------------<  125<H>  4.6   |  28  |  5.63<H>    Ca    7.8<L>      03 Aug 2023 08:18  Phos  5.0     08-03  Mg     2.2     08-03    TPro  6.8  /  Alb  3.0<L>  /  TBili  0.5  /  DBili  0.1  /  AST  33  /  ALT  26  /  AlkPhos  69  08-03    Coags:  PT/INR - ( 03 Aug 2023 08:18 )   PT: 11.6 sec;   INR: 0.97 ratio      Respiratory Viral Panel with COVID-19 by PROSPER (07.25.23 @ 17:55)    Rapid RVP Result: Detected   SARS-CoV-2: Detected   Adenovirus (RapRVP): Detected           ## Medications:  amLODIPine   Tablet 10 milliGRAM(s) Oral daily  hydrALAZINE 25 milliGRAM(s) Oral three times a day  metoprolol succinate  milliGRAM(s) Oral daily    benzonatate 100 milliGRAM(s) Oral every 8 hours  guaiFENesin  milliGRAM(s) Oral every 12 hours  hydrocodone/homatropine Syrup 5 milliLiter(s) Oral every 8 hours    dextrose 50% Injectable 25 Gram(s) IV Push once  dextrose 50% Injectable 12.5 Gram(s) IV Push once  dextrose 50% Injectable 25 Gram(s) IV Push once  dextrose Oral Gel 15 Gram(s) Oral once PRN  glucagon  Injectable 1 milliGRAM(s) IntraMuscular once  insulin glargine Injectable (LANTUS) 3 Unit(s) SubCutaneous at bedtime  insulin lispro (ADMELOG) corrective regimen sliding scale   SubCutaneous at bedtime  insulin lispro (ADMELOG) corrective regimen sliding scale   SubCutaneous three times a day before meals  insulin lispro Injectable (ADMELOG) 2 Unit(s) SubCutaneous three times a day before meals  linagliptin 5 milliGRAM(s) Oral daily  simvastatin 40 milliGRAM(s) Oral at bedtime    heparin   Injectable 5000 Unit(s) SubCutaneous every 8 hours    aluminum hydroxide/magnesium hydroxide/simethicone Suspension 30 milliLiter(s) Oral every 4 hours PRN    acetaminophen     Tablet .. 650 milliGRAM(s) Oral every 6 hours PRN  melatonin 3 milliGRAM(s) Oral at bedtime PRN  metoclopramide Injectable 10 milliGRAM(s) IV Push once  ondansetron Injectable 4 milliGRAM(s) IV Push every 8 hours PRN      ## Vitals:  T(C): 36.7 (03 Aug 2023 17:36), Max: 36.9 (02 Aug 2023 23:42)  T(F): 98 (03 Aug 2023 17:36), Max: 98.5 (02 Aug 2023 23:42)  HR: 65 (03 Aug 2023 17:36) (60 - 65)  BP: 116/65 (03 Aug 2023 17:36) (102/57 - 126/64)  RR: 18 (03 Aug 2023 17:36) (18 - 19)  SpO2: 96% (03 Aug 2023 17:36) (93% - 97%)    Parameters below as of 03 Aug 2023 11:17  Patient On (Oxygen Delivery Method): room       ## P/E:  Gen: elderly female, lying comfortably in bed in no apparent distress  HEENT: NC/AT, EOMI, sclera white, moist mucus membrane  Resp: CTA B/L, no wheeze, no rhonchi, R chest wall permacath (site clean)  CVS: RRR  Abd: soft ND +BS, mild tenderness LLQ no guarding, no rebound  Ext: no c/c/e, L arm AVF  Neuro: A&Ox3        CODE STATUS: [ ] full code  [x ] DNR  [ x] DNI    Goals of care discussion: [x ] yes

## 2023-08-03 NOTE — PROGRESS NOTE ADULT - ASSESSMENT
Current chest pain will order stat ekg and tropinin         A/P    1. COVID-19, adenovirus, acute hypoxic respiratory failure  - Remdesivir x 5 days last dose 8/1  - Wean patient off supplemental O2 as tolerated  - Tessalon for cough, will reorder hycoden given no improvement with tessalon  - Isolation, supportive care  - Decadron 6mg PO Daily X 7 days, dc as no longer hypoxic  - ISS    2. Debility, weakness:  - PT eval - [facility]    3. ESRD:  - On IHD    Dispo: [facility] is safer however patient is likely to go home

## 2023-08-03 NOTE — PROGRESS NOTE ADULT - ASSESSMENT
78F PMH DM2, HTN, HLD, diastolic CHF, S/P pacemaker, ESRD on HD MWF (issues cannulating L AVF with prior issues including bleeding, now getting HD via R chest wall permacath) recent admission 7/4-7/8 for LLL PNA treated with antibx ceftriaxone and discharged home presents for evaluation after being sent by her pulmonologist for worsening dry cough and persistent pleuritic chest pain with coughing. Tested + COVID and adenovirus. Hypoxic on RA requiring O2 supplementation, now resolved. Reports diarrhea and emesis today    DX: acute hypoxic respiratory failure, COVID-19, adenovirus infection      - supportive care for adenovirus infection  - cont 10 day course of dexamethasone (day #9)  - s/p 5 day course of remdesivir  - s/p HD 8/2 with 2L fluid removal, tolerated HD  - maintenance HD per nephrology, next session for tomorrow  - maintain O2 sat > 90%  - weaned off nasal cannula and doing well on RA  - hypoxia resolved  - hycodan as needed for cough  - reports emesis with diarrhea today, monitor for further episodes, blood work without leukocytosis, no fevers, primary team informed  - physical therapy  - DNR/DNI (pt with prior MOLST and verbally confirms her wishes)  - d/c planning , pt refusing rehab, wants to go home   - d/w hospitalist 78F PMH DM2, HTN, HLD, diastolic CHF, S/P pacemaker, ESRD on HD MWF (issues cannulating L AVF with prior issues including bleeding, now getting HD via R chest wall permacath) recent admission 7/4-7/8 for LLL PNA treated with antibx ceftriaxone and discharged home presents for evaluation after being sent by her pulmonologist for worsening dry cough and persistent pleuritic chest pain with coughing. Tested + COVID and adenovirus. Hypoxic on RA requiring O2 supplementation, now resolved. Reports diarrhea and emesis today    DX: acute hypoxic respiratory failure, COVID-19, adenovirus infection      - supportive care for adenovirus infection  - had been on dexamethasone, d/shashi by primary team s/p 7 day course  - s/p 5 day course of remdesivir  - s/p HD 8/2 with 2L fluid removal, tolerated HD  - maintenance HD per nephrology, next session for tomorrow  - maintain O2 sat > 90%  - weaned off nasal cannula and doing well on RA  - hypoxia resolved  - hycodan as needed for cough  - reports emesis with diarrhea today, monitor for further episodes, blood work without leukocytosis, no fevers, primary team informed  - physical therapy  - DNR/DNI (pt with prior MOLST and verbally confirms her wishes)  - d/c planning , pt refusing rehab, wants to go home   - d/w hospitalist

## 2023-08-04 LAB
ALBUMIN SERPL ELPH-MCNC: 2.8 G/DL — LOW (ref 3.3–5)
ALP SERPL-CCNC: 70 U/L — SIGNIFICANT CHANGE UP (ref 40–120)
ALT FLD-CCNC: 21 U/L — SIGNIFICANT CHANGE UP (ref 12–78)
ANION GAP SERPL CALC-SCNC: 5 MMOL/L — SIGNIFICANT CHANGE UP (ref 5–17)
AST SERPL-CCNC: 24 U/L — SIGNIFICANT CHANGE UP (ref 15–37)
BILIRUB SERPL-MCNC: 0.3 MG/DL — SIGNIFICANT CHANGE UP (ref 0.2–1.2)
BUN SERPL-MCNC: 82 MG/DL — HIGH (ref 7–23)
CALCIUM SERPL-MCNC: 7.5 MG/DL — LOW (ref 8.5–10.1)
CHLORIDE SERPL-SCNC: 104 MMOL/L — SIGNIFICANT CHANGE UP (ref 96–108)
CO2 SERPL-SCNC: 29 MMOL/L — SIGNIFICANT CHANGE UP (ref 22–31)
CREAT SERPL-MCNC: 8.23 MG/DL — HIGH (ref 0.5–1.3)
EGFR: 5 ML/MIN/1.73M2 — LOW
FLUAV AG NPH QL: SIGNIFICANT CHANGE UP
FLUBV AG NPH QL: SIGNIFICANT CHANGE UP
GLUCOSE BLDC GLUCOMTR-MCNC: 108 MG/DL — HIGH (ref 70–99)
GLUCOSE BLDC GLUCOMTR-MCNC: 140 MG/DL — HIGH (ref 70–99)
GLUCOSE BLDC GLUCOMTR-MCNC: 212 MG/DL — HIGH (ref 70–99)
GLUCOSE BLDC GLUCOMTR-MCNC: 236 MG/DL — HIGH (ref 70–99)
GLUCOSE SERPL-MCNC: 210 MG/DL — HIGH (ref 70–99)
INR BLD: 0.9 RATIO — SIGNIFICANT CHANGE UP (ref 0.85–1.18)
MAGNESIUM SERPL-MCNC: 2.4 MG/DL — SIGNIFICANT CHANGE UP (ref 1.6–2.6)
PHOSPHATE SERPL-MCNC: 5.7 MG/DL — HIGH (ref 2.5–4.5)
POTASSIUM SERPL-MCNC: 4.5 MMOL/L — SIGNIFICANT CHANGE UP (ref 3.5–5.3)
POTASSIUM SERPL-SCNC: 4.5 MMOL/L — SIGNIFICANT CHANGE UP (ref 3.5–5.3)
PROT SERPL-MCNC: 6.4 GM/DL — SIGNIFICANT CHANGE UP (ref 6–8.3)
PROTHROM AB SERPL-ACNC: 10.7 SEC — SIGNIFICANT CHANGE UP (ref 9.5–13)
SARS-COV-2 RNA SPEC QL NAA+PROBE: DETECTED
SODIUM SERPL-SCNC: 138 MMOL/L — SIGNIFICANT CHANGE UP (ref 135–145)

## 2023-08-04 PROCEDURE — 99232 SBSQ HOSP IP/OBS MODERATE 35: CPT

## 2023-08-04 NOTE — PROGRESS NOTE ADULT - SUBJECTIVE AND OBJECTIVE BOX
24 hr events:  seen earlier this afternoon  reports she had diarrhea x3 this morning  no abdominal pain  no emesis  no SOB  on RA  eating lamp chops and string beans with a roll and side of butter with side of pineapple cup - pt complaining the food is not good and not enough for her      ## ROS:  no fever, no chills  no HA, no dizziness  no visual changes, no auditory changes  no sore throat, no sinus congestion  no SOB, occassional cough  no chest pain, no palpitations  no abdominal pain, no N/V  + diarrhea  no dysuria, no hematuria  no myalgias, no arthralgias  no swelling  no rashes, no pruritis      ## Labs:  CBC:                        10.7   7.74  )-----------( 190      ( 03 Aug 2023 08:18 )             32.4     Chem:  08-04    138  |  104  |  82<H>  ----------------------------<  210<H>  4.5   |  29  |  8.23<H>    Ca    7.5<L>      04 Aug 2023 13:30  Phos  5.7     08-04  Mg     2.4     08-04    TPro  6.4  /  Alb  2.8<L>  /  TBili  0.3  /  DBili  x   /  AST  24  /  ALT  21  /  AlkPhos  70  08-04    Coags:  PT/INR - ( 04 Aug 2023 08:28 )   PT: 10.7 sec;   INR: 0.90 ratio      Flu With COVID-19 By PROSPER (08.04.23 @ 14:00)    SARS-CoV-2 Result: Detected   Influenza A Result: NotDetec: EUA/IVD   Influenza B Result: NotDetec: EUA/IVD      ## Imaging:    ## Medications:  amLODIPine   Tablet 10 milliGRAM(s) Oral daily  hydrALAZINE 25 milliGRAM(s) Oral three times a day  metoprolol succinate  milliGRAM(s) Oral daily    benzonatate 100 milliGRAM(s) Oral every 8 hours  guaiFENesin  milliGRAM(s) Oral every 12 hours  hydrocodone/homatropine Syrup 5 milliLiter(s) Oral every 8 hours    dextrose 50% Injectable 25 Gram(s) IV Push once  dextrose 50% Injectable 12.5 Gram(s) IV Push once  dextrose 50% Injectable 25 Gram(s) IV Push once  dextrose Oral Gel 15 Gram(s) Oral once PRN  glucagon  Injectable 1 milliGRAM(s) IntraMuscular once  insulin glargine Injectable (LANTUS) 3 Unit(s) SubCutaneous at bedtime  insulin lispro (ADMELOG) corrective regimen sliding scale   SubCutaneous at bedtime  insulin lispro (ADMELOG) corrective regimen sliding scale   SubCutaneous three times a day before meals  insulin lispro Injectable (ADMELOG) 2 Unit(s) SubCutaneous three times a day before meals  linagliptin 5 milliGRAM(s) Oral daily  simvastatin 40 milliGRAM(s) Oral at bedtime    heparin   Injectable 5000 Unit(s) SubCutaneous every 8 hours    aluminum hydroxide/magnesium hydroxide/simethicone Suspension 30 milliLiter(s) Oral every 4 hours PRN    acetaminophen     Tablet .. 650 milliGRAM(s) Oral every 6 hours PRN  melatonin 3 milliGRAM(s) Oral at bedtime PRN  metoclopramide Injectable 10 milliGRAM(s) IV Push once  ondansetron Injectable 4 milliGRAM(s) IV Push every 8 hours PRN      ## Vitals:  T(C): 36.8 (08-04-23 @ 17:05), Max: 37.1 (08-04-23 @ 05:18)  HR: 62 (08-04-23 @ 17:05) (58 - 64)  BP: 101/56 (08-04-23 @ 17:05) (100/59 - 119/58)  RR: 18 (08-04-23 @ 17:05) (17 - 20)  SpO2: 93% (08-04-23 @ 17:05) (92% - 95%)        08-04 @ 07:01  -  08-04 @ 18:25  --------------------------------------------------------  IN: 240 mL / OUT: 1500 mL / NET: -1260 mL          ## P/E:  Gen: lying comfortably in bed in no apparent distress  HEENT: NC/AT, EOMI, sclera white  Resp: CTA B/L, no wheeze, no rhonchi, R chest wall permacath  CVS: RRR  Abd: soft NT/ND +BS  Ext: no c/c/e; LUE AVF  Neuro: A&Ox3        CODE STATUS: [ ] full code  [ x] DNR  [x ] DNI  [ ] MOLST  Goals of care discussion: [x ] yes

## 2023-08-04 NOTE — PROGRESS NOTE ADULT - ASSESSMENT
78F PMH DM2, HTN, HLD, diastolic CHF, S/P pacemaker, ESRD on HD MWF (issues cannulating L AVF with prior issues including bleeding, now getting HD via R chest wall permacath) recent admission 7/4-7/8 for LLL PNA treated with antibx ceftriaxone and discharged home presents for evaluation after being sent by her pulmonologist for worsening dry cough and persistent pleuritic chest pain with coughing. Tested + COVID and adenovirus. Hypoxic on RA requiring O2 supplementation (resolved). Now reporting diarrhea    DX: acute hypoxic respiratory failure, COVID-19, adenovirus infection    - supportive care for adenovirus infection  - had been on dexamethasone, d/shashi by primary team s/p 7 day course  - s/p 5 day course of remdesivir  - hypoxia resolved  - oxygenating well on RA  - HD today  - maintenance HD per nephrology, next session for Monday  - maintain O2 sat > 90%  - hycodan as needed for cough  - reports emesis with diarrhea yesterday, no emesis today but did report diarrhea in AM  - cont to monitor for further episodes, blood work without leukocytosis, no fevers, no abdominal pain, abdominal exam benign   - physical therapy  - DNR/DNI (pt with prior MOLST and verbally confirms her wishes)  - d/c planning , pt refusing rehab, wants to go home   - will follow as needed  - acute pulmonary issues have resolved

## 2023-08-04 NOTE — DIETITIAN NUTRITION RISK NOTIFICATION - MALNUTRITION EVALUATION AS DEMONSTRATED BY (ADULTS > 20 YEARS OF AGE)
Loss of subcutaneous fat.../Loss of muscle...
What Is The Reason For Today's Visit?: History of Basal Cell Carcinoma
How Many Bccs Have You Had?: one
When Was Your Last Cancer Diagnosed?: 10/15

## 2023-08-04 NOTE — PROGRESS NOTE ADULT - SUBJECTIVE AND OBJECTIVE BOX
HPI:  This is a 78 year old female w/ PMHx of DM2, HTN, HLD, CHF, S/P pacemaker, and ESRD on HD MWF presents for evaluation after being sent by pulmonologist for for worsening dry cough.  States coughs so hard feels like ribs will break.  Of note patient was recently admitted 7/4-7/8/23 for LLL PNA (26 Jul 2023 06:34)  Patient is a 78y old  Female who presents with a chief complaint of cough (03 Aug 2023 20:09)      INTERVAL HPI/OVERNIGHT EVENTS:    MEDICATIONS  (STANDING):  amLODIPine   Tablet 10 milliGRAM(s) Oral daily  benzonatate 100 milliGRAM(s) Oral every 8 hours  chlorhexidine 2% Cloths 1 Application(s) Topical <User Schedule>  dextrose 5%. 1000 milliLiter(s) (100 mL/Hr) IV Continuous <Continuous>  dextrose 5%. 1000 milliLiter(s) (50 mL/Hr) IV Continuous <Continuous>  dextrose 50% Injectable 25 Gram(s) IV Push once  dextrose 50% Injectable 12.5 Gram(s) IV Push once  dextrose 50% Injectable 25 Gram(s) IV Push once  glucagon  Injectable 1 milliGRAM(s) IntraMuscular once  guaiFENesin  milliGRAM(s) Oral every 12 hours  heparin   Injectable 5000 Unit(s) SubCutaneous every 8 hours  hydrALAZINE 25 milliGRAM(s) Oral three times a day  hydrocodone/homatropine Syrup 5 milliLiter(s) Oral every 8 hours  insulin glargine Injectable (LANTUS) 3 Unit(s) SubCutaneous at bedtime  insulin lispro (ADMELOG) corrective regimen sliding scale   SubCutaneous at bedtime  insulin lispro (ADMELOG) corrective regimen sliding scale   SubCutaneous three times a day before meals  insulin lispro Injectable (ADMELOG) 2 Unit(s) SubCutaneous three times a day before meals  linagliptin 5 milliGRAM(s) Oral daily  metoclopramide Injectable 10 milliGRAM(s) IV Push once  metoprolol succinate  milliGRAM(s) Oral daily  Nephro-joseph 1 Tablet(s) Oral daily  simvastatin 40 milliGRAM(s) Oral at bedtime    MEDICATIONS  (PRN):  acetaminophen     Tablet .. 650 milliGRAM(s) Oral every 6 hours PRN Temp greater or equal to 38C (100.4F), Mild Pain (1 - 3)  aluminum hydroxide/magnesium hydroxide/simethicone Suspension 30 milliLiter(s) Oral every 4 hours PRN Dyspepsia  dextrose Oral Gel 15 Gram(s) Oral once PRN Blood Glucose LESS THAN 70 milliGRAM(s)/deciliter  melatonin 3 milliGRAM(s) Oral at bedtime PRN Insomnia  ondansetron Injectable 4 milliGRAM(s) IV Push every 8 hours PRN Nausea and/or Vomiting  sodium chloride 0.9% lock flush 10 milliLiter(s) IV Push every 1 hour PRN Pre/post blood products, medications, blood draw, and to maintain line patency      Allergies    Eliquis (Other)    Intolerances        REVIEW OF SYSTEMS:  CONSTITUTIONAL: No fever, weight loss, or fatigue  EYES: No eye pain, visual disturbances, or discharge  ENMT:  No difficulty hearing, tinnitus, vertigo; No sinus or throat pain  NECK: No pain or stiffness  BREASTS: No pain, masses, or nipple discharge  RESPIRATORY: No cough, wheezing, chills or hemoptysis; No shortness of breath  CARDIOVASCULAR: No chest pain, palpitations, dizziness, or leg swelling  GASTROINTESTINAL: No abdominal or epigastric pain. No nausea, vomiting, or hematemesis; No diarrhea or constipation. No melena or hematochezia.  GENITOURINARY: No dysuria, frequency, hematuria, or incontinence  NEUROLOGICAL: No headaches, memory loss, loss of strength, numbness, or tremors  SKIN: No itching, burning, rashes, or lesions   LYMPH NODES: No enlarged glands  ENDOCRINE: No heat or cold intolerance; No hair loss  MUSCULOSKELETAL: No joint pain or swelling; No muscle, back, or extremity pain  PSYCHIATRIC: No depression, anxiety, mood swings, or difficulty sleeping  HEME/LYMPH: No easy bruising, or bleeding gums  ALLERGY AND IMMUNOLOGIC: No hives or eczema    Vital Signs Last 24 Hrs  T(C): 36.6 (04 Aug 2023 16:00), Max: 37.1 (04 Aug 2023 05:18)  T(F): 97.9 (04 Aug 2023 16:00), Max: 98.7 (04 Aug 2023 05:18)  HR: 58 (04 Aug 2023 16:00) (58 - 65)  BP: 119/58 (04 Aug 2023 16:00) (100/59 - 119/58)  BP(mean): --  RR: 17 (04 Aug 2023 16:00) (17 - 20)  SpO2: 92% (04 Aug 2023 16:00) (92% - 96%)    Parameters below as of 04 Aug 2023 16:00  Patient On (Oxygen Delivery Method): room air        PHYSICAL EXAM:  GENERAL: NAD, well-groomed, well-developed  HEAD:  Atraumatic, Normocephalic  EYES: EOMI, PERRLA, conjunctiva and sclera clear  ENMT: No tonsillar erythema, exudates, or enlargement; Moist mucous membranes, Good dentition, No lesions  NECK: Supple, No JVD, Normal thyroid  NERVOUS SYSTEM:  Alert & Oriented X3, Good concentration; Motor Strength 5/5 B/L upper and lower extremities; DTRs 2+ intact and symmetric  CHEST/LUNG: Clear to ascultation  bilaterally; No rales, rhonchi, wheezing, or rubs  HEART: Regular rate and rhythm; No murmurs, rubs, or gallops  ABDOMEN: Soft, Nontender, Nondistended; Bowel sounds present  EXTREMITIES:  2+ Peripheral Pulses, No clubbing, cyanosis, or edema  LYMPH: No lymphadenopathy noted  SKIN: No rashes or lesions    LABS:                        10.7   7.74  )-----------( 190      ( 03 Aug 2023 08:18 )             32.4     08-04    138  |  104  |  82<H>  ----------------------------<  210<H>  4.5   |  29  |  8.23<H>    Ca    7.5<L>      04 Aug 2023 13:30  Phos  5.7     08-04  Mg     2.4     08-04    TPro  6.4  /  Alb  2.8<L>  /  TBili  0.3  /  DBili  x   /  AST  24  /  ALT  21  /  AlkPhos  70  08-04    PT/INR - ( 04 Aug 2023 08:28 )   PT: 10.7 sec;   INR: 0.90 ratio           Urinalysis Basic - ( 04 Aug 2023 13:30 )    Color: x / Appearance: x / SG: x / pH: x  Gluc: 210 mg/dL / Ketone: x  / Bili: x / Urobili: x   Blood: x / Protein: x / Nitrite: x   Leuk Esterase: x / RBC: x / WBC x   Sq Epi: x / Non Sq Epi: x / Bacteria: x      CAPILLARY BLOOD GLUCOSE      POCT Blood Glucose.: 108 mg/dL (04 Aug 2023 16:54)  POCT Blood Glucose.: 212 mg/dL (04 Aug 2023 10:53)  POCT Blood Glucose.: 140 mg/dL (04 Aug 2023 07:56)  POCT Blood Glucose.: 125 mg/dL (03 Aug 2023 22:40)      RADIOLOGY & ADDITIONAL TESTS:    Imaging Personally Reviewed:  [X ] YES  [ ] NO    Consultant(s) Notes Reviewed:  [X ] YES  [ ] NO    Care Discussed with Consultants/Other Providers [X ] YES  [ ] NO

## 2023-08-04 NOTE — CHART NOTE - NSCHARTNOTEFT_GEN_A_CORE
Assessment:  Pt is on COVID-19 isolation, c c/o not receiving food items as ordered, cold meals etc. Food and Nutrition Service made aware.  Pt adm c 2019 Novel corona virus, c debility, weakness, ESRD on HD, d/c plan is for [facility].  PMHx inlcude DM2, HTN, HLD, CHF, recent pneumonia.      Factors impacting intake: [ ] none [ ] nausea  [ ] vomiting [ ] diarrhea [ ] constipation  [ ]chewing problems [ ] swallowing issues  [ x] other: acute& chronic illness, persistent lack of appetite     Diet Prescription: Diet, Regular (08-03-23 @ 19:01)    Intake: <75% of meals   Pt is requesting Nepro 1 x day c dinner meal to have as supplement for am early medication     Current Weight: 08/04, 72.8 kg, 07/27, 74.4 kg, c wt. loss of 1.6 kg  % Change: 2.2%    Pertinent Medications: MEDICATIONS  (STANDING):  amLODIPine   Tablet 10 milliGRAM(s) Oral daily  benzonatate 100 milliGRAM(s) Oral every 8 hours  chlorhexidine 2% Cloths 1 Application(s) Topical <User Schedule>  dextrose 5%. 1000 milliLiter(s) (50 mL/Hr) IV Continuous <Continuous>  dextrose 5%. 1000 milliLiter(s) (100 mL/Hr) IV Continuous <Continuous>  dextrose 50% Injectable 25 Gram(s) IV Push once  dextrose 50% Injectable 12.5 Gram(s) IV Push once  dextrose 50% Injectable 25 Gram(s) IV Push once  glucagon  Injectable 1 milliGRAM(s) IntraMuscular once  guaiFENesin  milliGRAM(s) Oral every 12 hours  heparin   Injectable 5000 Unit(s) SubCutaneous every 8 hours  hydrALAZINE 25 milliGRAM(s) Oral three times a day  hydrocodone/homatropine Syrup 5 milliLiter(s) Oral every 8 hours  insulin glargine Injectable (LANTUS) 3 Unit(s) SubCutaneous at bedtime  insulin lispro (ADMELOG) corrective regimen sliding scale   SubCutaneous three times a day before meals  insulin lispro (ADMELOG) corrective regimen sliding scale   SubCutaneous at bedtime  insulin lispro Injectable (ADMELOG) 2 Unit(s) SubCutaneous three times a day before meals  linagliptin 5 milliGRAM(s) Oral daily  metoclopramide Injectable 10 milliGRAM(s) IV Push once  metoprolol succinate  milliGRAM(s) Oral daily  Nephro-joseph 1 Tablet(s) Oral daily  simvastatin 40 milliGRAM(s) Oral at bedtime    MEDICATIONS  (PRN):  acetaminophen     Tablet .. 650 milliGRAM(s) Oral every 6 hours PRN Temp greater or equal to 38C (100.4F), Mild Pain (1 - 3)  aluminum hydroxide/magnesium hydroxide/simethicone Suspension 30 milliLiter(s) Oral every 4 hours PRN Dyspepsia  dextrose Oral Gel 15 Gram(s) Oral once PRN Blood Glucose LESS THAN 70 milliGRAM(s)/deciliter  melatonin 3 milliGRAM(s) Oral at bedtime PRN Insomnia  ondansetron Injectable 4 milliGRAM(s) IV Push every 8 hours PRN Nausea and/or Vomiting  sodium chloride 0.9% lock flush 10 milliLiter(s) IV Push every 1 hour PRN Pre/post blood products, medications, blood draw, and to maintain line patency    Pertinent Labs: 08-03 Na139 mmol/L Glu 125 mg/dL<H> K+ 4.6 mmol/L Cr  5.63 mg/dL<H> BUN 54 mg/dL<H> 08-03 Phos 5.0 mg/dL<H> 08-03 Alb 3.0 g/dL<L>08-03 ALT 26 U/L AST 33 U/L Alkaline Phosphatase 69 U/L   CAPILLARY BLOOD GLUCOSE      POCT Blood Glucose.: 212 mg/dL (04 Aug 2023 10:53)  POCT Blood Glucose.: 140 mg/dL (04 Aug 2023 07:56)  POCT Blood Glucose.: 125 mg/dL (03 Aug 2023 22:40)  POCT Blood Glucose.: 225 mg/dL (03 Aug 2023 15:53)  POCT Blood Glucose.: 139 mg/dL (03 Aug 2023 12:11)    Skin: 08/04, WDL except ecchymotic     Estimated Needs:   [x ] no change since previous assessment(07/28)  [ ] recalculated:     Previous Nutrition Diagnosis: (07/28)  Malnutrition: moderate malnutrition in context of chronic illness  Etiology: inadequate protein-energy intake in setting of hx of ESRD on HD, cardiac dysfunction, DM  Signs/Symptoms: physical findings of mild/moderate fat/muscle loss  Goal/Expected Outcome: pt to consume >50-75% of meals & supplement	  Nutrition Diagnosis is [x ] ongoing  [ ] resolved [ ] not applicable       New Nutrition Diagnosis: [x ] not applicable     Interventions:   Recommend  [x] Continue c current diet regimen   [ ] Change Diet To:  [x ] Nutrition Supplement; add Nepro 1 x day = 425 calories, 19 grams protein per serving   [ ] Nutrition Support  [ ] Other:     Monitoring and Evaluation:   [x ] PO intake [ x ] Tolerance to diet prescription [ x ] weights [ x ] labs[ x ] follow up per protocol  [ ] other: Assessment:  Pt is on COVID-19 isolation, c c/o not receiving food items as ordered, cold meals etc. Food and Nutrition Service made aware.  Pt adm c 2019 Novel corona virus, c debility, weakness, ESRD on HD, d/c plan is for [facility].  PMHx inlcude DM2, HTN, HLD, CHF, recent pneumonia.      Factors impacting intake: [ ] none [ ] nausea  [ ] vomiting [ ] diarrhea [ ] constipation  [ ]chewing problems [ ] swallowing issues  [ x] other: acute& chronic illness, persistent lack of appetite     Diet Prescription: Diet, Regular (08-03-23 @ 19:01)     Intake: <75% of meals   Pt is requesting Nepro 1 x day c dinner meal to have as supplement for am early medication     Current Weight: 08/04, 72.8 kg, 07/27, 74.4 kg, c wt. loss of 1.6 kg  % Change: 2.2%    Pertinent Medications: MEDICATIONS  (STANDING):  amLODIPine   Tablet 10 milliGRAM(s) Oral daily  benzonatate 100 milliGRAM(s) Oral every 8 hours  chlorhexidine 2% Cloths 1 Application(s) Topical <User Schedule>  dextrose 5%. 1000 milliLiter(s) (50 mL/Hr) IV Continuous <Continuous>  dextrose 5%. 1000 milliLiter(s) (100 mL/Hr) IV Continuous <Continuous>  dextrose 50% Injectable 25 Gram(s) IV Push once  dextrose 50% Injectable 12.5 Gram(s) IV Push once  dextrose 50% Injectable 25 Gram(s) IV Push once  glucagon  Injectable 1 milliGRAM(s) IntraMuscular once  guaiFENesin  milliGRAM(s) Oral every 12 hours  heparin   Injectable 5000 Unit(s) SubCutaneous every 8 hours  hydrALAZINE 25 milliGRAM(s) Oral three times a day  hydrocodone/homatropine Syrup 5 milliLiter(s) Oral every 8 hours  insulin glargine Injectable (LANTUS) 3 Unit(s) SubCutaneous at bedtime  insulin lispro (ADMELOG) corrective regimen sliding scale   SubCutaneous three times a day before meals  insulin lispro (ADMELOG) corrective regimen sliding scale   SubCutaneous at bedtime  insulin lispro Injectable (ADMELOG) 2 Unit(s) SubCutaneous three times a day before meals  linagliptin 5 milliGRAM(s) Oral daily  metoclopramide Injectable 10 milliGRAM(s) IV Push once  metoprolol succinate  milliGRAM(s) Oral daily  Nephro-joseph 1 Tablet(s) Oral daily  simvastatin 40 milliGRAM(s) Oral at bedtime    MEDICATIONS  (PRN):  acetaminophen     Tablet .. 650 milliGRAM(s) Oral every 6 hours PRN Temp greater or equal to 38C (100.4F), Mild Pain (1 - 3)  aluminum hydroxide/magnesium hydroxide/simethicone Suspension 30 milliLiter(s) Oral every 4 hours PRN Dyspepsia  dextrose Oral Gel 15 Gram(s) Oral once PRN Blood Glucose LESS THAN 70 milliGRAM(s)/deciliter  melatonin 3 milliGRAM(s) Oral at bedtime PRN Insomnia  ondansetron Injectable 4 milliGRAM(s) IV Push every 8 hours PRN Nausea and/or Vomiting  sodium chloride 0.9% lock flush 10 milliLiter(s) IV Push every 1 hour PRN Pre/post blood products, medications, blood draw, and to maintain line patency    Pertinent Labs: 08-03 Na139 mmol/L Glu 125 mg/dL<H> K+ 4.6 mmol/L Cr  5.63 mg/dL<H> BUN 54 mg/dL<H> 08-03 Phos 5.0 mg/dL<H> 08-03 Alb 3.0 g/dL<L>08-03 ALT 26 U/L AST 33 U/L Alkaline Phosphatase 69 U/L   CAPILLARY BLOOD GLUCOSE      POCT Blood Glucose.: 212 mg/dL (04 Aug 2023 10:53)  POCT Blood Glucose.: 140 mg/dL (04 Aug 2023 07:56)  POCT Blood Glucose.: 125 mg/dL (03 Aug 2023 22:40)  POCT Blood Glucose.: 225 mg/dL (03 Aug 2023 15:53)  POCT Blood Glucose.: 139 mg/dL (03 Aug 2023 12:11)    Skin: 08/04, WDL except ecchymotic     Estimated Needs:   [x ] no change since previous assessment(07/28)  [ ] recalculated:     Previous Nutrition Diagnosis: (07/28)  Malnutrition: moderate malnutrition in context of chronic illness  Etiology: inadequate protein-energy intake in setting of hx of ESRD on HD, cardiac dysfunction, DM  Signs/Symptoms: physical findings of mild/moderate fat/muscle loss  Goal/Expected Outcome: pt to consume >50-75% of meals & supplement	  Nutrition Diagnosis is [x ] ongoing  [ ] resolved [ ] not applicable       New Nutrition Diagnosis: [x ] not applicable     Interventions:   Recommend  [x ] Change Diet To: Regular, low sodium(diet liberalized due to depressed appetite, low sodium diet for hx of CHF suggested)  [x ] Nutrition Supplement; add Nepro 1 x day (will provide c dinner meal) = 425 calories, 19 grams protein per serving   [ ] Nutrition Support  [ ] Other:     Monitoring and Evaluation:   [x ] PO intake [ x ] Tolerance to diet prescription [ x ] weights [ x ] labs[ x ] follow up per protocol  [ ] other: Assessment:  Pt is on COVID-19 isolation, c c/o not receiving food items as ordered, cold meals etc. Food and Nutrition Service made aware, Nursing made aware.  Pt adm c 2019 Novel corona virus, c debility, weakness, ESRD on HD, d/c plan is for [facility].  PMHx inlcude DM2, HTN, HLD, CHF, recent pneumonia.      Factors impacting intake: [ ] none [ ] nausea  [ ] vomiting [ ] diarrhea [ ] constipation  [ ]chewing problems [ ] swallowing issues  [ x] other: acute& chronic illness, persistent lack of appetite     Diet Prescription: Diet, Regular (08-03-23 @ 19:01)     Intake: <75% of meals   Pt is requesting Nepro 1 x day c dinner meal to have as supplement for am early medication     Current Weight: 08/04, 72.8 kg, 07/27, 74.4 kg, c wt. loss of 1.6 kg  % Change: 2.2%    Pertinent Medications: MEDICATIONS  (STANDING):  amLODIPine   Tablet 10 milliGRAM(s) Oral daily  benzonatate 100 milliGRAM(s) Oral every 8 hours  chlorhexidine 2% Cloths 1 Application(s) Topical <User Schedule>  dextrose 5%. 1000 milliLiter(s) (50 mL/Hr) IV Continuous <Continuous>  dextrose 5%. 1000 milliLiter(s) (100 mL/Hr) IV Continuous <Continuous>  dextrose 50% Injectable 25 Gram(s) IV Push once  dextrose 50% Injectable 12.5 Gram(s) IV Push once  dextrose 50% Injectable 25 Gram(s) IV Push once  glucagon  Injectable 1 milliGRAM(s) IntraMuscular once  guaiFENesin  milliGRAM(s) Oral every 12 hours  heparin   Injectable 5000 Unit(s) SubCutaneous every 8 hours  hydrALAZINE 25 milliGRAM(s) Oral three times a day  hydrocodone/homatropine Syrup 5 milliLiter(s) Oral every 8 hours  insulin glargine Injectable (LANTUS) 3 Unit(s) SubCutaneous at bedtime  insulin lispro (ADMELOG) corrective regimen sliding scale   SubCutaneous three times a day before meals  insulin lispro (ADMELOG) corrective regimen sliding scale   SubCutaneous at bedtime  insulin lispro Injectable (ADMELOG) 2 Unit(s) SubCutaneous three times a day before meals  linagliptin 5 milliGRAM(s) Oral daily  metoclopramide Injectable 10 milliGRAM(s) IV Push once  metoprolol succinate  milliGRAM(s) Oral daily  Nephro-joseph 1 Tablet(s) Oral daily  simvastatin 40 milliGRAM(s) Oral at bedtime    MEDICATIONS  (PRN):  acetaminophen     Tablet .. 650 milliGRAM(s) Oral every 6 hours PRN Temp greater or equal to 38C (100.4F), Mild Pain (1 - 3)  aluminum hydroxide/magnesium hydroxide/simethicone Suspension 30 milliLiter(s) Oral every 4 hours PRN Dyspepsia  dextrose Oral Gel 15 Gram(s) Oral once PRN Blood Glucose LESS THAN 70 milliGRAM(s)/deciliter  melatonin 3 milliGRAM(s) Oral at bedtime PRN Insomnia  ondansetron Injectable 4 milliGRAM(s) IV Push every 8 hours PRN Nausea and/or Vomiting  sodium chloride 0.9% lock flush 10 milliLiter(s) IV Push every 1 hour PRN Pre/post blood products, medications, blood draw, and to maintain line patency    Pertinent Labs: 08-03 Na139 mmol/L Glu 125 mg/dL<H> K+ 4.6 mmol/L Cr  5.63 mg/dL<H> BUN 54 mg/dL<H> 08-03 Phos 5.0 mg/dL<H> 08-03 Alb 3.0 g/dL<L>08-03 ALT 26 U/L AST 33 U/L Alkaline Phosphatase 69 U/L   CAPILLARY BLOOD GLUCOSE      POCT Blood Glucose.: 212 mg/dL (04 Aug 2023 10:53)  POCT Blood Glucose.: 140 mg/dL (04 Aug 2023 07:56)  POCT Blood Glucose.: 125 mg/dL (03 Aug 2023 22:40)  POCT Blood Glucose.: 225 mg/dL (03 Aug 2023 15:53)  POCT Blood Glucose.: 139 mg/dL (03 Aug 2023 12:11)    Skin: 08/04, WDL except ecchymotic     Estimated Needs:   [x ] no change since previous assessment(07/28)  [ ] recalculated:     Previous Nutrition Diagnosis: (07/28)  Malnutrition: moderate malnutrition in context of chronic illness  Etiology: inadequate protein-energy intake in setting of hx of ESRD on HD, cardiac dysfunction, DM  Signs/Symptoms: physical findings of mild/moderate fat/muscle loss  Goal/Expected Outcome: pt to consume >50-75% of meals & supplement	  Nutrition Diagnosis is [x ] ongoing  [ ] resolved [ ] not applicable       New Nutrition Diagnosis: [x ] not applicable     Interventions:   Recommend  [x ] Change Diet To: Regular, low sodium(diet liberalized due to depressed appetite, low sodium diet for hx of CHF suggested)  [x ] Nutrition Supplement; add Nepro 1 x day (will provide c dinner meal) = 425 calories, 19 grams protein per serving   [ ] Nutrition Support  [ ] Other:     Monitoring and Evaluation:   [x ] PO intake [ x ] Tolerance to diet prescription [ x ] weights [ x ] labs[ x ] follow up per protocol  [ ] other:

## 2023-08-04 NOTE — PROGRESS NOTE ADULT - SUBJECTIVE AND OBJECTIVE BOX
[facility], [facility]  NEPHROLOGY AND HYPERTENSION  [address]  [address]  [place] [address]  [phone]    MD PRAVEEN LYNNE MD YELENA ROSENBERG, MD BINNY KOSHY, MD CHRISTOPHER CAPUTO, MD EDWARD BOVER, MD          Patient events noted    MEDICATIONS  (STANDING):  amLODIPine   Tablet 10 milliGRAM(s) Oral daily  benzonatate 100 milliGRAM(s) Oral every 8 hours  chlorhexidine 2% Cloths 1 Application(s) Topical <User Schedule>  dextrose 5%. 1000 milliLiter(s) (50 mL/Hr) IV Continuous <Continuous>  dextrose 5%. 1000 milliLiter(s) (100 mL/Hr) IV Continuous <Continuous>  dextrose 50% Injectable 25 Gram(s) IV Push once  dextrose 50% Injectable 12.5 Gram(s) IV Push once  dextrose 50% Injectable 25 Gram(s) IV Push once  glucagon  Injectable 1 milliGRAM(s) IntraMuscular once  guaiFENesin  milliGRAM(s) Oral every 12 hours  heparin   Injectable 5000 Unit(s) SubCutaneous every 8 hours  hydrALAZINE 25 milliGRAM(s) Oral three times a day  hydrocodone/homatropine Syrup 5 milliLiter(s) Oral every 8 hours  insulin glargine Injectable (LANTUS) 3 Unit(s) SubCutaneous at bedtime  insulin lispro (ADMELOG) corrective regimen sliding scale   SubCutaneous three times a day before meals  insulin lispro (ADMELOG) corrective regimen sliding scale   SubCutaneous at bedtime  insulin lispro Injectable (ADMELOG) 2 Unit(s) SubCutaneous three times a day before meals  linagliptin 5 milliGRAM(s) Oral daily  metoclopramide Injectable 10 milliGRAM(s) IV Push once  metoprolol succinate  milliGRAM(s) Oral daily  Nephro-joseph 1 Tablet(s) Oral daily  simvastatin 40 milliGRAM(s) Oral at bedtime    MEDICATIONS  (PRN):  acetaminophen     Tablet .. 650 milliGRAM(s) Oral every 6 hours PRN Temp greater or equal to 38C (100.4F), Mild Pain (1 - 3)  aluminum hydroxide/magnesium hydroxide/simethicone Suspension 30 milliLiter(s) Oral every 4 hours PRN Dyspepsia  dextrose Oral Gel 15 Gram(s) Oral once PRN Blood Glucose LESS THAN 70 milliGRAM(s)/deciliter  melatonin 3 milliGRAM(s) Oral at bedtime PRN Insomnia  ondansetron Injectable 4 milliGRAM(s) IV Push every 8 hours PRN Nausea and/or Vomiting  sodium chloride 0.9% lock flush 10 milliLiter(s) IV Push every 1 hour PRN Pre/post blood products, medications, blood draw, and to maintain line patency      08-04-23 @ 07:01  -  08-04-23 @ 18:51  --------------------------------------------------------  IN: 240 mL / OUT: 1500 mL / NET: -1260 mL      PHYSICAL EXAM:      T(C): 36.8 (08-04-23 @ 17:05), Max: 37.1 (08-04-23 @ 05:18)  HR: 62 (08-04-23 @ 17:05) (58 - 62)  BP: 101/56 (08-04-23 @ 17:05) (100/59 - 119/58)  RR: 18 (08-04-23 @ 17:05) (17 - 19)  SpO2: 93% (08-04-23 @ 17:05) (92% - 94%)  Wt(kg): --  Lungs clear  Heart S1S2  Abd soft NT ND  Extremities:   tr edema                                    10.7   7.74  )-----------( 190      ( 03 Aug 2023 08:18 )             32.4     08-04    138  |  104  |  82<H>  ----------------------------<  210<H>  4.5   |  29  |  8.23<H>    Ca    7.5<L>      04 Aug 2023 13:30  Phos  5.7     08-04  Mg     2.4     08-04    TPro  6.4  /  Alb  2.8<L>  /  TBili  0.3  /  DBili  x   /  AST  24  /  ALT  21  /  AlkPhos  70  08-04      LIVER FUNCTIONS - ( 04 Aug 2023 13:30 )  Alb: 2.8 g/dL / Pro: 6.4 gm/dL / ALK PHOS: 70 U/L / ALT: 21 U/L / AST: 24 U/L / GGT: x           Creatinine Trend: 8.23<--, 5.63<--, 7.49<--, 5.33<--, 7.41<--, 5.93<--      Assessment   ESRD, maintenance         Plan:  HD for today  UF as tolerated   Discharge planning     Erickson Ray MD

## 2023-08-04 NOTE — PROGRESS NOTE ADULT - ASSESSMENT
Chhest pain from yesterday resolved       A/P    1. COVID-19, adenovirus, acute hypoxic respiratory failure  - Remdesivir x 5 days last dose 8/1  - Wean patient off supplemental O2 as tolerated  - Tessalon for cough, will reorder hycoden given no improvement with tessalon  - Isolation, supportive care  - Decadron 6mg PO Daily X 7 days, dc as no longer hypoxic  - ISS    2. Debility, weakness:  - PT eval - [facility]    3. ESRD:  - On IHD    Dispo: [facility] is safer however patient is likely to go home on monday

## 2023-08-04 NOTE — DIETITIAN NUTRITION RISK NOTIFICATION - TREATMENT: THE FOLLOWING DIET HAS BEEN RECOMMENDED
Diet, Regular (08-03-23 @ 19:01) [Active]  Recommend regular low sodium, Nepro 1 x day (will provide c dinner meal)= 425 calories, 19 grams protein per serving

## 2023-08-05 LAB
ALBUMIN SERPL ELPH-MCNC: 2.7 G/DL — LOW (ref 3.3–5)
ALP SERPL-CCNC: 62 U/L — SIGNIFICANT CHANGE UP (ref 40–120)
ALT FLD-CCNC: 18 U/L — SIGNIFICANT CHANGE UP (ref 12–78)
AST SERPL-CCNC: 21 U/L — SIGNIFICANT CHANGE UP (ref 15–37)
BILIRUB DIRECT SERPL-MCNC: 0.1 MG/DL — SIGNIFICANT CHANGE UP (ref 0–0.3)
BILIRUB INDIRECT FLD-MCNC: 0.3 MG/DL — SIGNIFICANT CHANGE UP (ref 0.2–1)
BILIRUB SERPL-MCNC: 0.4 MG/DL — SIGNIFICANT CHANGE UP (ref 0.2–1.2)
CREAT SERPL-MCNC: 5.43 MG/DL — HIGH (ref 0.5–1.3)
EGFR: 8 ML/MIN/1.73M2 — LOW
GLUCOSE BLDC GLUCOMTR-MCNC: 121 MG/DL — HIGH (ref 70–99)
GLUCOSE BLDC GLUCOMTR-MCNC: 132 MG/DL — HIGH (ref 70–99)
GLUCOSE BLDC GLUCOMTR-MCNC: 158 MG/DL — HIGH (ref 70–99)
GLUCOSE BLDC GLUCOMTR-MCNC: 230 MG/DL — HIGH (ref 70–99)
INR BLD: 0.97 RATIO — SIGNIFICANT CHANGE UP (ref 0.85–1.18)
PROT SERPL-MCNC: 6 GM/DL — SIGNIFICANT CHANGE UP (ref 6–8.3)
PROTHROM AB SERPL-ACNC: 11.7 SEC — SIGNIFICANT CHANGE UP (ref 9.5–13)

## 2023-08-05 PROCEDURE — 99232 SBSQ HOSP IP/OBS MODERATE 35: CPT

## 2023-08-05 RX ADMIN — AMLODIPINE BESYLATE 10 MILLIGRAM(S): 2.5 TABLET ORAL at 06:41

## 2023-08-05 RX ADMIN — Medication 1 TABLET(S): at 12:17

## 2023-08-05 RX ADMIN — Medication 100 MILLIGRAM(S): at 06:42

## 2023-08-05 RX ADMIN — LINAGLIPTIN 5 MILLIGRAM(S): 5 TABLET, FILM COATED ORAL at 12:17

## 2023-08-05 RX ADMIN — Medication 600 MILLIGRAM(S): at 06:41

## 2023-08-05 RX ADMIN — Medication 1: at 09:09

## 2023-08-05 RX ADMIN — INSULIN GLARGINE 3 UNIT(S): 100 INJECTION, SOLUTION SUBCUTANEOUS at 22:05

## 2023-08-05 RX ADMIN — Medication 2 UNIT(S): at 17:29

## 2023-08-05 RX ADMIN — HEPARIN SODIUM 5000 UNIT(S): 5000 INJECTION INTRAVENOUS; SUBCUTANEOUS at 06:42

## 2023-08-05 RX ADMIN — Medication 2 UNIT(S): at 12:18

## 2023-08-05 RX ADMIN — Medication 25 MILLIGRAM(S): at 06:41

## 2023-08-05 RX ADMIN — SIMVASTATIN 40 MILLIGRAM(S): 20 TABLET, FILM COATED ORAL at 22:05

## 2023-08-05 RX ADMIN — Medication 2 UNIT(S): at 09:09

## 2023-08-05 RX ADMIN — HEPARIN SODIUM 5000 UNIT(S): 5000 INJECTION INTRAVENOUS; SUBCUTANEOUS at 22:05

## 2023-08-05 RX ADMIN — Medication 100 MILLIGRAM(S): at 14:12

## 2023-08-05 RX ADMIN — Medication 2: at 12:18

## 2023-08-05 RX ADMIN — Medication 100 MILLIGRAM(S): at 22:04

## 2023-08-05 NOTE — PROGRESS NOTE ADULT - SUBJECTIVE AND OBJECTIVE BOX
HPI:  This is a 78 year old female w/ PMHx of DM2, HTN, HLD, CHF, S/P pacemaker, and ESRD on HD MWF presents for evaluation after being sent by pulmonologist for for worsening dry cough.  States coughs so hard feels like ribs will break.  Of note patient was recently admitted 7/4-7/8/23 for LLL PNA (26 Jul 2023 06:34)  Patient is a 78y old  Female who presents with a chief complaint of cough (03 Aug 2023 20:09)      INTERVAL HPI/OVERNIGHT EVENTS: no acute events soft blood pressure   MEDICATIONS  (STANDING):  benzonatate 100 milliGRAM(s) Oral every 8 hours  chlorhexidine 2% Cloths 1 Application(s) Topical <User Schedule>  dextrose 5%. 1000 milliLiter(s) (50 mL/Hr) IV Continuous <Continuous>  dextrose 5%. 1000 milliLiter(s) (100 mL/Hr) IV Continuous <Continuous>  dextrose 50% Injectable 25 Gram(s) IV Push once  dextrose 50% Injectable 12.5 Gram(s) IV Push once  dextrose 50% Injectable 25 Gram(s) IV Push once  glucagon  Injectable 1 milliGRAM(s) IntraMuscular once  guaiFENesin  milliGRAM(s) Oral every 12 hours  heparin   Injectable 5000 Unit(s) SubCutaneous every 8 hours  hydrocodone/homatropine Syrup 5 milliLiter(s) Oral every 8 hours  insulin glargine Injectable (LANTUS) 3 Unit(s) SubCutaneous at bedtime  insulin lispro (ADMELOG) corrective regimen sliding scale   SubCutaneous at bedtime  insulin lispro (ADMELOG) corrective regimen sliding scale   SubCutaneous three times a day before meals  insulin lispro Injectable (ADMELOG) 2 Unit(s) SubCutaneous three times a day before meals  linagliptin 5 milliGRAM(s) Oral daily  metoclopramide Injectable 10 milliGRAM(s) IV Push once  metoprolol succinate  milliGRAM(s) Oral daily  Nephro-joseph 1 Tablet(s) Oral daily  simvastatin 40 milliGRAM(s) Oral at bedtime    MEDICATIONS  (PRN):  acetaminophen     Tablet .. 650 milliGRAM(s) Oral every 6 hours PRN Temp greater or equal to 38C (100.4F), Mild Pain (1 - 3)  aluminum hydroxide/magnesium hydroxide/simethicone Suspension 30 milliLiter(s) Oral every 4 hours PRN Dyspepsia  dextrose Oral Gel 15 Gram(s) Oral once PRN Blood Glucose LESS THAN 70 milliGRAM(s)/deciliter  melatonin 3 milliGRAM(s) Oral at bedtime PRN Insomnia  ondansetron Injectable 4 milliGRAM(s) IV Push every 8 hours PRN Nausea and/or Vomiting  sodium chloride 0.9% lock flush 10 milliLiter(s) IV Push every 1 hour PRN Pre/post blood products, medications, blood draw, and to maintain line patency        Allergies    Eliquis (Other)    Intolerances        REVIEW OF SYSTEMS:  CONSTITUTIONAL: No fever, weight loss, or fatigue  EYES: No eye pain, visual disturbances, or discharge  ENMT:  No difficulty hearing, tinnitus, vertigo; No sinus or throat pain  NECK: No pain or stiffness  BREASTS: No pain, masses, or nipple discharge  RESPIRATORY: No cough, wheezing, chills or hemoptysis; No shortness of breath  CARDIOVASCULAR: No chest pain, palpitations, dizziness, or leg swelling  GASTROINTESTINAL: No abdominal or epigastric pain. No nausea, vomiting, or hematemesis; No diarrhea or constipation. No melena or hematochezia.  GENITOURINARY: No dysuria, frequency, hematuria, or incontinence  NEUROLOGICAL: No headaches, memory loss, loss of strength, numbness, or tremors  SKIN: No itching, burning, rashes, or lesions   LYMPH NODES: No enlarged glands  ENDOCRINE: No heat or cold intolerance; No hair loss  MUSCULOSKELETAL: No joint pain or swelling; No muscle, back, or extremity pain  PSYCHIATRIC: No depression, anxiety, mood swings, or difficulty sleeping  HEME/LYMPH: No easy bruising, or bleeding gums  ALLERGY AND IMMUNOLOGIC: No hives or eczema    Vital Signs Last 24 Hrs  T(C): 36.7 (06 Aug 2023 00:02), Max: 37.5 (05 Aug 2023 10:48)  T(F): 98.1 (06 Aug 2023 00:02), Max: 99.5 (05 Aug 2023 10:48)  HR: 61 (06 Aug 2023 00:02) (58 - 62)  BP: 109/61 (06 Aug 2023 00:02) (91/45 - 120/67)  BP(mean): --  RR: 18 (06 Aug 2023 00:02) (17 - 18)  SpO2: 97% (06 Aug 2023 00:02) (92% - 97%)    Parameters below as of 06 Aug 2023 00:02  Patient On (Oxygen Delivery Method): room air            PHYSICAL EXAM:  GENERAL: NAD,   HEAD:  Atraumatic, Normocephalic  EYES: EOMI, PERRLA, conjunctiva and sclera clear  ENMT: No tonsillar erythema, exudates, or enlargement; Moist mucous membranes, Good dentition, No lesions  NECK: Supple, No JVD, Normal thyroid  NERVOUS SYSTEM:  Alert & Oriented X3, Good concentration; Motor Strength 5/5 B/L upper and lower extremities; DTRs 2+ intact and symmetric  CHEST/LUNG: Clear to ascultation  bilaterally; No rales, rhonchi, wheezing, or rubs  HEART: Regular rate and rhythm; No murmurs, rubs, or gallops  ABDOMEN: Soft, Nontender, Nondistended; Bowel sounds present  EXTREMITIES:  dialysis access   LYMPH: No lymphadenopathy noted  SKIN: No rashes or lesions    LABS:                        10.7   7.74  )-----------( 190      ( 03 Aug 2023 08:18 )             32.4     08-04    138  |  104  |  82<H>  ----------------------------<  210<H>  4.5   |  29  |  8.23<H>    Ca    7.5<L>      04 Aug 2023 13:30  Phos  5.7     08-04  Mg     2.4     08-04    TPro  6.4  /  Alb  2.8<L>  /  TBili  0.3  /  DBili  x   /  AST  24  /  ALT  21  /  AlkPhos  70  08-04    PT/INR - ( 04 Aug 2023 08:28 )   PT: 10.7 sec;   INR: 0.90 ratio           Urinalysis Basic - ( 04 Aug 2023 13:30 )    Color: x / Appearance: x / SG: x / pH: x  Gluc: 210 mg/dL / Ketone: x  / Bili: x / Urobili: x   Blood: x / Protein: x / Nitrite: x   Leuk Esterase: x / RBC: x / WBC x   Sq Epi: x / Non Sq Epi: x / Bacteria: x      CAPILLARY BLOOD GLUCOSE      POCT Blood Glucose.: 108 mg/dL (04 Aug 2023 16:54)  POCT Blood Glucose.: 212 mg/dL (04 Aug 2023 10:53)  POCT Blood Glucose.: 140 mg/dL (04 Aug 2023 07:56)  POCT Blood Glucose.: 125 mg/dL (03 Aug 2023 22:40)      RADIOLOGY & ADDITIONAL TESTS:    Imaging Personally Reviewed:  [X ] YES  [ ] NO    Consultant(s) Notes Reviewed:  [X ] YES  [ ] NO    Care Discussed with Consultants/Other Providers [X ] YES  [ ] NO

## 2023-08-06 LAB
ALBUMIN SERPL ELPH-MCNC: 3 G/DL — LOW (ref 3.3–5)
ALP SERPL-CCNC: 69 U/L — SIGNIFICANT CHANGE UP (ref 40–120)
ALT FLD-CCNC: 16 U/L — SIGNIFICANT CHANGE UP (ref 12–78)
ANION GAP SERPL CALC-SCNC: 6 MMOL/L — SIGNIFICANT CHANGE UP (ref 5–17)
AST SERPL-CCNC: 21 U/L — SIGNIFICANT CHANGE UP (ref 15–37)
BILIRUB DIRECT SERPL-MCNC: 0.1 MG/DL — SIGNIFICANT CHANGE UP (ref 0–0.3)
BILIRUB INDIRECT FLD-MCNC: 0.4 MG/DL — SIGNIFICANT CHANGE UP (ref 0.2–1)
BILIRUB SERPL-MCNC: 0.5 MG/DL — SIGNIFICANT CHANGE UP (ref 0.2–1.2)
BUN SERPL-MCNC: 56 MG/DL — HIGH (ref 7–23)
CALCIUM SERPL-MCNC: 8.1 MG/DL — LOW (ref 8.5–10.1)
CHLORIDE SERPL-SCNC: 105 MMOL/L — SIGNIFICANT CHANGE UP (ref 96–108)
CO2 SERPL-SCNC: 26 MMOL/L — SIGNIFICANT CHANGE UP (ref 22–31)
CREAT SERPL-MCNC: 7.88 MG/DL — HIGH (ref 0.5–1.3)
CREAT SERPL-MCNC: 8.1 MG/DL — HIGH (ref 0.5–1.3)
EGFR: 5 ML/MIN/1.73M2 — LOW
EGFR: 5 ML/MIN/1.73M2 — LOW
GLUCOSE BLDC GLUCOMTR-MCNC: 137 MG/DL — HIGH (ref 70–99)
GLUCOSE BLDC GLUCOMTR-MCNC: 161 MG/DL — HIGH (ref 70–99)
GLUCOSE BLDC GLUCOMTR-MCNC: 226 MG/DL — HIGH (ref 70–99)
GLUCOSE BLDC GLUCOMTR-MCNC: 93 MG/DL — SIGNIFICANT CHANGE UP (ref 70–99)
GLUCOSE SERPL-MCNC: 100 MG/DL — HIGH (ref 70–99)
HCT VFR BLD CALC: 27.9 % — LOW (ref 34.5–45)
HGB BLD-MCNC: 9.7 G/DL — LOW (ref 11.5–15.5)
INR BLD: 0.91 RATIO — SIGNIFICANT CHANGE UP (ref 0.85–1.18)
MCHC RBC-ENTMCNC: 29.4 PG — SIGNIFICANT CHANGE UP (ref 27–34)
MCHC RBC-ENTMCNC: 34.8 G/DL — SIGNIFICANT CHANGE UP (ref 32–36)
MCV RBC AUTO: 84.5 FL — SIGNIFICANT CHANGE UP (ref 80–100)
NRBC # BLD: 0 /100 WBCS — SIGNIFICANT CHANGE UP (ref 0–0)
PLATELET # BLD AUTO: 192 K/UL — SIGNIFICANT CHANGE UP (ref 150–400)
POTASSIUM SERPL-MCNC: 4.5 MMOL/L — SIGNIFICANT CHANGE UP (ref 3.5–5.3)
POTASSIUM SERPL-SCNC: 4.5 MMOL/L — SIGNIFICANT CHANGE UP (ref 3.5–5.3)
PROT SERPL-MCNC: 6.7 GM/DL — SIGNIFICANT CHANGE UP (ref 6–8.3)
PROTHROM AB SERPL-ACNC: 10.9 SEC — SIGNIFICANT CHANGE UP (ref 9.5–13)
RBC # BLD: 3.3 M/UL — LOW (ref 3.8–5.2)
RBC # FLD: 17.3 % — HIGH (ref 10.3–14.5)
SODIUM SERPL-SCNC: 137 MMOL/L — SIGNIFICANT CHANGE UP (ref 135–145)
WBC # BLD: 8.87 K/UL — SIGNIFICANT CHANGE UP (ref 3.8–10.5)
WBC # FLD AUTO: 8.87 K/UL — SIGNIFICANT CHANGE UP (ref 3.8–10.5)

## 2023-08-06 PROCEDURE — 99232 SBSQ HOSP IP/OBS MODERATE 35: CPT

## 2023-08-06 RX ADMIN — Medication 1 TABLET(S): at 12:14

## 2023-08-06 RX ADMIN — HEPARIN SODIUM 5000 UNIT(S): 5000 INJECTION INTRAVENOUS; SUBCUTANEOUS at 21:45

## 2023-08-06 RX ADMIN — SIMVASTATIN 40 MILLIGRAM(S): 20 TABLET, FILM COATED ORAL at 21:46

## 2023-08-06 RX ADMIN — Medication 100 MILLIGRAM(S): at 21:46

## 2023-08-06 RX ADMIN — LINAGLIPTIN 5 MILLIGRAM(S): 5 TABLET, FILM COATED ORAL at 12:14

## 2023-08-06 RX ADMIN — Medication 2 UNIT(S): at 12:14

## 2023-08-06 RX ADMIN — Medication 2 UNIT(S): at 09:04

## 2023-08-06 RX ADMIN — Medication 2 UNIT(S): at 17:18

## 2023-08-06 RX ADMIN — HEPARIN SODIUM 5000 UNIT(S): 5000 INJECTION INTRAVENOUS; SUBCUTANEOUS at 05:10

## 2023-08-06 RX ADMIN — HEPARIN SODIUM 5000 UNIT(S): 5000 INJECTION INTRAVENOUS; SUBCUTANEOUS at 13:09

## 2023-08-06 RX ADMIN — CHLORHEXIDINE GLUCONATE 1 APPLICATION(S): 213 SOLUTION TOPICAL at 09:05

## 2023-08-06 RX ADMIN — INSULIN GLARGINE 3 UNIT(S): 100 INJECTION, SOLUTION SUBCUTANEOUS at 21:45

## 2023-08-06 RX ADMIN — Medication 100 MILLIGRAM(S): at 05:10

## 2023-08-06 RX ADMIN — Medication 2: at 12:14

## 2023-08-06 NOTE — PROGRESS NOTE ADULT - ASSESSMENT
Chhest pain from yesterday resolved       A/P    1. COVID-19, adenovirus, acute hypoxic respiratory failure remains positiv e  - Remdesivir x 5 days last dose 8/1  - Wean patient off supplemental O2 as tolerated  - Tessalon for cough, will reorder hycoden given no improvement with tessalon  - Isolation, supportive care  - Decadron 6mg PO Daily X 7 days, dc as no longer hypoxic  - ISS    2. Debility, weakness:  - PT eval - [facility]    3. ESRD:  - On IHD  4: Now hypotensive will hold blood pressure meds     Dispo: [facility] is safer however patient is likely to go home on Monday patient is willing to reconsider Will discuss with son today Hypoxia [R09.02]    HTN (hypertension) [I10]    CHF (congestive heart failure) [I50.9]    ESRD on dialysis [N18.6]    Type 2 diabetes mellitus [E11.9]    Pneumonia due to COVID-19 virus [U07.1]    Adenovirus infection [B34.0]

## 2023-08-06 NOTE — PROGRESS NOTE ADULT - SUBJECTIVE AND OBJECTIVE BOX
HPI:  This is a 78 year old female w/ PMHx of DM2, HTN, HLD, CHF, S/P pacemaker, and ESRD on HD MWF presents for evaluation after being sent by pulmonologist for for worsening dry cough.  States coughs so hard feels like ribs will break.  Of note patient was recently admitted 7/4-7/8/23 for LLL PNA (26 Jul 2023 06:34)    Patient is a 78y old  Female who presents with a chief complaint of cough (05 Aug 2023 11:40)      INTERVAL HPI/OVERNIGHT EVENTS: continues to complain of cough worries     MEDICATIONS  (STANDING):  benzonatate 100 milliGRAM(s) Oral every 8 hours  chlorhexidine 2% Cloths 1 Application(s) Topical <User Schedule>  dextrose 5%. 1000 milliLiter(s) (50 mL/Hr) IV Continuous <Continuous>  dextrose 5%. 1000 milliLiter(s) (100 mL/Hr) IV Continuous <Continuous>  dextrose 50% Injectable 25 Gram(s) IV Push once  dextrose 50% Injectable 12.5 Gram(s) IV Push once  dextrose 50% Injectable 25 Gram(s) IV Push once  glucagon  Injectable 1 milliGRAM(s) IntraMuscular once  guaiFENesin  milliGRAM(s) Oral every 12 hours  heparin   Injectable 5000 Unit(s) SubCutaneous every 8 hours  hydrocodone/homatropine Syrup 5 milliLiter(s) Oral every 8 hours  insulin glargine Injectable (LANTUS) 3 Unit(s) SubCutaneous at bedtime  insulin lispro (ADMELOG) corrective regimen sliding scale   SubCutaneous at bedtime  insulin lispro (ADMELOG) corrective regimen sliding scale   SubCutaneous three times a day before meals  insulin lispro Injectable (ADMELOG) 2 Unit(s) SubCutaneous three times a day before meals  linagliptin 5 milliGRAM(s) Oral daily  metoclopramide Injectable 10 milliGRAM(s) IV Push once  metoprolol succinate  milliGRAM(s) Oral daily  Nephro-joseph 1 Tablet(s) Oral daily  simvastatin 40 milliGRAM(s) Oral at bedtime    MEDICATIONS  (PRN):  acetaminophen     Tablet .. 650 milliGRAM(s) Oral every 6 hours PRN Temp greater or equal to 38C (100.4F), Mild Pain (1 - 3)  aluminum hydroxide/magnesium hydroxide/simethicone Suspension 30 milliLiter(s) Oral every 4 hours PRN Dyspepsia  dextrose Oral Gel 15 Gram(s) Oral once PRN Blood Glucose LESS THAN 70 milliGRAM(s)/deciliter  melatonin 3 milliGRAM(s) Oral at bedtime PRN Insomnia  ondansetron Injectable 4 milliGRAM(s) IV Push every 8 hours PRN Nausea and/or Vomiting  sodium chloride 0.9% lock flush 10 milliLiter(s) IV Push every 1 hour PRN Pre/post blood products, medications, blood draw, and to maintain line patency      Allergies    Eliquis (Other)    Intolerances        REVIEW OF SYSTEMS:  CONSTITUTIONAL: fatigue  EYES: No eye pain, visual disturbances, or discharge  ENMT:  No difficulty hearing, tinnitus, vertigo; No sinus or throat pain  NECK: No pain or stiffness  BREASTS: No pain, masses, or nipple discharge  RESPIRATORY: cough   CARDIOVASCULAR: No chest pain, palpitations, dizziness, or leg swelling  GASTROINTESTINAL: No abdominal or epigastric pain. No nausea, vomiting, or hematemesis; No diarrhea or constipation. No melena or hematochezia.  GENITOURINARY: No dysuria, frequency, hematuria, or incontinence  NEUROLOGICAL: No headaches, memory loss, loss of strength, numbness, or tremors  SKIN: No itching, burning, rashes, or lesions   LYMPH NODES: No enlarged glands  ENDOCRINE: No heat or cold intolerance; No hair loss  MUSCULOSKELETAL: No joint pain or swelling; No muscle, back, or extremity pain  PSYCHIATRIC: No depression, anxiety, mood swings, or difficulty sleeping  HEME/LYMPH: No easy bruising, or bleeding gums  ALLERGY AND IMMUNOLOGIC: No hives or eczema    Vital Signs Last 24 Hrs  T(C): 36.5 (06 Aug 2023 11:34), Max: 37.1 (05 Aug 2023 17:15)  T(F): 97.7 (06 Aug 2023 11:34), Max: 98.8 (05 Aug 2023 17:15)  HR: 60 (06 Aug 2023 11:34) (60 - 62)  BP: 97/59 (06 Aug 2023 11:34) (97/59 - 109/61)  RR: 18 (06 Aug 2023 11:34) (18 - 18)  SpO2: 95% (06 Aug 2023 11:34) (94% - 97%)    Parameters below as of 06 Aug 2023 11:34  Patient On (Oxygen Delivery Method): room air        PHYSICAL EXAM:  GENERAL: NAD, well-  HEAD:  Atraumatic, Normocephalic  EYES: EOMI, PERRLA, conjunctiva and sclera clear  ENMT: No tonsillar erythema, exudates, or enlargement; Moist mucous membranes, Good dentition, No lesions  NECK: Supple, No JVD, Normal thyroid  NERVOUS SYSTEM:  Alert & Oriented X3, Good concentration; Motor Strength 5/5 B/L upper and lower extremities; DTRs 2+ intact and symmetric  CHEST/LUNG: Clear to ascultation  bilaterally; No rales, rhonchi, wheezing, or rubs  HEART: Regular rate and rhythm; No murmurs, rubs, or gallops  ABDOMEN: Soft, Nontender, Nondistended; Bowel sounds present  EXTREMITIES: dialysis access left arm with   LYMPH: No lymphadenopathy noted  SKIN: No rashes or lesions    LABS:                        9.7    8.87  )-----------( 192      ( 06 Aug 2023 09:55 )             27.9     08-06    x   |  x   |  x   ----------------------------<  x   x    |  x   |  8.10<H>    Ca    8.1<L>      06 Aug 2023 07:35    TPro  6.7  /  Alb  3.0<L>  /  TBili  0.5  /  DBili  0.1  /  AST  21  /  ALT  16  /  AlkPhos  69  08-06    PT/INR - ( 06 Aug 2023 09:55 )   PT: 10.9 sec;   INR: 0.91 ratio           Urinalysis Basic - ( 06 Aug 2023 07:35 )    Color: x / Appearance: x / SG: x / pH: x  Gluc: 100 mg/dL / Ketone: x  / Bili: x / Urobili: x   Blood: x / Protein: x / Nitrite: x   Leuk Esterase: x / RBC: x / WBC x   Sq Epi: x / Non Sq Epi: x / Bacteria: x      CAPILLARY BLOOD GLUCOSE      POCT Blood Glucose.: 226 mg/dL (06 Aug 2023 11:53)  POCT Blood Glucose.: 93 mg/dL (06 Aug 2023 08:37)  POCT Blood Glucose.: 121 mg/dL (05 Aug 2023 21:11)  POCT Blood Glucose.: 132 mg/dL (05 Aug 2023 16:55)      RADIOLOGY & ADDITIONAL TESTS:    Imaging Personally Reviewed:  [ ] YES  [ ] NO    Consultant(s) Notes Reviewed:  [ ] YES  [ ] NO    Care Discussed with Consultants/Other Providers [ ] YES  [ ] NO

## 2023-08-07 LAB
ANION GAP SERPL CALC-SCNC: 8 MMOL/L — SIGNIFICANT CHANGE UP (ref 5–17)
BUN SERPL-MCNC: 75 MG/DL — HIGH (ref 7–23)
CALCIUM SERPL-MCNC: 8 MG/DL — LOW (ref 8.5–10.1)
CHLORIDE SERPL-SCNC: 104 MMOL/L — SIGNIFICANT CHANGE UP (ref 96–108)
CO2 SERPL-SCNC: 25 MMOL/L — SIGNIFICANT CHANGE UP (ref 22–31)
CREAT SERPL-MCNC: 9.56 MG/DL — HIGH (ref 0.5–1.3)
EGFR: 4 ML/MIN/1.73M2 — LOW
GLUCOSE BLDC GLUCOMTR-MCNC: 110 MG/DL — HIGH (ref 70–99)
GLUCOSE BLDC GLUCOMTR-MCNC: 131 MG/DL — HIGH (ref 70–99)
GLUCOSE BLDC GLUCOMTR-MCNC: 157 MG/DL — HIGH (ref 70–99)
GLUCOSE BLDC GLUCOMTR-MCNC: 182 MG/DL — HIGH (ref 70–99)
GLUCOSE SERPL-MCNC: 95 MG/DL — SIGNIFICANT CHANGE UP (ref 70–99)
HCT VFR BLD CALC: 26.9 % — LOW (ref 34.5–45)
HGB BLD-MCNC: 9.2 G/DL — LOW (ref 11.5–15.5)
MAGNESIUM SERPL-MCNC: 2.4 MG/DL — SIGNIFICANT CHANGE UP (ref 1.6–2.6)
MCHC RBC-ENTMCNC: 28.8 PG — SIGNIFICANT CHANGE UP (ref 27–34)
MCHC RBC-ENTMCNC: 34.2 G/DL — SIGNIFICANT CHANGE UP (ref 32–36)
MCV RBC AUTO: 84.1 FL — SIGNIFICANT CHANGE UP (ref 80–100)
NRBC # BLD: 0 /100 WBCS — SIGNIFICANT CHANGE UP (ref 0–0)
PHOSPHATE SERPL-MCNC: 6.2 MG/DL — HIGH (ref 2.5–4.5)
PLATELET # BLD AUTO: 202 K/UL — SIGNIFICANT CHANGE UP (ref 150–400)
POTASSIUM SERPL-MCNC: 4.5 MMOL/L — SIGNIFICANT CHANGE UP (ref 3.5–5.3)
POTASSIUM SERPL-SCNC: 4.5 MMOL/L — SIGNIFICANT CHANGE UP (ref 3.5–5.3)
RBC # BLD: 3.2 M/UL — LOW (ref 3.8–5.2)
RBC # FLD: 17.4 % — HIGH (ref 10.3–14.5)
SODIUM SERPL-SCNC: 137 MMOL/L — SIGNIFICANT CHANGE UP (ref 135–145)
WBC # BLD: 9.55 K/UL — SIGNIFICANT CHANGE UP (ref 3.8–10.5)
WBC # FLD AUTO: 9.55 K/UL — SIGNIFICANT CHANGE UP (ref 3.8–10.5)

## 2023-08-07 PROCEDURE — 99232 SBSQ HOSP IP/OBS MODERATE 35: CPT

## 2023-08-07 RX ADMIN — SIMVASTATIN 40 MILLIGRAM(S): 20 TABLET, FILM COATED ORAL at 22:11

## 2023-08-07 RX ADMIN — HEPARIN SODIUM 5000 UNIT(S): 5000 INJECTION INTRAVENOUS; SUBCUTANEOUS at 22:14

## 2023-08-07 RX ADMIN — LINAGLIPTIN 5 MILLIGRAM(S): 5 TABLET, FILM COATED ORAL at 14:04

## 2023-08-07 RX ADMIN — Medication 100 MILLIGRAM(S): at 06:51

## 2023-08-07 RX ADMIN — Medication 100 MILLIGRAM(S): at 14:04

## 2023-08-07 RX ADMIN — Medication 2 UNIT(S): at 08:35

## 2023-08-07 RX ADMIN — HEPARIN SODIUM 5000 UNIT(S): 5000 INJECTION INTRAVENOUS; SUBCUTANEOUS at 14:32

## 2023-08-07 RX ADMIN — Medication 1: at 08:35

## 2023-08-07 RX ADMIN — Medication 1 TABLET(S): at 14:04

## 2023-08-07 RX ADMIN — INSULIN GLARGINE 3 UNIT(S): 100 INJECTION, SOLUTION SUBCUTANEOUS at 22:12

## 2023-08-07 NOTE — PROGRESS NOTE ADULT - SUBJECTIVE AND OBJECTIVE BOX
[facility] NEPHROLOGY SERVICES, [facility]  NEPHROLOGY AND HYPERTENSION  [address]  [address]  [place] [address]  [phone]    MD PRAVEEN LYNNE MD YELENA ROSENBERG, MD BINNY KOSHY, MD CHRISTOPHER CAPUTO, MD EDWARD BOVER, MD          Patient events noted    MEDICATIONS  (STANDING):  benzonatate 100 milliGRAM(s) Oral every 8 hours  chlorhexidine 2% Cloths 1 Application(s) Topical <User Schedule>  dextrose 5%. 1000 milliLiter(s) (50 mL/Hr) IV Continuous <Continuous>  dextrose 5%. 1000 milliLiter(s) (100 mL/Hr) IV Continuous <Continuous>  dextrose 50% Injectable 25 Gram(s) IV Push once  dextrose 50% Injectable 12.5 Gram(s) IV Push once  dextrose 50% Injectable 25 Gram(s) IV Push once  glucagon  Injectable 1 milliGRAM(s) IntraMuscular once  guaiFENesin  milliGRAM(s) Oral every 12 hours  heparin   Injectable 5000 Unit(s) SubCutaneous every 8 hours  hydrocodone/homatropine Syrup 5 milliLiter(s) Oral every 8 hours  insulin glargine Injectable (LANTUS) 3 Unit(s) SubCutaneous at bedtime  insulin lispro (ADMELOG) corrective regimen sliding scale   SubCutaneous three times a day before meals  insulin lispro (ADMELOG) corrective regimen sliding scale   SubCutaneous at bedtime  insulin lispro Injectable (ADMELOG) 2 Unit(s) SubCutaneous three times a day before meals  linagliptin 5 milliGRAM(s) Oral daily  metoclopramide Injectable 10 milliGRAM(s) IV Push once  Nephro-joseph 1 Tablet(s) Oral daily  simvastatin 40 milliGRAM(s) Oral at bedtime    MEDICATIONS  (PRN):  acetaminophen     Tablet .. 650 milliGRAM(s) Oral every 6 hours PRN Temp greater or equal to 38C (100.4F), Mild Pain (1 - 3)  aluminum hydroxide/magnesium hydroxide/simethicone Suspension 30 milliLiter(s) Oral every 4 hours PRN Dyspepsia  dextrose Oral Gel 15 Gram(s) Oral once PRN Blood Glucose LESS THAN 70 milliGRAM(s)/deciliter  melatonin 3 milliGRAM(s) Oral at bedtime PRN Insomnia  ondansetron Injectable 4 milliGRAM(s) IV Push every 8 hours PRN Nausea and/or Vomiting  sodium chloride 0.9% lock flush 10 milliLiter(s) IV Push every 1 hour PRN Pre/post blood products, medications, blood draw, and to maintain line patency      08-07-23 @ 07:01  -  08-07-23 @ 21:42  --------------------------------------------------------  IN: 0 mL / OUT: 2000 mL / NET: -2000 mL      PHYSICAL EXAM:      T(C): 36.8 (08-07-23 @ 16:17), Max: 36.9 (08-07-23 @ 15:02)  HR: 68 (08-07-23 @ 16:17) (54 - 68)  BP: 114/65 (08-07-23 @ 16:17) (102/52 - 127/54)  RR: 19 (08-07-23 @ 16:17) (16 - 20)  SpO2: 96% (08-07-23 @ 16:17) (94% - 98%)  Wt(kg): --  Lungs clear  Heart S1S2  Abd soft NT ND  Extremities:   tr edema                                    9.2    9.55  )-----------( 202      ( 07 Aug 2023 07:00 )             26.9     08-07    137  |  104  |  75<H>  ----------------------------<  95  4.5   |  25  |  9.56<H>    Ca    8.0<L>      07 Aug 2023 07:00  Phos  6.2     08-07  Mg     2.4     08-07    TPro  6.7  /  Alb  3.0<L>  /  TBili  0.5  /  DBili  0.1  /  AST  21  /  ALT  16  /  AlkPhos  69  08-06      LIVER FUNCTIONS - ( 06 Aug 2023 09:55 )  Alb: 3.0 g/dL / Pro: 6.7 gm/dL / ALK PHOS: 69 U/L / ALT: 16 U/L / AST: 21 U/L / GGT: x           Creatinine Trend: 9.56<--, 8.10<--, 7.88<--, 5.43<--, 8.23<--, 5.63<--      Assessment   ESRD maintenance     Plan:  HD for today completed  Discharge planning     Erickson Ray MD

## 2023-08-08 LAB
FLUAV AG NPH QL: SIGNIFICANT CHANGE UP
FLUBV AG NPH QL: SIGNIFICANT CHANGE UP
GLUCOSE BLDC GLUCOMTR-MCNC: 111 MG/DL — HIGH (ref 70–99)
GLUCOSE BLDC GLUCOMTR-MCNC: 118 MG/DL — HIGH (ref 70–99)
GLUCOSE BLDC GLUCOMTR-MCNC: 133 MG/DL — HIGH (ref 70–99)
HBV CORE AB SER-ACNC: SIGNIFICANT CHANGE UP
HBV SURFACE AB SER-ACNC: REACTIVE
HBV SURFACE AG SER-ACNC: SIGNIFICANT CHANGE UP
HCV AB S/CO SERPL IA: 0.09 S/CO — SIGNIFICANT CHANGE UP (ref 0–0.99)
HCV AB SERPL-IMP: SIGNIFICANT CHANGE UP
SARS-COV-2 RNA SPEC QL NAA+PROBE: DETECTED

## 2023-08-08 PROCEDURE — 71045 X-RAY EXAM CHEST 1 VIEW: CPT | Mod: 26

## 2023-08-08 PROCEDURE — 99232 SBSQ HOSP IP/OBS MODERATE 35: CPT

## 2023-08-08 RX ORDER — SIMVASTATIN 20 MG/1
1 TABLET, FILM COATED ORAL
Qty: 30 | Refills: 0
Start: 2023-08-08 | End: 2023-09-06

## 2023-08-08 RX ORDER — LINAGLIPTIN 5 MG/1
1 TABLET, FILM COATED ORAL
Qty: 30 | Refills: 0
Start: 2023-08-08 | End: 2023-09-06

## 2023-08-08 RX ORDER — METOPROLOL TARTRATE 50 MG
1 TABLET ORAL
Qty: 30 | Refills: 0
Start: 2023-08-08 | End: 2023-09-06

## 2023-08-08 RX ORDER — LINAGLIPTIN 5 MG/1
1 TABLET, FILM COATED ORAL
Qty: 0 | Refills: 0 | DISCHARGE

## 2023-08-08 RX ORDER — METOPROLOL TARTRATE 50 MG
1 TABLET ORAL
Qty: 0 | Refills: 0 | DISCHARGE

## 2023-08-08 RX ADMIN — Medication 2 UNIT(S): at 08:15

## 2023-08-08 RX ADMIN — HEPARIN SODIUM 5000 UNIT(S): 5000 INJECTION INTRAVENOUS; SUBCUTANEOUS at 13:53

## 2023-08-08 RX ADMIN — Medication 3 MILLIGRAM(S): at 00:45

## 2023-08-08 RX ADMIN — Medication 2 UNIT(S): at 10:53

## 2023-08-08 RX ADMIN — Medication 100 MILLIGRAM(S): at 13:53

## 2023-08-08 RX ADMIN — Medication 1 TABLET(S): at 11:21

## 2023-08-08 RX ADMIN — HEPARIN SODIUM 5000 UNIT(S): 5000 INJECTION INTRAVENOUS; SUBCUTANEOUS at 05:32

## 2023-08-08 RX ADMIN — CHLORHEXIDINE GLUCONATE 1 APPLICATION(S): 213 SOLUTION TOPICAL at 05:36

## 2023-08-08 NOTE — PROGRESS NOTE ADULT - NUTRITIONAL ASSESSMENT
This patient has been assessed with a concern for Malnutrition and has been determined to have a diagnosis/diagnoses of Moderate protein-calorie malnutrition.    This patient is being managed with:   Diet Regular-  Low Sodium  Supplement Feeding Modality:  Oral  Nepro Cans or Servings Per Day:  1       Frequency:  Daily  Entered: Aug  4 2023 11:55AM  
This patient has been assessed with a concern for Malnutrition and has been determined to have a diagnosis/diagnoses of Moderate protein-calorie malnutrition.    This patient is being managed with:   Diet Regular-  Low Sodium  Supplement Feeding Modality:  Oral  Nepro Cans or Servings Per Day:  1       Frequency:  Daily  Entered: Aug  4 2023 11:55AM    This patient has been assessed with a concern for Malnutrition and has been determined to have a diagnosis/diagnoses of Moderate protein-calorie malnutrition.    This patient is being managed with:   Diet Regular-  Low Sodium  Supplement Feeding Modality:  Oral  Nepro Cans or Servings Per Day:  1       Frequency:  Daily  Entered: Aug  4 2023 11:55AM  
This patient has been assessed with a concern for Malnutrition and has been determined to have a diagnosis/diagnoses of Moderate protein-calorie malnutrition.    This patient is being managed with:   Diet Regular-  Low Sodium  Supplement Feeding Modality:  Oral  Nepro Cans or Servings Per Day:  1       Frequency:  Daily  Entered: Aug  4 2023 11:55AM  
This patient has been assessed with a concern for Malnutrition and has been determined to have a diagnosis/diagnoses of Moderate protein-calorie malnutrition.    This patient is being managed with:   Diet Regular-  Low Sodium  Supplement Feeding Modality:  Oral  Nepro Cans or Servings Per Day:  1       Frequency:  Daily  Entered: Aug  4 2023 11:55AM  
This patient has been assessed with a concern for Malnutrition and has been determined to have a diagnosis/diagnoses of Moderate protein-calorie malnutrition.    This patient is being managed with:   Diet Regular-  Low Sodium  Supplement Feeding Modality:  Oral  Nepro Cans or Servings Per Day:  1       Frequency:  Daily  Entered: Aug  4 2023 11:55AM    This patient has been assessed with a concern for Malnutrition and has been determined to have a diagnosis/diagnoses of Moderate protein-calorie malnutrition.    This patient is being managed with:   Diet Regular-  Low Sodium  Supplement Feeding Modality:  Oral  Nepro Cans or Servings Per Day:  1       Frequency:  Daily  Entered: Aug  4 2023 11:55AM

## 2023-08-08 NOTE — PROGRESS NOTE ADULT - ASSESSMENT
Chhest pain from yesterday resolved       A/P    1. COVID-19, adenovirus, acute hypoxic respiratory failure remains positiv e  - Remdesivir x 5 days last dose 8/1  - Wean patient off supplemental O2 as tolerated  - Tessalon for cough, will reorder hycoden given no improvement with tessalon  - Isolation, supportive care  - Decadron 6mg PO Daily X 7 days,cmplete - ISS    2. Debility, weakness:  - PT eval - [facility]    3. ESRD:  - On IHD  4: Now hypotensive will hold blood pressure meds     Dispo: [facility] is safer however patient is likely to go home tuesday      HTN (hypertension) [I10]    CHF (congestive heart failure) [I50.9]    ESRD on dialysis [N18.6]    Type 2 diabetes mellitus [E11.9]    Pneumonia due to COVID-19 virus [U07.1]    Adenovirus infection [B34.0]

## 2023-08-08 NOTE — PROGRESS NOTE ADULT - SUBJECTIVE AND OBJECTIVE BOX
HPI:  This is a 78 year old female w/ PMHx of DM2, HTN, HLD, CHF, S/P pacemaker, and ESRD on HD MWF presents for evaluation after being sent by pulmonologist for for worsening dry cough.  States coughs so hard feels like ribs will break.  Of note patient was recently admitted 7/4-7/8/23 for LLL PNA (26 Jul 2023 06:34)    Patient is a 78y old  Female who presents with a chief complaint of cough (07 Aug 2023 21:40)      INTERVAL HPI/OVERNIGHT EVENTS:    MEDICATIONS  (STANDING):  benzonatate 100 milliGRAM(s) Oral every 8 hours  chlorhexidine 2% Cloths 1 Application(s) Topical <User Schedule>  dextrose 5%. 1000 milliLiter(s) (100 mL/Hr) IV Continuous <Continuous>  dextrose 5%. 1000 milliLiter(s) (50 mL/Hr) IV Continuous <Continuous>  dextrose 50% Injectable 12.5 Gram(s) IV Push once  dextrose 50% Injectable 25 Gram(s) IV Push once  dextrose 50% Injectable 25 Gram(s) IV Push once  glucagon  Injectable 1 milliGRAM(s) IntraMuscular once  guaiFENesin  milliGRAM(s) Oral every 12 hours  heparin   Injectable 5000 Unit(s) SubCutaneous every 8 hours  hydrocodone/homatropine Syrup 5 milliLiter(s) Oral every 8 hours  insulin glargine Injectable (LANTUS) 3 Unit(s) SubCutaneous at bedtime  insulin lispro (ADMELOG) corrective regimen sliding scale   SubCutaneous three times a day before meals  insulin lispro (ADMELOG) corrective regimen sliding scale   SubCutaneous at bedtime  insulin lispro Injectable (ADMELOG) 2 Unit(s) SubCutaneous three times a day before meals  linagliptin 5 milliGRAM(s) Oral daily  metoclopramide Injectable 10 milliGRAM(s) IV Push once  Nephro-joseph 1 Tablet(s) Oral daily  simvastatin 40 milliGRAM(s) Oral at bedtime    MEDICATIONS  (PRN):  acetaminophen     Tablet .. 650 milliGRAM(s) Oral every 6 hours PRN Temp greater or equal to 38C (100.4F), Mild Pain (1 - 3)  aluminum hydroxide/magnesium hydroxide/simethicone Suspension 30 milliLiter(s) Oral every 4 hours PRN Dyspepsia  dextrose Oral Gel 15 Gram(s) Oral once PRN Blood Glucose LESS THAN 70 milliGRAM(s)/deciliter  melatonin 3 milliGRAM(s) Oral at bedtime PRN Insomnia  ondansetron Injectable 4 milliGRAM(s) IV Push every 8 hours PRN Nausea and/or Vomiting  sodium chloride 0.9% lock flush 10 milliLiter(s) IV Push every 1 hour PRN Pre/post blood products, medications, blood draw, and to maintain line patency      Allergies    Eliquis (Other)    Intolerances        REVIEW OF SYSTEMS:  CONSTITUTIONAL: No fever, weight loss, or fatigue  EYES: No eye pain, visual disturbances, or discharge  ENMT:  No difficulty hearing, tinnitus, vertigo; No sinus or throat pain  NECK: No pain or stiffness  BREASTS: No pain, masses, or nipple discharge  RESPIRATORY: No cough, wheezing, chills or hemoptysis; No shortness of breath  CARDIOVASCULAR: No chest pain, palpitations, dizziness, or leg swelling  GASTROINTESTINAL: No abdominal or epigastric pain. No nausea, vomiting, or hematemesis; No diarrhea or constipation. No melena or hematochezia.  GENITOURINARY: No dysuria, frequency, hematuria, or incontinence  NEUROLOGICAL: No headaches, memory loss, loss of strength, numbness, or tremors  SKIN: No itching, burning, rashes, or lesions   LYMPH NODES: No enlarged glands  ENDOCRINE: No heat or cold intolerance; No hair loss  MUSCULOSKELETAL: No joint pain or swelling; No muscle, back, or extremity pain  PSYCHIATRIC: No depression, anxiety, mood swings, or difficulty sleeping  HEME/LYMPH: No easy bruising, or bleeding gums  ALLERGY AND IMMUNOLOGIC: No hives or eczema    Vital Signs Last 24 Hrs  T(C): 36.2 (08 Aug 2023 10:59), Max: 36.8 (07 Aug 2023 23:57)  T(F): 97.2 (08 Aug 2023 10:59), Max: 98.3 (07 Aug 2023 23:57)  HR: 60 (08 Aug 2023 10:59) (58 - 60)  BP: 131/67 (08 Aug 2023 10:59) (104/54 - 131/67)  BP(mean): --  RR: 18 (08 Aug 2023 10:59) (18 - 18)  SpO2: 96% (08 Aug 2023 10:59) (96% - 96%)        PHYSICAL EXAM:  GENERAL: NAD, well-groomed, well-developed  HEAD:  Atraumatic, Normocephalic  EYES: EOMI, PERRLA, conjunctiva and sclera clear  ENMT: No tonsillar erythema, exudates, or enlargement; Moist mucous membranes, Good dentition, No lesions  NECK: Supple, No JVD, Normal thyroid  NERVOUS SYSTEM:  Alert & Oriented X3, Good concentration; Motor Strength 5/5 B/L upper and lower extremities; DTRs 2+ intact and symmetric  CHEST/LUNG: Clear to ascultation  bilaterally; No rales, rhonchi, wheezing, or rubs  HEART: Regular rate and rhythm; No murmurs, rubs, or gallops  ABDOMEN: Soft, Nontender, Nondistended; Bowel sounds present  EXTREMITIES:  2+ Peripheral Pulses, No clubbing, cyanosis, or edema  LYMPH: No lymphadenopathy noted  SKIN: No rashes or lesions    LABS:                        9.2    9.55  )-----------( 202      ( 07 Aug 2023 07:00 )             26.9     08-07    137  |  104  |  75<H>  ----------------------------<  95  4.5   |  25  |  9.56<H>    Ca    8.0<L>      07 Aug 2023 07:00  Phos  6.2     08-07  Mg     2.4     08-07        Urinalysis Basic - ( 07 Aug 2023 07:00 )    Color: x / Appearance: x / SG: x / pH: x  Gluc: 95 mg/dL / Ketone: x  / Bili: x / Urobili: x   Blood: x / Protein: x / Nitrite: x   Leuk Esterase: x / RBC: x / WBC x   Sq Epi: x / Non Sq Epi: x / Bacteria: x      CAPILLARY BLOOD GLUCOSE      POCT Blood Glucose.: 133 mg/dL (08 Aug 2023 10:51)  POCT Blood Glucose.: 111 mg/dL (08 Aug 2023 08:14)  POCT Blood Glucose.: 157 mg/dL (07 Aug 2023 20:39)      RADIOLOGY & ADDITIONAL TESTS:    Imaging Personally Reviewed:  [ X] YES  [ ] NO    Consultant(s) Notes Reviewed:  [X ] YES  [ ] NO    Care Discussed with Consultants/Other Providers [ ] YES  [ ] NO

## 2023-08-08 NOTE — PROGRESS NOTE ADULT - PROVIDER SPECIALTY LIST ADULT
Hospitalist
Hospitalist
Nephrology
Nephrology
Pulmonology
Pulmonology
Hospitalist
Hospitalist
Infectious Disease
Nephrology
Hospitalist
Hospitalist
Nephrology
Pulmonology
Hospitalist

## 2023-08-08 NOTE — PROGRESS NOTE ADULT - REASON FOR ADMISSION
cough

## 2023-08-09 VITALS
DIASTOLIC BLOOD PRESSURE: 71 MMHG | RESPIRATION RATE: 18 BRPM | HEART RATE: 62 BPM | TEMPERATURE: 98 F | SYSTOLIC BLOOD PRESSURE: 146 MMHG | OXYGEN SATURATION: 97 %

## 2023-08-09 LAB — GLUCOSE BLDC GLUCOMTR-MCNC: 93 MG/DL — SIGNIFICANT CHANGE UP (ref 70–99)

## 2023-08-14 DIAGNOSIS — E11.65 TYPE 2 DIABETES MELLITUS WITH HYPERGLYCEMIA: ICD-10-CM

## 2023-08-14 DIAGNOSIS — Z95.0 PRESENCE OF CARDIAC PACEMAKER: ICD-10-CM

## 2023-08-14 DIAGNOSIS — I50.32 CHRONIC DIASTOLIC (CONGESTIVE) HEART FAILURE: ICD-10-CM

## 2023-08-14 DIAGNOSIS — E44.0 MODERATE PROTEIN-CALORIE MALNUTRITION: ICD-10-CM

## 2023-08-14 DIAGNOSIS — N18.6 END STAGE RENAL DISEASE: ICD-10-CM

## 2023-08-14 DIAGNOSIS — I95.9 HYPOTENSION, UNSPECIFIED: ICD-10-CM

## 2023-08-14 DIAGNOSIS — E11.22 TYPE 2 DIABETES MELLITUS WITH DIABETIC CHRONIC KIDNEY DISEASE: ICD-10-CM

## 2023-08-14 DIAGNOSIS — B97.0 ADENOVIRUS AS THE CAUSE OF DISEASES CLASSIFIED ELSEWHERE: ICD-10-CM

## 2023-08-14 DIAGNOSIS — J96.01 ACUTE RESPIRATORY FAILURE WITH HYPOXIA: ICD-10-CM

## 2023-08-14 DIAGNOSIS — D69.6 THROMBOCYTOPENIA, UNSPECIFIED: ICD-10-CM

## 2023-08-14 DIAGNOSIS — U07.1 COVID-19: ICD-10-CM

## 2023-08-14 DIAGNOSIS — Z66 DO NOT RESUSCITATE: ICD-10-CM

## 2023-08-14 DIAGNOSIS — I13.2 HYPERTENSIVE HEART AND CHRONIC KIDNEY DISEASE WITH HEART FAILURE AND WITH STAGE 5 CHRONIC KIDNEY DISEASE, OR END STAGE RENAL DISEASE: ICD-10-CM

## 2023-08-14 DIAGNOSIS — Z99.2 DEPENDENCE ON RENAL DIALYSIS: ICD-10-CM

## 2023-08-14 DIAGNOSIS — R07.9 CHEST PAIN, UNSPECIFIED: ICD-10-CM

## 2023-08-14 DIAGNOSIS — R53.81 OTHER MALAISE: ICD-10-CM

## 2023-08-14 DIAGNOSIS — E78.5 HYPERLIPIDEMIA, UNSPECIFIED: ICD-10-CM

## 2023-08-14 DIAGNOSIS — R05.9 COUGH, UNSPECIFIED: ICD-10-CM

## 2023-08-14 DIAGNOSIS — Z88.9 ALLERGY STATUS TO UNSPECIFIED DRUGS, MEDICAMENTS AND BIOLOGICAL SUBSTANCES: ICD-10-CM

## 2023-08-14 DIAGNOSIS — Z86.718 PERSONAL HISTORY OF OTHER VENOUS THROMBOSIS AND EMBOLISM: ICD-10-CM

## 2024-01-23 NOTE — ED ADULT NURSE NOTE - CHIEF COMPLAINT QUOTE
"Orthopedic Progress Note      Assessment:   S/P Procedure(s):  OPEN REDUCTION INTERNAL FIXATION, FRACTURE, HIP, PERIPROSTHETIC     Plan:   - Plan for surgical ORIF 1/24/24   - Anticoagulation: SubQ Heparin given today. Holding Eliquis d/t surgery planned for 1/24.  - Pain Management; Tylenol 1,000 mg QID, oxycodone 5-10 mg q4 hours PRN, IV Dilaudid 0.1-0.2 [id number] PRN breakthrough pain   - Ok to have regular diet today as surgery is scheduled for tomorrow  - NPO orders placed for 1/24 at 0001        Subjective:  Pain: mild  Nausea, Vomiting:  No  Lightheadedness, Dizziness:  No  Neuro:  Patient denies new onset numbness or paresthesias  Fever, chills: No  Chest pain: No  SOB: No    Patient is asleep on his bed when arriving this morning. He reports having some pain in the left hip this morning, but says it is manageable. He is aware that he is having surgery tomorrow and is able to have a regular diet until this evening. All questions/concerns answered.      Objective:  /59 (BP Location: Left arm)   Pulse 82   Temp 97.9  F (36.6  C) (Oral)   Resp 18   Ht 1.803 m (5' 11\")   Wt 104.8 kg (231 lb)   SpO2 95%   BMI 32.22 kg/m      The patient is A&Ox3. Appears comfortable, sitting up at bedside.  Calves without tenderness, neg Rodriguez's  Brisk capillary refill in the toes.   Palpable Left dorsalis pedis pulse. Left foot warm & well-perfused.  Sensation is intact to light touch & equal bilaterally in the femoral, DP, SP & tibial nerve distributions.  ROM: Appropriately flexes & extends all toes bilaterally.   Motor: +5/5 dorsiflexion, plantar flexion & EHL bilaterally.   Left leg is externally rotated and slightly shortened.   Skin: No erythema, bruising, or abrasions laterally to the left hip.  Tenderness to palpation of the left thigh.         Pertinent Labs   Lab Results: personally reviewed.   Lab Results   Component Value Date    INR 1.69 (H) 01/22/2024    INR 1.59 (H) 06/06/2022     Lab Results "   Component Value Date    WBC 6.9 01/23/2024    HGB 13.1 (L) 01/23/2024    HCT 39.6 (L) 01/23/2024    MCV 89 01/23/2024     01/23/2024     Lab Results   Component Value Date     01/23/2024    CO2 28 01/23/2024         Report completed by:  Priti Gonzalez PA-C  Date: 01/23/2024  [facility]              Woke up with left leg pain from hip to toe H/O Bilateral Blood clots to legs, takes ASA

## 2024-04-18 NOTE — PHYSICAL THERAPY INITIAL EVALUATION ADULT - REHAB POTENTIAL, PT EVAL
Airway    Performed by: Christen Redmond MD  Authorized by: Christen Redmond MD    Final Airway Type:  Supraglottic airway  Final Supraglottic Airway:  Unique  SGA Size*:  4  Attempts*:  1   Patient Identified, Procedure confirmed, Emergency equipment available and Safety protocols followed  Location:  OR  Urgency:  Elective  Difficult Airway: No    Indications for Airway Management:  Anesthesia  Sedation Level:  Anesthetized  Mask Difficulty Assessment:  0 - not attempted       good, to achieve stated therapy goals

## 2024-11-02 NOTE — DIETITIAN INITIAL EVALUATION ADULT - NUTRITION CONSULT
yes
Bed in lowest position, wheels locked, appropriate side rails in place/Call bell, personal items and telephone in reach/Instruct patient to call for assistance before getting out of bed or chair/Non-slip footwear when patient is out of bed/[place] to call system/Physically safe environment - no spills, clutter or unnecessary equipment/Purposeful Proactive Rounding/Room/bathroom lighting operational, light cord in reach

## 2025-03-27 PROBLEM — E11.9 TYPE 2 DIABETES MELLITUS WITHOUT COMPLICATIONS: Chronic | Status: ACTIVE | Noted: 2023-07-26

## 2025-03-28 ENCOUNTER — INPATIENT (INPATIENT)
Facility: HOSPITAL | Age: 80
LOS: 0 days | Discharge: HOME | DRG: 286 | End: 2025-03-29
Attending: INTERNAL MEDICINE | Admitting: INTERNAL MEDICINE
Payer: MEDICARE

## 2025-03-28 VITALS
WEIGHT: 146.39 LBS | DIASTOLIC BLOOD PRESSURE: 76 MMHG | RESPIRATION RATE: 17 BRPM | HEIGHT: 64 IN | OXYGEN SATURATION: 100 % | SYSTOLIC BLOOD PRESSURE: 155 MMHG | TEMPERATURE: 98 F | HEART RATE: 60 BPM

## 2025-03-28 DIAGNOSIS — I25.10 ATHEROSCLEROTIC HEART DISEASE OF NATIVE CORONARY ARTERY WITHOUT ANGINA PECTORIS: ICD-10-CM

## 2025-03-28 DIAGNOSIS — K92.2 GASTROINTESTINAL HEMORRHAGE, UNSPECIFIED: Chronic | ICD-10-CM

## 2025-03-28 LAB
A1C WITH ESTIMATED AVERAGE GLUCOSE RESULT: 5.5 % — SIGNIFICANT CHANGE UP (ref 4–5.6)
ANION GAP SERPL CALC-SCNC: 16 MMOL/L — SIGNIFICANT CHANGE UP (ref 5–17)
BUN SERPL-MCNC: 61 MG/DL — HIGH (ref 7–23)
CALCIUM SERPL-MCNC: 8.5 MG/DL — SIGNIFICANT CHANGE UP (ref 8.4–10.5)
CHLORIDE SERPL-SCNC: 100 MMOL/L — SIGNIFICANT CHANGE UP (ref 96–108)
CHOLEST SERPL-MCNC: 171 MG/DL — SIGNIFICANT CHANGE UP
CO2 SERPL-SCNC: 21 MMOL/L — LOW (ref 22–31)
CREAT SERPL-MCNC: 7.03 MG/DL — HIGH (ref 0.5–1.3)
EGFR: 5 ML/MIN/1.73M2 — LOW
EGFR: 5 ML/MIN/1.73M2 — LOW
ESTIMATED AVERAGE GLUCOSE: 111 MG/DL — SIGNIFICANT CHANGE UP (ref 68–114)
GLUCOSE BLDC GLUCOMTR-MCNC: 100 MG/DL — HIGH (ref 70–99)
GLUCOSE BLDC GLUCOMTR-MCNC: 119 MG/DL — HIGH (ref 70–99)
GLUCOSE BLDC GLUCOMTR-MCNC: 141 MG/DL — HIGH (ref 70–99)
GLUCOSE BLDC GLUCOMTR-MCNC: 159 MG/DL — HIGH (ref 70–99)
GLUCOSE BLDC GLUCOMTR-MCNC: 89 MG/DL — SIGNIFICANT CHANGE UP (ref 70–99)
GLUCOSE SERPL-MCNC: 93 MG/DL — SIGNIFICANT CHANGE UP (ref 70–99)
HCT VFR BLD CALC: 28.8 % — LOW (ref 34.5–45)
HDLC SERPL-MCNC: 86 MG/DL — SIGNIFICANT CHANGE UP
HGB BLD-MCNC: 9.6 G/DL — LOW (ref 11.5–15.5)
LIPID PNL WITH DIRECT LDL SERPL: 76 MG/DL — SIGNIFICANT CHANGE UP
MCHC RBC-ENTMCNC: 29.3 PG — SIGNIFICANT CHANGE UP (ref 27–34)
MCHC RBC-ENTMCNC: 33.3 G/DL — SIGNIFICANT CHANGE UP (ref 32–36)
MCV RBC AUTO: 87.8 FL — SIGNIFICANT CHANGE UP (ref 80–100)
NON HDL CHOLESTEROL: 84 MG/DL — SIGNIFICANT CHANGE UP
NRBC BLD AUTO-RTO: 0 /100 WBCS — SIGNIFICANT CHANGE UP (ref 0–0)
PLATELET # BLD AUTO: 175 K/UL — SIGNIFICANT CHANGE UP (ref 150–400)
POTASSIUM SERPL-MCNC: 5.5 MMOL/L — HIGH (ref 3.5–5.3)
POTASSIUM SERPL-SCNC: 5.5 MMOL/L — HIGH (ref 3.5–5.3)
RBC # BLD: 3.28 M/UL — LOW (ref 3.8–5.2)
RBC # FLD: 15 % — HIGH (ref 10.3–14.5)
SODIUM SERPL-SCNC: 137 MMOL/L — SIGNIFICANT CHANGE UP (ref 135–145)
TRIGL SERPL-MCNC: 37 MG/DL — SIGNIFICANT CHANGE UP
WBC # BLD: 8.34 K/UL — SIGNIFICANT CHANGE UP (ref 3.8–10.5)
WBC # FLD AUTO: 8.34 K/UL — SIGNIFICANT CHANGE UP (ref 3.8–10.5)

## 2025-03-28 PROCEDURE — 99232 SBSQ HOSP IP/OBS MODERATE 35: CPT

## 2025-03-28 PROCEDURE — 93454 CORONARY ARTERY ANGIO S&I: CPT | Mod: 26

## 2025-03-28 PROCEDURE — 99152 MOD SED SAME PHYS/QHP 5/>YRS: CPT

## 2025-03-28 RX ORDER — LINAGLIPTIN 5 MG/1
1 TABLET, FILM COATED ORAL
Qty: 0 | Refills: 0 | DISCHARGE

## 2025-03-28 RX ORDER — ATORVASTATIN CALCIUM 80 MG/1
10 TABLET, FILM COATED ORAL AT BEDTIME
Refills: 0 | Status: DISCONTINUED | OUTPATIENT
Start: 2025-03-28 | End: 2025-03-29

## 2025-03-28 RX ORDER — DEXTROSE 50 % IN WATER 50 %
15 SYRINGE (ML) INTRAVENOUS ONCE
Refills: 0 | Status: DISCONTINUED | OUTPATIENT
Start: 2025-03-28 | End: 2025-03-29

## 2025-03-28 RX ORDER — AMLODIPINE BESYLATE 10 MG/1
10 TABLET ORAL DAILY
Refills: 0 | Status: DISCONTINUED | OUTPATIENT
Start: 2025-03-28 | End: 2025-03-29

## 2025-03-28 RX ORDER — DEXTROSE 50 % IN WATER 50 %
12.5 SYRINGE (ML) INTRAVENOUS ONCE
Refills: 0 | Status: DISCONTINUED | OUTPATIENT
Start: 2025-03-28 | End: 2025-03-29

## 2025-03-28 RX ORDER — METOPROLOL SUCCINATE 50 MG/1
1 TABLET, EXTENDED RELEASE ORAL
Qty: 0 | Refills: 0 | DISCHARGE

## 2025-03-28 RX ORDER — DEXTROSE 50 % IN WATER 50 %
25 SYRINGE (ML) INTRAVENOUS ONCE
Refills: 0 | Status: DISCONTINUED | OUTPATIENT
Start: 2025-03-28 | End: 2025-03-29

## 2025-03-28 RX ORDER — ACETAMINOPHEN 500 MG/5ML
2 LIQUID (ML) ORAL
Refills: 0 | DISCHARGE

## 2025-03-28 RX ORDER — GLUCAGON 3 MG/1
1 POWDER NASAL ONCE
Refills: 0 | Status: DISCONTINUED | OUTPATIENT
Start: 2025-03-28 | End: 2025-03-29

## 2025-03-28 RX ORDER — EPOETIN ALFA 10000 [IU]/ML
4000 SOLUTION INTRAVENOUS; SUBCUTANEOUS
Refills: 0 | DISCHARGE

## 2025-03-28 RX ORDER — INSULIN LISPRO 100 U/ML
INJECTION, SOLUTION INTRAVENOUS; SUBCUTANEOUS AT BEDTIME
Refills: 0 | Status: DISCONTINUED | OUTPATIENT
Start: 2025-03-28 | End: 2025-03-29

## 2025-03-28 RX ORDER — SODIUM CHLORIDE 9 G/1000ML
1000 INJECTION, SOLUTION INTRAVENOUS
Refills: 0 | Status: DISCONTINUED | OUTPATIENT
Start: 2025-03-28 | End: 2025-03-29

## 2025-03-28 RX ORDER — AMLODIPINE BESYLATE 10 MG/1
1 TABLET ORAL
Refills: 0 | DISCHARGE

## 2025-03-28 RX ORDER — APIXABAN 2.5 MG/1
2.5 TABLET, FILM COATED ORAL
Refills: 0 | Status: DISCONTINUED | OUTPATIENT
Start: 2025-03-29 | End: 2025-03-29

## 2025-03-28 RX ORDER — INSULIN LISPRO 100 U/ML
INJECTION, SOLUTION INTRAVENOUS; SUBCUTANEOUS
Refills: 0 | Status: DISCONTINUED | OUTPATIENT
Start: 2025-03-28 | End: 2025-03-29

## 2025-03-28 RX ORDER — SODIUM CHLORIDE 0.65 %
1 AEROSOL, SPRAY (ML) NASAL
Refills: 0 | Status: DISCONTINUED | OUTPATIENT
Start: 2025-03-28 | End: 2025-03-29

## 2025-03-28 RX ORDER — METOPROLOL SUCCINATE 50 MG/1
1 TABLET, EXTENDED RELEASE ORAL
Refills: 0 | DISCHARGE

## 2025-03-28 RX ORDER — ACETAMINOPHEN 500 MG/5ML
650 LIQUID (ML) ORAL EVERY 6 HOURS
Refills: 0 | Status: DISCONTINUED | OUTPATIENT
Start: 2025-03-28 | End: 2025-03-29

## 2025-03-28 RX ORDER — APIXABAN 2.5 MG/1
1 TABLET, FILM COATED ORAL
Qty: 0 | Refills: 0 | DISCHARGE

## 2025-03-28 RX ORDER — METOPROLOL SUCCINATE 50 MG/1
100 TABLET, EXTENDED RELEASE ORAL DAILY
Refills: 0 | Status: DISCONTINUED | OUTPATIENT
Start: 2025-03-28 | End: 2025-03-29

## 2025-03-28 RX ORDER — EPOETIN ALFA 10000 [IU]/ML
4000 SOLUTION INTRAVENOUS; SUBCUTANEOUS
Refills: 0 | Status: DISCONTINUED | OUTPATIENT
Start: 2025-03-28 | End: 2025-03-29

## 2025-03-28 RX ADMIN — EPOETIN ALFA 4000 UNIT(S): 10000 SOLUTION INTRAVENOUS; SUBCUTANEOUS at 18:26

## 2025-03-28 RX ADMIN — Medication 25 MILLIGRAM(S): at 05:44

## 2025-03-28 RX ADMIN — AMLODIPINE BESYLATE 10 MILLIGRAM(S): 10 TABLET ORAL at 05:44

## 2025-03-28 RX ADMIN — METOPROLOL SUCCINATE 100 MILLIGRAM(S): 50 TABLET, EXTENDED RELEASE ORAL at 05:44

## 2025-03-28 RX ADMIN — Medication 25 MILLIGRAM(S): at 21:51

## 2025-03-28 RX ADMIN — ATORVASTATIN CALCIUM 10 MILLIGRAM(S): 80 TABLET, FILM COATED ORAL at 21:51

## 2025-03-28 NOTE — DISCHARGE NOTE PROVIDER - HOSPITAL COURSE
80 y/o female, pmhx ESRD on HD (M,W,F at [facility], [phone]), SSS, St. Ricky PPM, afib (on Eliquis, last dose 3/27/25), diverticulosis, colectomy, DVT, DM2, admitted to [facility] on 3/17/25 with cough, SOB. Patient had been coughing and SOB for one week, was told by her primary MD to go to [place] ER. Seen for Cardiac EP consult by Dr. RYLEE Patel on 3/19, seen by Nephrology on 3/17 and continued on HD. Seen by Pulmonology on 3/21, CT scan showed B/L pleural effusions, but collections were too small for thoracentesis. 3/22 TTE with EF 60-65%. On 3/24, patient was undergoing a pharmacologic stress test, after the test was over she felt hot, became nauseous and vomited, had a witnessed syncopal episode. Upon regaining consciousness she was unable to recall anything that happened, Neurology consult called, brain CT was negative, determined that symptoms were due to stress test and not due to any stroke or seizure activity. With cardiac stress test results abnormal, Dr. RYLEE Patel discussed with patient the need to transfer her to [facility] for further cardiac workup, including cardiac catheterization.    Catheterization revealed 60 % RCA via RRA. (view catheterization report for full details). Post procedure, radial band was removed according to protocol without complication. Patient remained hemodynamically stable, neurovascularly intact and chest pain free. Patient voided and ambulated and had no EKG changes post procedure.  Pt remained overnight for observation and pain control.  The remainder of his hospitalization was uneventful.  Pt was provided education regarding medical therapy, as well as post procedure wound care instructions.  Pt will follow up with his private cardiologist Dr. Patel in 2 weeks and advised to return to ER with any concerning symptoms.       78 y/o female, pmhx ESRD on HD (M,W,F at [facility], [phone]), SSS, St. Ricky PPM, afib (on Eliquis, last dose 3/27/25), diverticulosis, colectomy, DVT, DM2, admitted to [facility] on 3/17/25 with cough, SOB. Patient had been coughing and SOB for one week, was told by her primary MD to go to [place] ER. Seen for Cardiac EP consult by Dr. RYLEE aPtel on 3/19, seen by Nephrology on 3/17 and continued on HD. Seen by Pulmonology on 3/21, CT scan showed B/L pleural effusions, but collections were too small for thoracentesis. 3/22 TTE with EF 60-65%. On 3/24, patient was undergoing a pharmacologic stress test, after the test was over she felt hot, became nauseous and vomited, had a witnessed syncopal episode. Upon regaining consciousness she was unable to recall anything that happened, Neurology consult called, brain CT was negative, determined that symptoms were due to stress test and not due to any stroke or seizure activity. With cardiac stress test results abnormal, Dr. RYLEE Patel discussed with patient the need to transfer her to [facility] for further cardiac workup, including cardiac catheterization.    Catheterization revealed 60 % RCA via RRA. (view catheterization report for full details). Post procedure, radial band was removed according to protocol without complication. Patient remained hemodynamically stable, neurovascularly intact and chest pain free. Patient voided and ambulated and had no EKG changes post procedure.  Pt remained overnight for observation and pain control.  The remainder of his hospitalization was uneventful.  Pt was provided education regarding medical therapy, as well as post procedure wound care instructions.  Pt will follow up with his private cardiologist Dr. Patel in 2 weeks and advised to return to ER with any concerning symptoms.    3/29 pt seen and examined, awake and alert w/o discomfort, ambulating to bathroom, RRA  procedural sites wnl ( soft w/o bleeding or hematoma)  AM labs vitals wnl, tele w/o events, pt known ESRD s/p HD 3/28 renal Dr Crump, to resume regular HD schedule once discharged   PLAN: dc home this am

## 2025-03-28 NOTE — DISCHARGE NOTE PROVIDER - CARE PROVIDER_API CALL
Janki Patel  Cardiac Electrophysiology  [address], [address]  [place] [address]  Phone: [phone]  Fax: [phone]  Follow Up Time:

## 2025-03-28 NOTE — PATIENT PROFILE ADULT - HAVE YOU BEEN EATING POORLY BECAUSE OF A DECREASED APPETITE?
Continued Stay SW/CM Assessment/Plan of Care Note       Active Substitute Decision Maker (SDM)    There are no active Substitute Decision Maker (SDM) on file.           Progress note:  SW/CM following for discharge planning. Reviewed medical record, pt is medically stable for discharge once discharge arrangements made.     Pt's son Fawad verbalized agreement with plan for pt to return home with hospice services through [facility] [place]. Pt has [employer] and family to supplement additional help. Per Fawad family unable to start care giving services until tomorrow. IMM reviewed with Fawad, who declined copy.     Received update per [facility] delivered to pt's home today and can be admitted onto services tomorrow at 16:30.    [facility] arranged to pick pt up tomorrow at 13:00. SW/CM to follow.     See SW/CM flowsheets for other objective data.    Disposition Recommendations:  Preliminary discharge destination: Planned Discharge Destination: Rehabilitation/Skilled Care  SW/CM recommendation for discharge: Sub-acute nursing home    Discharge Plan/Needs:     Continued Care and Services - Admitted Since 5/11/2023     Destination      Service Provider Request Status Selected Services Address Phone Fax    [facility] PAN Accepted N/A [address] [address] [phone]    [facility] Pending - Request Sent N/A [address] [facility] [address] [phone]    [facility] Declined  Bed not available N/A [address] [address] [phone]            Selected Continued Care - Episodes Includes continued care and service providers with selected services from the active episodes listed below    Care Transitions Episode start date: 5/15/2023   There are no active outsourced providers for this episode.                   Prior To Hospitalization:    Living Situation: Spouse and  residing at House.    Support Systems: Children, Family members, Home Care Staff, Neighbors, Spouse   Home Devices/Equipment: Blood pressure monitor, Commode, Mobility assist device, Elevated toilet seat   Mobility Assist Devices: Front-wheeled walker, Wheelchair, Cane   Type of Service Prior to Hospitalization: Family Caregiver (paid), Home care, Nurse (specify), OT, PT     Patient/Family discharge goal (s):  Sub-acute nursing home     Therapy Recommendations for Discharge:   PT:      OT:  Recommendation for Discharge Support: OT WI: 24 Hour assist, Assistance with medication, Assistance with IADLs    SLP:      Mobility Equipment Recommended for Discharge:        Barriers to Discharge  Identified Barriers to Discharge/Transition Planning: Medical necessity for acute care                 No (0)

## 2025-03-28 NOTE — DISCHARGE NOTE PROVIDER - NSDCMRMEDTOKEN_GEN_ALL_CORE_FT
acetaminophen 325 mg oral tablet: 2 tab(s) orally every 6 hours as needed for  mild pain  amLODIPine 10 mg oral tablet: 1 tab(s) orally once a day  apixaban 2.5 mg oral tablet: 1 tab(s) orally every 12 hours Resume 3/29/25  epoetin torrie: 4,000 unit(s) intravenous 3 times a week at Hemodialysis  hydrALAZINE 25 mg oral tablet: 1 tab(s) orally 3 times a day  metoprolol succinate 100 mg oral tablet, extended release: 1 tab(s) orally once a day  simvastatin 20 mg oral tablet: 1 tab(s) orally once a day (at bedtime)  sodium chloride 0.5% nasal spray: 1 spray(s) in each nostril every 1 to 2 hours as needed for  dry nasal passages  Tradjenta 5 mg oral tablet: 1 tab(s) orally once a day

## 2025-03-28 NOTE — H&P CARDIOLOGY - HISTORY OF PRESENT ILLNESS
80 y/o female, pmhx ESRD on HD (M,W,F at [facility]), SSS, [facility]. Ricky PPM, afib (Eliquis), diverticulosis, colectomy, DVT, DM2, admitted to [facility] on 3/17/25 with cough, SOB. Patient had been coughing and SOB for one week, was told by her primary MD to go to [place] ER. Seen for Cardiac EP consult by Dr. RYLEE Patel on 3/19, seen by Nephrology on 3/17 and continued on HD. Seen by Pulmonology on 3/21, CT scan showed B/L pleural effusions, but collections were too small for thoracentesis. 3/22 TTE with EF 60-65%. On 3/24, patient was undergoing a pharmacologic stress test, after the test was over she felt hot, became nauseous and vomited, had a witnessed syncopal episode. Upon regaining consciousness she was unable to recall anything that happened, Neurology consult called, brain CT was negative, determined that symptoms were due to stress test and not due to any stroke or seizure activity. With cardiac stress test results abnormal, Dr. RYLEE Patel discussed with patient the need to transfer her to [facility] for further cardiac workup, including cardiac catheterization. Patient agreed, and was transferred on 3/28 via [facility] ambulance to [facility], CSSU bed 13 and placed on telemetry. Patient A&O x3, denies chest pain, SOB, N/V/D. 80 y/o female, pmhx ESRD on HD (M,W,F at [facility]), SSS, St. Ricky PPM, afib (on Eliquis, last dose 3/27/25), diverticulosis, colectomy, DVT, DM2, admitted to [facility] on 3/17/25 with cough, SOB. Patient had been coughing and SOB for one week, was told by her primary MD to go to [place] ER. Seen for Cardiac EP consult by Dr. RYLEE Patel on 3/19, seen by Nephrology on 3/17 and continued on HD. Seen by Pulmonology on 3/21, CT scan showed B/L pleural effusions, but collections were too small for thoracentesis. 3/22 TTE with EF 60-65%. On 3/24, patient was undergoing a pharmacologic stress test, after the test was over she felt hot, became nauseous and vomited, had a witnessed syncopal episode. Upon regaining consciousness she was unable to recall anything that happened, Neurology consult called, brain CT was negative, determined that symptoms were due to stress test and not due to any stroke or seizure activity. With cardiac stress test results abnormal, Dr. RYLEE Patel discussed with patient the need to transfer her to [facility] for further cardiac workup, including cardiac catheterization. Patient agreed, and was transferred on 3/28 via [facility] ambulance to [facility], CSSU bed 13 and placed on telemetry. Patient A&O x3, denies chest pain, SOB, N/V/D. 78 y/o female, pmhx ESRD on HD (M,W,F at [facility], [phone]), SSS, St. Ricky PPM, afib (on Eliquis, last dose 3/27/25), diverticulosis, colectomy, DVT, DM2, admitted to [facility] on 3/17/25 with cough, SOB. Patient had been coughing and SOB for one week, was told by her primary MD to go to [place] ER. Seen for Cardiac EP consult by Dr. RYLEE Patel on 3/19, seen by Nephrology on 3/17 and continued on HD. Seen by Pulmonology on 3/21, CT scan showed B/L pleural effusions, but collections were too small for thoracentesis. 3/22 TTE with EF 60-65%. On 3/24, patient was undergoing a pharmacologic stress test, after the test was over she felt hot, became nauseous and vomited, had a witnessed syncopal episode. Upon regaining consciousness she was unable to recall anything that happened, Neurology consult called, brain CT was negative, determined that symptoms were due to stress test and not due to any stroke or seizure activity. With cardiac stress test results abnormal, Dr. RYLEE Patel discussed with patient the need to transfer her to [facility] for further cardiac workup, including cardiac catheterization. Patient agreed, and was transferred on 3/28 via [facility] ambulance to [facility], CSSU bed 13 and placed on telemetry. Patient A&O x3, denies chest pain, SOB, N/V/D.

## 2025-03-28 NOTE — CONSULT NOTE ADULT - SUBJECTIVE AND OBJECTIVE BOX
NEPHROLOGY CONSULTATION    CHIEF COMPLAINT: SOB    HPI:  Pt is 78 yo female, pmhx ESRD on HD, SSS, St. Ricky PPM, afib (on Eliquis, last dose 3/27/25), diverticulosis, colectomy, DVT, DM2, admitted to [facility] on 3/17/25 with cough, SOB. On 3/24, patient was undergoing a pharmacologic stress test, after the test was over she felt hot, became nauseous and vomited, had a witnessed syncopal episode. Upon regaining consciousness she was unable to recall anything that happened, Neurology consult called, brain CT was negative, determined that symptoms were due to stress test and not due to any stroke or seizure activity. With cardiac stress test results abnormal, pt was tx to [facility] for cardiac catheterization. Due for HD today. No chest pain, SOB, N/V/D/C/F/C.    ROS:  as above    Allergies:  No Known Allergies    PAST MEDICAL & SURGICAL HISTORY:  HTN (hypertension)  Pacemaker  DVT (deep venous thrombosis)  Diverticulitis  CHF (congestive heart failure)  ESRD on dialysis  Type 2 diabetes mellitus  Intestinal bleeding    SOCIAL HISTORY:  negative    FAMILY HISTORY:  HTN (hypertension)    MEDICATIONS  (STANDING):  amLODIPine   Tablet 10 milliGRAM(s) Oral daily  atorvastatin 10 milliGRAM(s) Oral at bedtime  dextrose 5%. 1000 milliLiter(s) (50 mL/Hr) IV Continuous <Continuous>  dextrose 5%. 1000 milliLiter(s) (100 mL/Hr) IV Continuous <Continuous>  dextrose 50% Injectable 25 Gram(s) IV Push once  dextrose 50% Injectable 12.5 Gram(s) IV Push once  dextrose 50% Injectable 25 Gram(s) IV Push once  epoetin torrie (EPOGEN) Injectable 4000 Unit(s) IV Push <User Schedule>  glucagon  Injectable 1 milliGRAM(s) IntraMuscular once  hydrALAZINE 25 milliGRAM(s) Oral three times a day  insulin lispro (ADMELOG) corrective regimen sliding scale   SubCutaneous three times a day before meals  insulin lispro (ADMELOG) corrective regimen sliding scale   SubCutaneous at bedtime  metoprolol succinate  milliGRAM(s) Oral daily    Vital Signs Last 24 Hrs  T(C): 36.5 (03-28-25 @ 08:30), Max: 36.6 (03-28-25 @ 00:45)  T(F): 97.7 (03-28-25 @ 08:30), Max: 97.8 (03-28-25 @ 00:45)  HR: 60 (03-28-25 @ 10:30) (60 - 61)  BP: 143/61 (03-28-25 @ 10:30) (127/60 - 155/76)  BP(mean): 88 (03-28-25 @ 10:30) (83 - 104)  RR: 16 (03-28-25 @ 09:30) (16 - 17)  SpO2: 99% (03-28-25 @ 10:30) (93% - 100%)    s1s2  b/l air entry  soft, ND  sm edema     LABS:                        9.6    8.34  )-----------( 175      ( 28 Mar 2025 03:19 )             28.8     03-28    137  |  100  |  61[H]  ----------------------------<  93  5.5[H]   |  21[L]  |  7.03[H]    Ca    8.5      28 Mar 2025 03:19    A/P:    Tx from [facility] for cardiac cath  ESRD on HD MWF  HD today  TMP as able  Epoetin w/HD  Renal diet    [phone]

## 2025-03-28 NOTE — DISCHARGE NOTE PROVIDER - NSDCCPTREATMENT_GEN_ALL_CORE_FT
PRINCIPAL PROCEDURE  Procedure: Cardiac catheterization, left heart  Findings and Treatment: 60% mid RCA-Medical Theerapy

## 2025-03-28 NOTE — DISCHARGE NOTE PROVIDER - NSDCCPCAREPLAN_GEN_ALL_CORE_FT
PRINCIPAL DISCHARGE DIAGNOSIS  Diagnosis: Nonacute chest pain  Assessment and Plan of Treatment: You have a history of coronary artery disease. Your disease is stable and you do not exhibit any symptoms such as chest pain or shortness of breath on exertion. Please continue to take your prescribed medication. Maintain a low fat and low sugar diet. Please follow up with your primary care physician/Cardiologist routinely to monitor your lipid profile, blood pressure, and blood sugar.      SECONDARY DISCHARGE DIAGNOSES  Diagnosis: ESRD (end stage renal disease) on dialysis  Assessment and Plan of Treatment: HD on M/W/F    Diagnosis: HTN (hypertension)  Assessment and Plan of Treatment: You have high blood pressure. Continue taking your blood pressure medications as prescribed. Eat a heart healthy diet with low salt; exercise regularly (if cleared by your primary care doctor or cardiologist). Maintain a heart healthy weight and include healthy ways to manage stress. If you smoke, quit. If you need assistance to help you stop smoking, please use the following resource: [facility] [employer] for Tobacco Control – ([phone]). Follow up with your primary care doctor to continue having your blood pressure checked on a continual basis.    Diagnosis: HLD (hyperlipidemia)  Assessment and Plan of Treatment: LDL<70   Goal is to keep LDL<70. Continue with your cholesterol medications as prescribed. Eat a heart healthy diet that is low in saturated fats and salt, and includes whole grains, fruits, vegetables and lean protein; exercise regularly (consult with your physician or cardiologist first); maintain a heart healthy weight; if you smoke - quit (A resource to help you stop smoking is the [facility] [employer] for [employer] Control – phone number [phone].). Continue to follow with your primary physician or cardiologist.

## 2025-03-28 NOTE — H&P CARDIOLOGY - PULMONARY EMBOLUS
no Chronic gout of knee, unspecified cause, unspecified laterality    Hypertension, unspecified type

## 2025-03-29 VITALS
SYSTOLIC BLOOD PRESSURE: 112 MMHG | DIASTOLIC BLOOD PRESSURE: 61 MMHG | TEMPERATURE: 99 F | RESPIRATION RATE: 16 BRPM | HEART RATE: 60 BPM | OXYGEN SATURATION: 95 %

## 2025-03-29 LAB
ANION GAP SERPL CALC-SCNC: 14 MMOL/L — SIGNIFICANT CHANGE UP (ref 5–17)
BUN SERPL-MCNC: 37 MG/DL — HIGH (ref 7–23)
CALCIUM SERPL-MCNC: 8.5 MG/DL — SIGNIFICANT CHANGE UP (ref 8.4–10.5)
CHLORIDE SERPL-SCNC: 97 MMOL/L — SIGNIFICANT CHANGE UP (ref 96–108)
CO2 SERPL-SCNC: 23 MMOL/L — SIGNIFICANT CHANGE UP (ref 22–31)
CREAT SERPL-MCNC: 4.49 MG/DL — HIGH (ref 0.5–1.3)
EGFR: 9 ML/MIN/1.73M2 — LOW
EGFR: 9 ML/MIN/1.73M2 — LOW
GLUCOSE BLDC GLUCOMTR-MCNC: 105 MG/DL — HIGH (ref 70–99)
GLUCOSE BLDC GLUCOMTR-MCNC: 172 MG/DL — HIGH (ref 70–99)
GLUCOSE SERPL-MCNC: 108 MG/DL — HIGH (ref 70–99)
POTASSIUM SERPL-MCNC: 4.4 MMOL/L — SIGNIFICANT CHANGE UP (ref 3.5–5.3)
POTASSIUM SERPL-SCNC: 4.4 MMOL/L — SIGNIFICANT CHANGE UP (ref 3.5–5.3)
SODIUM SERPL-SCNC: 134 MMOL/L — LOW (ref 135–145)

## 2025-03-29 PROCEDURE — C1887: CPT

## 2025-03-29 PROCEDURE — 80061 LIPID PANEL: CPT

## 2025-03-29 PROCEDURE — 85027 COMPLETE CBC AUTOMATED: CPT

## 2025-03-29 PROCEDURE — 93454 CORONARY ARTERY ANGIO S&I: CPT

## 2025-03-29 PROCEDURE — 99232 SBSQ HOSP IP/OBS MODERATE 35: CPT

## 2025-03-29 PROCEDURE — 83036 HEMOGLOBIN GLYCOSYLATED A1C: CPT

## 2025-03-29 PROCEDURE — 99261: CPT

## 2025-03-29 PROCEDURE — C1769: CPT

## 2025-03-29 PROCEDURE — 82962 GLUCOSE BLOOD TEST: CPT

## 2025-03-29 PROCEDURE — 80048 BASIC METABOLIC PNL TOTAL CA: CPT

## 2025-03-29 PROCEDURE — C1894: CPT

## 2025-03-29 RX ADMIN — METOPROLOL SUCCINATE 100 MILLIGRAM(S): 50 TABLET, EXTENDED RELEASE ORAL at 08:09

## 2025-03-29 RX ADMIN — AMLODIPINE BESYLATE 10 MILLIGRAM(S): 10 TABLET ORAL at 08:09

## 2025-03-29 RX ADMIN — Medication 25 MILLIGRAM(S): at 08:09

## 2025-03-29 RX ADMIN — APIXABAN 2.5 MILLIGRAM(S): 2.5 TABLET, FILM COATED ORAL at 08:09

## 2025-03-29 NOTE — PROGRESS NOTE ADULT - ASSESSMENT
HPI: 78 y/o female, pmhx ESRD on HD (M,W,F at [facility], [phone]), SSS, St. Ricky PPM, afib (on Eliquis, last dose 3/27/25), diverticulosis, colectomy, DVT, DM2, admitted to [facility] on 3/17/25 with cough, SOB. Patient had been coughing and SOB for one week, was told by her primary MD to go to [place] ER. Seen for Cardiac EP consult by Dr. RYLEE Patel on 3/19, seen by Nephrology on 3/17 and continued on HD. Seen by Pulmonology on 3/21, CT scan showed B/L pleural effusions, but collections were too small for thoracentesis. 3/22 TTE with EF 60-65%. On 3/24, patient was undergoing a pharmacologic stress test, after the test was over she felt hot, became nauseous and vomited, had a witnessed syncopal episode. Upon regaining consciousness she was unable to recall anything that happened, Neurology consult called, brain CT was negative, determined that symptoms were due to stress test and not due to any stroke or seizure activity. With cardiac stress test results abnormal, Dr. RYLEE Patel discussed with patient the need to transfer her to [facility] for further cardiac workup, including cardiac catheterization. Patient agreed, and was transferred on 3/28 via [facility] ambulance to [facility], CSSU bed 13 and placed on telemetry. Patient A&O x3, denies chest pain, SOB, N/V/D. (28 Mar 2025 00:45)    # CAD  3/28 s/p diagnostic LHC via RRA showing mRCA 60%- medical management  Resume Eliquis 2.5 mg BID 3/29  Continue Metoprolol Succinate 100 mg daily  Continue Atorvastatin 10 mg daily  Monitor telemetry  Keep Mg >2 K >4  Follow up appt in 4 weeks post discharge with outpatient cardiologist  Anticipate discharge in AM if site and condition remain stable    # HTN  Normotensive  Continue Amlodipine 10 mg daily, Hydralazine 25 mg TID, Metoprolol Succinate 100 mg daily  DASH diet    # HLD  Continue Atorvastatin 10 mg daily  DASH diet    # ESRD  ESRD on HD M/W/F  2L removed from HD on 3/28  Avoid nephrotoxic agents  Monitor Cr with AM labs  Renal recs appreciated    # T2DM  Continue SSI while inpatient  FS TID/HS  Continue consistent carb diet  Will resume diabetic home med regimen upon discharge    # Dispo  Anticipate discharge in AM if site and condition remain stable  Pending case manger to reinstate HD and set up ambulette for patient    Edwar Reyes [facility]  Invasive Cardiology  Ext [phone]

## 2025-03-29 NOTE — PROGRESS NOTE ADULT - SUBJECTIVE AND OBJECTIVE BOX
[facility] INVASIVE CARDIOLOGY- (Derek, Jayesh, Vicki, Shania, Kian, Shaji, Ramy, Benji, Ken)   CARDIAC CATH LAB, ACP TEAM   [phone]    CHIEF COMPLAINT: Patient is a 79y old female who presents with a chief complaint of syncope    HPI: 78 y/o female, pmhx ESRD on HD (M,W,F at [facility], [phone]), SSS, St. Ricky PPM, afib (on Eliquis, last dose 3/27/25), diverticulosis, colectomy, DVT, DM2, admitted to [facility] on 3/17/25 with cough, SOB. Patient had been coughing and SOB for one week, was told by her primary MD to go to [place] [facility]. Seen for Cardiac EP consult by Dr. RYLEE Patel on 3/19, seen by Nephrology on 3/17 and continued on HD. Seen by Pulmonology on 3/21, CT scan showed B/L pleural effusions, but collections were too small for thoracentesis. 3/22 TTE with EF 60-65%. On 3/24, patient was undergoing a pharmacologic stress test, after the test was over she felt hot, became nauseous and vomited, had a witnessed syncopal episode. Upon regaining consciousness she was unable to recall anything that happened, Neurology consult called, brain CT was negative, determined that symptoms were due to stress test and not due to any stroke or seizure activity. With cardiac stress test results abnormal, Dr. RYLEE Patel discussed with patient the need to transfer her to [facility] for further cardiac workup, including cardiac catheterization. Patient agreed, and was transferred on 3/28 via [facility] ambulance to [facility], CSSU bed 13 and placed on telemetry. Patient A&O x3, denies chest pain, SOB, N/V/D. (28 Mar 2025 00:45)    Subjective/Observations: Patient seen and examined.  Denies chest pain, dyspena, dizziness, palpitations, N&V, HA, upper/lower extremity pain, numbness/tingling    Review of Systems all WNL except below indicated:    Constitutional: [ ] Fever [ ] Chills [ ] Fatigue [ ] Weight change   HEENT: [ ] Blurred vision [ ] Eye Pain [ ] Headache [ ] Runny nose [ ] Sore Throat   Respiratory: [ ] Cough [ ] Wheezing [ ] Shortness of breath  Cardiovascular: [ ] Chest Pain [ ] Palpitations [ ] WEBBER [ ] PND [ ] Orthopnea  Gastrointestinal: [ ] Abdominal Pain [ ] Diarrhea [ ] Constipation [ ] Hemorrhoids [ ] Nausea [ ] Vomiting  Genitourinary: [ ] Nocturia [ ] Dysuria [ ] Incontinence  Extremities: [ ] Swelling [ ] Joint Pain  Neurologic: [ ] Focal deficit [ ] Paresthesias [ ] Syncope  Lymphatic: [ ] Swelling [ ] Lymphadenopathy   Skin: [ ] Rash [ ] Ecchymoses [ ] Wounds [ ] Lesions  Psychiatry: [ ] Depression [ ] Suicidal/Homicidal Ideation [ ] Anxiety [ ] Sleep Disturbances  [ ] 10 point review of systems is otherwise negative except as mentioned above            [ ]Unable to obtain    PAST MEDICAL & SURGICAL HISTORY:  HTN (hypertension)    Pacemaker    DVT (deep venous thrombosis)    Diverticulitis    CHF (congestive heart failure)    ESRD on dialysis    Type 2 diabetes mellitus    Intestinal bleeding    MEDICATIONS  (STANDING):  amLODIPine   Tablet 10 milliGRAM(s) Oral daily  atorvastatin 10 milliGRAM(s) Oral at bedtime  epoetin torrie (EPOGEN) Injectable 4000 Unit(s) IV Push <User Schedule>  glucagon  Injectable 1 milliGRAM(s) IntraMuscular once  hydrALAZINE 25 milliGRAM(s) Oral three times a day  insulin lispro (ADMELOG) corrective regimen sliding scale   SubCutaneous three times a day before meals  insulin lispro (ADMELOG) corrective regimen sliding scale   SubCutaneous at bedtime  metoprolol succinate  milliGRAM(s) Oral daily    MEDICATIONS  (PRN):  acetaminophen     Tablet .. 650 milliGRAM(s) Oral every 6 hours PRN Mild Pain (1 - 3)  dextrose Oral Gel 15 Gram(s) Oral once PRN Blood Glucose LESS THAN 70 milliGRAM(s)/deciliter  sodium chloride 0.65% Nasal 1 Spray(s) Both Nostrils every 1 hour PRN Nasal Congestion    Allergies    No Known Allergies    Intolerances    Vital Signs Last 24 Hrs  T(C): 36.4 (28 Mar 2025 20:02), Max: 36.6 (28 Mar 2025 00:45)  T(F): 97.5 (28 Mar 2025 20:02), Max: 97.8 (28 Mar 2025 00:45)  HR: 60 (28 Mar 2025 20:02) (60 - 61)  BP: 120/53 (28 Mar 2025 20:02) (120/53 - 159/83)  BP(mean): 76 (28 Mar 2025 20:02) (76 - 104)  RR: 17 (28 Mar 2025 20:02) (16 - 18)  SpO2: 94% (28 Mar 2025 20:02) (93% - 100%)    Parameters below as of 28 Mar 2025 20:02  Patient On (Oxygen Delivery Method): room air    I&O's Summary    28 Mar 2025 07:01  -  28 Mar 2025 22:27  --------------------------------------------------------  IN: 0 mL / OUT: 2000 mL / NET: -2000 mL      Weight (kg): 66.2 (03-28 @ 01:25)    FOCUSED PHYSICAL EXAM:  Pulmonary: Non-labored, breath sounds are clear bilaterally, No wheezing, rales or rhonchi  Cardiovascular: Regular, S1 and S2, No murmurs, rubs, gallops or clicks  Cath site: Right radial stable w/o bleeding or hematoma, soft, + pulses     LABS: All Labs Reviewed:                        9.6    8.34  )-----------( 175      ( 28 Mar 2025 03:19 )             28.8     28 Mar 2025 03:19    137    |  100    |  61     ----------------------------<  93     5.5     |  21     |  7.03     Ca    8.5        28 Mar 2025 03:19    RESULTS:    ECHO: 7/28/23  Summary:   1. Normal global left ventricular systolic function.   2. Left ventricular ejection fraction, by visual estimation, is 55 to   60%.   3. Spectral Doppler shows impaired relaxation pattern of left   ventricular myocardial filling (Grade I diastolic dysfunction).   4. The left atrium is normal in size.   5. Structurally normal mitral valve, with normal leaflet excursion.   6. Structurally normal tricuspid valve, with normal leaflet excursion.   7. Mild tricuspid regurgitation.   8. Structurally normal pulmonic valve, with normal leaflet excursion.   9. Mild pulmonic valve regurgitation.  10. Estimated pulmonary artery systolic pressure is 44.3 mmHg assuming a   right atrial pressure of 5 mmHg, which is consistent with mild pulmonary   hypertension.  11. Small pericardial effusion.    CATH REPORT:  Study Date:     03/28/2025   Cath Lab Report    DiagnosticCardiologist:       Gil Cohen MD   Fellow:                        Nicky Waldrop MD   Referring Physician:           Janki Patel MD   Diagnostic Conclusions:   Mild to moderate nonobstructive CAD.  Medical therapy and aggressive risk factor modification

## 2025-03-29 NOTE — DISCHARGE NOTE NURSING/CASE MANAGEMENT/SOCIAL WORK - PATIENT PORTAL LINK FT
You can access the FollowMyHealth Patient Portal offered by [facility] by registering at the following website: [email or web address]. By joining [employer]’s FollowMyHealth portal, you will also be able to view your health information using other applications (apps) compatible with our system.

## 2025-03-29 NOTE — DISCHARGE NOTE NURSING/CASE MANAGEMENT/SOCIAL WORK - FINANCIAL ASSISTANCE
[facility] provides services at a reduced cost to those who are determined to be eligible through [facility]’s financial assistance program. Information regarding [facility]’s financial assistance program can be found by going to [email or web address] or by calling [phone].

## 2025-05-14 NOTE — DISCHARGE NOTE PROVIDER - PROVIDER RX CONTACT NUMBER
[FreeTextEntry1] : Reviewed recent notes, labs and imaging today. And updated patient's EMR. Discussed plan as below with patient. 
[FreeTextEntry1] : Reviewed recent notes, labs and imaging today. And updated patient's EMR. Discussed plan as below with patient. 
[phone]

## 2025-05-23 ENCOUNTER — INPATIENT (INPATIENT)
Facility: HOSPITAL | Age: 80
LOS: 4 days | Discharge: HOME | End: 2025-05-28
Attending: HOSPITALIST | Admitting: HOSPITALIST
Payer: MEDICARE

## 2025-05-23 VITALS
TEMPERATURE: 99 F | DIASTOLIC BLOOD PRESSURE: 73 MMHG | OXYGEN SATURATION: 95 % | WEIGHT: 141.1 LBS | HEART RATE: 60 BPM | HEIGHT: 64 IN | RESPIRATION RATE: 20 BRPM | SYSTOLIC BLOOD PRESSURE: 156 MMHG

## 2025-05-23 DIAGNOSIS — J96.01 ACUTE RESPIRATORY FAILURE WITH HYPOXIA: ICD-10-CM

## 2025-05-23 DIAGNOSIS — E78.5 HYPERLIPIDEMIA, UNSPECIFIED: ICD-10-CM

## 2025-05-23 DIAGNOSIS — I10 ESSENTIAL (PRIMARY) HYPERTENSION: ICD-10-CM

## 2025-05-23 DIAGNOSIS — N18.6 END STAGE RENAL DISEASE: ICD-10-CM

## 2025-05-23 DIAGNOSIS — E11.9 TYPE 2 DIABETES MELLITUS WITHOUT COMPLICATIONS: ICD-10-CM

## 2025-05-23 DIAGNOSIS — Z29.9 ENCOUNTER FOR PROPHYLACTIC MEASURES, UNSPECIFIED: ICD-10-CM

## 2025-05-23 DIAGNOSIS — K92.2 GASTROINTESTINAL HEMORRHAGE, UNSPECIFIED: Chronic | ICD-10-CM

## 2025-05-23 DIAGNOSIS — I25.10 ATHEROSCLEROTIC HEART DISEASE OF NATIVE CORONARY ARTERY WITHOUT ANGINA PECTORIS: ICD-10-CM

## 2025-05-23 DIAGNOSIS — J18.9 PNEUMONIA, UNSPECIFIED ORGANISM: ICD-10-CM

## 2025-05-23 DIAGNOSIS — Z86.79 PERSONAL HISTORY OF OTHER DISEASES OF THE CIRCULATORY SYSTEM: ICD-10-CM

## 2025-05-23 LAB
ALBUMIN SERPL ELPH-MCNC: 3.4 G/DL — SIGNIFICANT CHANGE UP (ref 3.3–5)
ALP SERPL-CCNC: 169 U/L — HIGH (ref 40–120)
ALT FLD-CCNC: 10 U/L — LOW (ref 12–78)
ANION GAP SERPL CALC-SCNC: 7 MMOL/L — SIGNIFICANT CHANGE UP (ref 5–17)
AST SERPL-CCNC: 36 U/L — SIGNIFICANT CHANGE UP (ref 15–37)
BASOPHILS # BLD AUTO: 0.05 K/UL — SIGNIFICANT CHANGE UP (ref 0–0.2)
BASOPHILS NFR BLD AUTO: 0.6 % — SIGNIFICANT CHANGE UP (ref 0–2)
BILIRUB SERPL-MCNC: 1 MG/DL — SIGNIFICANT CHANGE UP (ref 0.2–1.2)
BUN SERPL-MCNC: 8 MG/DL — SIGNIFICANT CHANGE UP (ref 7–23)
CALCIUM SERPL-MCNC: 8.9 MG/DL — SIGNIFICANT CHANGE UP (ref 8.5–10.1)
CHLORIDE SERPL-SCNC: 100 MMOL/L — SIGNIFICANT CHANGE UP (ref 96–108)
CO2 SERPL-SCNC: 31 MMOL/L — SIGNIFICANT CHANGE UP (ref 22–31)
CREAT SERPL-MCNC: 3.17 MG/DL — HIGH (ref 0.5–1.3)
EGFR: 14 ML/MIN/1.73M2 — LOW
EGFR: 14 ML/MIN/1.73M2 — LOW
EOSINOPHIL # BLD AUTO: 0.18 K/UL — SIGNIFICANT CHANGE UP (ref 0–0.5)
EOSINOPHIL NFR BLD AUTO: 2 % — SIGNIFICANT CHANGE UP (ref 0–6)
FLUAV AG NPH QL: SIGNIFICANT CHANGE UP
FLUBV AG NPH QL: SIGNIFICANT CHANGE UP
GLUCOSE BLDC GLUCOMTR-MCNC: 132 MG/DL — HIGH (ref 70–99)
GLUCOSE SERPL-MCNC: 88 MG/DL — SIGNIFICANT CHANGE UP (ref 70–99)
HCT VFR BLD CALC: 34.7 % — SIGNIFICANT CHANGE UP (ref 34.5–45)
HGB BLD-MCNC: 11.7 G/DL — SIGNIFICANT CHANGE UP (ref 11.5–15.5)
IMM GRANULOCYTES NFR BLD AUTO: 0.3 % — SIGNIFICANT CHANGE UP (ref 0–0.9)
INR BLD: 1.06 RATIO — SIGNIFICANT CHANGE UP (ref 0.85–1.16)
LYMPHOCYTES # BLD AUTO: 1.43 K/UL — SIGNIFICANT CHANGE UP (ref 1–3.3)
LYMPHOCYTES # BLD AUTO: 15.9 % — SIGNIFICANT CHANGE UP (ref 13–44)
MCHC RBC-ENTMCNC: 30.1 PG — SIGNIFICANT CHANGE UP (ref 27–34)
MCHC RBC-ENTMCNC: 33.7 G/DL — SIGNIFICANT CHANGE UP (ref 32–36)
MCV RBC AUTO: 89.2 FL — SIGNIFICANT CHANGE UP (ref 80–100)
MONOCYTES # BLD AUTO: 0.7 K/UL — SIGNIFICANT CHANGE UP (ref 0–0.9)
MONOCYTES NFR BLD AUTO: 7.8 % — SIGNIFICANT CHANGE UP (ref 2–14)
NEUTROPHILS # BLD AUTO: 6.63 K/UL — SIGNIFICANT CHANGE UP (ref 1.8–7.4)
NEUTROPHILS NFR BLD AUTO: 73.4 % — SIGNIFICANT CHANGE UP (ref 43–77)
NRBC BLD AUTO-RTO: 0 /100 WBCS — SIGNIFICANT CHANGE UP (ref 0–0)
NT-PROBNP SERPL-SCNC: HIGH PG/ML (ref 0–450)
PLATELET # BLD AUTO: 165 K/UL — SIGNIFICANT CHANGE UP (ref 150–400)
POTASSIUM SERPL-MCNC: 4.5 MMOL/L — SIGNIFICANT CHANGE UP (ref 3.5–5.3)
POTASSIUM SERPL-SCNC: 4.5 MMOL/L — SIGNIFICANT CHANGE UP (ref 3.5–5.3)
PROT SERPL-MCNC: 8.2 GM/DL — SIGNIFICANT CHANGE UP (ref 6–8.3)
PROTHROM AB SERPL-ACNC: 12.3 SEC — SIGNIFICANT CHANGE UP (ref 9.9–13.4)
RBC # BLD: 3.89 M/UL — SIGNIFICANT CHANGE UP (ref 3.8–5.2)
RBC # FLD: 16.5 % — HIGH (ref 10.3–14.5)
RSV RNA NPH QL NAA+NON-PROBE: SIGNIFICANT CHANGE UP
SARS-COV-2 RNA SPEC QL NAA+PROBE: SIGNIFICANT CHANGE UP
SODIUM SERPL-SCNC: 138 MMOL/L — SIGNIFICANT CHANGE UP (ref 135–145)
SOURCE RESPIRATORY: SIGNIFICANT CHANGE UP
TROPONIN I, HIGH SENSITIVITY RESULT: 49.7 NG/L — SIGNIFICANT CHANGE UP
WBC # BLD: 9.02 K/UL — SIGNIFICANT CHANGE UP (ref 3.8–10.5)
WBC # FLD AUTO: 9.02 K/UL — SIGNIFICANT CHANGE UP (ref 3.8–10.5)

## 2025-05-23 PROCEDURE — 93010 ELECTROCARDIOGRAM REPORT: CPT

## 2025-05-23 PROCEDURE — 71045 X-RAY EXAM CHEST 1 VIEW: CPT | Mod: 26

## 2025-05-23 PROCEDURE — 71250 CT THORAX DX C-: CPT | Mod: 26

## 2025-05-23 PROCEDURE — 99285 EMERGENCY DEPT VISIT HI MDM: CPT

## 2025-05-23 PROCEDURE — 99223 1ST HOSP IP/OBS HIGH 75: CPT

## 2025-05-23 RX ORDER — ATORVASTATIN CALCIUM 80 MG/1
10 TABLET, FILM COATED ORAL AT BEDTIME
Refills: 0 | Status: DISCONTINUED | OUTPATIENT
Start: 2025-05-23 | End: 2025-05-28

## 2025-05-23 RX ORDER — APIXABAN 2.5 MG/1
2.5 TABLET, FILM COATED ORAL
Refills: 0 | Status: DISCONTINUED | OUTPATIENT
Start: 2025-05-23 | End: 2025-05-28

## 2025-05-23 RX ORDER — CEFTRIAXONE 500 MG/1
INJECTION, POWDER, FOR SOLUTION INTRAMUSCULAR; INTRAVENOUS
Refills: 0 | Status: DISCONTINUED | OUTPATIENT
Start: 2025-05-23 | End: 2025-05-28

## 2025-05-23 RX ORDER — GLUCAGON 3 MG/1
1 POWDER NASAL ONCE
Refills: 0 | Status: DISCONTINUED | OUTPATIENT
Start: 2025-05-23 | End: 2025-05-28

## 2025-05-23 RX ORDER — INSULIN LISPRO 100 U/ML
INJECTION, SOLUTION INTRAVENOUS; SUBCUTANEOUS AT BEDTIME
Refills: 0 | Status: DISCONTINUED | OUTPATIENT
Start: 2025-05-23 | End: 2025-05-28

## 2025-05-23 RX ORDER — CEFTRIAXONE 500 MG/1
1000 INJECTION, POWDER, FOR SOLUTION INTRAMUSCULAR; INTRAVENOUS ONCE
Refills: 0 | Status: COMPLETED | OUTPATIENT
Start: 2025-05-23 | End: 2025-05-23

## 2025-05-23 RX ORDER — CEFTRIAXONE 500 MG/1
1000 INJECTION, POWDER, FOR SOLUTION INTRAMUSCULAR; INTRAVENOUS EVERY 24 HOURS
Refills: 0 | Status: DISCONTINUED | OUTPATIENT
Start: 2025-05-24 | End: 2025-05-28

## 2025-05-23 RX ORDER — MELATONIN 5 MG
3 TABLET ORAL AT BEDTIME
Refills: 0 | Status: DISCONTINUED | OUTPATIENT
Start: 2025-05-23 | End: 2025-05-28

## 2025-05-23 RX ORDER — AZITHROMYCIN 250 MG
500 CAPSULE ORAL DAILY
Refills: 0 | Status: COMPLETED | OUTPATIENT
Start: 2025-05-23 | End: 2025-05-26

## 2025-05-23 RX ORDER — MAGNESIUM, ALUMINUM HYDROXIDE 200-200 MG
30 TABLET,CHEWABLE ORAL EVERY 4 HOURS
Refills: 0 | Status: DISCONTINUED | OUTPATIENT
Start: 2025-05-23 | End: 2025-05-28

## 2025-05-23 RX ORDER — INSULIN LISPRO 100 U/ML
INJECTION, SOLUTION INTRAVENOUS; SUBCUTANEOUS
Refills: 0 | Status: DISCONTINUED | OUTPATIENT
Start: 2025-05-23 | End: 2025-05-28

## 2025-05-23 RX ORDER — DEXTROSE 50 % IN WATER 50 %
50 SYRINGE (ML) INTRAVENOUS ONCE
Refills: 0 | Status: DISCONTINUED | OUTPATIENT
Start: 2025-05-23 | End: 2025-05-28

## 2025-05-23 RX ORDER — METOPROLOL SUCCINATE 50 MG/1
100 TABLET, EXTENDED RELEASE ORAL DAILY
Refills: 0 | Status: DISCONTINUED | OUTPATIENT
Start: 2025-05-24 | End: 2025-05-28

## 2025-05-23 RX ORDER — AMLODIPINE BESYLATE 10 MG/1
10 TABLET ORAL DAILY
Refills: 0 | Status: DISCONTINUED | OUTPATIENT
Start: 2025-05-24 | End: 2025-05-28

## 2025-05-23 RX ORDER — ACETAMINOPHEN 500 MG/5ML
650 LIQUID (ML) ORAL EVERY 6 HOURS
Refills: 0 | Status: DISCONTINUED | OUTPATIENT
Start: 2025-05-23 | End: 2025-05-28

## 2025-05-23 RX ORDER — DEXTROSE 50 % IN WATER 50 %
25 SYRINGE (ML) INTRAVENOUS ONCE
Refills: 0 | Status: DISCONTINUED | OUTPATIENT
Start: 2025-05-23 | End: 2025-05-28

## 2025-05-23 RX ADMIN — Medication 3 MILLIGRAM(S): at 22:27

## 2025-05-23 RX ADMIN — CEFTRIAXONE 100 MILLIGRAM(S): 500 INJECTION, POWDER, FOR SOLUTION INTRAMUSCULAR; INTRAVENOUS at 20:45

## 2025-05-23 RX ADMIN — Medication 25 MILLIGRAM(S): at 22:27

## 2025-05-23 RX ADMIN — Medication 500 MILLIGRAM(S): at 20:45

## 2025-05-23 RX ADMIN — ATORVASTATIN CALCIUM 10 MILLIGRAM(S): 80 TABLET, FILM COATED ORAL at 22:27

## 2025-05-23 NOTE — ED ADULT NURSE NOTE - CADM POA PRESS ULCER
No
Podiatry Discharge Instructions:  Follow up: Please follow up with Dr. Patton within 1 week of discharge from the hospital, please call [phone] for appointment and discuss that you recently were seen in the hospital.  Wound Care: Please leave your dressing clean dry intact until your follow up appointment .  Weight bearing: Please remain non weight bearing to left lower extremity in posterior splint  Antibiotics: n/a    Follow up with your primary care physician for further monitoring in 1-2 weeks. Please call to arrange appointment.

## 2025-05-23 NOTE — H&P ADULT - PROBLEM SELECTOR PLAN 2
- As above  - SIRS criteria not met  - Will treat with Azithromycin + Ceftriaxone  - F/u legionella, mycoplasma, strep pneumo (pt makes little bit of urine)

## 2025-05-23 NOTE — ED ADULT TRIAGE NOTE - CHIEF COMPLAINT QUOTE
pt c/o weakness, difficulty breathing started yesterday, o2 sat 80% on room air on scene, o2 5LNC placed. completed dialysis today. hx: ESRD on HD m-w-f pt c/o weakness, difficulty breathing started yesterday, o2 sat 80% on room air, fs-114 on scene, o2 5LNC placed. completed dialysis today. fs 84 in triage. hx: ESRD on HD m-w-f

## 2025-05-23 NOTE — ED ADULT NURSE NOTE - OBJECTIVE STATEMENT
80 year old female with PMH of ESRD on HD m-w-f last done this Am, DM and HTN. Pt with c/o weakness, difficulty breathing started yesterday, o2 sat 80% on room air, fs-114 on scene, o2 5LNC placed. Pt states she intermittently experience these episodes which normall resolve on its own but today seems worse. Pt denies chest pain and dizziness.

## 2025-05-23 NOTE — ED PROVIDER NOTE - CLINICAL SUMMARY MEDICAL DECISION MAKING FREE TEXT BOX
Ms. De La Fuente, is an 80-year-old female, ESRD, on HD, M, W, F, SSS, A-fib, on Eliquis, diverticulosis, DVT, DM2, presenting to the emergency department today for shortness of breath.  The patient states her symptoms began yesterday.  The patient states that she was able to go to dialysis today and get the complete session but she continues to feel short of breath.  The patient states that she was having chest pain as well yesterday but is no longer having chest pain today.  The patient was discharged from the hospital approximately 2 months ago for similar symptoms.  The patient states that today she feels the exact same way as when she was admitted for shortness of breath in the past.  At that time the patient was found to have pleural effusions.  The patient is denying fevers, chills, cough, nausea or vomiting.  The patient did have a catheterization last time she was in the hospital that showed RCA stenosis up to 60%.  There was no stent placed at that time and medical management was initiated.  ROS is otherwise negative.    VS.  Patient desatting to 88% on room air, placed on 4 L of nasal cannula.  Patient tachypneic, otherwise BP and heart rate within normal limits.    PE.  Patient tachypneic, however in no acute respiratory distress.    The differential includes but is not limited to pleural effusion, ACS, electrolyte/hematologic derangement, pleurisy, pneumonia, pericardial effusion.  Will obtain basic labs, cardiac labs, EKG, chest x-ray.  Patient will likely need admission pending labs imaging and reassessment.

## 2025-05-23 NOTE — ED ADULT NURSE NOTE - NSFALLRISKINTERV_ED_ALL_ED

## 2025-05-23 NOTE — ED PROVIDER NOTE - ATTENDING WITH...
[No] : In the past 12 months have you used drugs other than those required for medical reasons? No [No falls in past year] : Patient reported no falls in the past year [0] : 2) Feeling down, depressed, or hopeless: Not at all (0) [PHQ-2 Negative - No further assessment needed] : PHQ-2 Negative - No further assessment needed [Never] : Never [[id number]] : 0 Resident

## 2025-05-23 NOTE — H&P ADULT - ASSESSMENT
Patient is a 80F, with PMHx of ESRD on HD (M,W,F at [facility], [phone]), SSS, [facility] Ricky PPM, afib on Eliquis, diverticulosis, colectomy, DVT, DM2, who comes in with shortness of breath (WEBBER) for 2 days and dry cough. Admitted for acute respiratory failure with hypoxia.

## 2025-05-23 NOTE — ED ADULT NURSE NOTE - CHIEF COMPLAINT QUOTE
pt c/o weakness, difficulty breathing started yesterday, o2 sat 80% on room air, fs-114 on scene, o2 5LNC placed. completed dialysis today. fs 84 in triage. hx: ESRD on HD m-w-f

## 2025-05-23 NOTE — H&P ADULT - HISTORY OF PRESENT ILLNESS
Patient is a 80F, with PMHx of ESRD on HD (M,W,F at [facility], [phone]), SSS, [facility]. Ricky PPM, afib on Eliquis, diverticulosis, colectomy, DVT, DM2, who comes in with shortness of breath (WEBBER) for 2 days. She also has 3 days of dry cough. She had her last HD today and she still feels short of breath. She endorses having BL pleural effusions recently.    Patient was recently at [facility]. 3/21, CT scan showed B/L pleural effusions, but collections were too small for thoracentesis. 3/22 TTE with EF 60-65%. 3/28 LHC = mRCA 60% medical management.

## 2025-05-23 NOTE — ED PROVIDER NOTE - ATTENDING CONTRIBUTION TO CARE
79 y/o F with PMH ESRD on MWF dialysis,  SSS, A-fib, on Eliquis, diverticulosis, DVT, DM2, presenting to the emergency department today for shortness of breath.  The patient states her symptoms began yesterday.  The patient states that she was able to go to dialysis today and get the complete session but she continues to feel short of breath.  The patient states that she was having chest pain as well yesterday but is no longer having chest pain today.  The patient was discharged from the hospital approximately 2 months ago for similar symptoms.  The patient states that today she feels the exact same way as when she was admitted for shortness of breath in the past.  At that time the patient was found to have pleural effusions.  The patient is denying fevers, chills, cough, nausea or vomiting.  The patient did have a catheterization last time she was in the hospital that showed RCA stenosis up to 60%.  There was no stent placed at that time and medical management was initiated.  ROS is otherwise negative.    In the ED, patient hypoxic to 88% on RA and placed on supplemental O2. She appears mildly tachypneic.    On exam, she is mildly tachypneic. No obvious edema.    Concern for pulmonary edema vs PNA vs other acute etiology  Plan for labs including trop/bnp, CXR, EKG  Will need admission as on supplemental O2

## 2025-05-23 NOTE — H&P ADULT - PROBLEM SELECTOR PLAN 1
- Per ED document, patient desatting to 88% on room air, placed on 4 L of nasal cannula.  - Pt now saturating 94% with 3L NC  - P/w WEBBER for 2 days and 3 days of dry cough  - COVID/Flu/RSV negative  - CT chest = Small bilateral pleural effusions with adjacent airspace opacity, either representing chronic atelectasis aneurysm is multifocal pneumonia. Dilated main pulmonary artery, suggestive of pulmonary hypertension.

## 2025-05-23 NOTE — H&P ADULT - NSHPPHYSICALEXAM_GEN_ALL_CORE
GENERAL: NAD  HEAD: Atraumatic, Normocephalic  EYES: EOMI. Conjunctiva and sclera clear  NECK: Supple  CHEST/LUNG: Mild BL rhonchi; No wheeze, rales  HEART: Regular rate and rhythm; No murmurs  ABDOMEN: Soft, Nontender, Nondistended; Bowel sounds present  EXTREMITIES: No edema  NEURO: AAOx3

## 2025-05-24 LAB
ALBUMIN SERPL ELPH-MCNC: 2.9 G/DL — LOW (ref 3.3–5)
ALP SERPL-CCNC: 154 U/L — HIGH (ref 40–120)
ALT FLD-CCNC: 7 U/L — LOW (ref 12–78)
ANION GAP SERPL CALC-SCNC: 5 MMOL/L — SIGNIFICANT CHANGE UP (ref 5–17)
AST SERPL-CCNC: 16 U/L — SIGNIFICANT CHANGE UP (ref 15–37)
BILIRUB SERPL-MCNC: 0.8 MG/DL — SIGNIFICANT CHANGE UP (ref 0.2–1.2)
BUN SERPL-MCNC: 12 MG/DL — SIGNIFICANT CHANGE UP (ref 7–23)
CALCIUM SERPL-MCNC: 8.4 MG/DL — LOW (ref 8.5–10.1)
CHLORIDE SERPL-SCNC: 100 MMOL/L — SIGNIFICANT CHANGE UP (ref 96–108)
CO2 SERPL-SCNC: 31 MMOL/L — SIGNIFICANT CHANGE UP (ref 22–31)
CREAT SERPL-MCNC: 4.35 MG/DL — HIGH (ref 0.5–1.3)
EGFR: 10 ML/MIN/1.73M2 — LOW
EGFR: 10 ML/MIN/1.73M2 — LOW
GLUCOSE BLDC GLUCOMTR-MCNC: 117 MG/DL — HIGH (ref 70–99)
GLUCOSE BLDC GLUCOMTR-MCNC: 169 MG/DL — HIGH (ref 70–99)
GLUCOSE BLDC GLUCOMTR-MCNC: 86 MG/DL — SIGNIFICANT CHANGE UP (ref 70–99)
GLUCOSE BLDC GLUCOMTR-MCNC: 96 MG/DL — SIGNIFICANT CHANGE UP (ref 70–99)
GLUCOSE SERPL-MCNC: 91 MG/DL — SIGNIFICANT CHANGE UP (ref 70–99)
HCT VFR BLD CALC: 31.2 % — LOW (ref 34.5–45)
HGB BLD-MCNC: 10.3 G/DL — LOW (ref 11.5–15.5)
MAGNESIUM SERPL-MCNC: 2.2 MG/DL — SIGNIFICANT CHANGE UP (ref 1.6–2.6)
MCHC RBC-ENTMCNC: 29.8 PG — SIGNIFICANT CHANGE UP (ref 27–34)
MCHC RBC-ENTMCNC: 33 G/DL — SIGNIFICANT CHANGE UP (ref 32–36)
MCV RBC AUTO: 90.2 FL — SIGNIFICANT CHANGE UP (ref 80–100)
NRBC BLD AUTO-RTO: 0 /100 WBCS — SIGNIFICANT CHANGE UP (ref 0–0)
PHOSPHATE SERPL-MCNC: 3.4 MG/DL — SIGNIFICANT CHANGE UP (ref 2.5–4.5)
PLATELET # BLD AUTO: 134 K/UL — LOW (ref 150–400)
POTASSIUM SERPL-MCNC: 4.2 MMOL/L — SIGNIFICANT CHANGE UP (ref 3.5–5.3)
POTASSIUM SERPL-SCNC: 4.2 MMOL/L — SIGNIFICANT CHANGE UP (ref 3.5–5.3)
PROT SERPL-MCNC: 7.2 GM/DL — SIGNIFICANT CHANGE UP (ref 6–8.3)
RBC # BLD: 3.46 M/UL — LOW (ref 3.8–5.2)
RBC # FLD: 16.3 % — HIGH (ref 10.3–14.5)
SODIUM SERPL-SCNC: 136 MMOL/L — SIGNIFICANT CHANGE UP (ref 135–145)
WBC # BLD: 8.58 K/UL — SIGNIFICANT CHANGE UP (ref 3.8–10.5)
WBC # FLD AUTO: 8.58 K/UL — SIGNIFICANT CHANGE UP (ref 3.8–10.5)

## 2025-05-24 PROCEDURE — 99233 SBSQ HOSP IP/OBS HIGH 50: CPT

## 2025-05-24 RX ADMIN — Medication 25 MILLIGRAM(S): at 14:24

## 2025-05-24 RX ADMIN — APIXABAN 2.5 MILLIGRAM(S): 2.5 TABLET, FILM COATED ORAL at 09:45

## 2025-05-24 RX ADMIN — Medication 25 MILLIGRAM(S): at 21:29

## 2025-05-24 RX ADMIN — ATORVASTATIN CALCIUM 10 MILLIGRAM(S): 80 TABLET, FILM COATED ORAL at 21:29

## 2025-05-24 RX ADMIN — APIXABAN 2.5 MILLIGRAM(S): 2.5 TABLET, FILM COATED ORAL at 17:13

## 2025-05-24 RX ADMIN — Medication 25 MILLIGRAM(S): at 09:44

## 2025-05-24 RX ADMIN — Medication 500 MILLIGRAM(S): at 12:11

## 2025-05-24 RX ADMIN — METOPROLOL SUCCINATE 100 MILLIGRAM(S): 50 TABLET, EXTENDED RELEASE ORAL at 09:45

## 2025-05-24 RX ADMIN — AMLODIPINE BESYLATE 10 MILLIGRAM(S): 10 TABLET ORAL at 09:44

## 2025-05-24 NOTE — CONSULT NOTE ADULT - SUBJECTIVE AND OBJECTIVE BOX
Patient chart reviewed, full consult to follow.     Completed HD yesterday but with persistent respiratory distress.  Suspected multifocal pneumonia     IV abx, will follow for HD indications       Thank you for the courtesy of this consultation. [facility] NEPHROLOGY SERVICES, [facility]  NEPHROLOGY AND HYPERTENSION  [address]  [address]  [place] [address]  [phone]    MD PRAVEEN LYNNE MD YELENA ROSENBERG, MD BINNY KOSHY, MD CHRISTOPHER CAPUTO, MD EDWARD BOVER, MD      Information from chart:  "Patient is a 80y old  Female who presents with a chief complaint of Acute respiratory failure with hypoxia (24 May 2025 09:04)    HPI:  Patient is a 80F, with PMHx of ESRD on HD (M,W,F at [facility], [phone]), SSS, [facility] Ricky PPM, afib on Eliquis, diverticulosis, colectomy, DVT, DM2, who comes in with shortness of breath (WEBBER) for 2 days. She also has 3 days of dry cough. She had her last HD today and she still feels short of breath. She endorses having BL pleural effusions recently.    Patient was recently at [facility]. 3/21, CT scan showed B/L pleural effusions, but collections were too small for thoracentesis. 3/22 TTE with EF 60-65%. 3/28 LHC = mRCA 60% medical management. (23 May 2025 20:05)   "    Completed HD yesterday  CXR with multifocal PNA    PAST MEDICAL & SURGICAL HISTORY:  HTN (hypertension)      Pacemaker      DVT (deep venous thrombosis)      Diverticulitis      CHF (congestive heart failure)      ESRD on dialysis      Type 2 diabetes mellitus      Type 2 diabetes mellitus      Intestinal bleeding        FAMILY HISTORY:  FH: HTN (hypertension)      Allergies    No Known Allergies    Intolerances      Home Medications:  acetaminophen 325 mg oral tablet: 2 tab(s) orally every 6 hours as needed for  mild pain (28 Mar 2025 02:26)  amLODIPine 10 mg oral tablet: 1 tab(s) orally once a day (28 Mar 2025 02:28)  apixaban 2.5 mg oral tablet: 1 tab(s) orally every 12 hours Resume 3/29/25 (28 Mar 2025 12:44)  epoetin torrie: 4,000 unit(s) intravenous 3 times a week at Hemodialysis (28 Mar 2025 02:30)  hydrALAZINE 25 mg oral tablet: 1 tab(s) orally 3 times a day (28 Mar 2025 02:34)  metoprolol succinate 100 mg oral tablet, extended release: 1 tab(s) orally once a day (28 Mar 2025 12:44)  simvastatin 20 mg oral tablet: 1 tab(s) orally once a day (at bedtime) (28 Mar 2025 02:32)  sodium chloride 0.5% nasal spray: 1 spray(s) in each nostril every 1 to 2 hours as needed for  dry nasal passages (28 Mar 2025 02:33)  Tradjenta 5 mg oral tablet: 1 tab(s) orally once a day (28 Mar 2025 12:44)    MEDICATIONS  (STANDING):  amLODIPine   Tablet 10 milliGRAM(s) Oral daily  apixaban 2.5 milliGRAM(s) Oral two times a day  atorvastatin 10 milliGRAM(s) Oral at bedtime  azithromycin   Tablet 500 milliGRAM(s) Oral daily  cefTRIAXone   IVPB      cefTRIAXone   IVPB 1000 milliGRAM(s) IV Intermittent every 24 hours  dextrose 50% Injectable 25 Gram(s) IV Push once  dextrose 50% Injectable 50 Gram(s) IV Push once  dextrose 50% Injectable 50 Gram(s) IV Push once  glucagon  Injectable 1 milliGRAM(s) IntraMuscular once  hydrALAZINE 25 milliGRAM(s) Oral three times a day  insulin lispro (ADMELOG) corrective regimen sliding scale   SubCutaneous three times a day before meals  insulin lispro (ADMELOG) corrective regimen sliding scale   SubCutaneous at bedtime  metoprolol succinate  milliGRAM(s) Oral daily    MEDICATIONS  (PRN):  acetaminophen     Tablet .. 650 milliGRAM(s) Oral every 6 hours PRN Temp greater or equal to 38C (100.4F), Mild Pain (1 - 3)  aluminum hydroxide/magnesium hydroxide/simethicone Suspension 30 milliLiter(s) Oral every 4 hours PRN Dyspepsia  melatonin 3 milliGRAM(s) Oral at bedtime PRN Insomnia    Vital Signs Last 24 Hrs  T(C): 37.1 (24 May 2025 17:41), Max: 37.1 (24 May 2025 17:41)  T(F): 98.8 (24 May 2025 17:41), Max: 98.8 (24 May 2025 17:41)  HR: 60 (24 May 2025 17:41) (60 - 64)  BP: 128/66 (24 May 2025 17:41) (115/60 - 161/80)  BP(mean): --  RR: 18 (24 May 2025 17:41) (18 - 20)  SpO2: 97% (24 May 2025 17:41) (92% - 97%)    Parameters below as of 24 May 2025 17:41  Patient On (Oxygen Delivery Method): nasal cannula        Daily     Daily     05-24-25 @ 07:01  -  05-24-25 @ 21:04  --------------------------------------------------------  IN: 347 mL / OUT: 0 mL / NET: 347 mL      CAPILLARY BLOOD GLUCOSE      POCT Blood Glucose.: 96 mg/dL (24 May 2025 16:55)  POCT Blood Glucose.: 169 mg/dL (24 May 2025 11:29)  POCT Blood Glucose.: 86 mg/dL (24 May 2025 07:39)  POCT Blood Glucose.: 132 mg/dL (23 May 2025 22:15)    PHYSICAL EXAM:      T(C): 37.1 (05-24-25 @ 17:41), Max: 37.1 (05-24-25 @ 17:41)  HR: 60 (05-24-25 @ 17:41) (60 - 64)  BP: 128/66 (05-24-25 @ 17:41) (115/60 - 161/80)  RR: 18 (05-24-25 @ 17:41) (18 - 20)  SpO2: 97% (05-24-25 @ 17:41) (92% - 97%)  Wt(kg): --  Lungs decreased BS mild scattered rhonchi  Heart S1S2  Abd soft NT ND  Extremities:   tr edema              05-24    136  |  100  |  12  ----------------------------<  91  4.2   |  31  |  4.35[H]    Ca    8.4[L]      24 May 2025 06:40  Phos  3.4     05-24  Mg     2.2     05-24    TPro  7.2  /  Alb  2.9[L]  /  TBili  0.8  /  DBili  x   /  AST  16  /  ALT  7[L]  /  AlkPhos  154[H]  05-24                          10.3   8.58  )-----------( 134      ( 24 May 2025 06:40 )             31.2     Creatinine Trend: 4.35<--, 3.17<--  Urinalysis Basic - ( 24 May 2025 06:40 )    Color: x / Appearance: x / SG: x / pH: x  Gluc: 91 mg/dL / Ketone: x  / Bili: x / Urobili: x   Blood: x / Protein: x / Nitrite: x   Leuk Esterase: x / RBC: x / WBC x   Sq Epi: x / Non Sq Epi: x / Bacteria: x            Assessment   ESRD, multifactorial  PNA    Plan  IV abx  Will follow for HD indications       Erickson Ray MD

## 2025-05-25 LAB
APPEARANCE UR: CLEAR — SIGNIFICANT CHANGE UP
BACTERIA # UR AUTO: ABNORMAL /HPF
BILIRUB UR-MCNC: NEGATIVE — SIGNIFICANT CHANGE UP
COLOR SPEC: YELLOW — SIGNIFICANT CHANGE UP
DIFF PNL FLD: NEGATIVE — SIGNIFICANT CHANGE UP
EPI CELLS # UR: PRESENT
GLUCOSE BLDC GLUCOMTR-MCNC: 116 MG/DL — HIGH (ref 70–99)
GLUCOSE BLDC GLUCOMTR-MCNC: 179 MG/DL — HIGH (ref 70–99)
GLUCOSE BLDC GLUCOMTR-MCNC: 185 MG/DL — HIGH (ref 70–99)
GLUCOSE BLDC GLUCOMTR-MCNC: 85 MG/DL — SIGNIFICANT CHANGE UP (ref 70–99)
GLUCOSE UR QL: NEGATIVE MG/DL — SIGNIFICANT CHANGE UP
KETONES UR QL: ABNORMAL MG/DL
LEUKOCYTE ESTERASE UR-ACNC: ABNORMAL
NITRITE UR-MCNC: NEGATIVE — SIGNIFICANT CHANGE UP
PH UR: 7 — SIGNIFICANT CHANGE UP (ref 5–8)
PROT UR-MCNC: 300 MG/DL
RBC CASTS # UR COMP ASSIST: 2 /HPF — SIGNIFICANT CHANGE UP (ref 0–4)
SP GR SPEC: 1.02 — SIGNIFICANT CHANGE UP (ref 1–1.03)
UROBILINOGEN FLD QL: 1 MG/DL — SIGNIFICANT CHANGE UP (ref 0.2–1)
WBC UR QL: 20 /HPF — HIGH (ref 0–5)

## 2025-05-25 PROCEDURE — 36410 VNPNXR 3YR/> PHY/QHP DX/THER: CPT

## 2025-05-25 PROCEDURE — 76937 US GUIDE VASCULAR ACCESS: CPT | Mod: 26

## 2025-05-25 PROCEDURE — 36000 PLACE NEEDLE IN VEIN: CPT

## 2025-05-25 PROCEDURE — 99232 SBSQ HOSP IP/OBS MODERATE 35: CPT

## 2025-05-25 RX ADMIN — APIXABAN 2.5 MILLIGRAM(S): 2.5 TABLET, FILM COATED ORAL at 06:31

## 2025-05-25 RX ADMIN — AMLODIPINE BESYLATE 10 MILLIGRAM(S): 10 TABLET ORAL at 06:32

## 2025-05-25 RX ADMIN — Medication 25 MILLIGRAM(S): at 06:31

## 2025-05-25 RX ADMIN — INSULIN LISPRO 1: 100 INJECTION, SOLUTION INTRAVENOUS; SUBCUTANEOUS at 17:42

## 2025-05-25 RX ADMIN — Medication 25 MILLIGRAM(S): at 21:22

## 2025-05-25 RX ADMIN — APIXABAN 2.5 MILLIGRAM(S): 2.5 TABLET, FILM COATED ORAL at 17:42

## 2025-05-25 RX ADMIN — METOPROLOL SUCCINATE 100 MILLIGRAM(S): 50 TABLET, EXTENDED RELEASE ORAL at 09:31

## 2025-05-25 RX ADMIN — CEFTRIAXONE 100 MILLIGRAM(S): 500 INJECTION, POWDER, FOR SOLUTION INTRAMUSCULAR; INTRAVENOUS at 20:32

## 2025-05-25 RX ADMIN — ATORVASTATIN CALCIUM 10 MILLIGRAM(S): 80 TABLET, FILM COATED ORAL at 21:22

## 2025-05-25 RX ADMIN — Medication 500 MILLIGRAM(S): at 12:37

## 2025-05-25 RX ADMIN — Medication 25 MILLIGRAM(S): at 13:29

## 2025-05-26 LAB
ALBUMIN SERPL ELPH-MCNC: 2.8 G/DL — LOW (ref 3.3–5)
ALP SERPL-CCNC: 152 U/L — HIGH (ref 40–120)
ALT FLD-CCNC: 7 U/L — LOW (ref 12–78)
ANION GAP SERPL CALC-SCNC: 7 MMOL/L — SIGNIFICANT CHANGE UP (ref 5–17)
AST SERPL-CCNC: 11 U/L — LOW (ref 15–37)
BILIRUB SERPL-MCNC: 0.4 MG/DL — SIGNIFICANT CHANGE UP (ref 0.2–1.2)
BUN SERPL-MCNC: 45 MG/DL — HIGH (ref 7–23)
CALCIUM SERPL-MCNC: 7.7 MG/DL — LOW (ref 8.5–10.1)
CHLORIDE SERPL-SCNC: 105 MMOL/L — SIGNIFICANT CHANGE UP (ref 96–108)
CO2 SERPL-SCNC: 27 MMOL/L — SIGNIFICANT CHANGE UP (ref 22–31)
CREAT SERPL-MCNC: 8.47 MG/DL — HIGH (ref 0.5–1.3)
EGFR: 4 ML/MIN/1.73M2 — LOW
EGFR: 4 ML/MIN/1.73M2 — LOW
GLUCOSE BLDC GLUCOMTR-MCNC: 107 MG/DL — HIGH (ref 70–99)
GLUCOSE BLDC GLUCOMTR-MCNC: 108 MG/DL — HIGH (ref 70–99)
GLUCOSE BLDC GLUCOMTR-MCNC: 152 MG/DL — HIGH (ref 70–99)
GLUCOSE BLDC GLUCOMTR-MCNC: 94 MG/DL — SIGNIFICANT CHANGE UP (ref 70–99)
GLUCOSE SERPL-MCNC: 197 MG/DL — HIGH (ref 70–99)
HCT VFR BLD CALC: 28.2 % — LOW (ref 34.5–45)
HGB BLD-MCNC: 9.4 G/DL — LOW (ref 11.5–15.5)
M PNEUMO IGM SER-ACNC: 0.2 INDEX — SIGNIFICANT CHANGE UP (ref 0–0.9)
MAGNESIUM SERPL-MCNC: 2.2 MG/DL — SIGNIFICANT CHANGE UP (ref 1.6–2.6)
MCHC RBC-ENTMCNC: 29.9 PG — SIGNIFICANT CHANGE UP (ref 27–34)
MCHC RBC-ENTMCNC: 33.3 G/DL — SIGNIFICANT CHANGE UP (ref 32–36)
MCV RBC AUTO: 89.8 FL — SIGNIFICANT CHANGE UP (ref 80–100)
MYCOPLASMA AG SPEC QL: NEGATIVE — SIGNIFICANT CHANGE UP
NRBC BLD AUTO-RTO: 0 /100 WBCS — SIGNIFICANT CHANGE UP (ref 0–0)
PHOSPHATE SERPL-MCNC: 5.4 MG/DL — HIGH (ref 2.5–4.5)
PLATELET # BLD AUTO: 165 K/UL — SIGNIFICANT CHANGE UP (ref 150–400)
POTASSIUM SERPL-MCNC: 4.5 MMOL/L — SIGNIFICANT CHANGE UP (ref 3.5–5.3)
POTASSIUM SERPL-SCNC: 4.5 MMOL/L — SIGNIFICANT CHANGE UP (ref 3.5–5.3)
PROT SERPL-MCNC: 6.9 GM/DL — SIGNIFICANT CHANGE UP (ref 6–8.3)
RBC # BLD: 3.14 M/UL — LOW (ref 3.8–5.2)
RBC # FLD: 16.1 % — HIGH (ref 10.3–14.5)
SODIUM SERPL-SCNC: 139 MMOL/L — SIGNIFICANT CHANGE UP (ref 135–145)
WBC # BLD: 6.79 K/UL — SIGNIFICANT CHANGE UP (ref 3.8–10.5)
WBC # FLD AUTO: 6.79 K/UL — SIGNIFICANT CHANGE UP (ref 3.8–10.5)

## 2025-05-26 PROCEDURE — 99232 SBSQ HOSP IP/OBS MODERATE 35: CPT

## 2025-05-26 RX ORDER — EPOETIN ALFA 10000 [IU]/ML
10000 SOLUTION INTRAVENOUS; SUBCUTANEOUS ONCE
Refills: 0 | Status: COMPLETED | OUTPATIENT
Start: 2025-05-26 | End: 2025-05-28

## 2025-05-26 RX ORDER — EPOETIN ALFA 10000 [IU]/ML
10000 SOLUTION INTRAVENOUS; SUBCUTANEOUS ONCE
Refills: 0 | Status: DISCONTINUED | OUTPATIENT
Start: 2025-05-26 | End: 2025-05-26

## 2025-05-26 RX ADMIN — METOPROLOL SUCCINATE 100 MILLIGRAM(S): 50 TABLET, EXTENDED RELEASE ORAL at 05:46

## 2025-05-26 RX ADMIN — ATORVASTATIN CALCIUM 10 MILLIGRAM(S): 80 TABLET, FILM COATED ORAL at 21:21

## 2025-05-26 RX ADMIN — Medication 25 MILLIGRAM(S): at 17:45

## 2025-05-26 RX ADMIN — CEFTRIAXONE 100 MILLIGRAM(S): 500 INJECTION, POWDER, FOR SOLUTION INTRAMUSCULAR; INTRAVENOUS at 21:21

## 2025-05-26 RX ADMIN — APIXABAN 2.5 MILLIGRAM(S): 2.5 TABLET, FILM COATED ORAL at 05:47

## 2025-05-26 RX ADMIN — Medication 500 MILLIGRAM(S): at 17:46

## 2025-05-26 RX ADMIN — AMLODIPINE BESYLATE 10 MILLIGRAM(S): 10 TABLET ORAL at 05:47

## 2025-05-26 RX ADMIN — APIXABAN 2.5 MILLIGRAM(S): 2.5 TABLET, FILM COATED ORAL at 17:46

## 2025-05-26 RX ADMIN — Medication 25 MILLIGRAM(S): at 05:46

## 2025-05-26 RX ADMIN — INSULIN LISPRO 0: 100 INJECTION, SOLUTION INTRAVENOUS; SUBCUTANEOUS at 21:30

## 2025-05-26 RX ADMIN — Medication 25 MILLIGRAM(S): at 21:21

## 2025-05-27 LAB
GLUCOSE BLDC GLUCOMTR-MCNC: 101 MG/DL — HIGH (ref 70–99)
GLUCOSE BLDC GLUCOMTR-MCNC: 102 MG/DL — HIGH (ref 70–99)
GLUCOSE BLDC GLUCOMTR-MCNC: 193 MG/DL — HIGH (ref 70–99)
GLUCOSE BLDC GLUCOMTR-MCNC: 210 MG/DL — HIGH (ref 70–99)
LEGIONELLA AG UR QL: NEGATIVE — SIGNIFICANT CHANGE UP
S PNEUM AG UR QL: NEGATIVE — SIGNIFICANT CHANGE UP

## 2025-05-27 PROCEDURE — 99232 SBSQ HOSP IP/OBS MODERATE 35: CPT

## 2025-05-27 RX ADMIN — Medication 25 MILLIGRAM(S): at 17:16

## 2025-05-27 RX ADMIN — Medication 25 MILLIGRAM(S): at 21:59

## 2025-05-27 RX ADMIN — Medication 25 MILLIGRAM(S): at 05:49

## 2025-05-27 RX ADMIN — INSULIN LISPRO 2: 100 INJECTION, SOLUTION INTRAVENOUS; SUBCUTANEOUS at 11:36

## 2025-05-27 RX ADMIN — INSULIN LISPRO 0: 100 INJECTION, SOLUTION INTRAVENOUS; SUBCUTANEOUS at 21:20

## 2025-05-27 RX ADMIN — CEFTRIAXONE 100 MILLIGRAM(S): 500 INJECTION, POWDER, FOR SOLUTION INTRAMUSCULAR; INTRAVENOUS at 21:59

## 2025-05-27 RX ADMIN — METOPROLOL SUCCINATE 100 MILLIGRAM(S): 50 TABLET, EXTENDED RELEASE ORAL at 05:49

## 2025-05-27 RX ADMIN — ATORVASTATIN CALCIUM 10 MILLIGRAM(S): 80 TABLET, FILM COATED ORAL at 21:59

## 2025-05-27 RX ADMIN — AMLODIPINE BESYLATE 10 MILLIGRAM(S): 10 TABLET ORAL at 05:49

## 2025-05-27 RX ADMIN — APIXABAN 2.5 MILLIGRAM(S): 2.5 TABLET, FILM COATED ORAL at 17:16

## 2025-05-27 RX ADMIN — APIXABAN 2.5 MILLIGRAM(S): 2.5 TABLET, FILM COATED ORAL at 05:49

## 2025-05-27 NOTE — PATIENT PROFILE ADULT - FALL HARM RISK - HARM RISK INTERVENTIONS

## 2025-05-27 NOTE — PROGRESS NOTE ADULT - PROBLEM SELECTOR PLAN 6
- Home med tradjenta  - ISS + FS achs
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
## 2025-05-27 NOTE — PROGRESS NOTE ADULT - PROBLEM SELECTOR PLAN 5
- 3/28/25 diagnostic LHC via RRA = mRCA 60%- medical management

## 2025-05-27 NOTE — PROGRESS NOTE ADULT - ASSESSMENT
Patient is a 80F, with PMHx of ESRD on HD (M,W,F at [facility], [phone]), SSS, St. Ricky PPM, afib on Eliquis, diverticulosis, colectomy, DVT, DM2, who comes in with shortness of breath (WEBBER) for 2 days and dry cough. Admitted for acute respiratory failure with hypoxia.
Patient is a 80F, with PMHx of ESRD on HD (M,W,F at [facility], [phone]), SSS, St. Ricky PPM, afib on Eliquis, diverticulosis, colectomy, DVT, DM2, who comes in with shortness of breath (WEBBER) for 2 days and dry cough. Admitted for acute respiratory failure with hypoxia.
Patient is a 80F, with PMHx of ESRD on HD (M,W,F at [facility], [phone]), SSS, St. Ricky PPM, afib on Eliquis, diverticulosis, colectomy, DVT, DM2, who comes in with shortness of breath (WEBBER) for 2 days and dry cough. Admitted for acute respiratory failure with hypoxia.
Patient is a 80F, with PMHx of ESRD on HD (M,W,F at [facility], [phone]), SSS, St. Ricky PPM, afib on Eliquis, diverticulosis, colectomy, DVT, DM2, who comes in with shortness of breath (WEBBER) for 2 days and dry cough. Admitted for acute respiratory failure with hypoxia.

## 2025-05-27 NOTE — PROGRESS NOTE ADULT - PROBLEM SELECTOR PLAN 3
- ESRD on HD MWF  - Last HD 5/23  - No sign of fluid overload  - Nephro consult in AM please

## 2025-05-27 NOTE — PROGRESS NOTE ADULT - TIME BILLING
The necessity of the time spent during the encounter on this date of service was due to:   - Ordering, reviewing, and interpreting labs, testing, and imaging.  - Independently obtaining a review of systems and performing a physical exam  - Reviewing prior hospitalization and where necessary, outpatient records.  - Reviewing consultant recommendations/communicating with consultants  - Counselling and educating patient and family regarding interpretation of aforementioned items and plan of care.    Time-based billing (NON-critical care). Total minutes spent: 38
The necessity of the time spent during the encounter on this date of service was due to:   - Ordering, reviewing, and interpreting labs, testing, and imaging.  - Independently obtaining a review of systems and performing a physical exam  - Reviewing prior hospitalization and where necessary, outpatient records.  - Reviewing consultant recommendations/communicating with consultants  - Counselling and educating patient and family regarding interpretation of aforementioned items and plan of care.    Time-based billing (NON-critical care). Total minutes spent: 38

## 2025-05-27 NOTE — PROGRESS NOTE ADULT - PROBLEM SELECTOR PLAN 7
C/w amlodipine, hydralazine, metoprolol

## 2025-05-27 NOTE — PATIENT PROFILE ADULT - NSPRONUTRITIONRISK_GEN_A_NUR
5/30/2023 Ms. Fariba Hinkle ios a 26 year old female here for f/u of ulcerative proctitis.  She has previously failed mesalamine with Apriso as well as humira. She has been on rinvoq since last fall. She is having 1-2 formed stools. She denies any blood in her stool. Her labs are all stable. She has had some bruising but not too bothersome. She is living in [place] and now working in [employer]. Her family still lives in [place]. CS No indicators present

## 2025-05-27 NOTE — PROGRESS NOTE ADULT - PROBLEM SELECTOR PLAN 4
- C/w Eliquis, metoprolol

## 2025-05-28 VITALS
TEMPERATURE: 98 F | OXYGEN SATURATION: 98 % | RESPIRATION RATE: 16 BRPM | HEART RATE: 60 BPM | SYSTOLIC BLOOD PRESSURE: 130 MMHG | DIASTOLIC BLOOD PRESSURE: 83 MMHG

## 2025-05-28 LAB
GLUCOSE BLDC GLUCOMTR-MCNC: 130 MG/DL — HIGH (ref 70–99)
GLUCOSE BLDC GLUCOMTR-MCNC: 150 MG/DL — HIGH (ref 70–99)

## 2025-05-28 PROCEDURE — 99239 HOSP IP/OBS DSCHRG MGMT >30: CPT

## 2025-05-28 RX ORDER — LEVOFLOXACIN 25 MG/ML
1 SOLUTION ORAL
Qty: 3 | Refills: 0
Start: 2025-05-28 | End: 2025-05-30

## 2025-05-28 RX ADMIN — EPOETIN ALFA 10000 UNIT(S): 10000 SOLUTION INTRAVENOUS; SUBCUTANEOUS at 17:01

## 2025-05-28 RX ADMIN — Medication 3 MILLIGRAM(S): at 00:17

## 2025-05-28 RX ADMIN — AMLODIPINE BESYLATE 10 MILLIGRAM(S): 10 TABLET ORAL at 06:03

## 2025-05-28 RX ADMIN — METOPROLOL SUCCINATE 100 MILLIGRAM(S): 50 TABLET, EXTENDED RELEASE ORAL at 06:02

## 2025-05-28 RX ADMIN — Medication 25 MILLIGRAM(S): at 06:02

## 2025-05-28 RX ADMIN — APIXABAN 2.5 MILLIGRAM(S): 2.5 TABLET, FILM COATED ORAL at 06:02

## 2025-05-28 RX ADMIN — Medication 25 MILLIGRAM(S): at 14:39

## 2025-05-28 RX ADMIN — APIXABAN 2.5 MILLIGRAM(S): 2.5 TABLET, FILM COATED ORAL at 17:03

## 2025-05-28 NOTE — DISCHARGE NOTE NURSING/CASE MANAGEMENT/SOCIAL WORK - PATIENT PORTAL LINK FT
You can access the FollowMyHealth Patient Portal offered by [facility] by registering at the following website: [email or web address]. By joining [employer]’s FollowMyHealth portal, you will also be able to view your health information using other applications (apps) compatible with our system.

## 2025-05-28 NOTE — DISCHARGE NOTE PROVIDER - ATTENDING DISCHARGE PHYSICAL EXAMINATION:
T(C): 36.4 (05-28-25 @ 14:34), Max: 36.7 (05-27-25 @ 16:18)  HR: 60 (05-28-25 @ 14:34) (59 - 65)  BP: 124/66 (05-28-25 @ 14:34) (109/76 - 147/65)  RR: 18 (05-28-25 @ 14:34) (17 - 18)  SpO2: 97% (05-28-25 @ 14:34) (92% - 100%)    CONSTITUTIONAL: Well groomed, no apparent distress  EYES: PERRLA and symmetric, EOMI, No conjunctival or scleral injection, non-icteric  ENMT: Oral mucosa with moist membranes. Normal dentition; no pharyngeal injection or exudates             NECK: Supple, symmetric and without tracheal deviation   RESP: No respiratory distress, no use of accessory muscles; CTA b/l, no WRR  CV: RRR, +S1S2, no MRG; no JVD; no peripheral edema  GI: Soft, NT, ND, no rebound, no guarding; no palpable masses; no hepatosplenomegaly; no hernia palpated  LYMPH: No cervical LAD or tenderness; no axillary LAD or tenderness; no inguinal LAD or tenderness  SKIN: No rashes or ulcers noted; no subcutaneous nodules or induration palpable   NEURO: CN II-XII intact; normal reflexes in upper and lower extremities, sensation intact in upper and lower extremities b/l to light touch   PSYCH: Appropriate insight/judgment; A+O x 3, mood and affect appropriate, recent/remote memory intact

## 2025-05-28 NOTE — DISCHARGE NOTE PROVIDER - HOSPITAL COURSE
HPI:  Patient is a 80F, with PMHx of ESRD on HD (M,W,F at [facility], [phone]), SSS, [facility] PPM, afib on Eliquis, diverticulosis, colectomy, DVT, DM2, who comes in with shortness of breath (WEBBER) for 2 days. She also has 3 days of dry cough. She had her last HD today and she still feels short of breath. She endorses having BL pleural effusions recently.    Patient was recently at [facility]. 3/21, CT scan showed B/L pleural effusions, but collections were too small for thoracentesis. 3/22 TTE with EF 60-65%. 3/28 LHC = mRCA 60% medical management. (23 May 2025 20:05)      Patient is a 80F, with PMHx of ESRD on HD (M,W,F at [facility], [phone]), SSS, [facility] PPM, afib on Eliquis, diverticulosis, colectomy, DVT, DM2, who comes in with shortness of breath (WEBBER) for 2 days and dry cough. Admitted for acute respiratory failure with hypoxia.       Problem/Plan - 1:  ·  Problem: Acute respiratory failure with hypoxia.   ·  Plan: - Per ED document, patient desatting to 88% on room air, placed on 4 L of nasal cannula.  - Pt now saturating 94% with 3L NC  - P/w WEBBER for 2 days and 3 days of dry cough  - COVID/Flu/RSV negative  - CT chest = Small bilateral pleural effusions with adjacent airspace opacity, either representing chronic atelectasis aneurysm is multifocal pneumonia. Dilated main pulmonary artery, suggestive of pulmonary hypertension.     Problem/Plan - 2:  ·  Problem: Multifocal pneumonia.   ·  Plan: - As above  - SIRS criteria not met  - Will treat with Azithromycin + Ceftriaxone  - F/u legionella, mycoplasma, strep pneumo (pt makes little bit of urine).     Problem/Plan - 3:  ·  Problem: ESRD on hemodialysis.   ·  Plan: - ESRD on HD MWF  - Last HD 5/23  - No sign of fluid overload  - Nephro consult in AM please.     Problem/Plan - 4:  ·  Problem: History of atrial fibrillation.   ·  Plan: - C/w Eliquis, metoprolol.     Problem/Plan - 5:  ·  Problem: CAD (coronary artery disease).   ·  Plan: - 3/28/25 diagnostic LHC via RRA = mRCA 60%- medical management.     Problem/Plan - 6:  ·  Problem: DM (diabetes mellitus).   ·  Plan: - Home med tradjenta  - ISS + FS achs.     Problem/Plan - 7:  ·  Problem: HTN (hypertension).   ·  Plan: C/w amlodipine, hydralazine, metoprolol.     Problem/Plan - 8:  ·  Problem: HLD (hyperlipidemia).   ·  Plan: - C/w lipitor.     Problem/Plan - 9:  ·  Problem: Prophylactic measure.   ·  Plan: - C/w home Eliquis.

## 2025-05-28 NOTE — DISCHARGE NOTE PROVIDER - NSDCCPCAREPLAN_GEN_ALL_CORE_FT
PRINCIPAL DISCHARGE DIAGNOSIS  Diagnosis: Acute hypoxic respiratory failure  Assessment and Plan of Treatment:       SECONDARY DISCHARGE DIAGNOSES  Diagnosis: Multifocal pneumonia  Assessment and Plan of Treatment:     Diagnosis: CAD (coronary artery disease)  Assessment and Plan of Treatment:     Diagnosis: DM (diabetes mellitus)  Assessment and Plan of Treatment:     Diagnosis: HTN (hypertension)  Assessment and Plan of Treatment:

## 2025-05-28 NOTE — DISCHARGE NOTE PROVIDER - NSDCMRMEDTOKEN_GEN_ALL_CORE_FT
acetaminophen 325 mg oral tablet: 2 tab(s) orally every 6 hours as needed for  mild pain  amLODIPine 10 mg oral tablet: 1 tab(s) orally once a day  apixaban 2.5 mg oral tablet: 1 tab(s) orally every 12 hours Resume 3/29/25  epoetin torrie: 4,000 unit(s) intravenous 3 times a week at Hemodialysis  hydrALAZINE 25 mg oral tablet: 1 tab(s) orally 3 times a day  levoFLOXacin 250 mg oral tablet: 1 tab(s) orally once a day  metoprolol succinate 100 mg oral tablet, extended release: 1 tab(s) orally once a day  simvastatin 20 mg oral tablet: 1 tab(s) orally once a day (at bedtime)  sodium chloride 0.5% nasal spray: 1 spray(s) in each nostril every 1 to 2 hours as needed for  dry nasal passages  Tradjenta 5 mg oral tablet: 1 tab(s) orally once a day

## 2025-05-28 NOTE — DISCHARGE NOTE NURSING/CASE MANAGEMENT/SOCIAL WORK - NSDCPEFALRISK_GEN_ALL_CORE
For information on Fall & Injury Prevention, visit: [email or web address] OR  [email or web address] OR  https://www.cdc.gov/steadi/patient.html

## 2025-05-28 NOTE — PROGRESS NOTE ADULT - SUBJECTIVE AND OBJECTIVE BOX
[facility] NEPHROLOGY SERVICES, [facility]  NEPHROLOGY AND HYPERTENSION  [address]  [address]  [place] [address]  [phone]    MD PRAVEEN LYNNE MD YELENA ROSENBERG, MD BINNY KOSHY, MD CHRISTOPHER CAPUTO, MD EDWARD BOVER, MD          Patient events noted  No distress  Feels better     MEDICATIONS  (STANDING):  amLODIPine   Tablet 10 milliGRAM(s) Oral daily  apixaban 2.5 milliGRAM(s) Oral two times a day  atorvastatin 10 milliGRAM(s) Oral at bedtime  cefTRIAXone   IVPB      cefTRIAXone   IVPB 1000 milliGRAM(s) IV Intermittent every 24 hours  dextrose 50% Injectable 25 Gram(s) IV Push once  dextrose 50% Injectable 50 Gram(s) IV Push once  dextrose 50% Injectable 50 Gram(s) IV Push once  glucagon  Injectable 1 milliGRAM(s) IntraMuscular once  hydrALAZINE 25 milliGRAM(s) Oral three times a day  insulin lispro (ADMELOG) corrective regimen sliding scale   SubCutaneous three times a day before meals  insulin lispro (ADMELOG) corrective regimen sliding scale   SubCutaneous at bedtime  metoprolol succinate  milliGRAM(s) Oral daily    MEDICATIONS  (PRN):  acetaminophen     Tablet .. 650 milliGRAM(s) Oral every 6 hours PRN Temp greater or equal to 38C (100.4F), Mild Pain (1 - 3)  aluminum hydroxide/magnesium hydroxide/simethicone Suspension 30 milliLiter(s) Oral every 4 hours PRN Dyspepsia  melatonin 3 milliGRAM(s) Oral at bedtime PRN Insomnia      05-25-25 @ 07:01  -  05-26-25 @ 07:00  --------------------------------------------------------  IN: 120 mL / OUT: 1 mL / NET: 119 mL    05-26-25 @ 07:01  -  05-26-25 @ 19:20  --------------------------------------------------------  IN: 60 mL / OUT: 2500 mL / NET: -2440 mL      PHYSICAL EXAM:      T(C): 36.6 (05-26-25 @ 16:14), Max: 36.7 (05-26-25 @ 04:53)  HR: 93 (05-26-25 @ 17:15) (59 - 93)  BP: 122/65 (05-26-25 @ 17:15) (122/57 - 161/71)  RR: 17 (05-26-25 @ 16:14) (16 - 18)  SpO2: 96% (05-26-25 @ 16:14) (94% - 99%)  Wt(kg): --  Lungs clear  Heart S1S2  Abd soft NT ND  Extremities:   tr edema                                    9.4    6.79  )-----------( 165      ( 26 May 2025 10:30 )             28.2     05-26    139  |  105  |  45[H]  ----------------------------<  197[H]  4.5   |  27  |  8.47[H]    Ca    7.7[L]      26 May 2025 10:30  Phos  5.4     05-26  Mg     2.2     05-26    TPro  6.9  /  Alb  2.8[L]  /  TBili  0.4  /  DBili  x   /  AST  11[L]  /  ALT  7[L]  /  AlkPhos  152[H]  05-26      LIVER FUNCTIONS - ( 26 May 2025 10:30 )  Alb: 2.8 g/dL / Pro: 6.9 gm/dL / ALK PHOS: 152 U/L / ALT: 7 U/L / AST: 11 U/L / GGT: x           Creatinine Trend: 8.47<--, 4.35<--, 3.17<--        Assessment   ESRD, multifactorial  PNA    Plan  HD for today  UF as tolerated  Antibiotic regimen  Discharge planning         Erickson Ray MD
[facility] NEPHROLOGY SERVICES, [facility]  NEPHROLOGY AND HYPERTENSION  [address]  [address]  [place] [address]  [phone]    MD PRAVEEN LYNNE MD YELENA ROSENBERG, MD BINNY KOSHY, MD CHRISTOPHER CAPUTO, MD EDWARD BOVER, MD          Patient events noted    MEDICATIONS  (STANDING):  amLODIPine   Tablet 10 milliGRAM(s) Oral daily  apixaban 2.5 milliGRAM(s) Oral two times a day  atorvastatin 10 milliGRAM(s) Oral at bedtime  azithromycin   Tablet 500 milliGRAM(s) Oral daily  cefTRIAXone   IVPB      cefTRIAXone   IVPB 1000 milliGRAM(s) IV Intermittent every 24 hours  dextrose 50% Injectable 25 Gram(s) IV Push once  dextrose 50% Injectable 50 Gram(s) IV Push once  dextrose 50% Injectable 50 Gram(s) IV Push once  glucagon  Injectable 1 milliGRAM(s) IntraMuscular once  hydrALAZINE 25 milliGRAM(s) Oral three times a day  insulin lispro (ADMELOG) corrective regimen sliding scale   SubCutaneous three times a day before meals  insulin lispro (ADMELOG) corrective regimen sliding scale   SubCutaneous at bedtime  metoprolol succinate  milliGRAM(s) Oral daily    MEDICATIONS  (PRN):  acetaminophen     Tablet .. 650 milliGRAM(s) Oral every 6 hours PRN Temp greater or equal to 38C (100.4F), Mild Pain (1 - 3)  aluminum hydroxide/magnesium hydroxide/simethicone Suspension 30 milliLiter(s) Oral every 4 hours PRN Dyspepsia  melatonin 3 milliGRAM(s) Oral at bedtime PRN Insomnia      05-24-25 @ 07:01 - 05-25-25 @ 07:00  --------------------------------------------------------  IN: 347 mL / OUT: 0 mL / NET: 347 mL    05-25-25 @ 07:01 - 05-25-25 @ 21:59  --------------------------------------------------------  IN: 120 mL / OUT: 1 mL / NET: 119 mL      PHYSICAL EXAM:      T(C): 36.6 (05-25-25 @ 21:10), Max: 36.7 (05-25-25 @ 17:11)  HR: 63 (05-25-25 @ 21:10) (60 - 63)  BP: 126/64 (05-25-25 @ 21:10) (112/51 - 148/70)  RR: 18 (05-25-25 @ 21:10) (18 - 18)  SpO2: 94% (05-25-25 @ 21:10) (93% - 98%)  Wt(kg): --  Lungs clear ant decreased BS bases   Heart S1S2  Abd soft NT ND  Extremities:   tr edema                                    10.3   8.58  )-----------( 134      ( 24 May 2025 06:40 )             31.2     05-24    136  |  100  |  12  ----------------------------<  91  4.2   |  31  |  4.35[H]    Ca    8.4[L]      24 May 2025 06:40  Phos  3.4     05-24  Mg     2.2     05-24    TPro  7.2  /  Alb  2.9[L]  /  TBili  0.8  /  DBili  x   /  AST  16  /  ALT  7[L]  /  AlkPhos  154[H]  05-24      LIVER FUNCTIONS - ( 24 May 2025 06:40 )  Alb: 2.9 g/dL / Pro: 7.2 gm/dL / ALK PHOS: 154 U/L / ALT: 7 U/L / AST: 16 U/L / GGT: x           Creatinine Trend: 4.35<--, 3.17<--          Assessment   ESRD, multifactorial  PNA    Plan  Antibiotic regimen  HD for tomorrow       Erickson Ray MD
[facility] NEPHROLOGY SERVICES, [facility]  NEPHROLOGY AND HYPERTENSION  [address]  [address]  [place] [address]  [phone]    MD PRAVEEN LYNNE MD YELENA ROSENBERG, MD BINNY KOSHY, MD CHRISTOPHER CAPUTO, MD EDWARD BOVER, MD          Patient events noted    MEDICATIONS  (STANDING):  amLODIPine   Tablet 10 milliGRAM(s) Oral daily  apixaban 2.5 milliGRAM(s) Oral two times a day  atorvastatin 10 milliGRAM(s) Oral at bedtime  cefTRIAXone   IVPB      cefTRIAXone   IVPB 1000 milliGRAM(s) IV Intermittent every 24 hours  chlorhexidine 2% Cloths 1 Application(s) Topical daily  dextrose 50% Injectable 25 Gram(s) IV Push once  dextrose 50% Injectable 50 Gram(s) IV Push once  dextrose 50% Injectable 50 Gram(s) IV Push once  glucagon  Injectable 1 milliGRAM(s) IntraMuscular once  hydrALAZINE 25 milliGRAM(s) Oral three times a day  insulin lispro (ADMELOG) corrective regimen sliding scale   SubCutaneous three times a day before meals  insulin lispro (ADMELOG) corrective regimen sliding scale   SubCutaneous at bedtime  metoprolol succinate  milliGRAM(s) Oral daily    MEDICATIONS  (PRN):  acetaminophen     Tablet .. 650 milliGRAM(s) Oral every 6 hours PRN Temp greater or equal to 38C (100.4F), Mild Pain (1 - 3)  aluminum hydroxide/magnesium hydroxide/simethicone Suspension 30 milliLiter(s) Oral every 4 hours PRN Dyspepsia  melatonin 3 milliGRAM(s) Oral at bedtime PRN Insomnia      05-27-25 @ 07:01 - 05-28-25 @ 07:00  --------------------------------------------------------  IN: 340 mL / OUT: 0 mL / NET: 340 mL    05-28-25 @ 07:01 - 05-28-25 @ 22:44  --------------------------------------------------------  IN: 340 mL / OUT: 1500 mL / NET: -1160 mL      PHYSICAL EXAM:      T(C): 36.6 (05-28-25 @ 19:15), Max: 36.6 (05-28-25 @ 05:44)  HR: 60 (05-28-25 @ 19:15) (59 - 62)  BP: 130/83 (05-28-25 @ 19:15) (109/76 - 131/68)  RR: 16 (05-28-25 @ 19:15) (16 - 18)  SpO2: 98% (05-28-25 @ 19:15) (92% - 100%)  Wt(kg): --  Lungs clear  Heart S1S2  Abd soft NT ND  Extremities:   tr edema                          Creatinine Trend: 8.47<--, 4.35<--, 3.17<--      Assessment   ESRD, PNA    Plan:  HD for today  UF as tolerated  Will follow     Erickson Ray MD
HPI:  Patient is a 80F, with PMHx of ESRD on HD (M,W,F at [facility], [phone]), SSS, [facility] PPM, afib on Eliquis, diverticulosis, colectomy, DVT, DM2, who comes in with shortness of breath (WEBBER) for 2 days. She also has 3 days of dry cough. She had her last HD today and she still feels short of breath. She endorses having BL pleural effusions recently.    Patient was recently at [facility]. 3/21, CT scan showed B/L pleural effusions, but collections were too small for thoracentesis. 3/22 TTE with EF 60-65%. 3/28 LHC = mRCA 60% medical management. (23 May 2025 20:05)  Patient is a 80y old  Female who presents with a chief complaint of Acute respiratory failure with hypoxia (26 May 2025 19:20)      INTERVAL HPI/OVERNIGHT EVENTS: no acute events overnight     MEDICATIONS  (STANDING):  amLODIPine   Tablet 10 milliGRAM(s) Oral daily  apixaban 2.5 milliGRAM(s) Oral two times a day  atorvastatin 10 milliGRAM(s) Oral at bedtime  cefTRIAXone   IVPB      cefTRIAXone   IVPB 1000 milliGRAM(s) IV Intermittent every 24 hours  chlorhexidine 2% Cloths 1 Application(s) Topical daily  dextrose 50% Injectable 25 Gram(s) IV Push once  dextrose 50% Injectable 50 Gram(s) IV Push once  dextrose 50% Injectable 50 Gram(s) IV Push once  epoetin torrie-epbx (RETACRIT) Injectable 01736 Unit(s) SubCutaneous once  glucagon  Injectable 1 milliGRAM(s) IntraMuscular once  hydrALAZINE 25 milliGRAM(s) Oral three times a day  insulin lispro (ADMELOG) corrective regimen sliding scale   SubCutaneous three times a day before meals  insulin lispro (ADMELOG) corrective regimen sliding scale   SubCutaneous at bedtime  metoprolol succinate  milliGRAM(s) Oral daily    MEDICATIONS  (PRN):  acetaminophen     Tablet .. 650 milliGRAM(s) Oral every 6 hours PRN Temp greater or equal to 38C (100.4F), Mild Pain (1 - 3)  aluminum hydroxide/magnesium hydroxide/simethicone Suspension 30 milliLiter(s) Oral every 4 hours PRN Dyspepsia  melatonin 3 milliGRAM(s) Oral at bedtime PRN Insomnia      Allergies    No Known Allergies    Intolerances        REVIEW OF SYSTEMS:  CONSTITUTIONAL: No fever, weight loss, or fatigue  EYES: No eye pain, visual disturbances, or discharge  ENMT:  No difficulty hearing, tinnitus, vertigo; No sinus or throat pain  NECK: No pain or stiffness  BREASTS: No pain, masses, or nipple discharge  RESPIRATORY: No cough, wheezing, chills or hemoptysis; No shortness of breath  CARDIOVASCULAR: No chest pain, palpitations, dizziness, or leg swelling  GASTROINTESTINAL: No abdominal or epigastric pain. No nausea, vomiting, or hematemesis; No diarrhea or constipation. No melena or hematochezia.  GENITOURINARY: No dysuria, frequency, hematuria, or incontinence  NEUROLOGICAL: No headaches, memory loss, loss of strength, numbness, or tremors  SKIN: No itching, burning, rashes, or lesions   LYMPH NODES: No enlarged glands  ENDOCRINE: No heat or cold intolerance; No hair loss  MUSCULOSKELETAL: No joint pain or swelling; No muscle, back, or extremity pain  PSYCHIATRIC: No depression, anxiety, mood swings, or difficulty sleeping  HEME/LYMPH: No easy bruising, or bleeding gums  ALLERGY AND IMMUNOLOGIC: No hives or eczema    Vital Signs Last 24 Hrs  T(C): 36.6 (28 May 2025 11:00), Max: 36.7 (27 May 2025 16:18)  T(F): 97.8 (28 May 2025 11:00), Max: 98.1 (27 May 2025 16:18)  HR: 59 (28 May 2025 11:00) (59 - 65)  BP: 109/76 (28 May 2025 11:00) (109/76 - 147/65)  BP(mean): --  RR: 17 (28 May 2025 11:00) (17 - 18)  SpO2: 97% (28 May 2025 11:00) (92% - 98%)    Parameters below as of 28 May 2025 11:00  Patient On (Oxygen Delivery Method): room air        PHYSICAL EXAM:  GENERAL: NAD, well-groomed, well-developed  HEAD:  Atraumatic, Normocephalic  EYES: EOMI, PERRLA, conjunctiva and sclera clear  ENMT: No tonsillar erythema, exudates, or enlargement; Moist mucous membranes, Good dentition, No lesions  NECK: Supple, No JVD, Normal thyroid  NERVOUS SYSTEM:  Alert & Oriented X3, Good concentration; Motor Strength 5/5 B/L upper and lower extremities; DTRs 2+ intact and symmetric  CHEST/LUNG: Clear to ascultation  bilaterally; No rales, rhonchi, wheezing, or rubs  HEART: Regular rate and rhythm; No murmurs, rubs, or gallops  ABDOMEN: Soft, Nontender, Nondistended; Bowel sounds present  EXTREMITIES:  2+ Peripheral Pulses, No clubbing, cyanosis, or edema  LYMPH: No lymphadenopathy noted  SKIN: No rashes or lesions    LABS:              CAPILLARY BLOOD GLUCOSE      POCT Blood Glucose.: 130 mg/dL (28 May 2025 07:45)  POCT Blood Glucose.: 193 mg/dL (27 May 2025 21:17)  POCT Blood Glucose.: 101 mg/dL (27 May 2025 16:16)      RADIOLOGY & ADDITIONAL TESTS:    Imaging Personally Reviewed:  [ ] YES  [ ] NO    Consultant(s) Notes Reviewed:  [ ] YES  [ ] NO    Care Discussed with Consultants/Other Providers [ ] YES  [ ] NO
HPI:  Patient is a 80F, with PMHx of ESRD on HD (M,W,F at [facility], [phone]), SSS, [facility] Ricky PPM, afib on Eliquis, diverticulosis, colectomy, DVT, DM2, who comes in with shortness of breath (WEBBER) for 2 days. She also has 3 days of dry cough. She had her last HD today and she still feels short of breath. She endorses having BL pleural effusions recently.    Patient was recently at [facility]. 3/21, CT scan showed B/L pleural effusions, but collections were too small for thoracentesis. 3/22 TTE with EF 60-65%. 3/28 LHC = mRCA 60% medical management. (23 May 2025 20:05)  Patient is a 80y old  Female who presents with a chief complaint of Acute respiratory failure with hypoxia (25 May 2025 21:59)      INTERVAL HPI/OVERNIGHT EVENTS:    MEDICATIONS  (STANDING):  amLODIPine   Tablet 10 milliGRAM(s) Oral daily  apixaban 2.5 milliGRAM(s) Oral two times a day  atorvastatin 10 milliGRAM(s) Oral at bedtime  azithromycin   Tablet 500 milliGRAM(s) Oral daily  cefTRIAXone   IVPB      cefTRIAXone   IVPB 1000 milliGRAM(s) IV Intermittent every 24 hours  dextrose 50% Injectable 25 Gram(s) IV Push once  dextrose 50% Injectable 50 Gram(s) IV Push once  dextrose 50% Injectable 50 Gram(s) IV Push once  glucagon  Injectable 1 milliGRAM(s) IntraMuscular once  hydrALAZINE 25 milliGRAM(s) Oral three times a day  insulin lispro (ADMELOG) corrective regimen sliding scale   SubCutaneous three times a day before meals  insulin lispro (ADMELOG) corrective regimen sliding scale   SubCutaneous at bedtime  metoprolol succinate  milliGRAM(s) Oral daily    MEDICATIONS  (PRN):  acetaminophen     Tablet .. 650 milliGRAM(s) Oral every 6 hours PRN Temp greater or equal to 38C (100.4F), Mild Pain (1 - 3)  aluminum hydroxide/magnesium hydroxide/simethicone Suspension 30 milliLiter(s) Oral every 4 hours PRN Dyspepsia  melatonin 3 milliGRAM(s) Oral at bedtime PRN Insomnia      Allergies    No Known Allergies    Intolerances        REVIEW OF SYSTEMS:  CONSTITUTIONAL: No fever, weight loss, or fatigue  EYES: No eye pain, visual disturbances, or discharge  ENMT:  No difficulty hearing, tinnitus, vertigo; No sinus or throat pain  NECK: No pain or stiffness  BREASTS: No pain, masses, or nipple discharge  RESPIRATORY: No cough, wheezing, chills or hemoptysis; No shortness of breath  CARDIOVASCULAR: No chest pain, palpitations, dizziness, or leg swelling  GASTROINTESTINAL: No abdominal or epigastric pain. No nausea, vomiting, or hematemesis; No diarrhea or constipation. No melena or hematochezia.  GENITOURINARY: No dysuria, frequency, hematuria, or incontinence  NEUROLOGICAL: No headaches, memory loss, loss of strength, numbness, or tremors  SKIN: No itching, burning, rashes, or lesions   LYMPH NODES: No enlarged glands  ENDOCRINE: No heat or cold intolerance; No hair loss  MUSCULOSKELETAL: No joint pain or swelling; No muscle, back, or extremity pain  PSYCHIATRIC: No depression, anxiety, mood swings, or difficulty sleeping  HEME/LYMPH: No easy bruising, or bleeding gums  ALLERGY AND IMMUNOLOGIC: No hives or eczema    Vital Signs Last 24 Hrs  T(C): 36.7 (26 May 2025 13:35), Max: 36.7 (25 May 2025 17:11)  T(F): 98 (26 May 2025 13:35), Max: 98.1 (25 May 2025 17:11)  HR: 60 (26 May 2025 13:35) (59 - 63)  BP: 123/72 (26 May 2025 13:35) (112/51 - 161/71)  BP(mean): --  RR: 16 (26 May 2025 13:35) (16 - 18)  SpO2: 98% (26 May 2025 13:35) (94% - 99%)    Parameters below as of 26 May 2025 13:35  Patient On (Oxygen Delivery Method): nasal cannula  O2 Flow (L/min): 3      PHYSICAL EXAM:  GENERAL: NAD, well-groomed, well-developed  HEAD:  Atraumatic, Normocephalic  EYES: EOMI, PERRLA, conjunctiva and sclera clear  ENMT: No tonsillar erythema, exudates, or enlargement; Moist mucous membranes, Good dentition, No lesions  NECK: Supple, No JVD, Normal thyroid  NERVOUS SYSTEM:  Alert & Oriented X3, Good concentration; Motor Strength 5/5 B/L upper and lower extremities; DTRs 2+ intact and symmetric  CHEST/LUNG: Clear to ascultation  bilaterally; No rales, rhonchi, wheezing, or rubs  HEART: Regular rate and rhythm; No murmurs, rubs, or gallops  ABDOMEN: Soft, Nontender, Nondistended; Bowel sounds present  EXTREMITIES:  2+ Peripheral Pulses, No clubbing, cyanosis, or edema  LYMPH: No lymphadenopathy noted  SKIN: No rashes or lesions    LABS:                        9.4    6.79  )-----------( 165      ( 26 May 2025 10:30 )             28.2     05-26    139  |  105  |  45[H]  ----------------------------<  197[H]  4.5   |  27  |  8.47[H]    Ca    7.7[L]      26 May 2025 10:30  Phos  5.4     05-26  Mg     2.2     05-26    TPro  6.9  /  Alb  2.8[L]  /  TBili  0.4  /  DBili  x   /  AST  11[L]  /  ALT  7[L]  /  AlkPhos  152[H]  05-26      Urinalysis Basic - ( 26 May 2025 10:30 )    Color: x / Appearance: x / SG: x / pH: x  Gluc: 197 mg/dL / Ketone: x  / Bili: x / Urobili: x   Blood: x / Protein: x / Nitrite: x   Leuk Esterase: x / RBC: x / WBC x   Sq Epi: x / Non Sq Epi: x / Bacteria: x      CAPILLARY BLOOD GLUCOSE      POCT Blood Glucose.: 152 mg/dL (26 May 2025 14:03)  POCT Blood Glucose.: 94 mg/dL (26 May 2025 07:42)  POCT Blood Glucose.: 116 mg/dL (25 May 2025 22:26)  POCT Blood Glucose.: 185 mg/dL (25 May 2025 17:19)      RADIOLOGY & ADDITIONAL TESTS:    Imaging Personally Reviewed:  [ ] YES  [ ] NO    Consultant(s) Notes Reviewed:  [ ] YES  [ ] NO    Care Discussed with Consultants/Other Providers [ ] YES  [ ] NO
HPI:  Patient is a 80F, with PMHx of ESRD on HD (M,W,F at [facility], [phone]), SSS, [facility] Ricky PPM, afib on Eliquis, diverticulosis, colectomy, DVT, DM2, who comes in with shortness of breath (WEBBER) for 2 days. She also has 3 days of dry cough. She had her last HD today and she still feels short of breath. She endorses having BL pleural effusions recently.    Patient was recently at [facility]. 3/21, CT scan showed B/L pleural effusions, but collections were too small for thoracentesis. 3/22 TTE with EF 60-65%. 3/28 LHC = mRCA 60% medical management. (23 May 2025 20:05)  Patient is a 80y old  Female who presents with a chief complaint of Acute respiratory failure with hypoxia (25 May 2025 21:59)      INTERVAL HPI/OVERNIGHT EVENTS:    MEDICATIONS  (STANDING):  amLODIPine   Tablet 10 milliGRAM(s) Oral daily  apixaban 2.5 milliGRAM(s) Oral two times a day  atorvastatin 10 milliGRAM(s) Oral at bedtime  azithromycin   Tablet 500 milliGRAM(s) Oral daily  cefTRIAXone   IVPB      cefTRIAXone   IVPB 1000 milliGRAM(s) IV Intermittent every 24 hours  dextrose 50% Injectable 25 Gram(s) IV Push once  dextrose 50% Injectable 50 Gram(s) IV Push once  dextrose 50% Injectable 50 Gram(s) IV Push once  glucagon  Injectable 1 milliGRAM(s) IntraMuscular once  hydrALAZINE 25 milliGRAM(s) Oral three times a day  insulin lispro (ADMELOG) corrective regimen sliding scale   SubCutaneous three times a day before meals  insulin lispro (ADMELOG) corrective regimen sliding scale   SubCutaneous at bedtime  metoprolol succinate  milliGRAM(s) Oral daily    MEDICATIONS  (PRN):  acetaminophen     Tablet .. 650 milliGRAM(s) Oral every 6 hours PRN Temp greater or equal to 38C (100.4F), Mild Pain (1 - 3)  aluminum hydroxide/magnesium hydroxide/simethicone Suspension 30 milliLiter(s) Oral every 4 hours PRN Dyspepsia  melatonin 3 milliGRAM(s) Oral at bedtime PRN Insomnia      Allergies    No Known Allergies    Intolerances        REVIEW OF SYSTEMS:  CONSTITUTIONAL: No fever, weight loss, or fatigue  EYES: No eye pain, visual disturbances, or discharge  ENMT:  No difficulty hearing, tinnitus, vertigo; No sinus or throat pain  NECK: No pain or stiffness  BREASTS: No pain, masses, or nipple discharge  RESPIRATORY: No cough, wheezing, chills or hemoptysis; No shortness of breath  CARDIOVASCULAR: No chest pain, palpitations, dizziness, or leg swelling  GASTROINTESTINAL: No abdominal or epigastric pain. No nausea, vomiting, or hematemesis; No diarrhea or constipation. No melena or hematochezia.  GENITOURINARY: No dysuria, frequency, hematuria, or incontinence  NEUROLOGICAL: No headaches, memory loss, loss of strength, numbness, or tremors  SKIN: No itching, burning, rashes, or lesions   LYMPH NODES: No enlarged glands  ENDOCRINE: No heat or cold intolerance; No hair loss  MUSCULOSKELETAL: No joint pain or swelling; No muscle, back, or extremity pain  PSYCHIATRIC: No depression, anxiety, mood swings, or difficulty sleeping  HEME/LYMPH: No easy bruising, or bleeding gums  ALLERGY AND IMMUNOLOGIC: No hives or eczema    Vital Signs Last 24 Hrs  T(C): 36.7 (26 May 2025 04:53), Max: 36.7 (25 May 2025 17:11)  T(F): 98 (26 May 2025 04:53), Max: 98.1 (25 May 2025 17:11)  HR: 61 (26 May 2025 04:53) (60 - 63)  BP: 129/91 (26 May 2025 04:53) (112/51 - 148/70)  BP(mean): --  RR: 18 (26 May 2025 04:53) (18 - 18)  SpO2: 97% (26 May 2025 04:53) (94% - 98%)    Parameters below as of 26 May 2025 04:53  Patient On (Oxygen Delivery Method): nasal cannula  O2 Flow (L/min): 3      PHYSICAL EXAM:  GENERAL: NAD, well-groomed, well-developed  HEAD:  Atraumatic, Normocephalic  EYES: EOMI, PERRLA, conjunctiva and sclera clear  ENMT: No tonsillar erythema, exudates, or enlargement; Moist mucous membranes, Good dentition, No lesions  NECK: Supple, No JVD, Normal thyroid  NERVOUS SYSTEM:  Alert & Oriented X3, Good concentration; Motor Strength 5/5 B/L upper and lower extremities; DTRs 2+ intact and symmetric  CHEST/LUNG: Clear to ascultation  bilaterally; No rales, rhonchi, wheezing, or rubs  HEART: Regular rate and rhythm; No murmurs, rubs, or gallops  ABDOMEN: Soft, Nontender, Nondistended; Bowel sounds present  EXTREMITIES:  2+ Peripheral Pulses, No clubbing, cyanosis, or edema  LYMPH: No lymphadenopathy noted  SKIN: No rashes or lesions    LABS:            Urinalysis Basic - ( 25 May 2025 14:00 )    Color: Yellow / Appearance: Clear / S.017 / pH: x  Gluc: x / Ketone: x  / Bili: Negative / Urobili: 1.0 mg/dL   Blood: x / Protein: 300 mg/dL / Nitrite: Negative   Leuk Esterase: Small / RBC: 2 /HPF / WBC 20 /HPF   Sq Epi: x / Non Sq Epi: x / Bacteria: Few /HPF      CAPILLARY BLOOD GLUCOSE      POCT Blood Glucose.: 94 mg/dL (26 May 2025 07:42)  POCT Blood Glucose.: 116 mg/dL (25 May 2025 22:26)  POCT Blood Glucose.: 185 mg/dL (25 May 2025 17:19)  POCT Blood Glucose.: 179 mg/dL (25 May 2025 11:07)      RADIOLOGY & ADDITIONAL TESTS:    Imaging Personally Reviewed:  [ ] YES  [ ] NO    Consultant(s) Notes Reviewed:  [ ] YES  [ ] NO    Care Discussed with Consultants/Other Providers [ ] YES  [ ] NO
HPI:  Patient is a 80F, with PMHx of ESRD on HD (M,W,F at [facility], [phone]), SSS, St. Ricky PPM, afib on Eliquis, diverticulosis, colectomy, DVT, DM2, who comes in with shortness of breath (WEBBER) for 2 days. She also has 3 days of dry cough. She had her last HD today and she still feels short of breath. She endorses having BL pleural effusions recently.    Patient was recently at [facility]. 3/21, CT scan showed B/L pleural effusions, but collections were too small for thoracentesis. 3/22 TTE with EF 60-65%. 3/28 LHC = mRCA 60% medical management. (23 May 2025 20:05)  Patient is a 80y old  Female who presents with a chief complaint of Acute respiratory failure with hypoxia (25 May 2025 21:59)      INTERVAL HPI/OVERNIGHT EVENTS:    MEDICATIONS  (STANDING):  amLODIPine   Tablet 10 milliGRAM(s) Oral daily  apixaban 2.5 milliGRAM(s) Oral two times a day  atorvastatin 10 milliGRAM(s) Oral at bedtime  azithromycin   Tablet 500 milliGRAM(s) Oral daily  cefTRIAXone   IVPB      cefTRIAXone   IVPB 1000 milliGRAM(s) IV Intermittent every 24 hours  dextrose 50% Injectable 25 Gram(s) IV Push once  dextrose 50% Injectable 50 Gram(s) IV Push once  dextrose 50% Injectable 50 Gram(s) IV Push once  glucagon  Injectable 1 milliGRAM(s) IntraMuscular once  hydrALAZINE 25 milliGRAM(s) Oral three times a day  insulin lispro (ADMELOG) corrective regimen sliding scale   SubCutaneous three times a day before meals  insulin lispro (ADMELOG) corrective regimen sliding scale   SubCutaneous at bedtime  metoprolol succinate  milliGRAM(s) Oral daily    MEDICATIONS  (PRN):  acetaminophen     Tablet .. 650 milliGRAM(s) Oral every 6 hours PRN Temp greater or equal to 38C (100.4F), Mild Pain (1 - 3)  aluminum hydroxide/magnesium hydroxide/simethicone Suspension 30 milliLiter(s) Oral every 4 hours PRN Dyspepsia  melatonin 3 milliGRAM(s) Oral at bedtime PRN Insomnia      Allergies    No Known Allergies    Intolerances        REVIEW OF SYSTEMS:  CONSTITUTIONAL: No fever, weight loss, or fatigue  EYES: No eye pain, visual disturbances, or discharge  ENMT:  No difficulty hearing, tinnitus, vertigo; No sinus or throat pain  NECK: No pain or stiffness  BREASTS: No pain, masses, or nipple discharge  RESPIRATORY: No cough, wheezing, chills or hemoptysis; No shortness of breath  CARDIOVASCULAR: No chest pain, palpitations, dizziness, or leg swelling  GASTROINTESTINAL: No abdominal or epigastric pain. No nausea, vomiting, or hematemesis; No diarrhea or constipation. No melena or hematochezia.  GENITOURINARY: No dysuria, frequency, hematuria, or incontinence  NEUROLOGICAL: No headaches, memory loss, loss of strength, numbness, or tremors  SKIN: No itching, burning, rashes, or lesions   LYMPH NODES: No enlarged glands  ENDOCRINE: No heat or cold intolerance; No hair loss  MUSCULOSKELETAL: No joint pain or swelling; No muscle, back, or extremity pain  PSYCHIATRIC: No depression, anxiety, mood swings, or difficulty sleeping  HEME/LYMPH: No easy bruising, or bleeding gums  ALLERGY AND IMMUNOLOGIC: No hives or eczema    Vital Signs Last 24 Hrs  T(C): 36.7 (26 May 2025 04:53), Max: 36.7 (25 May 2025 17:11)  T(F): 98 (26 May 2025 04:53), Max: 98.1 (25 May 2025 17:11)  HR: 61 (26 May 2025 04:53) (60 - 63)  BP: 129/91 (26 May 2025 04:53) (112/51 - 148/70)  BP(mean): --  RR: 18 (26 May 2025 04:53) (18 - 18)  SpO2: 97% (26 May 2025 04:53) (94% - 98%)    Parameters below as of 26 May 2025 04:53  Patient On (Oxygen Delivery Method): nasal cannula  O2 Flow (L/min): 3      PHYSICAL EXAM:  GENERAL: NAD, well-groomed, well-developed  HEAD:  Atraumatic, Normocephalic  EYES: EOMI, PERRLA, conjunctiva and sclera clear  ENMT: No tonsillar erythema, exudates, or enlargement; Moist mucous membranes, Good dentition, No lesions  NECK: Supple, No JVD, Normal thyroid  NERVOUS SYSTEM:  Alert & Oriented X3, Good concentration; Motor Strength 5/5 B/L upper and lower extremities; DTRs 2+ intact and symmetric  CHEST/LUNG: Clear to ascultation  bilaterally; No rales, rhonchi, wheezing, or rubs  HEART: Regular rate and rhythm; No murmurs, rubs, or gallops  ABDOMEN: Soft, Nontender, Nondistended; Bowel sounds present  EXTREMITIES:  2+ Peripheral Pulses, No clubbing, cyanosis, or edema  LYMPH: No lymphadenopathy noted  SKIN: No rashes or lesions    LABS:            Urinalysis Basic - ( 25 May 2025 14:00 )    Color: Yellow / Appearance: Clear / S.017 / pH: x  Gluc: x / Ketone: x  / Bili: Negative / Urobili: 1.0 mg/dL   Blood: x / Protein: 300 mg/dL / Nitrite: Negative   Leuk Esterase: Small / RBC: 2 /HPF / WBC 20 /HPF   Sq Epi: x / Non Sq Epi: x / Bacteria: Few /HPF      CAPILLARY BLOOD GLUCOSE      POCT Blood Glucose.: 94 mg/dL (26 May 2025 07:42)  POCT Blood Glucose.: 116 mg/dL (25 May 2025 22:26)  POCT Blood Glucose.: 185 mg/dL (25 May 2025 17:19)  POCT Blood Glucose.: 179 mg/dL (25 May 2025 11:07)      RADIOLOGY & ADDITIONAL TESTS:    Imaging Personally Reviewed:  [ ] YES  [ ] NO    Consultant(s) Notes Reviewed:  [ ] YES  [ ] NO    Care Discussed with Consultants/Other Providers [ ] YES  [ ] NO

## 2025-05-28 NOTE — DISCHARGE NOTE NURSING/CASE MANAGEMENT/SOCIAL WORK - FINANCIAL ASSISTANCE
[facility] provides services at a reduced cost to those who are determined to be eligible through [facility]’s financial assistance program. Information regarding [facility]’s financial assistance program can be found by going to [email or web address] or by calling [phone].

## 2025-06-04 DIAGNOSIS — E11.22 TYPE 2 DIABETES MELLITUS WITH DIABETIC CHRONIC KIDNEY DISEASE: ICD-10-CM

## 2025-06-04 DIAGNOSIS — E78.5 HYPERLIPIDEMIA, UNSPECIFIED: ICD-10-CM

## 2025-06-04 DIAGNOSIS — Z79.899 OTHER LONG TERM (CURRENT) DRUG THERAPY: ICD-10-CM

## 2025-06-04 DIAGNOSIS — K57.90 DIVERTICULOSIS OF INTESTINE, PART UNSPECIFIED, WITHOUT PERFORATION OR ABSCESS WITHOUT BLEEDING: ICD-10-CM

## 2025-06-04 DIAGNOSIS — J96.01 ACUTE RESPIRATORY FAILURE WITH HYPOXIA: ICD-10-CM

## 2025-06-04 DIAGNOSIS — Z99.2 DEPENDENCE ON RENAL DIALYSIS: ICD-10-CM

## 2025-06-04 DIAGNOSIS — Z86.718 PERSONAL HISTORY OF OTHER VENOUS THROMBOSIS AND EMBOLISM: ICD-10-CM

## 2025-06-04 DIAGNOSIS — I48.91 UNSPECIFIED ATRIAL FIBRILLATION: ICD-10-CM

## 2025-06-04 DIAGNOSIS — I25.10 ATHEROSCLEROTIC HEART DISEASE OF NATIVE CORONARY ARTERY WITHOUT ANGINA PECTORIS: ICD-10-CM

## 2025-06-04 DIAGNOSIS — J18.9 PNEUMONIA, UNSPECIFIED ORGANISM: ICD-10-CM

## 2025-06-04 DIAGNOSIS — Z79.4 LONG TERM (CURRENT) USE OF INSULIN: ICD-10-CM

## 2025-06-04 DIAGNOSIS — Z79.01 LONG TERM (CURRENT) USE OF ANTICOAGULANTS: ICD-10-CM

## 2025-06-04 DIAGNOSIS — Z95.0 PRESENCE OF CARDIAC PACEMAKER: ICD-10-CM

## 2025-06-04 DIAGNOSIS — N18.6 END STAGE RENAL DISEASE: ICD-10-CM

## 2025-06-04 DIAGNOSIS — I49.5 SICK SINUS SYNDROME: ICD-10-CM

## 2025-06-04 DIAGNOSIS — I12.0 HYPERTENSIVE CHRONIC KIDNEY DISEASE WITH STAGE 5 CHRONIC KIDNEY DISEASE OR END STAGE RENAL DISEASE: ICD-10-CM

## 2025-06-04 DIAGNOSIS — Z82.49 FAMILY HISTORY OF ISCHEMIC HEART DISEASE AND OTHER DISEASES OF THE CIRCULATORY SYSTEM: ICD-10-CM
